# Patient Record
Sex: FEMALE | Race: OTHER | HISPANIC OR LATINO | URBAN - METROPOLITAN AREA
[De-identification: names, ages, dates, MRNs, and addresses within clinical notes are randomized per-mention and may not be internally consistent; named-entity substitution may affect disease eponyms.]

---

## 2017-10-16 ENCOUNTER — INPATIENT (INPATIENT)
Facility: HOSPITAL | Age: 57
LOS: 45 days | Discharge: HOME | DRG: 871 | End: 2017-12-01
Attending: SURGERY | Admitting: SURGERY
Payer: MEDICARE

## 2017-10-16 VITALS
RESPIRATION RATE: 30 BRPM | OXYGEN SATURATION: 97 % | TEMPERATURE: 98 F | HEART RATE: 70 BPM | SYSTOLIC BLOOD PRESSURE: 78 MMHG | DIASTOLIC BLOOD PRESSURE: 61 MMHG

## 2017-10-16 DIAGNOSIS — Z90.49 ACQUIRED ABSENCE OF OTHER SPECIFIED PARTS OF DIGESTIVE TRACT: Chronic | ICD-10-CM

## 2017-10-16 DIAGNOSIS — K81.9 CHOLECYSTITIS, UNSPECIFIED: ICD-10-CM

## 2017-10-16 DIAGNOSIS — N20.0 CALCULUS OF KIDNEY: Chronic | ICD-10-CM

## 2017-10-16 LAB
ALBUMIN SERPL ELPH-MCNC: 4.4 G/DL — SIGNIFICANT CHANGE UP (ref 3.3–5)
ALP SERPL-CCNC: 187 U/L — HIGH (ref 40–120)
ALT FLD-CCNC: 67 U/L RC — HIGH (ref 10–45)
ANION GAP SERPL CALC-SCNC: 18 MMOL/L — HIGH (ref 5–17)
APPEARANCE UR: CLEAR — SIGNIFICANT CHANGE UP
APTT BLD: 31.4 SEC — SIGNIFICANT CHANGE UP (ref 27.5–37.4)
AST SERPL-CCNC: 118 U/L — HIGH (ref 10–40)
BASOPHILS # BLD AUTO: 0.1 K/UL — SIGNIFICANT CHANGE UP (ref 0–0.2)
BASOPHILS NFR BLD AUTO: 0.7 % — SIGNIFICANT CHANGE UP (ref 0–2)
BILIRUB SERPL-MCNC: 0.6 MG/DL — SIGNIFICANT CHANGE UP (ref 0.2–1.2)
BILIRUB UR-MCNC: NEGATIVE — SIGNIFICANT CHANGE UP
BLD GP AB SCN SERPL QL: NEGATIVE — SIGNIFICANT CHANGE UP
BUN SERPL-MCNC: 21 MG/DL — SIGNIFICANT CHANGE UP (ref 7–23)
CALCIUM SERPL-MCNC: 10.1 MG/DL — SIGNIFICANT CHANGE UP (ref 8.4–10.5)
CHLORIDE SERPL-SCNC: 103 MMOL/L — SIGNIFICANT CHANGE UP (ref 96–108)
CO2 SERPL-SCNC: 23 MMOL/L — SIGNIFICANT CHANGE UP (ref 22–31)
COLOR SPEC: SIGNIFICANT CHANGE UP
CREAT SERPL-MCNC: 0.92 MG/DL — SIGNIFICANT CHANGE UP (ref 0.5–1.3)
DIFF PNL FLD: NEGATIVE — SIGNIFICANT CHANGE UP
EOSINOPHIL # BLD AUTO: 0.4 K/UL — SIGNIFICANT CHANGE UP (ref 0–0.5)
EOSINOPHIL NFR BLD AUTO: 2.4 % — SIGNIFICANT CHANGE UP (ref 0–6)
EPI CELLS # UR: SIGNIFICANT CHANGE UP /HPF
GAS PNL BLDV: SIGNIFICANT CHANGE UP
GAS PNL BLDV: SIGNIFICANT CHANGE UP
GLUCOSE SERPL-MCNC: 167 MG/DL — HIGH (ref 70–99)
GLUCOSE UR QL: NEGATIVE — SIGNIFICANT CHANGE UP
HCG SERPL-ACNC: <2 MIU/ML — SIGNIFICANT CHANGE UP
HCT VFR BLD CALC: 48.8 % — HIGH (ref 34.5–45)
HGB BLD-MCNC: 16 G/DL — HIGH (ref 11.5–15.5)
INR BLD: 0.95 RATIO — SIGNIFICANT CHANGE UP (ref 0.88–1.16)
KETONES UR-MCNC: NEGATIVE — SIGNIFICANT CHANGE UP
LEUKOCYTE ESTERASE UR-ACNC: NEGATIVE — SIGNIFICANT CHANGE UP
LIDOCAIN IGE QN: 8820 U/L — HIGH (ref 7–60)
LYMPHOCYTES # BLD AUTO: 23.7 % — SIGNIFICANT CHANGE UP (ref 13–44)
LYMPHOCYTES # BLD AUTO: 4 K/UL — HIGH (ref 1–3.3)
MCHC RBC-ENTMCNC: 29.4 PG — SIGNIFICANT CHANGE UP (ref 27–34)
MCHC RBC-ENTMCNC: 32.8 GM/DL — SIGNIFICANT CHANGE UP (ref 32–36)
MCV RBC AUTO: 89.5 FL — SIGNIFICANT CHANGE UP (ref 80–100)
MONOCYTES # BLD AUTO: 0.5 K/UL — SIGNIFICANT CHANGE UP (ref 0–0.9)
MONOCYTES NFR BLD AUTO: 3.1 % — SIGNIFICANT CHANGE UP (ref 2–14)
NEUTROPHILS # BLD AUTO: 11.8 K/UL — HIGH (ref 1.8–7.4)
NEUTROPHILS NFR BLD AUTO: 70 % — SIGNIFICANT CHANGE UP (ref 43–77)
NITRITE UR-MCNC: NEGATIVE — SIGNIFICANT CHANGE UP
PH UR: 7 — SIGNIFICANT CHANGE UP (ref 5–8)
PLATELET # BLD AUTO: 589 K/UL — HIGH (ref 150–400)
POTASSIUM SERPL-MCNC: 3.7 MMOL/L — SIGNIFICANT CHANGE UP (ref 3.5–5.3)
POTASSIUM SERPL-SCNC: 3.7 MMOL/L — SIGNIFICANT CHANGE UP (ref 3.5–5.3)
PROT SERPL-MCNC: 7.7 G/DL — SIGNIFICANT CHANGE UP (ref 6–8.3)
PROT UR-MCNC: SIGNIFICANT CHANGE UP
PROTHROM AB SERPL-ACNC: 10.2 SEC — SIGNIFICANT CHANGE UP (ref 9.8–12.7)
RBC # BLD: 5.45 M/UL — HIGH (ref 3.8–5.2)
RBC # FLD: 12.2 % — SIGNIFICANT CHANGE UP (ref 10.3–14.5)
RBC CASTS # UR COMP ASSIST: SIGNIFICANT CHANGE UP /HPF (ref 0–2)
RH IG SCN BLD-IMP: POSITIVE — SIGNIFICANT CHANGE UP
RH IG SCN BLD-IMP: POSITIVE — SIGNIFICANT CHANGE UP
SODIUM SERPL-SCNC: 144 MMOL/L — SIGNIFICANT CHANGE UP (ref 135–145)
SP GR SPEC: >1.03 — HIGH (ref 1.01–1.02)
UROBILINOGEN FLD QL: NEGATIVE — SIGNIFICANT CHANGE UP
WBC # BLD: 16.9 K/UL — HIGH (ref 3.8–10.5)
WBC # FLD AUTO: 16.9 K/UL — HIGH (ref 3.8–10.5)
WBC UR QL: NEGATIVE /HPF — SIGNIFICANT CHANGE UP (ref 0–5)

## 2017-10-16 PROCEDURE — 99285 EMERGENCY DEPT VISIT HI MDM: CPT | Mod: 25

## 2017-10-16 PROCEDURE — 74183 MRI ABD W/O CNTR FLWD CNTR: CPT | Mod: 26

## 2017-10-16 PROCEDURE — 71010: CPT | Mod: 26

## 2017-10-16 PROCEDURE — 99232 SBSQ HOSP IP/OBS MODERATE 35: CPT

## 2017-10-16 PROCEDURE — 76705 ECHO EXAM OF ABDOMEN: CPT | Mod: 26,RT

## 2017-10-16 PROCEDURE — 74177 CT ABD & PELVIS W/CONTRAST: CPT | Mod: 26

## 2017-10-16 RX ORDER — KETOROLAC TROMETHAMINE 30 MG/ML
30 SYRINGE (ML) INJECTION DAILY
Qty: 0 | Refills: 0 | Status: DISCONTINUED | OUTPATIENT
Start: 2017-10-16 | End: 2017-10-16

## 2017-10-16 RX ORDER — ONDANSETRON 8 MG/1
4 TABLET, FILM COATED ORAL ONCE
Qty: 0 | Refills: 0 | Status: COMPLETED | OUTPATIENT
Start: 2017-10-16 | End: 2017-10-16

## 2017-10-16 RX ORDER — ENOXAPARIN SODIUM 100 MG/ML
40 INJECTION SUBCUTANEOUS DAILY
Qty: 0 | Refills: 0 | Status: DISCONTINUED | OUTPATIENT
Start: 2017-10-16 | End: 2017-10-19

## 2017-10-16 RX ORDER — HYDROMORPHONE HYDROCHLORIDE 2 MG/ML
0.5 INJECTION INTRAMUSCULAR; INTRAVENOUS; SUBCUTANEOUS ONCE
Qty: 0 | Refills: 0 | Status: DISCONTINUED | OUTPATIENT
Start: 2017-10-16 | End: 2017-10-16

## 2017-10-16 RX ORDER — CEFOTETAN DISODIUM 1 G
2 VIAL (EA) INJECTION EVERY 12 HOURS
Qty: 0 | Refills: 0 | Status: DISCONTINUED | OUTPATIENT
Start: 2017-10-16 | End: 2017-10-17

## 2017-10-16 RX ORDER — SODIUM CHLORIDE 9 MG/ML
1000 INJECTION INTRAMUSCULAR; INTRAVENOUS; SUBCUTANEOUS ONCE
Qty: 0 | Refills: 0 | Status: COMPLETED | OUTPATIENT
Start: 2017-10-16 | End: 2017-10-16

## 2017-10-16 RX ORDER — HYDROMORPHONE HYDROCHLORIDE 2 MG/ML
1 INJECTION INTRAMUSCULAR; INTRAVENOUS; SUBCUTANEOUS ONCE
Qty: 0 | Refills: 0 | Status: DISCONTINUED | OUTPATIENT
Start: 2017-10-16 | End: 2017-10-16

## 2017-10-16 RX ORDER — FENTANYL CITRATE 50 UG/ML
100 INJECTION INTRAVENOUS ONCE
Qty: 0 | Refills: 0 | Status: DISCONTINUED | OUTPATIENT
Start: 2017-10-16 | End: 2017-10-16

## 2017-10-16 RX ORDER — HYDROMORPHONE HYDROCHLORIDE 2 MG/ML
1 INJECTION INTRAMUSCULAR; INTRAVENOUS; SUBCUTANEOUS EVERY 4 HOURS
Qty: 0 | Refills: 0 | Status: DISCONTINUED | OUTPATIENT
Start: 2017-10-16 | End: 2017-10-17

## 2017-10-16 RX ORDER — AMLODIPINE BESYLATE 2.5 MG/1
5 TABLET ORAL DAILY
Qty: 0 | Refills: 0 | Status: DISCONTINUED | OUTPATIENT
Start: 2017-10-16 | End: 2017-10-17

## 2017-10-16 RX ORDER — SODIUM CHLORIDE 9 MG/ML
1000 INJECTION INTRAMUSCULAR; INTRAVENOUS; SUBCUTANEOUS
Qty: 0 | Refills: 0 | Status: DISCONTINUED | OUTPATIENT
Start: 2017-10-16 | End: 2017-10-17

## 2017-10-16 RX ORDER — PIPERACILLIN AND TAZOBACTAM 4; .5 G/20ML; G/20ML
3.38 INJECTION, POWDER, LYOPHILIZED, FOR SOLUTION INTRAVENOUS ONCE
Qty: 0 | Refills: 0 | Status: COMPLETED | OUTPATIENT
Start: 2017-10-16 | End: 2017-10-16

## 2017-10-16 RX ORDER — HYDROMORPHONE HYDROCHLORIDE 2 MG/ML
0.5 INJECTION INTRAMUSCULAR; INTRAVENOUS; SUBCUTANEOUS EVERY 4 HOURS
Qty: 0 | Refills: 0 | Status: DISCONTINUED | OUTPATIENT
Start: 2017-10-16 | End: 2017-10-17

## 2017-10-16 RX ORDER — SODIUM CHLORIDE 9 MG/ML
1000 INJECTION, SOLUTION INTRAVENOUS
Qty: 0 | Refills: 0 | Status: DISCONTINUED | OUTPATIENT
Start: 2017-10-16 | End: 2017-10-16

## 2017-10-16 RX ORDER — ACETAMINOPHEN 500 MG
1000 TABLET ORAL ONCE
Qty: 0 | Refills: 0 | Status: COMPLETED | OUTPATIENT
Start: 2017-10-16 | End: 2017-10-17

## 2017-10-16 RX ORDER — KETOROLAC TROMETHAMINE 30 MG/ML
30 SYRINGE (ML) INJECTION EVERY 6 HOURS
Qty: 0 | Refills: 0 | Status: DISCONTINUED | OUTPATIENT
Start: 2017-10-16 | End: 2017-10-17

## 2017-10-16 RX ORDER — KETOROLAC TROMETHAMINE 30 MG/ML
30 SYRINGE (ML) INJECTION ONCE
Qty: 0 | Refills: 0 | Status: DISCONTINUED | OUTPATIENT
Start: 2017-10-16 | End: 2017-10-16

## 2017-10-16 RX ADMIN — HYDROMORPHONE HYDROCHLORIDE 1 MILLIGRAM(S): 2 INJECTION INTRAMUSCULAR; INTRAVENOUS; SUBCUTANEOUS at 10:43

## 2017-10-16 RX ADMIN — HYDROMORPHONE HYDROCHLORIDE 0.5 MILLIGRAM(S): 2 INJECTION INTRAMUSCULAR; INTRAVENOUS; SUBCUTANEOUS at 12:06

## 2017-10-16 RX ADMIN — HYDROMORPHONE HYDROCHLORIDE 1 MILLIGRAM(S): 2 INJECTION INTRAMUSCULAR; INTRAVENOUS; SUBCUTANEOUS at 16:30

## 2017-10-16 RX ADMIN — HYDROMORPHONE HYDROCHLORIDE 0.5 MILLIGRAM(S): 2 INJECTION INTRAMUSCULAR; INTRAVENOUS; SUBCUTANEOUS at 17:50

## 2017-10-16 RX ADMIN — HYDROMORPHONE HYDROCHLORIDE 1 MILLIGRAM(S): 2 INJECTION INTRAMUSCULAR; INTRAVENOUS; SUBCUTANEOUS at 20:48

## 2017-10-16 RX ADMIN — Medication 30 MILLIGRAM(S): at 18:39

## 2017-10-16 RX ADMIN — HYDROMORPHONE HYDROCHLORIDE 0.5 MILLIGRAM(S): 2 INJECTION INTRAMUSCULAR; INTRAVENOUS; SUBCUTANEOUS at 18:38

## 2017-10-16 RX ADMIN — SODIUM CHLORIDE 100 MILLILITER(S): 9 INJECTION INTRAMUSCULAR; INTRAVENOUS; SUBCUTANEOUS at 15:30

## 2017-10-16 RX ADMIN — ONDANSETRON 4 MILLIGRAM(S): 8 TABLET, FILM COATED ORAL at 06:12

## 2017-10-16 RX ADMIN — ENOXAPARIN SODIUM 40 MILLIGRAM(S): 100 INJECTION SUBCUTANEOUS at 12:40

## 2017-10-16 RX ADMIN — FENTANYL CITRATE 100 MICROGRAM(S): 50 INJECTION INTRAVENOUS at 06:13

## 2017-10-16 RX ADMIN — HYDROMORPHONE HYDROCHLORIDE 0.5 MILLIGRAM(S): 2 INJECTION INTRAMUSCULAR; INTRAVENOUS; SUBCUTANEOUS at 06:14

## 2017-10-16 RX ADMIN — HYDROMORPHONE HYDROCHLORIDE 0.5 MILLIGRAM(S): 2 INJECTION INTRAMUSCULAR; INTRAVENOUS; SUBCUTANEOUS at 17:15

## 2017-10-16 RX ADMIN — Medication 30 MILLIGRAM(S): at 21:49

## 2017-10-16 RX ADMIN — SODIUM CHLORIDE 2000 MILLILITER(S): 9 INJECTION INTRAMUSCULAR; INTRAVENOUS; SUBCUTANEOUS at 06:50

## 2017-10-16 RX ADMIN — HYDROMORPHONE HYDROCHLORIDE 1 MILLIGRAM(S): 2 INJECTION INTRAMUSCULAR; INTRAVENOUS; SUBCUTANEOUS at 20:33

## 2017-10-16 RX ADMIN — HYDROMORPHONE HYDROCHLORIDE 0.5 MILLIGRAM(S): 2 INJECTION INTRAMUSCULAR; INTRAVENOUS; SUBCUTANEOUS at 22:05

## 2017-10-16 RX ADMIN — PIPERACILLIN AND TAZOBACTAM 200 GRAM(S): 4; .5 INJECTION, POWDER, LYOPHILIZED, FOR SOLUTION INTRAVENOUS at 07:15

## 2017-10-16 RX ADMIN — HYDROMORPHONE HYDROCHLORIDE 0.5 MILLIGRAM(S): 2 INJECTION INTRAMUSCULAR; INTRAVENOUS; SUBCUTANEOUS at 21:49

## 2017-10-16 RX ADMIN — SODIUM CHLORIDE 1000 MILLILITER(S): 9 INJECTION INTRAMUSCULAR; INTRAVENOUS; SUBCUTANEOUS at 05:52

## 2017-10-16 RX ADMIN — HYDROMORPHONE HYDROCHLORIDE 0.5 MILLIGRAM(S): 2 INJECTION INTRAMUSCULAR; INTRAVENOUS; SUBCUTANEOUS at 07:56

## 2017-10-16 RX ADMIN — HYDROMORPHONE HYDROCHLORIDE 0.5 MILLIGRAM(S): 2 INJECTION INTRAMUSCULAR; INTRAVENOUS; SUBCUTANEOUS at 06:34

## 2017-10-16 RX ADMIN — AMLODIPINE BESYLATE 5 MILLIGRAM(S): 2.5 TABLET ORAL at 12:35

## 2017-10-16 RX ADMIN — HYDROMORPHONE HYDROCHLORIDE 1 MILLIGRAM(S): 2 INJECTION INTRAMUSCULAR; INTRAVENOUS; SUBCUTANEOUS at 15:30

## 2017-10-16 RX ADMIN — HYDROMORPHONE HYDROCHLORIDE 0.5 MILLIGRAM(S): 2 INJECTION INTRAMUSCULAR; INTRAVENOUS; SUBCUTANEOUS at 12:36

## 2017-10-16 RX ADMIN — HYDROMORPHONE HYDROCHLORIDE 0.5 MILLIGRAM(S): 2 INJECTION INTRAMUSCULAR; INTRAVENOUS; SUBCUTANEOUS at 05:52

## 2017-10-16 RX ADMIN — HYDROMORPHONE HYDROCHLORIDE 0.5 MILLIGRAM(S): 2 INJECTION INTRAMUSCULAR; INTRAVENOUS; SUBCUTANEOUS at 09:35

## 2017-10-16 RX ADMIN — HYDROMORPHONE HYDROCHLORIDE 0.5 MILLIGRAM(S): 2 INJECTION INTRAMUSCULAR; INTRAVENOUS; SUBCUTANEOUS at 19:08

## 2017-10-16 RX ADMIN — Medication 30 MILLIGRAM(S): at 19:09

## 2017-10-16 RX ADMIN — Medication 30 MILLIGRAM(S): at 22:05

## 2017-10-16 RX ADMIN — HYDROMORPHONE HYDROCHLORIDE 0.5 MILLIGRAM(S): 2 INJECTION INTRAMUSCULAR; INTRAVENOUS; SUBCUTANEOUS at 07:15

## 2017-10-16 RX ADMIN — SODIUM CHLORIDE 100 MILLILITER(S): 9 INJECTION INTRAMUSCULAR; INTRAVENOUS; SUBCUTANEOUS at 12:36

## 2017-10-16 RX ADMIN — FENTANYL CITRATE 100 MICROGRAM(S): 50 INJECTION INTRAVENOUS at 06:14

## 2017-10-16 RX ADMIN — Medication 100 GRAM(S): at 20:36

## 2017-10-16 RX ADMIN — HYDROMORPHONE HYDROCHLORIDE 1 MILLIGRAM(S): 2 INJECTION INTRAMUSCULAR; INTRAVENOUS; SUBCUTANEOUS at 13:23

## 2017-10-16 NOTE — ED PROVIDER NOTE - PHYSICAL EXAMINATION
Physical Exam: middle aged F who is in severe distress, writhing around in pain, AAOx3, NCAT, MMM, neck is supple, PERRL, CTAB, normal rate and regular rhythm, abdomen is nondistended, but significantly TTP diffusely worse in the RUQ and epigastrium, + sandoval sign, No edema, No deformity of extremities, No rashes, CN grossly intact, No focal motor or sensory deficits. ~ John Lyon MD

## 2017-10-16 NOTE — H&P ADULT - NSHPLABSRESULTS_GEN_ALL_CORE
CBC (10-16 @ 05:46)                              16.0<H>                         16.9<H>  )----------------(  589<H>     70.0  % Neutrophils, 23.7  % Lymphocytes, ANC: 11.8<H>                              48.8<H>                BMP (10-16 @ 05:46)             144     |  103     |  21    		Ca++ --      Ca 10.1               ---------------------------------( 167<H>		Mg --                 3.7     |  23      |  0.92  			Ph --        LFTs (10-16 @ 05:46)      TPro 7.7 / Alb 4.4 / TBili 0.6 / DBili 0.2 / <H> / ALT 67<H> / AlkPhos 187<H>    Coags (10-16 @ 05:46)  aPTT 31.4 / INR 0.95 / PT 10.2    ABG (10-16 @ 05:46)      /  /  /  /  / %     Lactate:   2.5<H>    VBG (10-16 @ 05:46)     7.45 / 38 / 17<L> / 26 / 2.5<H> / 22<L>%      IMAGING:  < from: US Abdomen Upper Quadrant Right (10.16.17 @ 06:31) >    FINDINGS:    Liver: Within normal limits.  Bile ducts: Normal caliber. Common duct measures 7 mm, top normal.   Gallbladder: Physiologically distended distended and contains multiple   shadowing stones. Gallbladder wall thickening measuring 5 mm. A   sonographic Conklin sign was elicited    Pancreas: Visualized portions are within normal limits.  Right kidney: 10.5 cm in length without hydronephrosis or shadowing   stones.  Ascites: None.  IVC/Aorta: Visualized portions are within normal limits.    < end of copied text >    Prelim read on CT scan sig for pancreatitis w/ 7mm CBD. Will f/u final read.

## 2017-10-16 NOTE — ED ADULT NURSE REASSESSMENT NOTE - NS ED NURSE REASSESS COMMENT FT1
Pt voided clear yellow urine without difficulty, specimen sent to lab. Pt removed from bedpan and cleaned.

## 2017-10-16 NOTE — H&P ADULT - ASSESSMENT
57yF w/ Gallstone pancreatitis    - Admit to ACS service, Dr. Beltran  - NPO/IVF  - IV Dilaudid PRN for pain  - will trend LFTs and Lipase  - if worsening of LFTs or lipase will obtain MRCP and GI consult  - VTE ppx  - Pt will require eventual Lap kamran once pancreatitis has resolved  - Plan discussed w/ Dr. Beltran  - Please page [phone] w/ any questions

## 2017-10-16 NOTE — H&P ADULT - ATTENDING COMMENTS
57F with interstitial edematous gallstone pancreatitis. no pancreatic necrosis on CT scan.    Non-infectious SIRS secondary to acute pancreatitis is present on admission  Lactic acidosis  -repeat lactate to assure that she has received adequate fluid resuscitation    r/o choledocholithiasis (CBD = 8mm on CT scan and Alk Phos = 187)  -MRCP    Pain from acute pancreatitis  -hydromorphone IV    Acute cholecystitis based on U/S  -ABx    Will admit to surgery and plan cholecystectomy prior to discharge home to prevent early recurrence of gallstone pancreatitis. 57F with interstitial edematous gallstone pancreatitis. no pancreatic necrosis on CT scan.  Seen and examined in Emergency Department 10/16/2017 @ 1130 AM.  Assessment and plan reviewed with the patient and her spouse at bedside.    Non-infectious SIRS secondary to acute pancreatitis is present on admission  Lactic acidosis  -repeat lactate to assure that she has received adequate fluid resuscitation    r/o choledocholithiasis (CBD = 8mm on CT scan and Alk Phos = 187)  -MRCP    Pain from acute pancreatitis  -hydromorphone IV    Acute cholecystitis based on U/S  -ABx    Will admit to surgery and plan cholecystectomy prior to discharge home to prevent early recurrence of gallstone pancreatitis.

## 2017-10-16 NOTE — ED PROVIDER NOTE - ATTENDING CONTRIBUTION TO CARE
Attending MD Acuna:  I personally have seen and examined this patient.  Resident note reviewed and agree on plan of care and except where noted.  See MDM for details.

## 2017-10-16 NOTE — ED ADULT NURSE NOTE - OBJECTIVE STATEMENT
57 y.o female aaox3 ambulatory came to ED from home accompanied by spouse with c/o acute abdominal pain and vomiting started few hours ago, h/o cholelithisis, appendicitis and appendectomy. Hypotensive in triage. Abd tender to palpation, non distended. denies any blood in urine or stool, no diarrhea or constipation.

## 2017-10-16 NOTE — ED PROVIDER NOTE - MEDICAL DECISION MAKING DETAILS
John Lyon MD (resident): severe abd pain w/ initial VS showing hypotension. initial concern for perforated viscus, ruptured AAA, so POCUS FAST, RUQ and Aorta was performed that showed no free fluid in abd, + gallstones w/ + sono sandoval, and limited aorta examination. Pt's BP improved w/o Tx, pain improved w/ dilaudid. Will get official U/S and a CT of Abd. John Lyon MD (resident): severe abd pain w/ initial VS showing hypotension. initial concern for perforated viscus, ruptured AAA, so POCUS FAST, RUQ and Aorta was performed that showed no free fluid in abd, + gallstones w/ + sono sandoval, and limited aorta examination. Pt's BP improved w/o Tx, pain improved w/ dilaudid. Will get official U/S and a CT of Abd.    Kalpana VICK: 58 y/o female with PMH of Kidney stone here with severe abd pain that woke her up from sleep. As per smith RN patient had one episode of SBP in the 80's and thus she was rushed to a critical room. In the room patient is animated complaining of severe diffuse abd pain 's and no resp distress observed. Endorses nausea and NBNB vomiting today. Denies trauma, diarrhea, melena, alcohol abuse, IV drug use, CP, SOB, fever, back pain, focal weakness, HA, cough or recent hopsitalization. Exam shows a female in severe distress with guarding in abd diffusely and also diffuse tenderness. No abd distention observed. Normal skin. Moves all extremities. Consider Ruptured hollow viscus, Aortic dissection or AAA, pancreatitis, PUD, Cholecystits, Cholangitis, Diverticulaitis. Plan CBC, CMP, Lipase, VBG, CXR, EKG, CT abd and RUQ US. POCUS of abdomen was done and multiple gallbladder stones were found with possible CBD dilatation. Patient to be admitted.

## 2017-10-16 NOTE — H&P ADULT - HISTORY OF PRESENT ILLNESS
57yF w/ PMH sig for HTN and nephrolithiasis, s/p lap appy. Pt presented to [facility] ED on 10/16/2017 c/o acute onset severe sharp stabbing upper abdominal pain that radiates to the back. She says the pain woke her up in the middle of the night. She is uncertain exactly what time it was. She is also c/o nausea and NBNB emesis x3 episodes. Last PO intake was 10PM the night PTA. She has never experienced a pain like this before. She denies F/C, CP, SoB, palpitations, diarrhea, constipation, hematuria, or dysuria.

## 2017-10-16 NOTE — ED PROVIDER NOTE - PROGRESS NOTE DETAILS
Richard: Assumed care at 8am. Patient seen and evaluated. Patient in considerable pain. CT scan being done for the pain severity. Lipase>8000, kamran. Surgery involved at every step.

## 2017-10-16 NOTE — ED PROVIDER NOTE - OBJECTIVE STATEMENT
John Lyon MD (resident): 57 F w/ Hx of appendectomy and kidney stones who p/w severe abd pain that woke her up from sleep, generalized, 10/10 in severity. Pt has not had similar pain in the past. Unable to localize the pain. Does not feel like her kidney stone pain. Vomiting x2, reported as yellow/green fluid. Last BM was at 7 pm.     PMD: [place]

## 2017-10-16 NOTE — H&P ADULT - NSHPPHYSICALEXAM_GEN_ALL_CORE
Gen: WD, WN, writhing in pain  HEENT: PERRLA, EOMI. Oropharynx clear.  Neck: Supple, no JVD, no thyromegaly.  Lungs: CTA B/L.  Heart: RRR, S1 S2, No m/r/g  Abd: Soft, ND, TTP epigastrium and RUQ, No HSM, No rebound or guarding  Ext: WWP. No clubbing, cyanosis, or edema.  Neuro: AAOx3. CN II-XII grossly intact, No focal deficits Gen: WD, WN, writhing in pain  HEENT: PERRLA, EOMI. Oropharynx clear.  Neck: Supple, no JVD, no thyromegaly.  Lungs: CTA B/L.  Heart: RRR, S1 S2, No m/r/g  Abd: Soft, ND, TTP epigastrium and RUQ, No HSM, Guarding, No rebound.  Ext: WWP. No clubbing, cyanosis, or edema.  Neuro: AAOx3. CN II-XII grossly intact, No focal deficits

## 2017-10-17 LAB
APTT BLD: 32.2 SEC — SIGNIFICANT CHANGE UP (ref 27.5–37.4)
CA-I BLD-SCNC: 1.1 MMOL/L — LOW (ref 1.12–1.3)
GAS PNL BLDV: SIGNIFICANT CHANGE UP
HCT VFR BLD CALC: 44 % — SIGNIFICANT CHANGE UP (ref 34.5–45)
HGB BLD-MCNC: 15.2 G/DL — SIGNIFICANT CHANGE UP (ref 11.5–15.5)
INR BLD: 1.12 RATIO — SIGNIFICANT CHANGE UP (ref 0.88–1.16)
MAGNESIUM SERPL-MCNC: 1.5 MG/DL — LOW (ref 1.6–2.6)
MCHC RBC-ENTMCNC: 31.1 PG — SIGNIFICANT CHANGE UP (ref 27–34)
MCHC RBC-ENTMCNC: 34.6 GM/DL — SIGNIFICANT CHANGE UP (ref 32–36)
MCV RBC AUTO: 89.9 FL — SIGNIFICANT CHANGE UP (ref 80–100)
PHOSPHATE SERPL-MCNC: 3.9 MG/DL — SIGNIFICANT CHANGE UP (ref 2.5–4.5)
PLATELET # BLD AUTO: 417 K/UL — HIGH (ref 150–400)
PROTHROM AB SERPL-ACNC: 12.2 SEC — SIGNIFICANT CHANGE UP (ref 9.8–12.7)
RBC # BLD: 4.89 M/UL — SIGNIFICANT CHANGE UP (ref 3.8–5.2)
RBC # FLD: 12.5 % — SIGNIFICANT CHANGE UP (ref 10.3–14.5)
WBC # BLD: 13.7 K/UL — HIGH (ref 3.8–10.5)
WBC # FLD AUTO: 13.7 K/UL — HIGH (ref 3.8–10.5)

## 2017-10-17 PROCEDURE — 99222 1ST HOSP IP/OBS MODERATE 55: CPT | Mod: 25,GC

## 2017-10-17 PROCEDURE — 99291 CRITICAL CARE FIRST HOUR: CPT

## 2017-10-17 PROCEDURE — 43274 ERCP DUCT STENT PLACEMENT: CPT | Mod: GC

## 2017-10-17 PROCEDURE — 74328 X-RAY BILE DUCT ENDOSCOPY: CPT | Mod: 26,GC

## 2017-10-17 RX ORDER — ONDANSETRON 8 MG/1
4 TABLET, FILM COATED ORAL ONCE
Qty: 0 | Refills: 0 | Status: COMPLETED | OUTPATIENT
Start: 2017-10-17 | End: 2017-10-17

## 2017-10-17 RX ORDER — HYDROMORPHONE HYDROCHLORIDE 2 MG/ML
2 INJECTION INTRAMUSCULAR; INTRAVENOUS; SUBCUTANEOUS EVERY 4 HOURS
Qty: 0 | Refills: 0 | Status: DISCONTINUED | OUTPATIENT
Start: 2017-10-17 | End: 2017-10-17

## 2017-10-17 RX ORDER — HYDROMORPHONE HYDROCHLORIDE 2 MG/ML
30 INJECTION INTRAMUSCULAR; INTRAVENOUS; SUBCUTANEOUS
Qty: 0 | Refills: 0 | Status: DISCONTINUED | OUTPATIENT
Start: 2017-10-17 | End: 2017-10-19

## 2017-10-17 RX ORDER — ACETAMINOPHEN 500 MG
1000 TABLET ORAL ONCE
Qty: 0 | Refills: 0 | Status: COMPLETED | OUTPATIENT
Start: 2017-10-17 | End: 2017-10-17

## 2017-10-17 RX ORDER — HYDROMORPHONE HYDROCHLORIDE 2 MG/ML
0.5 INJECTION INTRAMUSCULAR; INTRAVENOUS; SUBCUTANEOUS EVERY 4 HOURS
Qty: 0 | Refills: 0 | Status: DISCONTINUED | OUTPATIENT
Start: 2017-10-17 | End: 2017-10-17

## 2017-10-17 RX ORDER — CALCIUM GLUCONATE 100 MG/ML
2 VIAL (ML) INTRAVENOUS ONCE
Qty: 0 | Refills: 0 | Status: COMPLETED | OUTPATIENT
Start: 2017-10-17 | End: 2017-10-17

## 2017-10-17 RX ORDER — HYDROMORPHONE HYDROCHLORIDE 2 MG/ML
1 INJECTION INTRAMUSCULAR; INTRAVENOUS; SUBCUTANEOUS EVERY 4 HOURS
Qty: 0 | Refills: 0 | Status: DISCONTINUED | OUTPATIENT
Start: 2017-10-17 | End: 2017-10-17

## 2017-10-17 RX ORDER — HYDROMORPHONE HYDROCHLORIDE 2 MG/ML
1 INJECTION INTRAMUSCULAR; INTRAVENOUS; SUBCUTANEOUS
Qty: 0 | Refills: 0 | Status: DISCONTINUED | OUTPATIENT
Start: 2017-10-17 | End: 2017-10-17

## 2017-10-17 RX ORDER — PIPERACILLIN AND TAZOBACTAM 4; .5 G/20ML; G/20ML
3.38 INJECTION, POWDER, LYOPHILIZED, FOR SOLUTION INTRAVENOUS EVERY 8 HOURS
Qty: 0 | Refills: 0 | Status: DISCONTINUED | OUTPATIENT
Start: 2017-10-17 | End: 2017-10-19

## 2017-10-17 RX ORDER — MAGNESIUM SULFATE 500 MG/ML
2 VIAL (ML) INJECTION ONCE
Qty: 0 | Refills: 0 | Status: COMPLETED | OUTPATIENT
Start: 2017-10-17 | End: 2017-10-17

## 2017-10-17 RX ORDER — SODIUM CHLORIDE 9 MG/ML
1000 INJECTION, SOLUTION INTRAVENOUS
Qty: 0 | Refills: 0 | Status: DISCONTINUED | OUTPATIENT
Start: 2017-10-17 | End: 2017-10-18

## 2017-10-17 RX ORDER — HYDROMORPHONE HYDROCHLORIDE 2 MG/ML
1 INJECTION INTRAMUSCULAR; INTRAVENOUS; SUBCUTANEOUS ONCE
Qty: 0 | Refills: 0 | Status: DISCONTINUED | OUTPATIENT
Start: 2017-10-17 | End: 2017-10-17

## 2017-10-17 RX ORDER — HYDROMORPHONE HYDROCHLORIDE 2 MG/ML
0.5 INJECTION INTRAMUSCULAR; INTRAVENOUS; SUBCUTANEOUS
Qty: 0 | Refills: 0 | Status: DISCONTINUED | OUTPATIENT
Start: 2017-10-17 | End: 2017-10-17

## 2017-10-17 RX ORDER — ONDANSETRON 8 MG/1
4 TABLET, FILM COATED ORAL EVERY 6 HOURS
Qty: 0 | Refills: 0 | Status: DISCONTINUED | OUTPATIENT
Start: 2017-10-17 | End: 2017-10-19

## 2017-10-17 RX ORDER — HYDROMORPHONE HYDROCHLORIDE 2 MG/ML
0.75 INJECTION INTRAMUSCULAR; INTRAVENOUS; SUBCUTANEOUS
Qty: 0 | Refills: 0 | Status: DISCONTINUED | OUTPATIENT
Start: 2017-10-17 | End: 2017-10-19

## 2017-10-17 RX ORDER — NALOXONE HYDROCHLORIDE 4 MG/.1ML
0.1 SPRAY NASAL
Qty: 0 | Refills: 0 | Status: DISCONTINUED | OUTPATIENT
Start: 2017-10-17 | End: 2017-10-19

## 2017-10-17 RX ORDER — POTASSIUM CHLORIDE 20 MEQ
10 PACKET (EA) ORAL
Qty: 0 | Refills: 0 | Status: COMPLETED | OUTPATIENT
Start: 2017-10-17 | End: 2017-10-17

## 2017-10-17 RX ADMIN — HYDROMORPHONE HYDROCHLORIDE 30 MILLILITER(S): 2 INJECTION INTRAMUSCULAR; INTRAVENOUS; SUBCUTANEOUS at 19:13

## 2017-10-17 RX ADMIN — Medication 100 MILLIEQUIVALENT(S): at 05:17

## 2017-10-17 RX ADMIN — Medication 400 MILLIGRAM(S): at 21:29

## 2017-10-17 RX ADMIN — HYDROMORPHONE HYDROCHLORIDE 30 MILLILITER(S): 2 INJECTION INTRAMUSCULAR; INTRAVENOUS; SUBCUTANEOUS at 15:15

## 2017-10-17 RX ADMIN — HYDROMORPHONE HYDROCHLORIDE 1 MILLIGRAM(S): 2 INJECTION INTRAMUSCULAR; INTRAVENOUS; SUBCUTANEOUS at 04:16

## 2017-10-17 RX ADMIN — ENOXAPARIN SODIUM 40 MILLIGRAM(S): 100 INJECTION SUBCUTANEOUS at 18:12

## 2017-10-17 RX ADMIN — HYDROMORPHONE HYDROCHLORIDE 0.5 MILLIGRAM(S): 2 INJECTION INTRAMUSCULAR; INTRAVENOUS; SUBCUTANEOUS at 04:21

## 2017-10-17 RX ADMIN — Medication 50 GRAM(S): at 08:06

## 2017-10-17 RX ADMIN — HYDROMORPHONE HYDROCHLORIDE 0.75 MILLIGRAM(S): 2 INJECTION INTRAMUSCULAR; INTRAVENOUS; SUBCUTANEOUS at 19:32

## 2017-10-17 RX ADMIN — ONDANSETRON 4 MILLIGRAM(S): 8 TABLET, FILM COATED ORAL at 01:17

## 2017-10-17 RX ADMIN — HYDROMORPHONE HYDROCHLORIDE 30 MILLILITER(S): 2 INJECTION INTRAMUSCULAR; INTRAVENOUS; SUBCUTANEOUS at 05:51

## 2017-10-17 RX ADMIN — Medication 100 MILLIEQUIVALENT(S): at 06:17

## 2017-10-17 RX ADMIN — Medication 1000 MILLIGRAM(S): at 01:16

## 2017-10-17 RX ADMIN — Medication 50 GRAM(S): at 06:14

## 2017-10-17 RX ADMIN — HYDROMORPHONE HYDROCHLORIDE 0.5 MILLIGRAM(S): 2 INJECTION INTRAMUSCULAR; INTRAVENOUS; SUBCUTANEOUS at 01:53

## 2017-10-17 RX ADMIN — HYDROMORPHONE HYDROCHLORIDE 1 MILLIGRAM(S): 2 INJECTION INTRAMUSCULAR; INTRAVENOUS; SUBCUTANEOUS at 00:31

## 2017-10-17 RX ADMIN — Medication 400 MILLIGRAM(S): at 10:00

## 2017-10-17 RX ADMIN — PIPERACILLIN AND TAZOBACTAM 25 GRAM(S): 4; .5 INJECTION, POWDER, LYOPHILIZED, FOR SOLUTION INTRAVENOUS at 23:06

## 2017-10-17 RX ADMIN — PIPERACILLIN AND TAZOBACTAM 25 GRAM(S): 4; .5 INJECTION, POWDER, LYOPHILIZED, FOR SOLUTION INTRAVENOUS at 08:06

## 2017-10-17 RX ADMIN — Medication 400 GRAM(S): at 06:14

## 2017-10-17 RX ADMIN — HYDROMORPHONE HYDROCHLORIDE 1 MILLIGRAM(S): 2 INJECTION INTRAMUSCULAR; INTRAVENOUS; SUBCUTANEOUS at 00:46

## 2017-10-17 RX ADMIN — HYDROMORPHONE HYDROCHLORIDE 0.5 MILLIGRAM(S): 2 INJECTION INTRAMUSCULAR; INTRAVENOUS; SUBCUTANEOUS at 02:10

## 2017-10-17 RX ADMIN — Medication 1000 MILLIGRAM(S): at 22:00

## 2017-10-17 RX ADMIN — Medication 50 GRAM(S): at 07:43

## 2017-10-17 RX ADMIN — Medication 100 MILLIEQUIVALENT(S): at 10:21

## 2017-10-17 RX ADMIN — HYDROMORPHONE HYDROCHLORIDE 1 MILLIGRAM(S): 2 INJECTION INTRAMUSCULAR; INTRAVENOUS; SUBCUTANEOUS at 03:48

## 2017-10-17 RX ADMIN — Medication 1000 MILLIGRAM(S): at 10:20

## 2017-10-17 RX ADMIN — HYDROMORPHONE HYDROCHLORIDE 0.75 MILLIGRAM(S): 2 INJECTION INTRAMUSCULAR; INTRAVENOUS; SUBCUTANEOUS at 17:24

## 2017-10-17 RX ADMIN — PIPERACILLIN AND TAZOBACTAM 25 GRAM(S): 4; .5 INJECTION, POWDER, LYOPHILIZED, FOR SOLUTION INTRAVENOUS at 15:46

## 2017-10-17 RX ADMIN — Medication 400 MILLIGRAM(S): at 01:01

## 2017-10-17 RX ADMIN — HYDROMORPHONE HYDROCHLORIDE 30 MILLILITER(S): 2 INJECTION INTRAMUSCULAR; INTRAVENOUS; SUBCUTANEOUS at 07:23

## 2017-10-17 NOTE — PROVIDER CONTACT NOTE (OTHER) - ASSESSMENT
Pt is A&Ox4, able to verbalize understanding. Pt's HR is 110, 92% O2sat on room air, pt is complaining of inadequate pain relief of abdomen, increasing pain in LLQ, nausea, inability to take deep breaths d/t pain, and chest tightness d/t pain. Pt states that she does not feel that the chest tightness radiates anywhere else in the body.

## 2017-10-17 NOTE — PROGRESS NOTE ADULT - ASSESSMENT
57yF w/ Gallstone pancreatitis, choledocolithiasis.  - NPO/IVF  - f/u MRCP: prelim read multiple stones in gallbladder and common bile duct  - f/u GI recs: ERCP when schedule permits (OR if available, otherwise endoscopy unit)\  - Pain control  - VTE ppx  - Pt will require eventual Lap kamran once pancreatitis has resolved

## 2017-10-17 NOTE — PROGRESS NOTE ADULT - SUBJECTIVE AND OBJECTIVE BOX
Pre-Endoscopy Evaluation      Referring Physician: Dr. Woody Beltran                                 Procedure: ERCP    Indication for Procedure: Choledocholithiasis    Pertinent History: 57 year old female with hx of  hypertension, Nephrolithiasis presents to ED with abdominal pain, nausea, and vomiting, abnormal LFT'S. Found to have choledocholithiasis on MRCP    PAST MEDICAL & SURGICAL HISTORY:  Kidney stone  Hypertension  Kidney stones  History of appendectomy      PMH of Gastroparesis [ ]  Gastric Surgery [ ]  Gastric Outlet Obstruction [ ]    Allergies:    No Known Allergies    Intolerances:    Latex allergy: [ ] yes [X] no    Medications:MEDICATIONS  (STANDING):  enoxaparin Injectable 40 milliGRAM(s) SubCutaneous daily  HYDROmorphone PCA (1 mG/mL) 30 milliLiter(s) PCA Continuous PCA Continuous  lactated ringers. 1000 milliLiter(s) (150 mL/Hr) IV Continuous <Continuous>  piperacillin/tazobactam IVPB. 3.375 Gram(s) IV Intermittent every 8 hours    MEDICATIONS  (PRN):  HYDROmorphone  Injectable 0.5 milliGRAM(s) IV Push every 2 hours PRN Severe Pain (7 - 10)  HYDROmorphone  Injectable 1 milliGRAM(s) IV Push every 4 hours PRN severe breakthrough pain  HYDROmorphone PCA (1 mG/mL) Rescue Clinician Bolus 0.75 milliGRAM(s) IV Push every 15 minutes PRN for Pain Scale GREATER THAN 6  naloxone Injectable 0.1 milliGRAM(s) IV Push every 3 minutes PRN For ANY of the following changes in patient status:  A. RR LESS THAN 10 breaths per minute, B. Oxygen saturation LESS THAN 90%, C. Sedation score of 6  ondansetron Injectable 4 milliGRAM(s) IV Push every 6 hours PRN Nausea      Smoking: [ ] yes  [X] no    AICD/PPM: [ ] yes   [X] no    Pertinent lab data:                        15.2   13.7  )-----------( 417      ( 17 Oct 2017 04:12 )             44.0     10-    141  |  106  |  17  ----------------------------<  131<H>  3.7   |  18<L>  |  0.80    Ca    7.6<L>      17 Oct 2017 04:12  Phos  3.9     10-17  Mg     1.5     10-17    TPro  6.5  /  Alb  3.6  /  TBili  0.5  /  DBili  x   /  AST  44<H>  /  ALT  58<H>  /  AlkPhos  141<H>  10-17    PT/INR - ( 17 Oct 2017 04:12 )   PT: 12.2 sec;   INR: 1.12 ratio         PTT - ( 17 Oct 2017 04:12 )  PTT:32.2 sec  CARDIAC MARKERS ( 16 Oct 2017 05:46 )  x     / <0.01 ng/mL / x     / x     / x            HCG Quantitative, Serum: <2.0 mIU/mL (10-16 @ 05:46)    Physical Examination:  Daily Height in cm: 149.86 (16 Oct 2017 21:04)    Daily Weight in k.5 (17 Oct 2017 03:30)  Vital Signs Last 24 Hrs  T(C): 37.4 (17 Oct 2017 07:00), Max: 37.4 (17 Oct 2017 07:00)  T(F): 99.3 (17 Oct 2017 07:00), Max: 99.3 (17 Oct 2017 07:00)  HR: 95 (17 Oct 2017 10:00) (58 - 130)  BP: 118/69 (17 Oct 2017 10:00) (101/67 - 150/84)  BP(mean): 88 (17 Oct 2017 10:00) (79 - 105)  RR: 38 (17 Oct 2017 10:00) (12 - 38)  SpO2: 90% (17 Oct 2017 10:00) (90% - 97%)      Constitutional: NAD    HEENT: PERRLA, EOMI,       Neck:  No JVD    Respiratory: CTAB/L    Cardiovascular: S1 and S2    Gastrointestinal: BS+, soft, NT/ND    Extremities: No peripheral edema    Neurological: A/O x 3, no focal deficits    Psychiatric: Normal mood, normal affect    : No Lara    Skin: No rashes    Comments:    ASA Class: I [ ]  II [ ]  III [X]  IV [ ]    The patient is a suitable candidate for the planned procedure unless box checked [ ]  No, explain:

## 2017-10-17 NOTE — CONSULT NOTE ADULT - SUBJECTIVE AND OBJECTIVE BOX
Chief Complaint:  Patient is a 57y old  Female who presents with a chief complaint of Abdominal pain (16 Oct 2017 09:50)      HPI: 57 year old woman with hypertension presents with abdominal pain, nausea, and vomiting for 2 days, admitted with pancreatitis was found to have choledocholithiasis on MRCP.     She reports severe, diffuse abdominal pain that began early in the morning the day prior to admission ( morning). Her pain is primarily in the upper abdomen, LUQ>RUQ with radiation to the back. The pain worsened throughout the day, and she developed nausea and nonbloody emesis. She denies fever, chills, cough, rash, dysuria. She denies melena, hematochezia, hematemesis. She has not had prior episodes of pain like this, and denies history of gallstones, pancreatitis, cholangitis. She has never had an EGD or colonoscopy. She denies alcohol, tobacco and drug use. She is originally from [place]. She has no family history of GI malignancy or diseases of the pancreas, gallbladder, or biliary tree. She takes aspirin (81 mg) and otherwise denies NSAIDs or anticoagulants.     Since admission she has received 2 L NS and is currently receiving LR at 150 ml/hr. She received cefotetan and was switched to zosyn. She was transferred to the SICU for tachycardia and GI was consulted after MRCP revealed choledocholithiasis.     Allergies:  No Known Allergies      Home Medications:  Patient reports: amlodipine, aspirin    Hospital Medications:  acetaminophen  IVPB. 1000 milliGRAM(s) IV Intermittent once  calcium gluconate IVPB 2 Gram(s) IV Intermittent once  enoxaparin Injectable 40 milliGRAM(s) SubCutaneous daily  HYDROmorphone  Injectable 0.5 milliGRAM(s) IV Push every 2 hours PRN  HYDROmorphone  Injectable 1 milliGRAM(s) IV Push every 4 hours PRN  HYDROmorphone PCA (1 mG/mL) 30 milliLiter(s) PCA Continuous PCA Continuous  HYDROmorphone PCA (1 mG/mL) Rescue Clinician Bolus 0.75 milliGRAM(s) IV Push every 15 minutes PRN  lactated ringers. 1000 milliLiter(s) IV Continuous <Continuous>  magnesium sulfate  IVPB 2 Gram(s) IV Intermittent once  magnesium sulfate  IVPB 2 Gram(s) IV Intermittent once  magnesium sulfate  IVPB 2 Gram(s) IV Intermittent once  naloxone Injectable 0.1 milliGRAM(s) IV Push every 3 minutes PRN  ondansetron Injectable 4 milliGRAM(s) IV Push every 6 hours PRN  piperacillin/tazobactam IVPB. 3.375 Gram(s) IV Intermittent every 8 hours  potassium chloride  10 mEq/100 mL IVPB 10 milliEquivalent(s) IV Intermittent every 1 hour      PMHX/PSHX:  Kidney stone  Hypertension  No pertinent past medical history  Kidney stones  History of appendectomy      Family history:  No pertinent family history in first degree relatives      Social History: As above    Review of systems: Negative, except as otherwise noted above      PHYSICAL EXAM:   Vital Signs:  Vital Signs Last 24 Hrs  T(C): 37 (17 Oct 2017 03:30), Max: 37 (17 Oct 2017 03:30)  T(F): 98.6 (17 Oct 2017 03:30), Max: 98.6 (17 Oct 2017 03:30)  HR: 118 (17 Oct 2017 04:00) (56 - 130)  BP: 118/76 (17 Oct 2017 04:00) (101/79 - 150/84)  BP(mean): 88 (17 Oct 2017 04:00) (88 - 105)  RR: 19 (17 Oct 2017 04:00) (16 - 24)  SpO2: 94% (17 Oct 2017 04:00) (92% - 98%)  Daily Height in cm: 149.86 (16 Oct 2017 21:04)    Daily Weight in k.5 (17 Oct 2017 03:30)    GENERAL:  No acute distress  HEENT:  Anicteric, no thrush  CHEST:  Non-labored breathing, lungs clear b/l  HEART:  +s1, s2 heart sounds, no murmurs  ABDOMEN:  Soft, tender to palpation diffusely, no rebound tenderness  EXTREMITIES:  warm and well perfused, no edema  SKIN:  No rash or jaundice  NEURO:  AAOx3, interactive, following commands        LABS:  CBC Full  -  ( 17 Oct 2017 04:12 )  WBC Count : 13.7 K/uL  Hemoglobin : 15.2 g/dL  Hematocrit : 44.0 %  Platelet Count - Automated : 417 K/uL  Mean Cell Volume : 89.9 fl  Auto Neutrophil # :   Auto Neutrophil % :     10-16  WBC 16.9  70% Neutrophils    10-17 @ 04:12  Na 141 mmol/L  K 3.7 mmol/L  Cl 106 mmol/L  CO2 18 mmol/L  BUN 17 mg/dL  Creat 0.80 mg/dL  Glucose 131 mg/dL  Ca 7.6 mg/dL    Total protein 6.5 g/dL  Albumin 3.6 g/dL  T bili 0.5 mg/dL  Alk phos 141 U/L  AST 44 U/L  ALT 58 U/L RC    10-16 @ 05:46  T bili 0.6 mg/dL  Alk phos 187 U/L   U/L  ALT 67 U/L RC    Lipase 8820      PT/INR - ( 17 Oct 2017 04:12 )   PT: 12.2 sec;   INR: 1.12 ratio         PTT - ( 17 Oct 2017 04:12 )  PTT:32.2 sec      Imaging:    CTAP:  FINDINGS:    LOWER CHEST: Dependent atelectasis.    LIVER: Within normal limits.  BILE DUCTS: No intrahepatic biliary ductal dilatation. The common bile   duct measures up to 7 mm and tapers distally at the level of the pancreas.  GALLBLADDER: Gallbladder wall thickening.  SPLEEN: Within normal limits.  PANCREAS: The pancreas is edematous with moderate peripancreatic fluid   and stranding, compatible with acute pancreatitis. No evidence of   necrosis. No peripancreatic fluid collection.  ADRENALS: Within normal limits.  KIDNEYS/URETERS: No hydronephrosis. Mild nonspecific left perirenal   edema. 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   Additional subcentimeter hypodensities are too small to characterize.    BLADDER: Within normal limits.  REPRODUCTIVE ORGANS: The uterus and adnexa are within normal limits.    BOWEL: Submucosal edema involving the ascending colon and proximal   transverse colon, likely due to reactive inflammation. No bowel   obstruction. Appendix is not visualized, probably surgically absent.  PERITONEUM: Small volume ascites.  VESSELS:  The portal vein, splenic vein, celiac axis, and SMA are patent.  RETROPERITONEUM: No lymphadenopathy.    ABDOMINAL WALL: Within normal limits.  BONES: Mild degenerative changes of the spine.    IMPRESSION:   1.  Acute interstitial edematous pancreatitis. No evidence of necrosis.   2.  Common bile duct borderline enlarged up to 7 mm. Consider MRCP for   further evaluation.      MRCP: prelim read multiple stones in gallbladder and common bile duct

## 2017-10-17 NOTE — PROGRESS NOTE ADULT - SUBJECTIVE AND OBJECTIVE BOX
ATP Team Progress Note    S: Pt seen and examined at bedside. Pt transferred to the SICU yesterday due to tachycardia and pain control.  Denies fever/chills/N/V. Pt complaining of 9/10 pain.     T(C): 37.4 (10-17-17 @ 07:00), Max: 37.4 (10-17-17 @ 07:00)  HR: 97 (10-17-17 @ 09:00) (58 - 130)  BP: 118/72 (10-17-17 @ 09:00) (101/67 - 150/84)  RR: 22 (10-17-17 @ 09:00) (12 - 24)  SpO2: 92% (10-17-17 @ 09:00) (92% - 97%)  Wt(kg): --    10-16 @ 07:01  -  10-17 @ 07:00  --------------------------------------------------------  IN:    IV PiggyBack: 50 mL    lactated ringers.: 450 mL    sodium chloride 0.9%: 1300 mL  Total IN: 1800 mL    OUT:    Indwelling Catheter - Urethral: 450 mL    Voided: 200 mL  Total OUT: 650 mL    Total NET: 1150 mL      10-17 @ 07:01  -  10-17 @ 10:03  --------------------------------------------------------  IN:    IV PiggyBack: 50 mL    lactated ringers.: 300 mL    Solution: 50 mL  Total IN: 400 mL    OUT:    Indwelling Catheter - Urethral: 75 mL  Total OUT: 75 mL    Total NET: 325 mL      PE:   Gen: writhing in pain  Abd: Soft, ND, TTP epigastrium and RUQ, No HSM, Guarding, No rebound.  Ext: WWP. No clubbing, cyanosis, or edema.  Neuro: AAOx3, No focal deficits                        15.2   13.7  )-----------( 417      ( 17 Oct 2017 04:12 )             44.0     10-17    141  |  106  |  17  ----------------------------<  131<H>  3.7   |  18<L>  |  0.80    Ca    7.6<L>      17 Oct 2017 04:12  Phos  3.9     10-17  Mg     1.5     10-17    TPro  6.5  /  Alb  3.6  /  TBili  0.5  /  DBili  x   /  AST  44<H>  /  ALT  58<H>  /  AlkPhos  141<H>  10-17    PT/INR - ( 17 Oct 2017 04:12 )   PT: 12.2 sec;   INR: 1.12 ratio         PTT - ( 17 Oct 2017 04:12 )  PTT:32.2 sec    Urinalysis Basic - ( 16 Oct 2017 12:05 )    Color: PL Yellow / Appearance: Clear / SG: >1.030 / pH: x  Gluc: x / Ketone: Negative  / Bili: Negative / Urobili: Negative   Blood: x / Protein: Trace / Nitrite: Negative   Leuk Esterase: Negative / RBC: 0-2 /HPF / WBC Negative /HPF   Sq Epi: x / Non Sq Epi: OCC /HPF / Bacteria: x

## 2017-10-17 NOTE — CONSULT NOTE ADULT - SUBJECTIVE AND OBJECTIVE BOX
HISTORY OF PRESENT ILLNESS:  57yF w/ PMH sig for HTN and nephrolithiasis, s/p lap appy. Pt presented to [facility] ED on 10/16/2017 c/o acute onset severe sharp stabbing upper abdominal pain that radiates to the back. She says the pain woke her up in the middle of the night. She is uncertain exactly what time it was. She is also c/o nausea and NBNB emesis x3 episodes. Last PO intake was 10PM the night PTA. She has never experienced a pain like this before. She denies F/C, CP, SoB, palpitations, diarrhea, constipation, hematuria, or dysuria. She endorses taking oxycodone for low back pain intermittently.  Over the course of the day, her pain became increasingly severe and diffuse. MRCP was ordered and completed, and showed a distal CBD stone. SICU was consulted as she was tachycardic to the 130s and breaking through dilaudid, tylenol, and toradol. GI was consulted urgently.    PAST MEDICAL HISTORY: Kidney stone  Hypertension  No pertinent past medical history      PAST SURGICAL HISTORY: Kidney stones  History of appendectomy      FAMILY HISTORY: No pertinent family history in first degree relatives      SOCIAL HISTORY: Accompanied by . Nonsmoker    CODE STATUS: Full    HOME MEDICATIONS: Amlodipine    ALLERGIES: No Known Allergies      VITAL SIGNS:  ICU Vital Signs Last 24 Hrs  T(C): 37 (17 Oct 2017 03:30), Max: 37 (17 Oct 2017 03:30)  T(F): 98.6 (17 Oct 2017 03:30), Max: 98.6 (17 Oct 2017 03:30)  HR: 118 (17 Oct 2017 04:00) (56 - 130)  BP: 118/76 (17 Oct 2017 04:00) (78/61 - 150/84)  BP(mean): 88 (17 Oct 2017 04:00) (88 - 105)  ABP: --  ABP(mean): --  RR: 19 (17 Oct 2017 04:00) (16 - 30)  SpO2: 94% (17 Oct 2017 04:00) (92% - 98%)      NEURO  Exam: in apparent distress, responsive, orientedx3  Meds:HYDROmorphone  Injectable 0.5 milliGRAM(s) IV Push every 2 hours PRN Severe Pain (7 - 10)  HYDROmorphone  Injectable 1 milliGRAM(s) IV Push every 4 hours PRN severe breakthrough pain      RESPIRATORY  Exam: CTA b/l  Meds: none    CARDIOVASCULAR  VBG - ( 17 Oct 2017 03:56 )  pH: 7.30  /  pCO2: 44    /  pO2: 73    / HCO3: 21    / Base Excess: -5.3  /  SaO2: 92     Lactate: 1.9    Exam: no murmurs  Cardiac Rhythm: sinus tachycardia  Meds:amLODIPine   Tablet 5 milliGRAM(s) Oral daily      GI/NUTRITION  Exam: diffusely tender, with guarding throughout  Diet: NPO  Meds: none    GENITOURINARY/RENAL  Meds:sodium chloride 0.9%. 1000 milliLiter(s) IV Continuous <Continuous>      10-16 @ 07:01  -  10-17 @ 04:13  --------------------------------------------------------  IN:    sodium chloride 0.9%.: 1300 mL  Total IN: 1300 mL    OUT:    Voided: 200 mL  Total OUT: 200 mL    Total NET: 1100 mL        Weight (kg): 66.5 (10-17 @ 03:30)  10-16    144  |  103  |  21  ----------------------------<  167<H>  3.7   |  23  |  0.92    Ca    10.1      16 Oct 2017 05:46    TPro  7.7  /  Alb  4.4  /  TBili  0.6  /  DBili  0.2  /  AST  118<H>  /  ALT  67<H>  /  AlkPhos  187<H>  10-16    [x ] Lara catheter, indication: urine output monitoring in critically ill patient    HEMATOLOGIC  [x ] VTE Prophylaxis:  enoxaparin Injectable 40 milliGRAM(s) SubCutaneous daily                          16.0   16.9  )-----------( 589      ( 16 Oct 2017 05:46 )             48.8     PT/INR - ( 16 Oct 2017 05:46 )   PT: 10.2 sec;   INR: 0.95 ratio         PTT - ( 16 Oct 2017 05:46 )  PTT:31.4 sec  Transfusion: [ ] PRBC	[ ] Platelets	[ ] FFP	[ ] Cryoprecipitate      INFECTIOUS DISEASES  Meds:piperacillin/tazobactam IVPB. 3.375 Gram(s) IV Intermittent every 8 hours  RECENT CULTURES: pending      ENDOCRINE  Meds: none  BMP glucose 167      PATIENT CARE ACCESS DEVICES:  [x ] Peripheral IV  [ ] Central Venous Line	[ ] R	[ ] L	[ ] IJ	[ ] Fem	[ ] SC	Placed:   [ ] Arterial Line		[ ] R	[ ] L	[ ] Fem	[ ] Rad	[ ] Ax	Placed:   [ ] PICC:					[ ] Mediport  [x ] Urinary Catheter, Date Placed: 10/17  [x] Necessity of urinary, arterial, and venous catheters discussed    OTHER MEDICATIONS:   IMAGING STUDIES: US:Sonographic evidence of acute cholecystitis.     CT: Acute interstitial edematous pancreatitis. No evidence of necrosis.  2. Common bile duct borderline enlarged up to 7 mm. Consider MRCP for  further evaluation.    MRCP 10/17 w/ distal CBD stone

## 2017-10-17 NOTE — CHART NOTE - NSCHARTNOTEFT_GEN_A_CORE
STATUS POST:  ERCP    SUBJECTIVE: Pt seen and examined in the SICU  SOB:  [ ] YES [x ] NO  Chest Discomfort: [ ] YES [ x] NO    Nausea: [x ] YES [ ] NO           Vomiting: [ ] YES [x ] NO  Flatus: [ ] YES [x ] NO             Bowel Movement: [ ] YES [ x] NO  Pain Control Adequate: [ ] YES [x ] NO *requiring additional IV narcotics    Vital Signs Last 24 Hrs  T(C): 37.5 (17 Oct 2017 15:00), Max: 37.5 (17 Oct 2017 15:00)  T(F): 99.5 (17 Oct 2017 15:00), Max: 99.5 (17 Oct 2017 15:00)  HR: 97 (17 Oct 2017 19:00) (81 - 130)  BP: 140/74 (17 Oct 2017 19:00) (101/67 - 140/74)  BP(mean): 101 (17 Oct 2017 19:00) (79 - 105)  RR: 31 (17 Oct 2017 19:00) (12 - 38)  SpO2: 93% (17 Oct 2017 19:00) (90% - 96%)      MEDICATIONS  (STANDING):  enoxaparin Injectable 40 milliGRAM(s) SubCutaneous daily  HYDROmorphone PCA (1 mG/mL) 30 milliLiter(s) PCA Continuous PCA Continuous  lactated ringers. 1000 milliLiter(s) (150 mL/Hr) IV Continuous <Continuous>  piperacillin/tazobactam IVPB. 3.375 Gram(s) IV Intermittent every 8 hours      LABS:                        15.2   13.7  )-----------( 417      ( 17 Oct 2017 04:12 )             44.0     10-17    141  |  106  |  17  ----------------------------<  131<H>  3.7   |  18<L>  |  0.80    Ca    7.6<L>      17 Oct 2017 04:12  Phos  3.9     10-17  Mg     1.5     10-17    TPro  6.5  /  Alb  3.6  /  TBili  0.5  /  DBili  x   /  AST  44<H>  /  ALT  58<H>  /  AlkPhos  141<H>  10-17    PT/INR - ( 17 Oct 2017 04:12 )   PT: 12.2 sec;   INR: 1.12 ratio         PTT - ( 17 Oct 2017 04:12 )  PTT:32.2 sec  Urinalysis Basic - ( 16 Oct 2017 12:05 )    Color: PL Yellow / Appearance: Clear / SG: >1.030 / pH: x  Gluc: x / Ketone: Negative  / Bili: Negative / Urobili: Negative   Blood: x / Protein: Trace / Nitrite: Negative   Leuk Esterase: Negative / RBC: 0-2 /HPF / WBC Negative /HPF   Sq Epi: x / Non Sq Epi: OCC /HPF / Bacteria: x      FINDINGS/IMPRESSION:  Findings: The common bile duct contained filling defect(s) thought to be a stone and sludge. One 10 Fr by 7 cm biliary stent was placed into the common bile duct to allow biliary system to decompress. Bile and sludge flowed through the stent. The stent was in good position.   Impression: Ascending cholangitis with choledocholithiasis s/p ERCP with biliary stent placement    Physical Exam:    General: WN/WD; appears uncomfortable  Neurology: A&Ox3, nonfocal, follows commands  ENT/Neck: Neck supple, trachea midline, No JVD  CV: Normal rate regular rhythm  Abdominal: Obese, softly distended. Tender to light palpation throughout, most significant in the left nadia-abdomen  Extremities: No edema, + peripheral pulses  Skin: No Rashes, Hematoma, Ecchymosis        A/P: 57y Female s/p ERCP w/ biliary stent placement    - Pain control: PCA  - Activity: OOB as tolerated  - Labs  - DVT ppx  - Monitor GI and  function  - Dispo: SICU, patient will need repeat ERCP w/ stent removal, sphincterotomy, and stone extraction at later date per GI    [id number]

## 2017-10-17 NOTE — CONSULT NOTE ADULT - ASSESSMENT
Impression:  1. Gallstone pancreatitis (BISAP = 1)  2. Choledocholithiasis on MRCP    Plan:  - IV fluid resuscitation  - Pain control  - Patient was started on antibiotics, continue at this point, though leukocytosis and tachycardia could be attributed to pancreatitis alone and may have improved with fluid resuscitation  - Infectious workup, BCx x 2, f/u UCx  - Correction of electrolyte derangements per primary team  - Keep NPO  - ERCP when schedule permits (OR if available, otherwise endoscopy unit)  - Eventual evaluation for cholecystectomy Impression:  1. Gallstone pancreatitis (BISAP = 1)  2. Choledocholithiasis on MRCP, meets TG13 criteria for mild cholangitis (leukocytosis, abnormal liver tests, CBD stone)    Plan:  - IV fluid resuscitation  - Pain control  - Continue antibiotics (though leukocytosis and tachycardia could be attributed to pancreatitis alone and may have improved with fluid resuscitation)  - Infectious workup, BCx x 2, f/u UCx  - Correction of electrolyte derangements per primary team  - Keep NPO  - ERCP when schedule permits (OR if available, otherwise endoscopy unit)  - Eventual evaluation for cholecystectomy Impression:  1. Gallstone pancreatitis (BISAP = 1)  2. Choledocholithiasis on MRCP, meets TG13 criteria for mild cholangitis (leukocytosis, abnormal liver tests, CBD stone)    Plan:  - IV fluid resuscitation; would give additional IVF bolus as tachycardia and leukocytosis could be attributed to pancreatitis alone, rather than cholangitis, and would likely improve with additional fluid resuscitation)  - Pain control  - Continue antibiotics   - Infectious workup, BCx x 2, f/u UCx  - Correction of electrolyte derangements per primary team  - Keep NPO  - ERCP when schedule permits (OR if available, otherwise endoscopy unit)  - Eventual evaluation for cholecystectomy

## 2017-10-18 LAB
ALBUMIN SERPL ELPH-MCNC: 2.5 G/DL — LOW (ref 3.3–5)
ALBUMIN SERPL ELPH-MCNC: 2.7 G/DL — LOW (ref 3.3–5)
ALP SERPL-CCNC: 78 U/L — SIGNIFICANT CHANGE UP (ref 40–120)
ALP SERPL-CCNC: 85 U/L — SIGNIFICANT CHANGE UP (ref 40–120)
ALT FLD-CCNC: 26 U/L RC — SIGNIFICANT CHANGE UP (ref 10–45)
ALT FLD-CCNC: 28 U/L RC — SIGNIFICANT CHANGE UP (ref 10–45)
AMYLASE P1 CFR SERPL: 923 U/L — HIGH (ref 25–125)
ANION GAP SERPL CALC-SCNC: 10 MMOL/L — SIGNIFICANT CHANGE UP (ref 5–17)
ANION GAP SERPL CALC-SCNC: 12 MMOL/L — SIGNIFICANT CHANGE UP (ref 5–17)
APTT BLD: 31 SEC — SIGNIFICANT CHANGE UP (ref 27.5–37.4)
AST SERPL-CCNC: 30 U/L — SIGNIFICANT CHANGE UP (ref 10–40)
AST SERPL-CCNC: 38 U/L — SIGNIFICANT CHANGE UP (ref 10–40)
BILIRUB DIRECT SERPL-MCNC: 0.3 MG/DL — HIGH (ref 0–0.2)
BILIRUB INDIRECT FLD-MCNC: 0.4 MG/DL — SIGNIFICANT CHANGE UP (ref 0.2–1)
BILIRUB SERPL-MCNC: 0.5 MG/DL — SIGNIFICANT CHANGE UP (ref 0.2–1.2)
BILIRUB SERPL-MCNC: 0.7 MG/DL — SIGNIFICANT CHANGE UP (ref 0.2–1.2)
BLD GP AB SCN SERPL QL: NEGATIVE — SIGNIFICANT CHANGE UP
BUN SERPL-MCNC: 10 MG/DL — SIGNIFICANT CHANGE UP (ref 7–23)
BUN SERPL-MCNC: 12 MG/DL — SIGNIFICANT CHANGE UP (ref 7–23)
CA-I BLD-SCNC: 1.04 MMOL/L — LOW (ref 1.12–1.3)
CALCIUM SERPL-MCNC: 7 MG/DL — LOW (ref 8.4–10.5)
CALCIUM SERPL-MCNC: 7.3 MG/DL — LOW (ref 8.4–10.5)
CHLORIDE SERPL-SCNC: 101 MMOL/L — SIGNIFICANT CHANGE UP (ref 96–108)
CHLORIDE SERPL-SCNC: 104 MMOL/L — SIGNIFICANT CHANGE UP (ref 96–108)
CO2 SERPL-SCNC: 23 MMOL/L — SIGNIFICANT CHANGE UP (ref 22–31)
CO2 SERPL-SCNC: 23 MMOL/L — SIGNIFICANT CHANGE UP (ref 22–31)
CREAT SERPL-MCNC: 0.46 MG/DL — LOW (ref 0.5–1.3)
CREAT SERPL-MCNC: 0.69 MG/DL — SIGNIFICANT CHANGE UP (ref 0.5–1.3)
GAS PNL BLDA: SIGNIFICANT CHANGE UP
GLUCOSE SERPL-MCNC: 109 MG/DL — HIGH (ref 70–99)
GLUCOSE SERPL-MCNC: 90 MG/DL — SIGNIFICANT CHANGE UP (ref 70–99)
HCT VFR BLD CALC: 30.2 % — LOW (ref 34.5–45)
HCT VFR BLD CALC: 35.1 % — SIGNIFICANT CHANGE UP (ref 34.5–45)
HCT VFR BLD CALC: 35.6 % — SIGNIFICANT CHANGE UP (ref 34.5–45)
HGB BLD-MCNC: 10.8 G/DL — LOW (ref 11.5–15.5)
HGB BLD-MCNC: 11.6 G/DL — SIGNIFICANT CHANGE UP (ref 11.5–15.5)
HGB BLD-MCNC: 12.2 G/DL — SIGNIFICANT CHANGE UP (ref 11.5–15.5)
INR BLD: 1.54 RATIO — HIGH (ref 0.88–1.16)
LACTATE BLDV-MCNC: 1.2 MMOL/L — SIGNIFICANT CHANGE UP (ref 0.7–2)
LIDOCAIN IGE QN: 212 U/L — HIGH (ref 7–60)
LIDOCAIN IGE QN: 890 U/L — HIGH (ref 7–60)
MAGNESIUM SERPL-MCNC: 1.9 MG/DL — SIGNIFICANT CHANGE UP (ref 1.6–2.6)
MAGNESIUM SERPL-MCNC: 2 MG/DL — SIGNIFICANT CHANGE UP (ref 1.6–2.6)
MCHC RBC-ENTMCNC: 30.6 PG — SIGNIFICANT CHANGE UP (ref 27–34)
MCHC RBC-ENTMCNC: 31.4 PG — SIGNIFICANT CHANGE UP (ref 27–34)
MCHC RBC-ENTMCNC: 32.7 PG — SIGNIFICANT CHANGE UP (ref 27–34)
MCHC RBC-ENTMCNC: 33.2 GM/DL — SIGNIFICANT CHANGE UP (ref 32–36)
MCHC RBC-ENTMCNC: 34.1 GM/DL — SIGNIFICANT CHANGE UP (ref 32–36)
MCHC RBC-ENTMCNC: 35.8 GM/DL — SIGNIFICANT CHANGE UP (ref 32–36)
MCV RBC AUTO: 91.3 FL — SIGNIFICANT CHANGE UP (ref 80–100)
MCV RBC AUTO: 92 FL — SIGNIFICANT CHANGE UP (ref 80–100)
MCV RBC AUTO: 92.2 FL — SIGNIFICANT CHANGE UP (ref 80–100)
PHOSPHATE SERPL-MCNC: 1.9 MG/DL — LOW (ref 2.5–4.5)
PHOSPHATE SERPL-MCNC: 2.5 MG/DL — SIGNIFICANT CHANGE UP (ref 2.5–4.5)
PLATELET # BLD AUTO: 227 K/UL — SIGNIFICANT CHANGE UP (ref 150–400)
PLATELET # BLD AUTO: 274 K/UL — SIGNIFICANT CHANGE UP (ref 150–400)
PLATELET # BLD AUTO: 275 K/UL — SIGNIFICANT CHANGE UP (ref 150–400)
POTASSIUM SERPL-MCNC: 3.8 MMOL/L — SIGNIFICANT CHANGE UP (ref 3.5–5.3)
POTASSIUM SERPL-MCNC: 4.1 MMOL/L — SIGNIFICANT CHANGE UP (ref 3.5–5.3)
POTASSIUM SERPL-SCNC: 3.8 MMOL/L — SIGNIFICANT CHANGE UP (ref 3.5–5.3)
POTASSIUM SERPL-SCNC: 4.1 MMOL/L — SIGNIFICANT CHANGE UP (ref 3.5–5.3)
PROT SERPL-MCNC: 5.3 G/DL — LOW (ref 6–8.3)
PROT SERPL-MCNC: 5.3 G/DL — LOW (ref 6–8.3)
PROTHROM AB SERPL-ACNC: 16.8 SEC — HIGH (ref 9.8–12.7)
RBC # BLD: 3.3 M/UL — LOW (ref 3.8–5.2)
RBC # BLD: 3.81 M/UL — SIGNIFICANT CHANGE UP (ref 3.8–5.2)
RBC # BLD: 3.88 M/UL — SIGNIFICANT CHANGE UP (ref 3.8–5.2)
RBC # FLD: 12.5 % — SIGNIFICANT CHANGE UP (ref 10.3–14.5)
RBC # FLD: 12.5 % — SIGNIFICANT CHANGE UP (ref 10.3–14.5)
RBC # FLD: 12.6 % — SIGNIFICANT CHANGE UP (ref 10.3–14.5)
RH IG SCN BLD-IMP: POSITIVE — SIGNIFICANT CHANGE UP
SODIUM SERPL-SCNC: 136 MMOL/L — SIGNIFICANT CHANGE UP (ref 135–145)
SODIUM SERPL-SCNC: 137 MMOL/L — SIGNIFICANT CHANGE UP (ref 135–145)
WBC # BLD: 15.2 K/UL — HIGH (ref 3.8–10.5)
WBC # BLD: 18.3 K/UL — HIGH (ref 3.8–10.5)
WBC # BLD: 18.5 K/UL — HIGH (ref 3.8–10.5)
WBC # FLD AUTO: 15.2 K/UL — HIGH (ref 3.8–10.5)
WBC # FLD AUTO: 18.3 K/UL — HIGH (ref 3.8–10.5)
WBC # FLD AUTO: 18.5 K/UL — HIGH (ref 3.8–10.5)

## 2017-10-18 PROCEDURE — 76705 ECHO EXAM OF ABDOMEN: CPT | Mod: 26,RT

## 2017-10-18 PROCEDURE — 71010: CPT | Mod: 26

## 2017-10-18 RX ORDER — ACETAMINOPHEN 500 MG
1000 TABLET ORAL ONCE
Qty: 0 | Refills: 0 | Status: COMPLETED | OUTPATIENT
Start: 2017-10-19 | End: 2017-10-19

## 2017-10-18 RX ORDER — ACETAMINOPHEN 500 MG
1000 TABLET ORAL ONCE
Qty: 0 | Refills: 0 | Status: COMPLETED | OUTPATIENT
Start: 2017-10-18 | End: 2017-10-18

## 2017-10-18 RX ORDER — HYDROMORPHONE HYDROCHLORIDE 2 MG/ML
0.5 INJECTION INTRAMUSCULAR; INTRAVENOUS; SUBCUTANEOUS ONCE
Qty: 0 | Refills: 0 | Status: DISCONTINUED | OUTPATIENT
Start: 2017-10-18 | End: 2017-10-18

## 2017-10-18 RX ORDER — ACETAMINOPHEN 500 MG
1000 TABLET ORAL ONCE
Qty: 0 | Refills: 0 | Status: COMPLETED | OUTPATIENT
Start: 2017-10-19 | End: 2017-10-18

## 2017-10-18 RX ORDER — POTASSIUM PHOSPHATE, MONOBASIC POTASSIUM PHOSPHATE, DIBASIC 236; 224 MG/ML; MG/ML
15 INJECTION, SOLUTION INTRAVENOUS ONCE
Qty: 0 | Refills: 0 | Status: COMPLETED | OUTPATIENT
Start: 2017-10-18 | End: 2017-10-18

## 2017-10-18 RX ORDER — HYDROMORPHONE HYDROCHLORIDE 2 MG/ML
1 INJECTION INTRAMUSCULAR; INTRAVENOUS; SUBCUTANEOUS ONCE
Qty: 0 | Refills: 0 | Status: DISCONTINUED | OUTPATIENT
Start: 2017-10-18 | End: 2017-10-18

## 2017-10-18 RX ORDER — HYDROMORPHONE HYDROCHLORIDE 2 MG/ML
2 INJECTION INTRAMUSCULAR; INTRAVENOUS; SUBCUTANEOUS ONCE
Qty: 0 | Refills: 0 | Status: DISCONTINUED | OUTPATIENT
Start: 2017-10-18 | End: 2017-10-19

## 2017-10-18 RX ORDER — SODIUM CHLORIDE 9 MG/ML
500 INJECTION, SOLUTION INTRAVENOUS ONCE
Qty: 0 | Refills: 0 | Status: COMPLETED | OUTPATIENT
Start: 2017-10-18 | End: 2017-10-18

## 2017-10-18 RX ORDER — SODIUM CHLORIDE 9 MG/ML
1000 INJECTION, SOLUTION INTRAVENOUS
Qty: 0 | Refills: 0 | Status: DISCONTINUED | OUTPATIENT
Start: 2017-10-18 | End: 2017-10-19

## 2017-10-18 RX ORDER — MAGNESIUM SULFATE 500 MG/ML
2 VIAL (ML) INJECTION ONCE
Qty: 0 | Refills: 0 | Status: COMPLETED | OUTPATIENT
Start: 2017-10-18 | End: 2017-10-18

## 2017-10-18 RX ADMIN — SODIUM CHLORIDE 150 MILLILITER(S): 9 INJECTION, SOLUTION INTRAVENOUS at 06:14

## 2017-10-18 RX ADMIN — Medication 400 MILLIGRAM(S): at 23:19

## 2017-10-18 RX ADMIN — POTASSIUM PHOSPHATE, MONOBASIC POTASSIUM PHOSPHATE, DIBASIC 62.5 MILLIMOLE(S): 236; 224 INJECTION, SOLUTION INTRAVENOUS at 20:16

## 2017-10-18 RX ADMIN — HYDROMORPHONE HYDROCHLORIDE 30 MILLILITER(S): 2 INJECTION INTRAMUSCULAR; INTRAVENOUS; SUBCUTANEOUS at 07:15

## 2017-10-18 RX ADMIN — HYDROMORPHONE HYDROCHLORIDE 0.75 MILLIGRAM(S): 2 INJECTION INTRAMUSCULAR; INTRAVENOUS; SUBCUTANEOUS at 17:12

## 2017-10-18 RX ADMIN — Medication 255 MILLIMOLE(S): at 18:36

## 2017-10-18 RX ADMIN — Medication 1000 MILLIGRAM(S): at 12:20

## 2017-10-18 RX ADMIN — HYDROMORPHONE HYDROCHLORIDE 0.5 MILLIGRAM(S): 2 INJECTION INTRAMUSCULAR; INTRAVENOUS; SUBCUTANEOUS at 23:50

## 2017-10-18 RX ADMIN — HYDROMORPHONE HYDROCHLORIDE 0.75 MILLIGRAM(S): 2 INJECTION INTRAMUSCULAR; INTRAVENOUS; SUBCUTANEOUS at 00:21

## 2017-10-18 RX ADMIN — HYDROMORPHONE HYDROCHLORIDE 30 MILLILITER(S): 2 INJECTION INTRAMUSCULAR; INTRAVENOUS; SUBCUTANEOUS at 11:12

## 2017-10-18 RX ADMIN — SODIUM CHLORIDE 100 MILLILITER(S): 9 INJECTION, SOLUTION INTRAVENOUS at 10:46

## 2017-10-18 RX ADMIN — Medication 400 MILLIGRAM(S): at 11:43

## 2017-10-18 RX ADMIN — PIPERACILLIN AND TAZOBACTAM 25 GRAM(S): 4; .5 INJECTION, POWDER, LYOPHILIZED, FOR SOLUTION INTRAVENOUS at 23:19

## 2017-10-18 RX ADMIN — HYDROMORPHONE HYDROCHLORIDE 0.75 MILLIGRAM(S): 2 INJECTION INTRAMUSCULAR; INTRAVENOUS; SUBCUTANEOUS at 08:05

## 2017-10-18 RX ADMIN — SODIUM CHLORIDE 2000 MILLILITER(S): 9 INJECTION, SOLUTION INTRAVENOUS at 03:00

## 2017-10-18 RX ADMIN — Medication 50 GRAM(S): at 20:16

## 2017-10-18 RX ADMIN — HYDROMORPHONE HYDROCHLORIDE 30 MILLILITER(S): 2 INJECTION INTRAMUSCULAR; INTRAVENOUS; SUBCUTANEOUS at 19:20

## 2017-10-18 RX ADMIN — HYDROMORPHONE HYDROCHLORIDE 0.5 MILLIGRAM(S): 2 INJECTION INTRAMUSCULAR; INTRAVENOUS; SUBCUTANEOUS at 13:00

## 2017-10-18 RX ADMIN — Medication 400 MILLIGRAM(S): at 17:12

## 2017-10-18 RX ADMIN — ENOXAPARIN SODIUM 40 MILLIGRAM(S): 100 INJECTION SUBCUTANEOUS at 11:52

## 2017-10-18 RX ADMIN — HYDROMORPHONE HYDROCHLORIDE 1 MILLIGRAM(S): 2 INJECTION INTRAMUSCULAR; INTRAVENOUS; SUBCUTANEOUS at 04:15

## 2017-10-18 RX ADMIN — PIPERACILLIN AND TAZOBACTAM 25 GRAM(S): 4; .5 INJECTION, POWDER, LYOPHILIZED, FOR SOLUTION INTRAVENOUS at 08:54

## 2017-10-18 RX ADMIN — HYDROMORPHONE HYDROCHLORIDE 0.75 MILLIGRAM(S): 2 INJECTION INTRAMUSCULAR; INTRAVENOUS; SUBCUTANEOUS at 10:42

## 2017-10-18 RX ADMIN — Medication 1000 MILLIGRAM(S): at 06:45

## 2017-10-18 RX ADMIN — HYDROMORPHONE HYDROCHLORIDE 1 MILLIGRAM(S): 2 INJECTION INTRAMUSCULAR; INTRAVENOUS; SUBCUTANEOUS at 04:07

## 2017-10-18 RX ADMIN — Medication 63.75 MILLIMOLE(S): at 06:12

## 2017-10-18 RX ADMIN — HYDROMORPHONE HYDROCHLORIDE 0.75 MILLIGRAM(S): 2 INJECTION INTRAMUSCULAR; INTRAVENOUS; SUBCUTANEOUS at 23:32

## 2017-10-18 RX ADMIN — PIPERACILLIN AND TAZOBACTAM 25 GRAM(S): 4; .5 INJECTION, POWDER, LYOPHILIZED, FOR SOLUTION INTRAVENOUS at 16:50

## 2017-10-18 RX ADMIN — HYDROMORPHONE HYDROCHLORIDE 0.5 MILLIGRAM(S): 2 INJECTION INTRAMUSCULAR; INTRAVENOUS; SUBCUTANEOUS at 12:42

## 2017-10-18 RX ADMIN — Medication 1000 MILLIGRAM(S): at 17:40

## 2017-10-18 RX ADMIN — Medication 400 MILLIGRAM(S): at 06:13

## 2017-10-18 NOTE — PROGRESS NOTE ADULT - ASSESSMENT
Impression:  1. Gallstone pancreatitis (BISAP = 1)  2. Choledocholithiasis- meets TG13 criteria for mild cholangitis (leukocytosis, abnormal liver tests, CBD stone)    Plan:  -c/w pain control, IVF  -trend liver enzymes daily  -f/u surgery recs re: cholecystectomy  -will need outpatient f/u with GI for stent removal following cholecystectomy       Please call with questions  Rena Tijerina  GI Fellow  Pager: [phone]

## 2017-10-18 NOTE — PROGRESS NOTE ADULT - SUBJECTIVE AND OBJECTIVE BOX
Pain Management Attending Addendum    SUBJECTIVE: Patient doing well with IV PCA    Therapy:    [X] IV PCA         [ ] PRN Analgesics    OBJECTIVE:   [X] Pain appropriately controlled    [ ] Other:    Side Effects:  [X] None	             [ ] Nausea              [ ] Pruritis                	[ ] Other:    ASSESSMENT/PLAN: Continue current therapy    Comments:

## 2017-10-18 NOTE — PROGRESS NOTE ADULT - SUBJECTIVE AND OBJECTIVE BOX
HISTORY OF PRESENT ILLNESS:  57yF w/ PMH significant for HTN and nephrolithiasis, s/p appendectomy that presented to [facility] ED on 10/16/2017 c/o acute onset severe sharp stabbing upper abdominal pain that radiates to the back. She says the pain woke her up in the middle of the night causing her to develop nausea and NBNB emesis x3 episodes. She presented to the emergency department hypotensive was worked up with abdominal imaging findings concerning for CBD obstructing stone and was admitted, MRCP ordered and completed, and showed a distal CBD stone elevating concern for acute obstructive cholangitis. Labs resulted with evidence of pancreatitis with lipase > 8500. SICU was consulted as she was tachycardic to the 130s and breaking through dilaudid, tylenol, and toradol. GI was consulted urgently and ERCP was performed with biliary stent placement.     24 HOUR EVENTS: Biliary stent placement successful, patient on empiric pip/tazo. Patient is not opioid naive and there is difficulty with pain control; pain service consultation was placed and the patient was started on a hydromorphone PCA with continued iv APAP. Despite use of the PCA the patient continued to have significant pain throughout the night of 10/17-10/18.     SUBJECTIVE/ROS:  [ ] A ten-point review of systems was otherwise negative except as noted.  [ ] Due to altered mental status/intubation, subjective information were not able to be obtained from the patient. History was obtained, to the extent possible, from review of the chart and collateral sources of information.    NEURO  RASS:     GCS:     CAM ICU:  Exam: awake, alert, oriented  Meds: acetaminophen  IVPB. 1000 milliGRAM(s) IV Intermittent once  HYDROmorphone PCA (1 mG/mL) 30 milliLiter(s) PCA Continuous PCA Continuous  HYDROmorphone PCA (1 mG/mL) Rescue Clinician Bolus 0.75 milliGRAM(s) IV Push every 15 minutes PRN for Pain Scale GREATER THAN 6  ondansetron Injectable 4 milliGRAM(s) IV Push every 6 hours PRN Nausea    [x] Adequacy of sedation and pain control has been assessed and adjusted    RESPIRATORY  RR: 20 (10-18-17 @ 01:00) (12 - 38)  SpO2: 91% (10-18-17 @ 01:00) (90% - 95%)  Wt(kg): --  Exam: unlabored, clear to auscultation bilaterally  Mechanical Ventilation:     [N/A] Extubation Readiness Assessed  Meds:     CARDIOVASCULAR  HR: 109 (10-18-17 @ 01:00) (81 - 130)  BP: 107/69 (10-18-17 @ 01:00) (101/67 - 140/74)  BP(mean): 84 (10-18-17 @ 01:00) (79 - 105)  ABP: --  ABP(mean): --  Wt(kg): --  CVP(cm H2O): --  VBG - ( 17 Oct 2017 03:56 )  pH: 7.30  /  pCO2: 44    /  pO2: 73    / HCO3: 21    / Base Excess: -5.3  /  SaO2: 92     Lactate: 1.9      Exam: regular rate and rhythm  Cardiac Rhythm: sinus  Perfusion     [x]Adequate   [ ]Inadequate  Mentation   [x]Normal       [ ]Reduced  Extremities  [x]Warm         [ ]Cool  Volume Status [ ]Hypervolemic [x]Euvolemic [ ]Hypovolemic  Meds:     GI/NUTRITION  Exam: soft, nontender, nondistended, incision C/D/I  Diet:  Meds:     GENITOURINARY  I&O's Detail    10-16 @ 07:01  -  10-17 @ 07:00  --------------------------------------------------------  IN:    IV PiggyBack: 50 mL    lactated ringers.: 450 mL    sodium chloride 0.9%: 1300 mL  Total IN: 1800 mL    OUT:    Indwelling Catheter - Urethral: 450 mL    Voided: 200 mL  Total OUT: 650 mL    Total NET: 1150 mL      10-17 @ 07:01  -  10-18 @ 02:28  --------------------------------------------------------  IN:    IV PiggyBack: 250 mL    lactated ringers.: 2400 mL    Solution: 225 mL  Total IN: 2875 mL    OUT:    Indwelling Catheter - Urethral: 650 mL  Total OUT: 650 mL    Total NET: 2225 mL    Weight (kg): 66.5 (10-17 @ 03:30)  10-17    141  |  106  |  17  ----------------------------<  131<H>  3.7   |  18<L>  |  0.80    Ca    7.6<L>      17 Oct 2017 04:12  Phos  3.9     10-17  Mg     1.5     10-17    TPro  6.5  /  Alb  3.6  /  TBili  0.5  /  DBili  x   /  AST  44<H>  /  ALT  58<H>  /  AlkPhos  141<H>  10-17    [ ] Lara catheter, indication: N/A  Meds: lactated ringers Bolus 500 milliLiter(s) IV Bolus once  lactated ringers. 1000 milliLiter(s) IV Continuous <Continuous>    HEMATOLOGIC  Meds: enoxaparin Injectable 40 milliGRAM(s) SubCutaneous daily    [x] VTE Prophylaxis                        15.2   13.7  )-----------( 417      ( 17 Oct 2017 04:12 )             44.0     PT/INR - ( 17 Oct 2017 04:12 )   PT: 12.2 sec;   INR: 1.12 ratio         PTT - ( 17 Oct 2017 04:12 )  PTT:32.2 sec  Transfusion     [ ] PRBC   [ ] Platelets   [ ] FFP   [ ] Cryoprecipitate    INFECTIOUS DISEASES  WBC Count: 13.7 K/uL (10-17 @ 04:12)    RECENT CULTURES:    Meds: piperacillin/tazobactam IVPB. 3.375 Gram(s) IV Intermittent every 8 hours    ENDOCRINE  CAPILLARY BLOOD GLUCOSE    Meds:     ACCESS DEVICES:  [ ] Peripheral IV  [ ] Central Venous Line	[ ] R	[ ] L	[ ] IJ	[ ] Fem	[ ] SC	Placed:   [ ] Arterial Line		[ ] R	[ ] L	[ ] Fem	[ ] Rad	[ ] Ax	Placed:   [ ] PICC:					[ ] Mediport  [ ] Urinary Catheter, Date Placed:   [x] Necessity of urinary, arterial, and venous catheters discussed    OTHER MEDICATIONS:  naloxone Injectable 0.1 milliGRAM(s) IV Push every 3 minutes PRN      CODE STATUS:      IMAGING: HISTORY OF PRESENT ILLNESS:  57yF w/ PMH significant for HTN and nephrolithiasis, s/p appendectomy that presented to [facility] ED on 10/16/2017 c/o acute onset severe sharp stabbing upper abdominal pain that radiates to the back. She says the pain woke her up in the middle of the night causing her to develop nausea and NBNB emesis x3 episodes. She presented to the emergency department hypotensive was worked up with abdominal imaging findings concerning for CBD obstructing stone and was admitted, MRCP ordered and completed, and showed a distal CBD stone elevating concern for acute obstructive cholangitis. Labs resulted with evidence of pancreatitis with lipase > 8500. SICU was consulted as she was tachycardic to the 130s and breaking through dilaudid, tylenol, and toradol. GI was consulted urgently and ERCP was performed with biliary stent placement.     24 HOUR EVENTS: Biliary stent placement successful, patient on empiric pip/tazo. Patient is not opioid naive and there is difficulty with pain control; pain service consultation was placed and the patient was started on a hydromorphone PCA with continued iv APAP. Despite use of the PCA the patient continued to have significant pain throughout the night of 10/17-10/18.     SUBJECTIVE/ROS:  [ ] A ten-point review of systems was otherwise negative except as noted.  [ ] Due to altered mental status/intubation, subjective information were not able to be obtained from the patient. History was obtained, to the extent possible, from review of the chart and collateral sources of information.    NEURO  RASS:     GCS:     CAM ICU:  Exam: awake, alert, oriented  Meds: acetaminophen  IVPB. 1000 milliGRAM(s) IV Intermittent once  HYDROmorphone PCA (1 mG/mL) 30 milliLiter(s) PCA Continuous PCA Continuous  HYDROmorphone PCA (1 mG/mL) Rescue Clinician Bolus 0.75 milliGRAM(s) IV Push every 15 minutes PRN for Pain Scale GREATER THAN 6  ondansetron Injectable 4 milliGRAM(s) IV Push every 6 hours PRN Nausea    [x] Adequacy of sedation and pain control has been assessed and adjusted    RESPIRATORY  RR: 25 (18 Oct 2017 07:00) (12 - 38)  SpO2: 93% (18 Oct 2017 07:00) (89% - 96%)      Wt(kg): --  Exam: unlabored, clear to auscultation bilaterally  Mechanical Ventilation:     [N/A] Extubation Readiness Assessed  Meds:     CARDIOVASCULAR  HR: 101 (18 Oct 2017 07:00) (81 - 112)  BP: 115/61 (18 Oct 2017 07:00) (107/69 - 140/74)  BP(mean): 80 (18 Oct 2017 07:00) (80 - 103)  VBG - ( 17 Oct 2017 03:56 )  pH: 7.30  /  pCO2: 44    /  pO2: 73    / HCO3: 21    / Base Excess: -5.3  /  SaO2: 92     Lactate: 1.9      Exam: regular rate and rhythm  Cardiac Rhythm: sinus  Perfusion     [x]Adequate   [ ]Inadequate  Mentation   [x]Normal       [ ]Reduced  Extremities  [x]Warm         [ ]Cool  Volume Status [ ]Hypervolemic [x]Euvolemic [ ]Hypovolemic  Meds:     GI/NUTRITION  Exam: soft, nontender, nondistended, incision C/D/I  Diet:  Meds:     GENITOURINARY  I&O's Detail    10-16 @ 07:01  -  10-17 @ 07:00  --------------------------------------------------------  IN:    IV PiggyBack: 50 mL    lactated ringers.: 450 mL    sodium chloride 0.9%: 1300 mL  Total IN: 1800 mL    OUT:    Indwelling Catheter - Urethral: 450 mL    Voided: 200 mL  Total OUT: 650 mL    Total NET: 1150 mL      I&O's Detail    17 Oct 2017 07:01  -  18 Oct 2017 07:00  --------------------------------------------------------  IN:    IV PiggyBack: 512.5 mL    Lactated Ringers IV Bolus: 500 mL    lactated ringers.: 3300 mL    Solution: 275 mL  Total IN: 4587.5 mL    OUT:    Indwelling Catheter - Urethral: 870 mL  Total OUT: 870 mL    Total NET: 3717.5 mL    Weight (kg): 66.5 (10-17 @ 03:30)    10-18    137  |  104  |  12  ----------------------------<  109<H>  4.1   |  23  |  0.69    Ca    7.3<L>      18 Oct 2017 02:29  Phos  2.5     10-18  Mg     2.0     10-18    TPro  5.3<L>  /  Alb  2.7<L>  /  TBili  0.7  /  DBili  0.3<H>  /  AST  38  /  ALT  28  /  AlkPhos  85  10-18      [ ] Lara catheter, indication: N/A  Meds: lactated ringers Bolus 500 milliLiter(s) IV Bolus once  lactated ringers. 1000 milliLiter(s) IV Continuous <Continuous>    HEMATOLOGIC  Meds: enoxaparin Injectable 40 milliGRAM(s) SubCutaneous daily    [x] VTE Prophylaxis                                   12.2   18.3  )-----------( 274      ( 18 Oct 2017 04:30 )               35.6   PT/INR - ( 18 Oct 2017 02:29 )   PT: 16.8 sec;   INR: 1.54 ratio         PTT - ( 18 Oct 2017 02:29 )  PTT:31.0 sec     Transfusion     [ ] PRBC   [ ] Platelets   [ ] FFP   [ ] Cryoprecipitate    INFECTIOUS DISEASES  ICU Vital Signs Last 24 Hrs  T(C): 37.2 (18 Oct 2017 03:00), Max: 37.5 (17 Oct 2017 15:00)  T(F): 99 (18 Oct 2017 03:00), Max: 99.5 (17 Oct 2017 15:00)    WBC Count: 18.3 K/uL (10-18 @ 04:30)  WBC Count: 18.5 K/uL (10-18 @ 02:29)  WBC Count: 13.7 K/uL (10-17 @ 04:12)  WBC Count: 16.9 K/uL (10-16 @ 05:46)    RECENT CULTURES:    Meds: piperacillin/tazobactam IVPB. 3.375 Gram(s) IV Intermittent every 8 hours    ENDOCRINE  CAPILLARY BLOOD GLUCOSE    Meds:     ACCESS DEVICES:  [ ] Peripheral IV  [ ] Central Venous Line	[ ] R	[ ] L	[ ] IJ	[ ] Fem	[ ] SC	Placed:   [ ] Arterial Line		[ ] R	[ ] L	[ ] Fem	[ ] Rad	[ ] Ax	Placed:   [ ] PICC:					[ ] Mediport  [ ] Urinary Catheter, Date Placed:   [x] Necessity of urinary, arterial, and venous catheters discussed    OTHER MEDICATIONS:  naloxone Injectable 0.1 milliGRAM(s) IV Push every 3 minutes PRN      CODE STATUS:      IMAGING:

## 2017-10-18 NOTE — DIETITIAN INITIAL EVALUATION ADULT. - PT NOT SOURCE
Pt interview pending. Per chart, pt noted with N+V x 3 episodes PTA. Pt noted with pain management issues; RD will follow up with interview as appropriate.

## 2017-10-18 NOTE — PROVIDER CONTACT NOTE (CHANGE IN STATUS NOTIFICATION) - SITUATION
Pt pain unrelieved by Dilaudid PCA; Pt increasingly tachy; Pt requiring 50% Venturi mask to maintain O2 sat; WBC elevated and H/H decreased

## 2017-10-18 NOTE — PROGRESS NOTE ADULT - SUBJECTIVE AND OBJECTIVE BOX
Day 1 of Anesthesia Pain Management Service    SUBJECTIVE: Patient is doing well with IV PCA    Pain Scale Score:	[X] Refer to charted pain scores    THERAPY:    [ ] IV PCA Morphine		[ ] 5 mg/mL	[ ] 1 mg/mL  [X] IV PCA Hydromorphone	[ ] 5 mg/mL	[X] 1 mg/mL  [ ] IV PCA Fentanyl		[ ] 50 micrograms/mL    Demand dose: 0.3 mg     Lockout: 6 minutes   Continuous Rate: 0 mg/hr  4 Hour Limit: 6 mg    MEDICATIONS  (STANDING):  acetaminophen  IVPB. 1000 milliGRAM(s) IV Intermittent once  acetaminophen  IVPB. 1000 milliGRAM(s) IV Intermittent once  enoxaparin Injectable 40 milliGRAM(s) SubCutaneous daily  HYDROmorphone PCA (1 mG/mL) 30 milliLiter(s) PCA Continuous PCA Continuous  multiple electrolytes Injection Type 1 1000 milliLiter(s) (100 mL/Hr) IV Continuous <Continuous>  piperacillin/tazobactam IVPB. 3.375 Gram(s) IV Intermittent every 8 hours    MEDICATIONS  (PRN):  HYDROmorphone PCA (1 mG/mL) Rescue Clinician Bolus 0.75 milliGRAM(s) IV Push every 15 minutes PRN for Pain Scale GREATER THAN 6  naloxone Injectable 0.1 milliGRAM(s) IV Push every 3 minutes PRN For ANY of the following changes in patient status:  A. RR LESS THAN 10 breaths per minute, B. Oxygen saturation LESS THAN 90%, C. Sedation score of 6  ondansetron Injectable 4 milliGRAM(s) IV Push every 6 hours PRN Nausea      OBJECTIVE:    Sedation Score:	[ X] Alert	[ ] Drowsy 	[ ] Arousable	[ ] Asleep	[ ] Unresponsive    Side Effects:	[X ] None	[ ] Nausea	[ ] Vomiting	[ ] Pruritus  		[ ] Other:    Vital Signs Last 24 Hrs  T(C): 37.4 (18 Oct 2017 08:00), Max: 37.5 (17 Oct 2017 15:00)  T(F): 99.3 (18 Oct 2017 08:00), Max: 99.5 (17 Oct 2017 15:00)  HR: 94 (18 Oct 2017 09:00) (81 - 112)  BP: 115/67 (18 Oct 2017 09:00) (104/55 - 140/74)  BP(mean): 86 (18 Oct 2017 09:00) (74 - 103)  RR: 27 (18 Oct 2017 09:00) (12 - 38)  SpO2: 93% (18 Oct 2017 09:00) (89% - 96%)    ASSESSMENT/ PLAN    Therapy to  be:               [X] Continued   [ ] Discontinued   [ ] Changed to PRN Analgesics    Documentation and Verification of current medications:   [X] Done	[ ] Not done, not eligible    Comments: Using 3-6x/hr. Reeducated to use.

## 2017-10-18 NOTE — PROVIDER CONTACT NOTE (CHANGE IN STATUS NOTIFICATION) - ASSESSMENT
Pt pain unrelieved by Dilaudid PCA - 4hour limit reached; Pt increasingly tachy throughout shift (HR ~ 100-110); Temp 99F orally; Pt requiring 50% Venturi mask to maintain O2 sat ~ 92%; Respirations irregular and shallow; WBC elevated and H/H decreased, as per AM labs; UO low (~25-30cc/hr); Abd soft but extremely tender

## 2017-10-18 NOTE — DIETITIAN INITIAL EVALUATION ADULT. - PHYSICAL APPEARANCE
BMI 29.6Kg/m2. Pt appears well developed with no signs of muscle or fat depletion./well nourished/obese

## 2017-10-18 NOTE — PROGRESS NOTE ADULT - SUBJECTIVE AND OBJECTIVE BOX
ATP Team Progress Note    S: Pt seen and examined at bedside. Denies fever/chills/N/V. Biliary stent placement yesterday, patient on empiric pip/tazo. Started on hydromorphone PCA. Despite use of the PCA the patient continued to have significant pain throughout the night    T(C): 37.5 (10-18-17 @ 12:00), Max: 37.5 (10-17-17 @ 15:00)  HR: 86 (10-18-17 @ 13:00) (84 - 112)  BP: 110/64 (10-18-17 @ 13:00) (104/55 - 140/74)  RR: 14 (10-18-17 @ 13:00) (12 - 33)  SpO2: 94% (10-18-17 @ 13:00) (89% - 96%)  Wt(kg): --    10-17 @ 07:01  -  10-18 @ 07:00  --------------------------------------------------------  IN:    IV PiggyBack: 512.5 mL    Lactated Ringers IV Bolus: 500 mL    lactated ringers.: 3300 mL    Solution: 275 mL  Total IN: 4587.5 mL    OUT:    Indwelling Catheter - Urethral: 870 mL  Total OUT: 870 mL    Total NET: 3717.5 mL      10-18 @ 07:01  -  10-18 @ 14:07  --------------------------------------------------------  IN:    IV PiggyBack: 225 mL    lactated ringers.: 450 mL    multiple electrolytes Injection Type 1: 400 mL    Solution: 100 mL  Total IN: 1175 mL    OUT:    Indwelling Catheter - Urethral: 205 mL  Total OUT: 205 mL    Total NET: 970 mL      PE:   Gen: writhing in pain  Abd: Soft, ND, TTP epigastrium and RUQ, No HSM, Guarding, No rebound.  Ext: WWP. No clubbing, cyanosis, or edema.  Neuro: AAOx3, No focal deficits                                   12.2   18.3  )-----------( 274      ( 18 Oct 2017 04:30 )             35.6       10-18    137  |  104  |  12  ----------------------------<  109<H>  4.1   |  23  |  0.69    Ca    7.3<L>      18 Oct 2017 02:29  Phos  2.5     10-18  Mg     2.0     10-18    TPro  5.3<L>  /  Alb  2.7<L>  /  TBili  0.7  /  DBili  0.3<H>  /  AST  38  /  ALT  28  /  AlkPhos  85  10-18

## 2017-10-18 NOTE — PROGRESS NOTE ADULT - SUBJECTIVE AND OBJECTIVE BOX
[facility] ENDOSCOPY PROGRESS NOTE:  Chief Complaint:  Patient is a 57y old  Female who presents with a chief complaint of Abdominal pain (16 Oct 2017 09:50)    Interval Events:  -s/p ERCP with biliary stent placement  -persistent oxygen requirements  -abdominal pain is improved but patient requiring PCA  -no nausea or vomiting   -afebrile       Allergies:  No Known Allergies      Home Medications:  Patient reports: amlodipine, aspirin    Hospital Medications:  acetaminophen  IVPB. 1000 milliGRAM(s) IV Intermittent once  calcium gluconate IVPB 2 Gram(s) IV Intermittent once  enoxaparin Injectable 40 milliGRAM(s) SubCutaneous daily  HYDROmorphone  Injectable 0.5 milliGRAM(s) IV Push every 2 hours PRN  HYDROmorphone  Injectable 1 milliGRAM(s) IV Push every 4 hours PRN  HYDROmorphone PCA (1 mG/mL) 30 milliLiter(s) PCA Continuous PCA Continuous  HYDROmorphone PCA (1 mG/mL) Rescue Clinician Bolus 0.75 milliGRAM(s) IV Push every 15 minutes PRN  lactated ringers. 1000 milliLiter(s) IV Continuous <Continuous>  magnesium sulfate  IVPB 2 Gram(s) IV Intermittent once  magnesium sulfate  IVPB 2 Gram(s) IV Intermittent once  magnesium sulfate  IVPB 2 Gram(s) IV Intermittent once  naloxone Injectable 0.1 milliGRAM(s) IV Push every 3 minutes PRN  ondansetron Injectable 4 milliGRAM(s) IV Push every 6 hours PRN  piperacillin/tazobactam IVPB. 3.375 Gram(s) IV Intermittent every 8 hours  potassium chloride  10 mEq/100 mL IVPB 10 milliEquivalent(s) IV Intermittent every 1 hour      PMHX/PSHX:  Kidney stone  Hypertension  No pertinent past medical history  Kidney stones  History of appendectomy      Family history:  No pertinent family history in first degree relatives      Social History: no tobacco, ETOH or IVDA     Review of systems: Negative, except as otherwise noted above      PHYSICAL EXAM:    GENERAL:  No acute distress  HEENT:  Anicteric, no thrush  CHEST:  Non-labored breathing, lungs clear b/l  HEART:  +s1, s2 heart sounds, no murmurs  ABDOMEN:  Soft, tender to palpation diffusely, no rebound tenderness  EXTREMITIES:  warm and well perfused, no edema  SKIN:  No rash or jaundice  NEURO:  AAOx3, interactive, following commands      Vital Signs:  Vital Signs Last 24 Hrs  T(C): 37.4 (18 Oct 2017 08:00), Max: 37.5 (17 Oct 2017 15:00)  T(F): 99.3 (18 Oct 2017 08:00), Max: 99.5 (17 Oct 2017 15:00)  HR: 94 (18 Oct 2017 09:00) (81 - 112)  BP: 115/67 (18 Oct 2017 09:00) (104/55 - 140/74)  BP(mean): 86 (18 Oct 2017 09:00) (74 - 103)  RR: 27 (18 Oct 2017 09:00) (12 - 38)  SpO2: 93% (18 Oct 2017 09:00) (89% - 96%)  Daily     Daily     LABS:                        12.2   18.3  )-----------( 274      ( 18 Oct 2017 04:30 )             35.6     10-18    137  |  104  |  12  ----------------------------<  109<H>  4.1   |  23  |  0.69    Ca    7.3<L>      18 Oct 2017 02:29  Phos  2.5     10-18  Mg     2.0     10-18    TPro  5.3<L>  /  Alb  2.7<L>  /  TBili  0.7  /  DBili  0.3<H>  /  AST  38  /  ALT  28  /  AlkPhos  85  10-18    LIVER FUNCTIONS - ( 18 Oct 2017 02:29 )  Alb: 2.7 g/dL / Pro: 5.3 g/dL / ALK PHOS: 85 U/L / ALT: 28 U/L RC / AST: 38 U/L / GGT: x           PT/INR - ( 18 Oct 2017 02:29 )   PT: 16.8 sec;   INR: 1.54 ratio         PTT - ( 18 Oct 2017 02:29 )  PTT:31.0 sec  Urinalysis Basic - ( 16 Oct 2017 12:05 )    Color: PL Yellow / Appearance: Clear / SG: >1.030 / pH: x  Gluc: x / Ketone: Negative  / Bili: Negative / Urobili: Negative   Blood: x / Protein: Trace / Nitrite: Negative   Leuk Esterase: Negative / RBC: 0-2 /HPF / WBC Negative /HPF   Sq Epi: x / Non Sq Epi: OCC /HPF / Bacteria: x      Amylase [id number]      Lipase [id number]       Ammonia--                  Imaging:  < from: ERCP (10.17.17 @ 11:24) >  [facility]  ____________________________________________________________________________________________________  Patient Name: Elba Coleman                   MRN: [id number]  Account Number: [id number]                     YOB: 1960  Room: Endoscopy Room 2                           Gender: Female  Attending MD: MELISSA GRAY MD                 Procedure Date No Time: 10/17/2017  ____________________________________________________________________________________________________     Procedure:           ERCP  Indications:         Bile duct stone(s), Suspected ascending cholangitis  Providers:           MELISSA GRAY MD, Aissatou Iqbal MD (Fellow)  Medicines:           General Anesthesia  Complications:       No immediate complications.  ____________________________________________________________________________________________________  Procedure:           Pre-Anesthesia Assessment:                       - Prior to the procedure, a History andPhysical was performed, and patient                        medications, allergies and sensitivities were reviewed. The patient's                        tolerance of previous anesthesia was reviewed.                       - The risks and benefits of the procedure and the sedation options and risks                        were discussed with the patient. All questions were answered and informed                        consent was obtained.                       - Patient identification and proposed procedure were verified prior to the                        procedure by the physician, the nurse and the anesthetist. The procedure was                        verified in the procedure room.                       After obtaining informed consent, the scope was passed under direct vision.                        Throughout the procedure, the patient's blood pressure, pulse, and oxygen                        saturations were monitored continuously. The Duodenoscope was introduced     through the mouth, and advanced to the duodenum and used to inject contrast                        into the bile duct. The ERCP was accomplished without difficulty. The patient                        tolerated the procedure well.                                                                                      Findings:       The  film was normal. The esophagus was successfully intubated under direct vision. The        scope was advanced to a normal major papilla in the descending duodenum without detailed        examination of the pharynx, larynx and associated structures, and upper GI tract. The upper        GI tract was grossly normal. The bile duct was deeply cannulated with the Rx 44        sphincterotome preloaded with the 0.035 inch x 260 cm straight Hydra Jagwire . Contrast was        injected. I personally interpreted the bile duct images. There was brisk flow of contrast        through the ducts. Image quality was excellent. Contrast extended to the main bile duct. The        common bile duct contained filling defect(s) thought to be a stone and sludge. One 10 Fr by 7        cm biliary stent was placed into the common bile duct to allow biliary system to decompress.        Bile and sludge flowed through the stent. The stent was in good position. Final fluoroscopic        images showed no air in unusual places and biliary stent.                                                                                                        Impression:      Ascending cholangitis with choledocholithiasis s/p ERCP with biliary stent                        placement  Recommendation:      - [facility] patient to [facility] for [facility] care.                       - Consider cholecystectomy          - Repeat ERCP with stent removal, sphincterotomy, and stone extraction at                        later date    < end of copied text >

## 2017-10-18 NOTE — PROGRESS NOTE ADULT - ASSESSMENT
57F w/ obstructing common bile duct stone, subsequent cholangitis and pancreatitis now s/p ERCP with biliary stent placement that will require future ERCP for stent removal currently undergoing empiric antibiotic therapy and pain control.     Neuro: severe pain, not opioid naive, continued difficulties with pain despite PCA  Pain  	- Hydromorphone PCA 1mg/ml (0.5mg bolus, 0.2mg demand, lockout 6, 4h limit 6mg)  	- Hydromorphone 0.75mg ivp via pump q15m prn pain >6; not to exceed 2 doses in 3h  	- APAP 1000mg ivpb q6h    Resp: no acute issues  Opioids  -Naloxone 0.1mg ivp q3m prn max dose 0.4mg; critera RR<10 or SpO2 <90% or sedation score 6    CV: borderline tachycardia currently attributed to SIRS/pain  	- Cardiac monitor, q1h vitals  	- Hold home amlodipine    GI: choledocholithiasis & pancreatitis with lipase now resolving   Cholangitis/pancreatitis  	- ERCP w/ biliary stent placed 10/17  	- Piperacillin/tazobactam 3.375g ivpb q8h  NPO   	- Fluids as listed in   	- No TPN at this time   	- Consider advancing to CLD in AM on 10/18    :   	- LR @ 150cc/hr  	- Lara, monitor I&Os, replete electrolytes as needed    ID:   Possible cholecystitis, cholangitis  	- Piperacillin/tazobactam 3.375g ivpb q8h  	- Trend CBC, WBC decreasing s/p ERCP  - Monitor fevers, q1h vitals    Heme: trend CBCs  DVT prophylaxis  	- Enoxaparin 40mg subq qd    Endo: No active issues/interventions    Disposition: Full code    RACHNA Santamaria PGY1, [id number]

## 2017-10-18 NOTE — DIETITIAN INITIAL EVALUATION ADULT. - ENERGY NEEDS
ht: 4 feet 11 inches, wt: 147 pounds, BMI: 29.6 Kg/m2, IBW: 97 pounds (+/- 10%), 152% IBW. Edema: none noted; Skin: no pressure injuries.

## 2017-10-18 NOTE — DIETITIAN INITIAL EVALUATION ADULT. - ORAL INTAKE PTA
Pt interview pending. Per chart review, pt takes no nutrition supplements, reports NKFA. Per chart, pt noted with N+V x 3 episodes PTA. Per chart review, pt takes no nutrition supplements, reports NKFA.

## 2017-10-18 NOTE — PROVIDER CONTACT NOTE (OTHER) - ASSESSMENT
Low UO - 20cc for last hour; UO for previous 2 hours was 25cc/25cc; No intervention ordered at this time; Pt mildly tachy (-105) - all other VSS; NPO; IVF at 150cc/hr

## 2017-10-18 NOTE — PROVIDER CONTACT NOTE (CHANGE IN STATUS NOTIFICATION) - ACTION/TREATMENT ORDERED:
CBC/lactate to be drawn; Pt assessed at bedside by Elie VICK; 1mg Dilaudid IVP ordered for pain; No other intervention ordered at this time; Will continue to monitor

## 2017-10-18 NOTE — PROGRESS NOTE ADULT - ASSESSMENT
57yF w/ Gallstone pancreatitis, choledocolithiasis, s/p biliary stent placement (10/17).  - NPO/IVF  - f/u GI recs: will need outpatient f/u with GI for stent removal following cholecystectomy  - Pain control  - VTE ppx  - Pt will require eventual Lap kamran once pancreatitis has resolved  care as per SICU

## 2017-10-18 NOTE — PROVIDER CONTACT NOTE (CHANGE IN STATUS NOTIFICATION) - RECOMMENDATIONS
Draw blood cultures for elevated WBC; Abd CT; Pain management; Trend CBC; Reevaluate IV abx regimen; Concern for possible sepsis clearly expressed

## 2017-10-19 LAB
ALBUMIN SERPL ELPH-MCNC: 2.7 G/DL — LOW (ref 3.3–5)
ALP SERPL-CCNC: 82 U/L — SIGNIFICANT CHANGE UP (ref 40–120)
ALT FLD-CCNC: 25 U/L RC — SIGNIFICANT CHANGE UP (ref 10–45)
ANION GAP SERPL CALC-SCNC: 13 MMOL/L — SIGNIFICANT CHANGE UP (ref 5–17)
APPEARANCE UR: CLEAR — SIGNIFICANT CHANGE UP
APTT BLD: 31.7 SEC — SIGNIFICANT CHANGE UP (ref 27.5–37.4)
AST SERPL-CCNC: 29 U/L — SIGNIFICANT CHANGE UP (ref 10–40)
BACTERIA # UR AUTO: ABNORMAL /HPF
BILIRUB DIRECT SERPL-MCNC: 0.2 MG/DL — SIGNIFICANT CHANGE UP (ref 0–0.2)
BILIRUB INDIRECT FLD-MCNC: 0.2 MG/DL — SIGNIFICANT CHANGE UP (ref 0.2–1)
BILIRUB SERPL-MCNC: 0.4 MG/DL — SIGNIFICANT CHANGE UP (ref 0.2–1.2)
BILIRUB UR-MCNC: NEGATIVE — SIGNIFICANT CHANGE UP
BUN SERPL-MCNC: 9 MG/DL — SIGNIFICANT CHANGE UP (ref 7–23)
CALCIUM SERPL-MCNC: 7.6 MG/DL — LOW (ref 8.4–10.5)
CHLORIDE SERPL-SCNC: 101 MMOL/L — SIGNIFICANT CHANGE UP (ref 96–108)
CO2 SERPL-SCNC: 23 MMOL/L — SIGNIFICANT CHANGE UP (ref 22–31)
COLOR SPEC: YELLOW — SIGNIFICANT CHANGE UP
CREAT SERPL-MCNC: 0.47 MG/DL — LOW (ref 0.5–1.3)
DIFF PNL FLD: ABNORMAL
EPI CELLS # UR: SIGNIFICANT CHANGE UP /HPF
GLUCOSE SERPL-MCNC: 86 MG/DL — SIGNIFICANT CHANGE UP (ref 70–99)
GLUCOSE UR QL: NEGATIVE — SIGNIFICANT CHANGE UP
HCT VFR BLD CALC: 32.9 % — LOW (ref 34.5–45)
HGB BLD-MCNC: 11.2 G/DL — LOW (ref 11.5–15.5)
INR BLD: 1.18 RATIO — HIGH (ref 0.88–1.16)
KETONES UR-MCNC: ABNORMAL
LEUKOCYTE ESTERASE UR-ACNC: NEGATIVE — SIGNIFICANT CHANGE UP
LIDOCAIN IGE QN: 111 U/L — HIGH (ref 7–60)
MAGNESIUM SERPL-MCNC: 2.5 MG/DL — SIGNIFICANT CHANGE UP (ref 1.6–2.6)
MCHC RBC-ENTMCNC: 31.3 PG — SIGNIFICANT CHANGE UP (ref 27–34)
MCHC RBC-ENTMCNC: 34 GM/DL — SIGNIFICANT CHANGE UP (ref 32–36)
MCV RBC AUTO: 92.2 FL — SIGNIFICANT CHANGE UP (ref 80–100)
NITRITE UR-MCNC: NEGATIVE — SIGNIFICANT CHANGE UP
PH UR: 6 — SIGNIFICANT CHANGE UP (ref 5–8)
PHOSPHATE SERPL-MCNC: 2.5 MG/DL — SIGNIFICANT CHANGE UP (ref 2.5–4.5)
PLATELET # BLD AUTO: 252 K/UL — SIGNIFICANT CHANGE UP (ref 150–400)
POTASSIUM SERPL-MCNC: 3.8 MMOL/L — SIGNIFICANT CHANGE UP (ref 3.5–5.3)
POTASSIUM SERPL-SCNC: 3.8 MMOL/L — SIGNIFICANT CHANGE UP (ref 3.5–5.3)
PROT SERPL-MCNC: 5.6 G/DL — LOW (ref 6–8.3)
PROT UR-MCNC: 100 MG/DL
PROTHROM AB SERPL-ACNC: 12.9 SEC — HIGH (ref 9.8–12.7)
RBC # BLD: 3.57 M/UL — LOW (ref 3.8–5.2)
RBC # FLD: 12.2 % — SIGNIFICANT CHANGE UP (ref 10.3–14.5)
RBC CASTS # UR COMP ASSIST: ABNORMAL /HPF (ref 0–2)
SODIUM SERPL-SCNC: 137 MMOL/L — SIGNIFICANT CHANGE UP (ref 135–145)
SP GR SPEC: >1.03 — HIGH (ref 1.01–1.02)
UROBILINOGEN FLD QL: NEGATIVE — SIGNIFICANT CHANGE UP
WBC # BLD: 16.4 K/UL — HIGH (ref 3.8–10.5)
WBC # FLD AUTO: 16.4 K/UL — HIGH (ref 3.8–10.5)
WBC UR QL: SIGNIFICANT CHANGE UP /HPF (ref 0–5)

## 2017-10-19 PROCEDURE — 99233 SBSQ HOSP IP/OBS HIGH 50: CPT

## 2017-10-19 PROCEDURE — 71010: CPT | Mod: 26

## 2017-10-19 RX ORDER — HYDROMORPHONE HYDROCHLORIDE 2 MG/ML
1 INJECTION INTRAMUSCULAR; INTRAVENOUS; SUBCUTANEOUS
Qty: 0 | Refills: 0 | Status: DISCONTINUED | OUTPATIENT
Start: 2017-10-19 | End: 2017-10-19

## 2017-10-19 RX ORDER — ACETAMINOPHEN 500 MG
1000 TABLET ORAL ONCE
Qty: 0 | Refills: 0 | Status: COMPLETED | OUTPATIENT
Start: 2017-10-19 | End: 2017-10-19

## 2017-10-19 RX ORDER — IMIPENEM AND CILASTATIN 250; 250 MG/100ML; MG/100ML
500 INJECTION, POWDER, FOR SOLUTION INTRAVENOUS ONCE
Qty: 0 | Refills: 0 | Status: COMPLETED | OUTPATIENT
Start: 2017-10-19 | End: 2017-10-19

## 2017-10-19 RX ORDER — MORPHINE SULFATE 50 MG/1
3 CAPSULE, EXTENDED RELEASE ORAL ONCE
Qty: 0 | Refills: 0 | Status: DISCONTINUED | OUTPATIENT
Start: 2017-10-19 | End: 2017-10-21

## 2017-10-19 RX ORDER — DIPHENHYDRAMINE HCL 50 MG
25 CAPSULE ORAL ONCE
Qty: 0 | Refills: 0 | Status: COMPLETED | OUTPATIENT
Start: 2017-10-19 | End: 2017-10-19

## 2017-10-19 RX ORDER — HYDROMORPHONE HYDROCHLORIDE 2 MG/ML
1 INJECTION INTRAMUSCULAR; INTRAVENOUS; SUBCUTANEOUS EVERY 4 HOURS
Qty: 0 | Refills: 0 | Status: DISCONTINUED | OUTPATIENT
Start: 2017-10-19 | End: 2017-10-22

## 2017-10-19 RX ORDER — PANTOPRAZOLE SODIUM 20 MG/1
40 TABLET, DELAYED RELEASE ORAL DAILY
Qty: 0 | Refills: 0 | Status: DISCONTINUED | OUTPATIENT
Start: 2017-10-19 | End: 2017-10-23

## 2017-10-19 RX ORDER — DEXTROSE MONOHYDRATE, SODIUM CHLORIDE, AND POTASSIUM CHLORIDE 50; .745; 4.5 G/1000ML; G/1000ML; G/1000ML
1000 INJECTION, SOLUTION INTRAVENOUS
Qty: 0 | Refills: 0 | Status: DISCONTINUED | OUTPATIENT
Start: 2017-10-19 | End: 2017-11-04

## 2017-10-19 RX ORDER — IMIPENEM AND CILASTATIN 250; 250 MG/100ML; MG/100ML
INJECTION, POWDER, FOR SOLUTION INTRAVENOUS
Qty: 0 | Refills: 0 | Status: DISCONTINUED | OUTPATIENT
Start: 2017-10-19 | End: 2017-10-22

## 2017-10-19 RX ORDER — OXYCODONE HYDROCHLORIDE 5 MG/1
10 TABLET ORAL
Qty: 0 | Refills: 0 | Status: DISCONTINUED | OUTPATIENT
Start: 2017-10-19 | End: 2017-10-22

## 2017-10-19 RX ORDER — IMIPENEM AND CILASTATIN 250; 250 MG/100ML; MG/100ML
500 INJECTION, POWDER, FOR SOLUTION INTRAVENOUS EVERY 6 HOURS
Qty: 0 | Refills: 0 | Status: DISCONTINUED | OUTPATIENT
Start: 2017-10-20 | End: 2017-10-22

## 2017-10-19 RX ORDER — DEXTROSE 50 % IN WATER 50 %
1 SYRINGE (ML) INTRAVENOUS ONCE
Qty: 0 | Refills: 0 | Status: DISCONTINUED | OUTPATIENT
Start: 2017-10-19 | End: 2017-10-19

## 2017-10-19 RX ORDER — HEPARIN SODIUM 5000 [USP'U]/ML
5000 INJECTION INTRAVENOUS; SUBCUTANEOUS EVERY 12 HOURS
Qty: 0 | Refills: 0 | Status: DISCONTINUED | OUTPATIENT
Start: 2017-10-19 | End: 2017-10-21

## 2017-10-19 RX ORDER — OXYCODONE HYDROCHLORIDE 5 MG/1
5 TABLET ORAL
Qty: 0 | Refills: 0 | Status: DISCONTINUED | OUTPATIENT
Start: 2017-10-19 | End: 2017-10-22

## 2017-10-19 RX ORDER — POTASSIUM PHOSPHATE, MONOBASIC POTASSIUM PHOSPHATE, DIBASIC 236; 224 MG/ML; MG/ML
15 INJECTION, SOLUTION INTRAVENOUS ONCE
Qty: 0 | Refills: 0 | Status: COMPLETED | OUTPATIENT
Start: 2017-10-19 | End: 2017-10-19

## 2017-10-19 RX ADMIN — HYDROMORPHONE HYDROCHLORIDE 0.75 MILLIGRAM(S): 2 INJECTION INTRAMUSCULAR; INTRAVENOUS; SUBCUTANEOUS at 09:17

## 2017-10-19 RX ADMIN — PANTOPRAZOLE SODIUM 40 MILLIGRAM(S): 20 TABLET, DELAYED RELEASE ORAL at 05:04

## 2017-10-19 RX ADMIN — HYDROMORPHONE HYDROCHLORIDE 1 MILLIGRAM(S): 2 INJECTION INTRAMUSCULAR; INTRAVENOUS; SUBCUTANEOUS at 21:40

## 2017-10-19 RX ADMIN — HEPARIN SODIUM 5000 UNIT(S): 5000 INJECTION INTRAVENOUS; SUBCUTANEOUS at 17:24

## 2017-10-19 RX ADMIN — POTASSIUM PHOSPHATE, MONOBASIC POTASSIUM PHOSPHATE, DIBASIC 62.5 MILLIMOLE(S): 236; 224 INJECTION, SOLUTION INTRAVENOUS at 04:52

## 2017-10-19 RX ADMIN — Medication 25 MILLIGRAM(S): at 23:42

## 2017-10-19 RX ADMIN — HYDROMORPHONE HYDROCHLORIDE 2 MILLIGRAM(S): 2 INJECTION INTRAMUSCULAR; INTRAVENOUS; SUBCUTANEOUS at 00:01

## 2017-10-19 RX ADMIN — HYDROMORPHONE HYDROCHLORIDE 1 MILLIGRAM(S): 2 INJECTION INTRAMUSCULAR; INTRAVENOUS; SUBCUTANEOUS at 22:00

## 2017-10-19 RX ADMIN — HYDROMORPHONE HYDROCHLORIDE 0.75 MILLIGRAM(S): 2 INJECTION INTRAMUSCULAR; INTRAVENOUS; SUBCUTANEOUS at 04:50

## 2017-10-19 RX ADMIN — Medication 400 MILLIGRAM(S): at 05:04

## 2017-10-19 RX ADMIN — ONDANSETRON 4 MILLIGRAM(S): 8 TABLET, FILM COATED ORAL at 06:11

## 2017-10-19 RX ADMIN — HYDROMORPHONE HYDROCHLORIDE 30 MILLILITER(S): 2 INJECTION INTRAMUSCULAR; INTRAVENOUS; SUBCUTANEOUS at 07:20

## 2017-10-19 RX ADMIN — HYDROMORPHONE HYDROCHLORIDE 2 MILLIGRAM(S): 2 INJECTION INTRAMUSCULAR; INTRAVENOUS; SUBCUTANEOUS at 00:20

## 2017-10-19 RX ADMIN — IMIPENEM AND CILASTATIN 100 MILLIGRAM(S): 250; 250 INJECTION, POWDER, FOR SOLUTION INTRAVENOUS at 16:26

## 2017-10-19 RX ADMIN — Medication 25 MILLIGRAM(S): at 15:00

## 2017-10-19 RX ADMIN — HYDROMORPHONE HYDROCHLORIDE 30 MILLILITER(S): 2 INJECTION INTRAMUSCULAR; INTRAVENOUS; SUBCUTANEOUS at 05:06

## 2017-10-19 RX ADMIN — IMIPENEM AND CILASTATIN 100 MILLIGRAM(S): 250; 250 INJECTION, POWDER, FOR SOLUTION INTRAVENOUS at 23:51

## 2017-10-19 RX ADMIN — Medication 400 MILLIGRAM(S): at 16:09

## 2017-10-19 RX ADMIN — Medication 400 MILLIGRAM(S): at 23:49

## 2017-10-19 RX ADMIN — SODIUM CHLORIDE 100 MILLILITER(S): 9 INJECTION, SOLUTION INTRAVENOUS at 05:04

## 2017-10-19 RX ADMIN — HYDROMORPHONE HYDROCHLORIDE 0.5 MILLIGRAM(S): 2 INJECTION INTRAMUSCULAR; INTRAVENOUS; SUBCUTANEOUS at 00:05

## 2017-10-19 RX ADMIN — PIPERACILLIN AND TAZOBACTAM 25 GRAM(S): 4; .5 INJECTION, POWDER, LYOPHILIZED, FOR SOLUTION INTRAVENOUS at 08:40

## 2017-10-19 RX ADMIN — HYDROMORPHONE HYDROCHLORIDE 0.75 MILLIGRAM(S): 2 INJECTION INTRAMUSCULAR; INTRAVENOUS; SUBCUTANEOUS at 08:37

## 2017-10-19 NOTE — PROGRESS NOTE ADULT - SUBJECTIVE AND OBJECTIVE BOX
Day 2  of Anesthesia Pain Management Service    SUBJECTIVE: Patient still reporting pain     Pain Scale Score:	[X] Refer to charted pain scores    THERAPY:    [ ] IV PCA Morphine		[ ] 5 mg/mL	[ ] 1 mg/mL  [X] IV PCA Hydromorphone	[ ] 5 mg/mL	[X] 1 mg/mL  [ ] IV PCA Fentanyl		[ ] 50 micrograms/mL    Demand dose: 0.35 mg     Lockout: 6 minutes   Continuous Rate: 0 mg/hr  4 Hour Limit: 4 mg    MEDICATIONS  (STANDING):  dextrose 5% + sodium chloride 0.45% with potassium chloride 20 mEq/L 1000 milliLiter(s) (50 mL/Hr) IV Continuous <Continuous>  heparin  Injectable 5000 Unit(s) SubCutaneous every 12 hours  HYDROmorphone (10 MICROgram(s)/mL) + BUpivacaine 0.0625% in 0.9% Sodium Chloride PCEA 250 milliLiter(s) Epidural PCA Continuous  HYDROmorphone PCA (1 mG/mL) 30 milliLiter(s) PCA Continuous PCA Continuous  pantoprazole  Injectable 40 milliGRAM(s) IV Push daily    MEDICATIONS  (PRN):  HYDROmorphone (10 MICROgram(s)/mL) + BUpivacaine 0.0625% in 0.9% Sodium Chloride PCEA Rescue Clinician  Bolus 5 milliLiter(s) Epidural every 15 minutes PRN for Pain Score greater than 6   HYDROmorphone PCA (1 mG/mL) Rescue Clinician Bolus 0.75 milliGRAM(s) IV Push every 15 minutes PRN for Pain Scale GREATER THAN 6  naloxone Injectable 0.1 milliGRAM(s) IV Push every 3 minutes PRN For ANY of the following changes in patient status:  A. RR LESS THAN 10 breaths per minute, B. Oxygen saturation LESS THAN 90%, C. Sedation score of 6  ondansetron Injectable 4 milliGRAM(s) IV Push every 6 hours PRN Nausea      OBJECTIVE:    Sedation Score:	[ X] Alert	[ ] Drowsy 	[ ] Arousable	[ ] Asleep	[ ] Unresponsive    Side Effects:	[X ] None	[ ] Nausea	[ ] Vomiting	[ ] Pruritus  		[ ] Other:    Vital Signs Last 24 Hrs  T(C): 36.7 (19 Oct 2017 07:00), Max: 37.5 (18 Oct 2017 12:00)  T(F): 98.1 (19 Oct 2017 07:00), Max: 99.5 (18 Oct 2017 12:00)  HR: 89 (19 Oct 2017 10:00) (82 - 99)  BP: 118/72 (19 Oct 2017 10:00) (95/54 - 131/91)  BP(mean): 90 (19 Oct 2017 10:00) (69 - 103)  RR: 17 (19 Oct 2017 10:00) (9 - 46)  SpO2: 98% (19 Oct 2017 10:00) (92% - 99%)    ASSESSMENT/ PLAN    Therapy to  be:               [X] Continued   [ ] Discontinued   [ ] Changed to PRN Analgesics    Documentation and Verification of current medications:   [X] Done	[ ] Not done, not eligible    Comments: Patient continues to report abdominal pain while on PCA with escalating PCA demand doses. Using 2-6x/hr.   Epidural  placed by anesthesia. Will D\C PCA.

## 2017-10-19 NOTE — PHYSICAL THERAPY INITIAL EVALUATION ADULT - PERTINENT HX OF CURRENT PROBLEM, REHAB EVAL
56 yo F admit on 10/16/2017 c/o acute onset severe sharp stabbing upper abdominal pain that radiating to back. Pt also c/o nausea and NBNB emesis x3 episodes. Pt hypotensive in ED. abdominal imaging findings concerning for CBD obstructing stone and acute pancreatitis w/ 30% necrosis. MRCP showed a distal CBD stone, elevating concern for acute obstructive cholangitis. Labs resulted with evidence of pancreatitis with lipase > 8500.

## 2017-10-19 NOTE — CONSULT NOTE ADULT - ASSESSMENT
Offered a Thoracic Epidural, SICU TEAM AND PATIENT accept.  Under full sterile precautions, a T78 Thoracic Epidural placed without any difficulty, test dose given, Loaded with 6 mls of 0.125% Plain Bupivaciane. Tolerated well.  Full epidural PCEA Orders written.

## 2017-10-19 NOTE — PROGRESS NOTE ADULT - SUBJECTIVE AND OBJECTIVE BOX
Pain Management Attending Addendum    SUBJECTIVE: Patient doing well with IV PCA    Therapy:    [X] IV PCA         [ ] PRN Analgesics    OBJECTIVE:   [] Pain appropriately controlled    [ ] Other:    Side Effects:  [] None	             [ ] Nausea              [ ] Pruritis                	[ ] Other:    ASSESSMENT/PLAN: Switch IV PCA to EPIDURAL PCEA    Comments: Placed T78 Epidural at bedside.

## 2017-10-19 NOTE — PROGRESS NOTE ADULT - ASSESSMENT
Impression:  1. Gallstone pancreatitis (BISAP = 1)  2. Choledocholithiasis- meets TG13 criteria for mild cholangitis (leukocytosis, abnormal liver tests, CBD stone)  3. Respiratory Distress with Hypoxia requiring High Flow O2     Plan:  -c/w pain control, IVF  -trend liver enzymes daily  -f/u surgery recs re: cholecystectomy  -will need outpatient f/u with GI for stent removal following cholecystectomy   -incentive spirometry, OOB as tolerated       Please call with questions  Rena Tijerina  GI Fellow  Pager: [phone]

## 2017-10-19 NOTE — PHYSICAL THERAPY INITIAL EVALUATION ADULT - PRECAUTIONS/LIMITATIONS, REHAB EVAL
fall precautions fall precautions/SICU was consulted as she was tachycardic to the 130s and breaking through Dilaudid, Tylenol, and Toradol. GI was consulted urgently and ERCP was performed with biliary stent placement.  CXR demonstrates bilateral atelectsis.Plan for repeat ERCP to removal stone in the CBD.

## 2017-10-19 NOTE — PROGRESS NOTE ADULT - ASSESSMENT
57 year old woman with necrotizing pancreatitis, s/p ERCP (sphincterotomy and plastic stent placement)  1. Pain control  - appreciate pain service placing PCEA  2. Continue NPO and IV fluids  3. Continue incentive spirometry use  4. Wean supplemental O2 as tolerated

## 2017-10-19 NOTE — PROGRESS NOTE ADULT - SUBJECTIVE AND OBJECTIVE BOX
[facility]VANCED ENDOSCOPY PROGRESS NOTE:  Chief Complaint:  Patient is a 57y old  Female who presents with a chief complaint of Abdominal pain (16 Oct 2017 09:50)    Interval Events:  -s/p ERCP with biliary stent placement  -persistent oxygen requirements- now on high flow O2   -abdominal pain is improved   -no nausea or vomiting   -afebrile       Allergies:  No Known Allergies      Home Medications:  Patient reports: amlodipine, aspirin    Hospital Medications:  acetaminophen  IVPB. 1000 milliGRAM(s) IV Intermittent once  calcium gluconate IVPB 2 Gram(s) IV Intermittent once  enoxaparin Injectable 40 milliGRAM(s) SubCutaneous daily  HYDROmorphone  Injectable 0.5 milliGRAM(s) IV Push every 2 hours PRN  HYDROmorphone  Injectable 1 milliGRAM(s) IV Push every 4 hours PRN  HYDROmorphone PCA (1 mG/mL) 30 milliLiter(s) PCA Continuous PCA Continuous  HYDROmorphone PCA (1 mG/mL) Rescue Clinician Bolus 0.75 milliGRAM(s) IV Push every 15 minutes PRN  lactated ringers. 1000 milliLiter(s) IV Continuous <Continuous>  magnesium sulfate  IVPB 2 Gram(s) IV Intermittent once  magnesium sulfate  IVPB 2 Gram(s) IV Intermittent once  magnesium sulfate  IVPB 2 Gram(s) IV Intermittent once  naloxone Injectable 0.1 milliGRAM(s) IV Push every 3 minutes PRN  ondansetron Injectable 4 milliGRAM(s) IV Push every 6 hours PRN  piperacillin/tazobactam IVPB. 3.375 Gram(s) IV Intermittent every 8 hours  potassium chloride  10 mEq/100 mL IVPB 10 milliEquivalent(s) IV Intermittent every 1 hour      PMHX/PSHX:  Kidney stone  Hypertension  No pertinent past medical history  Kidney stones  History of appendectomy      Family history:  No pertinent family history in first degree relatives      Social History: no tobacco, ETOH or IVDA     Review of systems: Negative, except as otherwise noted above      PHYSICAL EXAM:    GENERAL:  On high flow O2   HEENT:  Anicteric, no thrush  CHEST:  Non-labored breathing, lungs clear b/l  HEART:  +s1, s2 heart sounds, no murmurs  ABDOMEN:  Soft, tender to palpation diffusely, no rebound tenderness  EXTREMITIES:  warm and well perfused, no edema  SKIN:  No rash or jaundice  NEURO:  AAOx3, interactive, following commands        Vital Signs:  Vital Signs Last 24 Hrs  T(C): 36.7 (19 Oct 2017 07:00), Max: 37.5 (18 Oct 2017 12:00)  T(F): 98.1 (19 Oct 2017 07:00), Max: 99.5 (18 Oct 2017 12:00)  HR: 84 (19 Oct 2017 09:00) (82 - 99)  BP: 95/54 (19 Oct 2017 09:00) (95/54 - 131/91)  BP(mean): 69 (19 Oct 2017 09:00) (69 - 103)  RR: 14 (19 Oct 2017 09:00) (9 - 46)  SpO2: 99% (19 Oct 2017 09:00) (92% - 99%)  Daily     Daily Weight in k.5 (19 Oct 2017 06:42)    LABS:                        11.2   16.4  )-----------( 252      ( 19 Oct 2017 02:37 )             32.9     10-    137  |  101  |  9   ----------------------------<  86  3.8   |  23  |  0.47<L>    Ca    7.6<L>      19 Oct 2017 02:37  Phos  2.5     10-  Mg     2.5     10-19    TPro  5.6<L>  /  Alb  2.7<L>  /  TBili  0.4  /  DBili  0.2  /  AST  29  /  ALT  25  /  AlkPhos  82  10-19    LIVER FUNCTIONS - ( 19 Oct 2017 02:37 )  Alb: 2.7 g/dL / Pro: 5.6 g/dL / ALK PHOS: 82 U/L / ALT: 25 U/L RC / AST: 29 U/L / GGT: x           PT/INR - ( 19 Oct 2017 02:37 )   PT: 12.9 sec;   INR: 1.18 ratio         PTT - ( 19 Oct 2017 02:37 )  PTT:31.7 sec    Amylase Serum--      Lipase [id number]       Ammonia--  Amylase Serum--      Lipase [id number]       Ammonia--                        Imaging:  < from: ERCP (10.17.17 @ 11:24) >  [facility]  ____________________________________________________________________________________________________  Patient Name: Elba Coleman                   MRN: [id number]  Account Number: [id number]                     YOB: 1960  Room: Endoscopy Room 2                           Gender: Female  Attending MD: MELISSA GRAY MD                 Procedure Date No Time: 10/17/2017  ____________________________________________________________________________________________________     Procedure:           ERCP  Indications:         Bile duct stone(s), Suspected ascending cholangitis  Providers:           MELISSA GRAY MD, Aissatou Iqbal MD (Fellow)  Medicines:           General Anesthesia  Complications:       No immediate complications.  ____________________________________________________________________________________________________  Procedure:           Pre-Anesthesia Assessment:                       - Prior to the procedure, a History andPhysical was performed, and patient                        medications, allergies and sensitivities were reviewed. The patient's                        tolerance of previous anesthesia was reviewed.                       - The risks and benefits of the procedure and the sedation options and risks                        were discussed with the patient. All questions were answered and informed                        consent was obtained.                       - Patient identification and proposed procedure were verified prior to the                        procedure by the physician, the nurse and the anesthetist. The procedure was                        verified in the procedure room.                       After obtaining informed consent, the scope was passed under direct vision.                        Throughout the procedure, the patient's blood pressure, pulse, and oxygen                        saturations were monitored continuously. The Duodenoscope was introduced     through the mouth, and advanced to the duodenum and used to inject contrast                        into the bile duct. The ERCP was accomplished without difficulty. The patient                        tolerated the procedure well.                                                                                      Findings:       The  film was normal. The esophagus was successfully intubated under direct vision. The        scope was advanced to a normal major papilla in the descending duodenum without detailed        examination of the pharynx, larynx and associated structures, and upper GI tract. The upper        GI tract was grossly normal. The bile duct was deeply cannulated with the Rx 44        sphincterotome preloaded with the 0.035 inch x 260 cm straight Hydra Jagwire . Contrast was        injected. I personally interpreted the bile duct images. There was brisk flow of contrast        through the ducts. Image quality was excellent. Contrast extended to the main bile duct. The        common bile duct contained filling defect(s) thought to be a stone and sludge. One 10 Fr by 7        cm biliary stent was placed into the common bile duct to allow biliary system to decompress.        Bile and sludge flowed through the stent. The stent was in good position. Final fluoroscopic        images showed no air in unusual places and biliary stent.                                                                                                        Impression:      Ascending cholangitis with choledocholithiasis s/p ERCP with biliary stent                        placement  Recommendation:      - [facility].                       - Consider cholecystectomy          - Repeat ERCP with stent removal, sphincterotomy, and stone extraction at                        later date    < end of copied text >

## 2017-10-19 NOTE — PROGRESS NOTE ADULT - ATTENDING COMMENTS
I have seen and examined this patient and agree with above. Patient with acute gallstone pancreatitis an pancreatic necrosis. The patient continued to have pain overnight not controlled well with PCA. After discussion with anesthesia an epidural catheter was inserted. Her pain improved after this procedure     Acute hypoxemic respiratory failure- placed on high flow oxygen today with improvement in respiratory status  -will continue to wean as tolerated  -goal SpO2>92%  IVF decreased as well.   CXR reviewed and there is no pulmonary congestion.     Hemodynamically stable  Patient has been febrile throughout the day and primaxin was begun. Will continue NPO and continue with serial abdominal exams. Cultures to be sent. If continues to be febrile plan for CT scan abdomen/pelvis to assess for infected pancreatic necrosis.

## 2017-10-19 NOTE — PROGRESS NOTE ADULT - ASSESSMENT
ASSESSMENT:  56 y/o female w/ PMHx significant for HTN, nephrolithiasis, and s/p appendectomy presenting with acute gallstone pancreatitis & cholangitis s/p ERCP w/ biliary stent placement. Pending repeat ERCP with stone extraction.    PLAN:    Neuro: acute pain from gallstone pancreatitis  - Dilaudid PCA w/ standing IV acetaminophen.    Resp: atelectasis  - Monitor pulse oximeter.  - High flow O2 to help with atelectasis. Will obtain repeat ABG in the afternoon.    CV: low grade tachycardia  - Monitor vital signs.  - Holding home anti-hypertensives.    GI: choledocholithiasis s/p ERCP with biliary stent placement, gallstone pancreatitis now resolving  - Strict NPO due to her severe pain.  - Pending repeat ERCP with stone extraction with GI.    Renal: no acute issues  - Monitor I&Os.  - Monitor electrolytes and replete as needed.  - Plasmalyte @ 100 cc/hr for acute pancreatitis and patient is NPO.    Heme: no acute issues  - Lovenox for VTE prophylaxis.    ID: choledocholithiasis & gallstone pancreatitis  - Monitor for clinical evidence of active infection.  - Zosyn for empiric treatment of cholecystitis vs. cholangitis.    Endo: no acute issues  - Monitor glucose on BMP.    Disposition:  - Full code.  - Will remain in SICU.    Miguelina Martinez PA-C    [id number] ASSESSMENT:  56 y/o female w/ PMHx significant for HTN, nephrolithiasis, and s/p appendectomy presenting with acute gallstone pancreatitis & cholangitis s/p ERCP w/ biliary stent placement. Pending repeat ERCP with stone extraction. Hypoxic, requiring [place] NC.     PLAN:  Neuro: acute pain from gallstone pancreatitis  - Dilaudid PCA w/ standing IV acetaminophen.  - s/p Epidural PCA transition     Resp: atelectasis, hypoxemia   - Monitor pulse oximeter.  - Encourage incentive spirometry   - Wean [place] NC     CV: tachycardia resolved, hypervolemic   - Monitor vital signs.  - Holding home anti-hypertensives.    GI: choledocholithiasis s/p ERCP with biliary stent placement, gallstone pancreatitis now resolving  - Strict NPO due to her severe pain.  - Pending repeat ERCP with stone extraction with GI.    Renal: Hypervolemic   - Monitor I&Os.  - Monitor electrolytes and replete as needed.  - Change IVF to D51/2NS +20KCl @ 50mL/hr     Heme: no acute issues  - SQ Hep for VTE prophylaxis     ID: choledocholithiasis s/p CBD & gallstone pancreatitis  - D/C Zosyn, monitor for signs of infection, rescan if clinically warranted    Endo: no acute issues  - Monitor glucose on BMP.    Disposition:  - Full code.  - Will remain in SICU.    Miguelina Martinez PA-C    [id number]

## 2017-10-19 NOTE — PROVIDER CONTACT NOTE (OTHER) - ASSESSMENT
Pt awoke from sleep in excruciating pain, crying, moaning, guarding. Pt in LUQ and described as constant and sharp. Pt tachy (HR~ 100-110) - all other VSS at this time. Dilaudid PCA in progress - Rescue bolus of .75mg given, as well as 1g IV Tylenol, as per orders. Pt describes no relief in pain Pt awoke from sleep in excruciating pain, crying, rocking, guarding abd. Pain in LUQ and described as constant and sharp. Pt tachy (HR~ 100-110) - all other VSS at this time. Dilaudid PCA in progress - Rescue bolus of .75mg given, as well as 1g IV Tylenol, as per orders. Pt describes no relief in pain

## 2017-10-19 NOTE — PROGRESS NOTE ADULT - SUBJECTIVE AND OBJECTIVE BOX
HISTORY  56 y/o female w/ PMHx significant for HTN, nephrolithiasis, and s/p appendectomy that presented to ED on 10/16/2017 c/o acute onset severe sharp stabbing upper abdominal pain that radiates to the back. The pain woke her up in the middle of the night causing her to develop nausea and emesis x3 episodes. Found to be hypotensive in the ED w/ abdominal imaging findings concerning for CBD obstructing stone and acute pancreatitis w/ 30% necrosis. MRCP showed a distal CBD stone, elevating concern for acute obstructive cholangitis. Labs resulted with evidence of pancreatitis with lipase > 8500. SICU was consulted as she was tachycardic to the 130s and breaking through Dilaudid, Tylenol, and Toradol. GI was consulted urgently and ERCP was performed with biliary stent placement.    24 HOUR EVENTS: Patient continues to have pain control issues. Due to the severe abdominal pain, patient is unable to breathe deeply and CXR demonstrates bilateral atelectsis so she was started on high flow O2. Lipase decreased to 111. Plan for repeat ERCP to removal stone in the CBD.    SUBJECTIVE/ROS:  [x] A ten-point review of systems was otherwise negative except as noted.  [ ] Due to altered mental status/intubation, subjective information were not able to be obtained from the patient. History was obtained, to the extent possible, from review of the chart and collateral sources of information.    NEURO  CAM ICU: negative  Exam: awake, alert, oriented x4, NAD, no focal deficits  Meds:  ·	HYDROmorphone PCA (1 mG/mL) 30 milliLiter(s) PCA Continuous PCA Continuous  ·	HYDROmorphone PCA (1 mG/mL) Rescue Clinician Bolus 0.75 milliGRAM(s) IV Push every 15 minutes PRN for Pain Scale GREATER THAN 6  [x] Adequacy of sedation and pain control has been assessed and adjusted    RESPIRATORY  RR: 17 (10-19-17 @ 05:00) (9 - 46)  SpO2: 96% (10-19-17 @ 05:00) (92% - 97%)  Exam: decreased bibasilar breath sounds  Mechanical Ventilation: none  [N/A] Extubation Readiness Assessed  Meds: none    CARDIOVASCULAR  HR: 90 (10-19-17 @ 05:00) (82 - 101)  BP: 124/75 (10-19-17 @ 05:00) (104/55 - 124/75)  BP(mean): 95 (10-19-17 @ 05:00) (74 - 95)  Exam: regular rate and rhythm, S1S2  Cardiac Rhythm: sinus  Perfusion     [x]Adequate   [ ]Inadequate  Mentation   [x]Normal       [ ]Reduced  Extremities  [x]Warm         [ ]Cool  Volume Status [ ]Hypervolemic [x]Euvolemic [ ]Hypovolemic  Meds: none    GI/NUTRITION  Exam: mildly distended, diffuse tenderness but soft, no rebound tenderness  Diet: NPO  Meds:   ·	pantoprazole  Injectable 40 milliGRAM(s) IV Push daily  ·	ondansetron Injectable 4 milliGRAM(s) IV Push every 6 hours PRN Nausea    GENITOURINARY    19 Oct 2017 02:37    137  |  101  |  9   ----------------------------<  86  3.8   |  23  |  0.47<L>    Ca    7.6<L>        Phos  2.5  Mg     2.5  TPro  5.6<L>  /  Alb  2.7<L>  /  TBili  0.4  /  DBili  0.2  /  AST  29  /  ALT  25  /  AlkPhos  82  10-19    [x] Lara catheter, indication: urine output monitoring in the critically ill  Meds: multiple electrolytes Injection Type 1 infuse at 100 mL/hr    HEMATOLOGIC  Meds: enoxaparin Injectable 40 milliGRAM(s) SubCutaneous daily  [x] VTE Prophylaxis                        11.2   16.4  )-----------( 252      ( 19 Oct 2017 02:37 )             32.9     PT/INR - ( 19 Oct 2017 02:37 )   PT: 12.9 sec;   INR: 1.18 ratio    PTT - ( 19 Oct 2017 02:37 )  PTT:31.7 sec    INFECTIOUS DISEASES  T(C): 37.2 (10-19-17 @ 03:00), Max: 37.5 (10-18-17 @ 12:00)  WBC Count:  ·	16.4 K/uL (10-19 @ 02:37)  ·	15.2 K/uL (10-18 @ 17:35)  Recent Cultures: Specimen Source: .Blood Blood-Peripheral, 10-17 @ 07:32; Results No growth to date.; Gram Stain: --; Organism: --  Meds: piperacillin/tazobactam IVPB. 3.375 Gram(s) IV Intermittent every 8 hours    ENDOCRINE  Capillary Blood Glucose: none  Meds: none    ACCESS DEVICES:  [x] Peripheral IV  [ ] Central Venous Line	[ ] R	[ ] L	[ ] IJ	[ ] Fem	[ ] SC	Placed:   [ ] Arterial Line		[ ] R	[ ] L	[ ] Fem	[ ] Rad	[ ] Ax	Placed:   [ ] PICC:					[ ] Mediport  [x] Urinary Catheter, Date Placed: 10/17/2017  [x] Necessity of urinary, arterial, and venous catheters discussed    OTHER MEDICATIONS: naloxone Injectable 0.1 milliGRAM(s) IV Push every 3 minutes PRN    CODE STATUS: full code    IMAGING: HISTORY  56 y/o female w/ PMHx significant for HTN, nephrolithiasis, and s/p appendectomy that presented to ED on 10/16/2017 c/o acute onset severe sharp stabbing upper abdominal pain that radiates to the back. The pain woke her up in the middle of the night causing her to develop nausea and emesis x3 episodes. Found to be hypotensive in the ED w/ abdominal imaging findings concerning for CBD obstructing stone and acute pancreatitis w/ 30% necrosis. MRCP showed a distal CBD stone, elevating concern for acute obstructive cholangitis. Labs resulted with evidence of pancreatitis with lipase > 8500. SICU was consulted as she was tachycardic to the 130s and breaking through Dilaudid, Tylenol, and Toradol. GI was consulted urgently and ERCP was performed with biliary stent placement.    24 HOUR EVENTS: Patient continues to have pain control issues. Due to the severe abdominal pain, patient is unable to breathe deeply and CXR demonstrates bilateral atelectsis so she was started on high flow O2. Lipase decreased to 111. Plan for repeat ERCP to removal stone in the CBD.    SUBJECTIVE/ROS:  [x] A ten-point review of systems was otherwise negative except as noted.  [ ] Due to altered mental status/intubation, subjective information were not able to be obtained from the patient. History was obtained, to the extent possible, from review of the chart and collateral sources of information.    NEURO  CAM ICU: negative  Exam: awake, alert, oriented x4, NAD, no focal deficits  Meds:  ·	HYDROmorphone PCA (1 mG/mL) 30 milliLiter(s) PCA Continuous PCA Continuous  ·	HYDROmorphone PCA (1 mG/mL) Rescue Clinician Bolus 0.75 milliGRAM(s) IV Push every 15 minutes PRN for Pain Scale GREATER THAN 6  [x] Adequacy of sedation and pain control has been assessed and adjusted    RESPIRATORY  RR: 17 (10-19-17 @ 05:00) (9 - 46)  SpO2: 96% (10-19-17 @ 05:00) (92% - 97%)  Exam: decreased bibasilar breath sounds on [place] NC  Mechanical Ventilation: none  [N/A] Extubation Readiness Assessed  Meds: none    CARDIOVASCULAR  HR: 90 (10-19-17 @ 05:00) (82 - 101)  BP: 124/75 (10-19-17 @ 05:00) (104/55 - 124/75)  BP(mean): 95 (10-19-17 @ 05:00) (74 - 95)  Exam: regular rate and rhythm, S1S2  Cardiac Rhythm: sinus  Perfusion     [x]Adequate   [ ]Inadequate  Mentation   [x]Normal       [ ]Reduced  Extremities  [x]Warm         [ ]Cool  Volume Status [x ]Hypervolemic [ ]Euvolemic [ ]Hypovolemic  Meds: none    GI/NUTRITION  Exam: mildly distended, diffuse tenderness but soft, no rebound tenderness  Diet: NPO  Meds:   ·	pantoprazole  Injectable 40 milliGRAM(s) IV Push daily  ·	ondansetron Injectable 4 milliGRAM(s) IV Push every 6 hours PRN Nausea    GENITOURINARY  I&O's Detail    18 Oct 2017 07:01  -  19 Oct 2017 07:00  --------------------------------------------------------  IN:    IV PiggyBack: 962.5 mL    lactated ringers.: 450 mL    multiple electrolytes Injection Type 1: 2000 mL    Solution: 200 mL    Solution: 200 mL  Total IN: 3812.5 mL    OUT:    Indwelling Catheter - Urethral: 945 mL  Total OUT: 945 mL    Total NET: 2867.5 mL      19 Oct 2017 07:01  -  19 Oct 2017 10:20  --------------------------------------------------------  IN:    IV PiggyBack: 62.5 mL    multiple electrolytes Injection Type 1: 200 mL    Solution: 50 mL  Total IN: 312.5 mL    OUT:    Indwelling Catheter - Urethral: 100 mL  Total OUT: 100 mL    Total NET: 212.5 mL      19 Oct 2017 02:37    137  |  101  |  9   ----------------------------<  86  3.8   |  23  |  0.47<L>    Ca    7.6<L>        Phos  2.5  Mg     2.5  TPro  5.6<L>  /  Alb  2.7<L>  /  TBili  0.4  /  DBili  0.2  /  AST  29  /  ALT  25  /  AlkPhos  82  10-19    [x] Laar catheter, indication: urine output monitoring in the critically ill  Meds: multiple electrolytes Injection Type 1 infuse at 100 mL/hr    HEMATOLOGIC  Meds: enoxaparin Injectable 40 milliGRAM(s) SubCutaneous daily  [x] VTE Prophylaxis                        11.2   16.4  )-----------( 252      ( 19 Oct 2017 02:37 )             32.9     PT/INR - ( 19 Oct 2017 02:37 )   PT: 12.9 sec;   INR: 1.18 ratio    PTT - ( 19 Oct 2017 02:37 )  PTT:31.7 sec    INFECTIOUS DISEASES  T(C): 37.2 (10-19-17 @ 03:00), Max: 37.5 (10-18-17 @ 12:00)  WBC Count:  ·	16.4 K/uL (10-19 @ 02:37)  ·	15.2 K/uL (10-18 @ 17:35)  Procalcitonin 24.40     Recent Cultures: Specimen Source: .Blood Blood-Peripheral, 10-17 @ 07:32; Results No growth to date.; Gram Stain: --; Organism: --  Meds: piperacillin/tazobactam IVPB. 3.375 Gram(s) IV Intermittent every 8 hours    ENDOCRINE  Capillary Blood Glucose: none  Meds: none    ACCESS DEVICES:  [x] Peripheral IV  [ ] Central Venous Line	[ ] R	[ ] L	[ ] IJ	[ ] Fem	[ ] SC	Placed:   [ ] Arterial Line		[ ] R	[ ] L	[ ] Fem	[ ] Rad	[ ] Ax	Placed:   [ ] PICC:					[ ] Mediport  [x] Urinary Catheter, Date Placed: 10/17/2017  [x] Necessity of urinary, arterial, and venous catheters discussed    OTHER MEDICATIONS: naloxone Injectable 0.1 milliGRAM(s) IV Push every 3 minutes PRN    CODE STATUS: full code    IMAGING:

## 2017-10-19 NOTE — PHYSICAL THERAPY INITIAL EVALUATION ADULT - ADDITIONAL COMMENTS
Pt and spouse visiting from [place]. Pt living with pt daughter during stay in [place], no steps to negotiate. Pt in [place] lives in private apartment with 20 steps to enter with bilat railing. Pt not using AD prior to admission, pt attend PT 2x/wk

## 2017-10-19 NOTE — PROGRESS NOTE ADULT - SUBJECTIVE AND OBJECTIVE BOX
SURGERY DAILY PROGRESS NOTE:     The patient continues to have abdominal pain.   She denies any nausea or vomiting today  She states that she does not feel short of breath, but she is on high-flow nasal cannula    Physical Exam  Vital Signs Last 24 Hrs  T(C): 37.1 (19 Oct 2017 11:00), Max: 37.2 (18 Oct 2017 23:00)  T(F): 98.7 (19 Oct 2017 11:00), Max: 99 (18 Oct 2017 23:00)  HR: 92 (19 Oct 2017 11:53) (82 - 99)  BP: 111/64 (19 Oct 2017 11:00) (95/54 - 131/91)  BP(mean): 81 (19 Oct 2017 11:00) (69 - 103)  RR: 12 (19 Oct 2017 11:53) (9 - 46)  SpO2: 95% (19 Oct 2017 11:53) (94% - 99%)    Gen: No apparent distress  HEENT: normocephalic, atraumatic, no scleral icterus  Pulm: Airway patent, high flow nasal cannula for supplemental O2  Abd: Soft, distended, tender, without any rebound or guarding    I&O's Detail    18 Oct 2017 07:01  -  19 Oct 2017 07:00  --------------------------------------------------------  IN:    IV PiggyBack: 962.5 mL    lactated ringers.: 450 mL    multiple electrolytes Injection Type 1multiple electrolytes Injection Type 1: 2000 mL    Solution: 200 mL    Solution: 200 mL  Total IN: 3812.5 mL    OUT:    Indwelling Catheter - Urethral: 945 mL  Total OUT: 945 mL    Total NET: 2867.5 mL      19 Oct 2017 07:01  -  19 Oct 2017 12:55  --------------------------------------------------------  IN:    dextrose 5% + sodium chloride 0.45% with potassium chloride 20 mEq/L: 150 mL    IV PiggyBack: 62.5 mL    multiple electrolytes Injection Type 1multiple electrolytes Injection Type 1: 200 mL    Solution: 100 mL  Total IN: 512.5 mL    OUT:    Indwelling Catheter - Urethral: 270 mL  Total OUT: 270 mL    Total NET: 242.5 mL          Labs:                        11.2   16.4  )-----------( 252      ( 19 Oct 2017 02:37 )             32.9     10-19    137  |  101  |  9   ----------------------------<  86  3.8   |  23  |  0.47<L>    Ca    7.6<L>      19 Oct 2017 02:37  Phos  2.5     10-19  Mg     2.5     10-19    TPro  5.6<L>  /  Alb  2.7<L>  /  TBili  0.4  /  DBili  0.2  /  AST  29  /  ALT  25  /  AlkPhos  82  10-19    PT/INR - ( 19 Oct 2017 02:37 )   PT: 12.9 sec;   INR: 1.18 ratio         PTT - ( 19 Oct 2017 02:37 )  PTT:31.7 sec    ABG - ( 18 Oct 2017 10:04 )  pH: 7.38  /  pCO2: 42    /  pO2: 77    / HCO3: 24    / Base Excess: -.6   /  SaO2: 96

## 2017-10-20 LAB
ALBUMIN SERPL ELPH-MCNC: 2.3 G/DL — LOW (ref 3.3–5)
ALP SERPL-CCNC: 91 U/L — SIGNIFICANT CHANGE UP (ref 40–120)
ALT FLD-CCNC: 19 U/L RC — SIGNIFICANT CHANGE UP (ref 10–45)
ANION GAP SERPL CALC-SCNC: 11 MMOL/L — SIGNIFICANT CHANGE UP (ref 5–17)
APPEARANCE UR: CLEAR — SIGNIFICANT CHANGE UP
APTT BLD: 31.6 SEC — SIGNIFICANT CHANGE UP (ref 27.5–37.4)
AST SERPL-CCNC: 21 U/L — SIGNIFICANT CHANGE UP (ref 10–40)
BILIRUB DIRECT SERPL-MCNC: 0.1 MG/DL — SIGNIFICANT CHANGE UP (ref 0–0.2)
BILIRUB INDIRECT FLD-MCNC: 0.2 MG/DL — SIGNIFICANT CHANGE UP (ref 0.2–1)
BILIRUB SERPL-MCNC: 0.3 MG/DL — SIGNIFICANT CHANGE UP (ref 0.2–1.2)
BILIRUB UR-MCNC: NEGATIVE — SIGNIFICANT CHANGE UP
BUN SERPL-MCNC: 7 MG/DL — SIGNIFICANT CHANGE UP (ref 7–23)
CA-I BLD-SCNC: 1.09 MMOL/L — LOW (ref 1.12–1.3)
CALCIUM SERPL-MCNC: 7.8 MG/DL — LOW (ref 8.4–10.5)
CHLORIDE SERPL-SCNC: 103 MMOL/L — SIGNIFICANT CHANGE UP (ref 96–108)
CO2 SERPL-SCNC: 22 MMOL/L — SIGNIFICANT CHANGE UP (ref 22–31)
COLOR SPEC: SIGNIFICANT CHANGE UP
CREAT SERPL-MCNC: 0.34 MG/DL — LOW (ref 0.5–1.3)
DIFF PNL FLD: NEGATIVE — SIGNIFICANT CHANGE UP
EPI CELLS # UR: ABNORMAL /HPF
GAS PNL BLDA: SIGNIFICANT CHANGE UP
GLUCOSE SERPL-MCNC: 97 MG/DL — SIGNIFICANT CHANGE UP (ref 70–99)
GLUCOSE UR QL: NEGATIVE — SIGNIFICANT CHANGE UP
HCT VFR BLD CALC: 31.3 % — LOW (ref 34.5–45)
HGB BLD-MCNC: 10.4 G/DL — LOW (ref 11.5–15.5)
INR BLD: 1.31 RATIO — HIGH (ref 0.88–1.16)
KETONES UR-MCNC: NEGATIVE — SIGNIFICANT CHANGE UP
LEUKOCYTE ESTERASE UR-ACNC: NEGATIVE — SIGNIFICANT CHANGE UP
MAGNESIUM SERPL-MCNC: 2.1 MG/DL — SIGNIFICANT CHANGE UP (ref 1.6–2.6)
MCHC RBC-ENTMCNC: 30.4 PG — SIGNIFICANT CHANGE UP (ref 27–34)
MCHC RBC-ENTMCNC: 33.3 GM/DL — SIGNIFICANT CHANGE UP (ref 32–36)
MCV RBC AUTO: 91.3 FL — SIGNIFICANT CHANGE UP (ref 80–100)
NITRITE UR-MCNC: NEGATIVE — SIGNIFICANT CHANGE UP
PH UR: 7 — SIGNIFICANT CHANGE UP (ref 5–8)
PHOSPHATE SERPL-MCNC: 1.5 MG/DL — LOW (ref 2.5–4.5)
PLATELET # BLD AUTO: 293 K/UL — SIGNIFICANT CHANGE UP (ref 150–400)
POTASSIUM SERPL-MCNC: 3.8 MMOL/L — SIGNIFICANT CHANGE UP (ref 3.5–5.3)
POTASSIUM SERPL-SCNC: 3.8 MMOL/L — SIGNIFICANT CHANGE UP (ref 3.5–5.3)
PROT SERPL-MCNC: 5.6 G/DL — LOW (ref 6–8.3)
PROT UR-MCNC: 30 MG/DL
PROTHROM AB SERPL-ACNC: 14.2 SEC — HIGH (ref 9.8–12.7)
RBC # BLD: 3.43 M/UL — LOW (ref 3.8–5.2)
RBC # FLD: 12.4 % — SIGNIFICANT CHANGE UP (ref 10.3–14.5)
RBC CASTS # UR COMP ASSIST: SIGNIFICANT CHANGE UP /HPF (ref 0–2)
SODIUM SERPL-SCNC: 136 MMOL/L — SIGNIFICANT CHANGE UP (ref 135–145)
SP GR SPEC: 1.01 — LOW (ref 1.01–1.02)
TROPONIN T SERPL-MCNC: <0.01 NG/ML — SIGNIFICANT CHANGE UP (ref 0–0.06)
UROBILINOGEN FLD QL: NEGATIVE — SIGNIFICANT CHANGE UP
WBC # BLD: 14.9 K/UL — HIGH (ref 3.8–10.5)
WBC # FLD AUTO: 14.9 K/UL — HIGH (ref 3.8–10.5)
WBC UR QL: SIGNIFICANT CHANGE UP /HPF (ref 0–5)

## 2017-10-20 PROCEDURE — 99233 SBSQ HOSP IP/OBS HIGH 50: CPT

## 2017-10-20 PROCEDURE — 93010 ELECTROCARDIOGRAM REPORT: CPT

## 2017-10-20 PROCEDURE — 71010: CPT | Mod: 26

## 2017-10-20 PROCEDURE — 99232 SBSQ HOSP IP/OBS MODERATE 35: CPT | Mod: GC

## 2017-10-20 RX ORDER — ACETAMINOPHEN 500 MG
1000 TABLET ORAL ONCE
Qty: 0 | Refills: 0 | Status: COMPLETED | OUTPATIENT
Start: 2017-10-20 | End: 2017-10-20

## 2017-10-20 RX ORDER — DIPHENHYDRAMINE HCL 50 MG
25 CAPSULE ORAL ONCE
Qty: 0 | Refills: 0 | Status: COMPLETED | OUTPATIENT
Start: 2017-10-20 | End: 2017-10-20

## 2017-10-20 RX ORDER — CALCIUM GLUCONATE 100 MG/ML
2 VIAL (ML) INTRAVENOUS ONCE
Qty: 0 | Refills: 0 | Status: COMPLETED | OUTPATIENT
Start: 2017-10-20 | End: 2017-10-20

## 2017-10-20 RX ORDER — POTASSIUM PHOSPHATE, MONOBASIC POTASSIUM PHOSPHATE, DIBASIC 236; 224 MG/ML; MG/ML
15 INJECTION, SOLUTION INTRAVENOUS ONCE
Qty: 0 | Refills: 0 | Status: COMPLETED | OUTPATIENT
Start: 2017-10-20 | End: 2017-10-20

## 2017-10-20 RX ORDER — DIPHENHYDRAMINE HCL 50 MG
25 CAPSULE ORAL ONCE
Qty: 0 | Refills: 0 | Status: DISCONTINUED | OUTPATIENT
Start: 2017-10-20 | End: 2017-10-20

## 2017-10-20 RX ADMIN — Medication 400 MILLIGRAM(S): at 23:05

## 2017-10-20 RX ADMIN — HEPARIN SODIUM 5000 UNIT(S): 5000 INJECTION INTRAVENOUS; SUBCUTANEOUS at 05:04

## 2017-10-20 RX ADMIN — Medication 63.75 MILLIMOLE(S): at 05:04

## 2017-10-20 RX ADMIN — HYDROMORPHONE HYDROCHLORIDE 1 MILLIGRAM(S): 2 INJECTION INTRAMUSCULAR; INTRAVENOUS; SUBCUTANEOUS at 04:00

## 2017-10-20 RX ADMIN — Medication 63.75 MILLIMOLE(S): at 04:05

## 2017-10-20 RX ADMIN — Medication 25 MILLIGRAM(S): at 20:57

## 2017-10-20 RX ADMIN — HYDROMORPHONE HYDROCHLORIDE 1 MILLIGRAM(S): 2 INJECTION INTRAMUSCULAR; INTRAVENOUS; SUBCUTANEOUS at 03:46

## 2017-10-20 RX ADMIN — PANTOPRAZOLE SODIUM 40 MILLIGRAM(S): 20 TABLET, DELAYED RELEASE ORAL at 05:04

## 2017-10-20 RX ADMIN — POTASSIUM PHOSPHATE, MONOBASIC POTASSIUM PHOSPHATE, DIBASIC 62.5 MILLIMOLE(S): 236; 224 INJECTION, SOLUTION INTRAVENOUS at 04:05

## 2017-10-20 RX ADMIN — Medication 400 GRAM(S): at 03:47

## 2017-10-20 RX ADMIN — Medication 400 GRAM(S): at 06:07

## 2017-10-20 RX ADMIN — HEPARIN SODIUM 5000 UNIT(S): 5000 INJECTION INTRAVENOUS; SUBCUTANEOUS at 17:10

## 2017-10-20 RX ADMIN — IMIPENEM AND CILASTATIN 100 MILLIGRAM(S): 250; 250 INJECTION, POWDER, FOR SOLUTION INTRAVENOUS at 11:40

## 2017-10-20 RX ADMIN — Medication 400 MILLIGRAM(S): at 15:00

## 2017-10-20 RX ADMIN — IMIPENEM AND CILASTATIN 100 MILLIGRAM(S): 250; 250 INJECTION, POWDER, FOR SOLUTION INTRAVENOUS at 23:05

## 2017-10-20 RX ADMIN — IMIPENEM AND CILASTATIN 100 MILLIGRAM(S): 250; 250 INJECTION, POWDER, FOR SOLUTION INTRAVENOUS at 05:04

## 2017-10-20 RX ADMIN — IMIPENEM AND CILASTATIN 100 MILLIGRAM(S): 250; 250 INJECTION, POWDER, FOR SOLUTION INTRAVENOUS at 17:10

## 2017-10-20 RX ADMIN — DEXTROSE MONOHYDRATE, SODIUM CHLORIDE, AND POTASSIUM CHLORIDE 50 MILLILITER(S): 50; .745; 4.5 INJECTION, SOLUTION INTRAVENOUS at 20:56

## 2017-10-20 NOTE — PROGRESS NOTE ADULT - ASSESSMENT
57 year old woman with necrotizing pancreatitis, s/p ERCP (sphincterotomy and plastic stent placement)  - Pain control: PCEA  - Continue NPO and IV fluids  - Continue incentive spirometry use  - Wean supplemental O2 as tolerated  Care as per SICU 57 year old woman with necrotizing pancreatitis, s/p ERCP (sphincterotomy and plastic stent placement)  - Pain control: PCEA  - Continue NPO and IV fluids  - Continue incentive spirometry use  - Wean supplemental O2 as tolerated  - PT: home w/ assist; outpatient PT  Care as per SICU

## 2017-10-20 NOTE — PROGRESS NOTE ADULT - ATTENDING COMMENTS
As above    Impression:  Necrotizing pancreatitis, with improved abd pain.  Choledocholithiasis with biliary obstruction, s/p ERCP/stent  LFTs improved after ERCP  Fever, dDx necrotizing pancreatitis, infected necrosis, biliary sepsis, much less likely perforation.    Recommendations:  Follow CBC/LFTs.  CT scan abd/pelvis  IV fluids  IV antibiotics  Pain control

## 2017-10-20 NOTE — PROGRESS NOTE ADULT - ASSESSMENT
57F w/ obstructing common bile duct stone, subsequent cholangitis and pancreatitis now s/p ERCP with biliary stent placement that will require future ERCP for stent removal currently undergoing empiric antibiotic therapy and pain control. Now with PCEA placement (10/19) to significant improvement of pain.     Neuro: severe pain, not opioid naive, pain significantly improved with PCEA  Pain  	- Hydromorphone 10mcg/ml + bupivacaine 0.0625% PCEA (3ml demand, lockout 15min, 4h limit 50ml)  	- Oxycodone IR 5mg q3h prn mild pain  	- Oxycodone IR 10mg q3h prn moderate pain  	- Hydromorphone 1mg ivp q4h prn breakthough pain   	- APAP 1000mg ivpb q6h    Resp: no acute issues   Opioids  -Transitioned to PCEA significantly reducing risk of opioid related apnea  -Monitor    CV: borderline tachycardia currently attributed to SIRS/pain  	- Cardiac monitor, q1h vitals  	- Hold home amlodipine    GI: choledocholithiasis & pancreatitis with lipase now resolving but ongoing concern for necrotic conversion  Choledocholithiasis/pancreatitis  	- Imipenem/cilastatin 500mg iv q6h  	- Pantoprazole 40mg qd  	- ERCP w/ biliary stent placed 10/17  NPO   	- Fluids as listed in   	- No TPN at this time   	- Consider advancing to [facility] in AM on 10/20    :   	- D5 1/2NS w/ KCl @ 50ml/h   	- Lara, monitor I&Os, replete electrolytes as needed    ID:   Possible cholecystitis, cholangitis  	- Imipenem/cilastatin 500mg iv q6h  	- Trend CBC, WBC decreasing s/p ERCP  - Monitor fevers, q1h vitals    Heme: trend CBCs  DVT prophylaxis  	- Heparin 5000units subq bid    Endo: No active issues/interventions    Disposition: Full code    FRANCINE Martin, EM PGY1, [id number] ASSESSMENT: 57F w/ obstructing common bile duct stone, subsequent cholangitis and pancreatitis now s/p ERCP with biliary stent placement on 10/17 that will require future ERCP for stent removal currently undergoing empiric antibiotic therapy and pain control. Now with PCEA placement (10/19) to significant improvement of pain.     PLAN:  Neuro: severe pain, not opioid naive, pain significantly improved with PCEA  	- Hydromorphone 10mcg/ml + bupivacaine 0.0625% PCEA (3ml demand, lockout 15min, 4h limit 50ml)  	- Oxycodone IR 5mg q3h prn mild pain  	- Oxycodone IR 10mg q3h prn moderate pain  	- Hydromorphone 1mg ivp q4h prn breakthough pain   	- APAP 1000mg ivpb q6h    Resp: no acute issues   -Transitioned to PCEA significantly reducing risk of opioid related apnea  -Incentive spirometry, OOB, PT    CV: borderline tachycardia currently attributed to SIRS/pain  	- Cardiac monitor, q1h vitals  	- Hold home amlodipine     GI: choledocholithiasis & pancreatitis with lipase now resolving but ongoing concern for necrotic conversion  	- Imipenem/cilastatin 500mg iv q6h  	- Pantoprazole 40mg qd  	- ERCP w/ biliary stent placed 10/17  	- NPO   	- Advance diet as per surgical team    :   	- D5 1/2NS w/ KCl @ 50ml/h   	- Lara, monitor I&Os, replete electrolytes as needed    Heme: trend CBCs  	- Heparin 5000units subq bid for VTE ppx    ID:  Possible cholangitis,   	- Imipenem/cilastatin 500mg iv q6h  	- Trend CBC, WBC decreasing s/p ERCP  - Monitor fevers, q1h vitals    Endo: No active issues/interventions    Disposition: Full code, SICU care      RACHNA Santamaria PGY1, [id number]

## 2017-10-20 NOTE — PROGRESS NOTE ADULT - ATTENDING COMMENTS
I have seen and examined this patient and agree with above. Patient with acute gallstone pancreatitis and pancreatic necrosis noted on MRCP. The patient continued to have pain overnight but is better controlled with PCA. After discussion with anesthesia an epidural catheter was inserted. Her pain improved after this procedure.    Acute hypoxemic respiratory failure-  improvement in respiratory status  -on 3 liters of nasal cannula  -will continue to wean as tolerated  -goal SpO2>92%    Hemodynamically stable  Pancreatic necrosis and leukocytosis- Continue primaxin as improved leukocytosis. WIll advance to clears.  Discontinue rivas.

## 2017-10-20 NOTE — PROGRESS NOTE ADULT - SUBJECTIVE AND OBJECTIVE BOX
[facility]ED ENDOSCOPY PROGRESS NOTE:  Chief Complaint:  Patient is a 57y old  Female who presents with a chief complaint of Abdominal pain (16 Oct 2017 09:50)    Interval Events:  -febrile overnight to 101.2  -pain is improved but still with PCA requirement  -currently on nasal cannula  -empirically started on Imipenem       Allergies:  No Known Allergies      Home Medications:  Patient reports: amlodipine, aspirin    Hospital Medications:  acetaminophen  IVPB. 1000 milliGRAM(s) IV Intermittent once  calcium gluconate IVPB 2 Gram(s) IV Intermittent once  enoxaparin Injectable 40 milliGRAM(s) SubCutaneous daily  HYDROmorphone  Injectable 0.5 milliGRAM(s) IV Push every 2 hours PRN  HYDROmorphone  Injectable 1 milliGRAM(s) IV Push every 4 hours PRN  HYDROmorphone PCA (1 mG/mL) 30 milliLiter(s) PCA Continuous PCA Continuous  HYDROmorphone PCA (1 mG/mL) Rescue Clinician Bolus 0.75 milliGRAM(s) IV Push every 15 minutes PRN  lactated ringers. 1000 milliLiter(s) IV Continuous <Continuous>  magnesium sulfate  IVPB 2 Gram(s) IV Intermittent once  magnesium sulfate  IVPB 2 Gram(s) IV Intermittent once  magnesium sulfate  IVPB 2 Gram(s) IV Intermittent once  naloxone Injectable 0.1 milliGRAM(s) IV Push every 3 minutes PRN  ondansetron Injectable 4 milliGRAM(s) IV Push every 6 hours PRN  piperacillin/tazobactam IVPB. 3.375 Gram(s) IV Intermittent every 8 hours  potassium chloride  10 mEq/100 mL IVPB 10 milliEquivalent(s) IV Intermittent every 1 hour      PMHX/PSHX:  Kidney stone  Hypertension  No pertinent past medical history  Kidney stones  History of appendectomy      Family history:  No pertinent family history in first degree relatives      Social History: no tobacco, ETOH or IVDA     Review of systems: Negative, except as otherwise noted above      PHYSICAL EXAM:    GENERAL:  On high flow O2   HEENT:  Anicteric, no thrush  CHEST:  Non-labored breathing, lungs clear b/l  HEART:  +s1, s2 heart sounds, no murmurs  ABDOMEN:  Soft, tender to palpation diffusely, no rebound tenderness  EXTREMITIES:  warm and well perfused, no edema  SKIN:  No rash or jaundice  NEURO:  AAOx3, interactive, following commands      Vital Signs:  Vital Signs Last 24 Hrs  T(C): 37.6 (20 Oct 2017 11:00), Max: 38.4 (20 Oct 2017 00:00)  T(F): 99.7 (20 Oct 2017 11:00), Max: 101.2 (20 Oct 2017 00:00)  HR: 86 (20 Oct 2017 11:00) (79 - 102)  BP: 127/74 (20 Oct 2017 11:00) (111/66 - 149/71)  BP(mean): 95 (20 Oct 2017 11:00) (82 - 115)  RR: 26 (20 Oct 2017 11:00) (12 - 37)  SpO2: 96% (20 Oct 2017 11:00) (91% - 98%)  Daily     Daily Weight in k.1 (20 Oct 2017 01:29)    LABS:                        10.4   14.9  )-----------( 293      ( 20 Oct 2017 02:57 )             31.3     10-20    136  |  103  |  7   ----------------------------<  97  3.8   |  22  |  0.34<L>    Ca    7.8<L>      20 Oct 2017 02:57  Phos  1.5     10-20  Mg     2.1     10-20    TPro  5.6<L>  /  Alb  2.3<L>  /  TBili  0.3  /  DBili  0.1  /  AST  21  /  ALT  19  /  AlkPhos  91  10-20    LIVER FUNCTIONS - ( 20 Oct 2017 02:57 )  Alb: 2.3 g/dL / Pro: 5.6 g/dL / ALK PHOS: 91 U/L / ALT: 19 U/L RC / AST: 21 U/L / GGT: x           PT/INR - ( 20 Oct 2017 02:57 )   PT: 14.2 sec;   INR: 1.31 ratio         PTT - ( 20 Oct 2017 02:57 )  PTT:31.6 sec  Urinalysis Basic - ( 19 Oct 2017 16:47 )    Color: Yellow / Appearance: Clear / SG: >1.030 / pH: x  Gluc: x / Ketone: Large  / Bili: Negative / Urobili: Negative   Blood: x / Protein: 100 mg/dL / Nitrite: Negative   Leuk Esterase: Negative / RBC: 2-5 /HPF / WBC 2-5 /HPF   Sq Epi: x / Non Sq Epi: Occasional /HPF / Bacteria: Few /HPF                    Imaging:  < from: ERCP (10.17.17 @ 11:24) >  [facility]  ____________________________________________________________________________________________________  Patient Name: Elba Coleman                   MRN: [id number]  Account Number: [id number]                     YOB: 1960  Room: Endoscopy Room 2                           Gender: Female  Attending MD: MELISSA GRAY MD                 Procedure Date No Time: 10/17/2017  ____________________________________________________________________________________________________     Procedure:           ERCP  Indications:         Bile duct stone(s), Suspected ascending cholangitis  Providers:           MELISSA GRAY MD, Aissatou Iqbal MD (Fellow)  Medicines:           General Anesthesia  Complications:       No immediate complications.  ____________________________________________________________________________________________________  Procedure:           Pre-Anesthesia Assessment:                       - Prior to the procedure, a History andPhysical was performed, and patient                        medications, allergies and sensitivities were reviewed. The patient's                        tolerance of previous anesthesia was reviewed.                       - The risks and benefits of the procedure and the sedation options and risks                        were discussed with the patient. All questions were answered and informed                        consent was obtained.                       - Patient identification and proposed procedure were verified prior to the                        procedure by the physician, the nurse and the anesthetist. The procedure was                        verified in the procedure room.                       After obtaining informed consent, the scope was passed under direct vision.                        Throughout the procedure, the patient's blood pressure, pulse, and oxygen                        saturations were monitored continuously. The Duodenoscope was introduced     through the mouth, and advanced to the duodenum and used to inject contrast                        into the bile duct. The ERCP was accomplished without difficulty. The patient                        tolerated the procedure well.                                                                                      Findings:       The  film was normal. The esophagus was successfully intubated under direct vision. The        scope was advanced to a normal major papilla in the descending duodenum without detailed        examination of the pharynx, larynx and associated structures, and upper GI tract. The upper        GI tract was grossly normal. The bile duct was deeply cannulated with the Rx 44        sphincterotome preloaded with the 0.035 inch x 260 cm straight Hydra Jagwire . Contrast was        injected. I personally interpreted the bile duct images. There was brisk flow of contrast        through the ducts. Image quality was excellent. Contrast extended to the main bile duct. The        common bile duct contained filling defect(s) thought to be a stone and sludge. One 10 Fr by 7        cm biliary stent was placed into the common bile duct to allow biliary system to decompress.        Bile and sludge flowed through the stent. The stent was in good position. Final fluoroscopic        images showed no air in unusual places and biliary stent.                                                                                                        Impression:      Ascending cholangitis with choledocholithiasis s/p ERCP with biliary stent                        placement  Recommendation:      - Return patient to [facility] for [facility] care.                       - Consider cholecystectomy          - Repeat ERCP with stent removal, sphincterotomy, and stone extraction at                        later date    < end of copied text >

## 2017-10-20 NOTE — PROGRESS NOTE ADULT - SUBJECTIVE AND OBJECTIVE BOX
Pain Management Attending Addendum    SUBJECTIVE: Patient doing well with IV PCA, c/o some chest pain. EKG about to be done    Therapy:    [X] IV PCA         [ ] PRN Analgesics    OBJECTIVE:   [X] Pain appropriately controlled    [ ] Other:    Side Effects:  [X] None	             [ ] Nausea              [ ] Pruritis                	[ ] Other:    ASSESSMENT/PLAN: Continue current therapy    Comments: good strength in both arms. As per family the pt has been much more mobile since epidural was inserted. Epidural site clean and epidural catheter in good position.

## 2017-10-20 NOTE — PROGRESS NOTE ADULT - SUBJECTIVE AND OBJECTIVE BOX
ATP Team Progress Note    S: Pt seen and examined at bedside. PCEA placed with anesthesia with significant improvement in her pain. Started on imipenem and has ~30% necrosis of her pancreas    T(C): 37.7 (10-20-17 @ 07:00), Max: 38.4 (10-20-17 @ 00:00)  HR: 86 (10-20-17 @ 08:00) (79 - 102)  BP: 141/78 (10-20-17 @ 08:00) (111/64 - 149/71)  RR: 20 (10-20-17 @ 08:00) (12 - 44)  SpO2: 96% (10-20-17 @ 08:00) (91% - 98%)  Wt(kg): --    10-19 @ 07:01  -  10-20 @ 07:00  --------------------------------------------------------  IN:    dextrose 5% + sodium chloride 0.45% with potassium chloride 20 mEq/L: 1100 mL    multiple electrolytes Injection Type 1multiple electrolytes Injection Type 1: 200 mL    Solution: 200 mL    Solution: 600 mL    Solution: 362.5 mL    Solution: 187.5 mL    Solution: 200 mL  Total IN: 2850 mL    OUT:    Indwelling Catheter - Urethral: 1415 mL  Total OUT: 1415 mL    Total NET: 1435 mL      10-20 @ 07:01  -  10-20 @ 09:46  --------------------------------------------------------  IN:    dextrose 5% + sodium chloride 0.45% with potassium chloride 20 mEq/L: 150 mL    Solution: 62.5 mL  Total IN: 212.5 mL    OUT:    Indwelling Catheter - Urethral: 100 mL  Total OUT: 100 mL    Total NET: 112.5 mL      PE  Gen: No apparent distress  HEENT: normocephalic, atraumatic, no scleral icterus  Pulm: Airway patent, high flow nasal cannula for supplemental O2  Abd: Soft, distended, tender, without any rebound or guarding                          10.4   14.9  )-----------( 293      ( 20 Oct 2017 02:57 )             31.3     10-20    136  |  103  |  7   ----------------------------<  97  3.8   |  22  |  0.34<L>    Ca    7.8<L>      20 Oct 2017 02:57  Phos  1.5     10-20  Mg     2.1     10-20    TPro  5.6<L>  /  Alb  2.3<L>  /  TBili  0.3  /  DBili  0.1  /  AST  21  /  ALT  19  /  AlkPhos  91  10-20    PT/INR - ( 20 Oct 2017 02:57 )   PT: 14.2 sec;   INR: 1.31 ratio         PTT - ( 20 Oct 2017 02:57 )  PTT:31.6 sec

## 2017-10-20 NOTE — PROGRESS NOTE ADULT - SUBJECTIVE AND OBJECTIVE BOX
HISTORY OF PRESENT ILLNESS:  57yF w/ PMH significant for HTN and nephrolithiasis, s/p appendectomy that presented to [facility] ED on 10/16/2017 c/o acute onset severe sharp stabbing upper abdominal pain that radiates to the back. She says the pain woke her up in the middle of the night causing her to develop nausea and NBNB emesis x3 episodes. She presented to the emergency department hypotensive was worked up with abdominal imaging findings concerning for CBD obstructing stone and was admitted, MRCP ordered and completed, and showed a distal CBD stone elevating concern for acute obstructive cholangitis. Labs resulted with evidence of pancreatitis with lipase > 8500. SICU was consulted as she was tachycardic to the 130s and breaking through dilaudid, tylenol, and toradol. GI was consulted urgently and ERCP was performed with biliary stent placement.     24 HOUR EVENTS: The patient had a PCEA placed with anesthesia with significant improvement in her pain. 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 Her high flow O2 was weaned to NC. HISTORY  57yF w/ PMH significant for HTN and nephrolithiasis, s/p appendectomy that presented to [facility] ED on 10/16/2017 c/o acute onset severe sharp stabbing upper abdominal pain that radiates to the back. She says the pain woke her up in the middle of the night causing her to develop nausea and NBNB emesis x3 episodes. She presented to the emergency department hypotensive was worked up with abdominal imaging findings concerning for CBD obstructing stone and was admitted, MRCP ordered and completed, and showed a distal CBD stone elevating concern for acute obstructive cholangitis. Labs resulted with evidence of pancreatitis with lipase > 8500. SICU was consulted as she was tachycardic to the 130s and breaking through dilaudid, tylenol, and toradol. GI was consulted urgently and ERCP was performed with biliary stent placement.     24 HOUR EVENTS: The patient had a PCEA placed with anesthesia with significant improvement in her pain. 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 Her high flow O2 was weaned to NC.     SUBJECTIVE/ROS: Pt reports abdominal pain.  [ x] A ten-point review of systems was otherwise negative except as noted.  [ ] Due to altered mental status/intubation, subjective information were not able to be obtained from the patient. History was obtained, to the extent possible, from review of the chart and collateral sources of information.      NEURO  RASS:  0     Exam: awake, alert and oriented x3, NAD  Meds: HYDROmorphone  Injectable 1 milliGRAM(s) IV Push every 4 hours PRN breakthrough  HYDROmorphone (10 MICROgram(s)/mL) + BUpivacaine 0.0625% in 0.9% Sodium Chloride PCEA 250 milliLiter(s) Epidural PCA Continuous  HYDROmorphone (10 MICROgram(s)/mL) + BUpivacaine 0.0625% in 0.9% Sodium Chloride PCEA Rescue Clinician  Bolus 5 milliLiter(s) Epidural every 15 minutes PRN for Pain Score greater than 6  morphine PF Epidural 3 milliGRAM(s) Epidural once  oxyCODONE    IR 5 milliGRAM(s) Oral every 3 hours PRN Mild Pain  oxyCODONE    IR 10 milliGRAM(s) Oral every 3 hours PRN Moderate Pain    [x] Adequacy of sedation and pain control has been assessed and adjusted      RESPIRATORY  RR: 19 (10-20-17 @ 06:00) (12 - 44)  SpO2: 96% (10-20-17 @ 06:00) (91% - 99%)    Exam: unlabored, clear to auscultation bilaterally  Mechanical Ventilation: n/a  ABG - ( 20 Oct 2017 02:50 )  pH: 7.40  /  pCO2: 41    /  pO2: 125   / HCO3: 25    / Base Excess: 1.0   /  SaO2: 99      Blood Gas Arterial, Lactate: 0.6 (10.20.17 @ 02:50)    [ n/a] Extubation Readiness Assessed  Meds: x      CARDIOVASCULAR  HR: 85 (10-20-17 @ 06:00) (79 - 102)  BP: 140/98 (10-20-17 @ 06:00) (95/54 - 149/71)  BP(mean): 115 (10-20-17 @ 06:00) (69 - 115)    Exam: regular rate and rhythm  Cardiac Rhythm: sinus rhythm  Perfusion     [x ]Adequate   [ ]Inadequate  Mentation   [x ]Normal       [ ]Reduced  Extremities  [x ]Warm         [ ]Cool  Volume Status [ ]Hypervolemic [x ]Euvolemic [ ]Hypovolemic  Meds: x      GI/NUTRITION  Exam: softly distended, tender to palpation  Diet: NPO  Meds: pantoprazole  Injectable 40 milliGRAM(s) IV Push daily      GENITOURINARY  I&O's Detail    10-18 @ 07:01  -  10-19 @ 07:00  --------------------------------------------------------  IN:    lactated ringers.: 450 mL    multiple electrolytes Injection Type 1multiple electrolytes Injection Type 1: 2000 mL    Solution: 200 mL    Solution: 200 mL    Solution: 962.5 mL  Total IN: 3812.5 mL    OUT:    Indwelling Catheter - Urethral: 945 mL  Total OUT: 945 mL    Total NET: 2867.5 mL      10-19 @ 07:01  -  10-20 @ 06:03  --------------------------------------------------------  IN:    dextrose 5% + sodium chloride 0.45% with potassium chloride 20 mEq/L: 1100 mL    multiple electrolytes Injection Type 1multiple electrolytes Injection Type 1: 200 mL    Solution: 200 mL    Solution: 600 mL    Solution: 362.5 mL    Solution: 187.5 mL    Solution: 200 mL  Total IN: 2850 mL    OUT:    Indwelling Catheter - Urethral: 1365 mL  Total OUT: 1365 mL    Total NET: 1485 mL          10-20    136  |  103  |  7   ----------------------------<  97  3.8   |  22  |  0.34<L>    Ca    7.8<L>      20 Oct 2017 02:57  Phos  1.5     10-20  Mg     2.1     10-20    TPro  5.6<L>  /  Alb  2.3<L>  /  TBili  0.3  /  DBili  0.1  /  AST  21  /  ALT  19  /  AlkPhos  91  10-20    [x ] Lara catheter, indication: urine output monitoring in critically ill   Meds: calcium gluconate IVPB 2 Gram(s) IV Intermittent once  dextrose 5% + sodium chloride 0.45% with potassium chloride 20 mEq/L 1000 milliLiter(s) IV Continuous <Continuous>        HEMATOLOGIC  Meds: heparin  Injectable 5000 Unit(s) SubCutaneous every 12 hours    [x] VTE Prophylaxis                        10.4   14.9  )-----------( 293      ( 20 Oct 2017 02:57 )             31.3     PT/INR - ( 20 Oct 2017 02:57 )   PT: 14.2 sec;   INR: 1.31 ratio         PTT - ( 20 Oct 2017 02:57 )  PTT:31.6 sec  Transfusion     [ ] PRBC   [ ] Platelets   [ ] FFP   [ ] Cryoprecipitate      INFECTIOUS DISEASES  T(C): 37.4 (10-20-17 @ 03:00), Max: 38.4 (10-20-17 @ 00:00)    WBC Count: 14.9 K/uL (10-20 @ 02:57)    Recent Cultures:  Specimen Source: .Blood Blood-Peripheral, 10-17 @ 07:32; Results   No growth to date.; Gram Stain: --; Organism: --    Meds: imipenem/cilastatin  IVPB      imipenem/cilastatin  IVPB 500 milliGRAM(s) IV Intermittent every 6 hours        ENDOCRINE  Capillary Blood Glucose: WNL    Meds: x      ACCESS DEVICES:  [x ] Peripheral IV  [ ] Central Venous Line	[ ] R	[ ] L	[ ] IJ	[ ] Fem	[ ] SC	Placed:   [ ] Arterial Line		[ ] R	[ ] L	[ ] Fem	[ ] Rad	[ ] Ax	Placed:   [ ] PICC:					[ ] Mediport  [x ] Urinary Catheter, Date Placed:   [x ] Necessity of urinary, arterial, and venous catheters discussed    OTHER MEDICATIONS: x      CODE STATUS: Full code    IMAGING: < from: Xray Chest 1 View AP -PORTABLE-Routine (10.19.17 @ 07:20) >    Impression:    Poor inspiratory effort. The heart is normal in size. Left lower lobe   pneumonia and/or atelectasis. The right lung is clear. No radiographic   evidence for pulmonary vascular congestion. HISTORY  57yF w/ PMH significant for HTN and nephrolithiasis, s/p appendectomy that presented to [facility] ED on 10/16/2017 c/o acute onset severe sharp stabbing upper abdominal pain that radiates to the back. She says the pain woke her up in the middle of the night causing her to develop nausea and NBNB emesis x3 episodes. She presented to the emergency department hypotensive was worked up with abdominal imaging findings concerning for CBD obstructing stone and was admitted, MRCP ordered and completed, and showed a distal CBD stone elevating concern for acute obstructive cholangitis. Labs resulted with evidence of pancreatitis with lipase > 8500. SICU was consulted as she was tachycardic to the 130s and breaking through dilaudid, tylenol, and toradol. GI was consulted urgently and ERCP was performed with biliary stent placement.     24 HOUR EVENTS: The patient had a PCEA placed with anesthesia with significant improvement in her pain. 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 Her high flow O2 was weaned to NC.     SUBJECTIVE/ROS: Pt reports abdominal pain.  [ x] A ten-point review of systems was otherwise negative except as noted.  [ ] Due to altered mental status/intubation, subjective information were not able to be obtained from the patient. History was obtained, to the extent possible, from review of the chart and collateral sources of information.      NEURO  RASS:  0     Exam: awake, alert and oriented x3, NAD  Meds: HYDROmorphone  Injectable 1 milliGRAM(s) IV Push every 4 hours PRN breakthrough  HYDROmorphone (10 MICROgram(s)/mL) + BUpivacaine 0.0625% in 0.9% Sodium Chloride PCEA 250 milliLiter(s) Epidural PCA Continuous  HYDROmorphone (10 MICROgram(s)/mL) + BUpivacaine 0.0625% in 0.9% Sodium Chloride PCEA Rescue Clinician  Bolus 5 milliLiter(s) Epidural every 15 minutes PRN for Pain Score greater than 6  morphine PF Epidural 3 milliGRAM(s) Epidural once  oxyCODONE    IR 5 milliGRAM(s) Oral every 3 hours PRN Mild Pain  oxyCODONE    IR 10 milliGRAM(s) Oral every 3 hours PRN Moderate Pain    [x] Adequacy of sedation and pain control has been assessed and adjusted      RESPIRATORY  RR: 22 (20 Oct 2017 07:00) (12 - 44)  SpO2: 96% (20 Oct 2017 07:00) (91% - 99%)      Exam: unlabored, clear to auscultation bilaterally  Mechanical Ventilation: n/a  ABG - ( 20 Oct 2017 02:50 )  pH: 7.40  /  pCO2: 41    /  pO2: 125   / HCO3: 25    / Base Excess: 1.0   /  SaO2: 99      Blood Gas Arterial, Lactate: 0.6 (10.20.17 @ 02:50)    [ n/a] Extubation Readiness Assessed  Meds: x      CARDIOVASCULAR  HR: 86 (20 Oct 2017 07:00) (79 - 102)  BP: 146/88 (20 Oct 2017 07:00) (95/54 - 149/71)  BP(mean): 111 (20 Oct 2017 07:00) (69 - 115)    Exam: regular rate and rhythm  Cardiac Rhythm: sinus rhythm  Perfusion     [x ]Adequate   [ ]Inadequate  Mentation   [x ]Normal       [ ]Reduced  Extremities  [x ]Warm         [ ]Cool  Volume Status [ ]Hypervolemic [x ]Euvolemic [ ]Hypovolemic  Meds: x      GI/NUTRITION  Exam: softly distended, tender to palpation  Diet: NPO  Meds: pantoprazole  Injectable 40 milliGRAM(s) IV Push daily      GENITOURINARY  I&O's Detail    10-18 @ 07:01  -  10-19 @ 07:00  --------------------------------------------------------  IN:    lactated ringers.: 450 mL    multiple electrolytes Injection Type 1multiple electrolytes Injection Type 1: 2000 mL    Solution: 200 mL    Solution: 200 mL    Solution: 962.5 mL  Total IN: 3812.5 mL    OUT:    Indwelling Catheter - Urethral: 945 mL  Total OUT: 945 mL    Total NET: 2867.5 mL      I&O's Detail    19 Oct 2017 07:01  -  20 Oct 2017 07:00  --------------------------------------------------------  IN:    dextrose 5% + sodium chloride 0.45% with potassium chloride 20 mEq/L: 1100 mL    multiple electrolytes Injection Type 1multiple electrolytes Injection Type 1: 200 mL    Solution: 200 mL    Solution: 600 mL    Solution: 362.5 mL    Solution: 187.5 mL    Solution: 200 mL  Total IN: 2850 mL    OUT:    Indwelling Catheter - Urethral: 1415 mL  Total OUT: 1415 mL    Total NET: 1435 mL      20 Oct 2017 07:01  -  20 Oct 2017 07:42  --------------------------------------------------------  IN:    dextrose 5% + sodium chloride 0.45% with potassium chloride 20 mEq/L: 50 mL    Solution: 62.5 mL  Total IN: 112.5 mL    OUT:  Total OUT: 0 mL    Total NET: 112.5 mL        10-20    136  |  103  |  7   ----------------------------<  97  3.8   |  22  |  0.34<L>    Ca    7.8<L>      20 Oct 2017 02:57  Phos  1.5     10-20  Mg     2.1     10-20    TPro  5.6<L>  /  Alb  2.3<L>  /  TBili  0.3  /  DBili  0.1  /  AST  21  /  ALT  19  /  AlkPhos  91  10-20    [x ] Lara catheter, indication: urine output monitoring in critically ill   Meds: calcium gluconate IVPB 2 Gram(s) IV Intermittent once  dextrose 5% + sodium chloride 0.45% with potassium chloride 20 mEq/L 1000 milliLiter(s) IV Continuous <Continuous>        HEMATOLOGIC  Meds: heparin  Injectable 5000 Unit(s) SubCutaneous every 12 hours    [x] VTE Prophylaxis                        10.4   14.9  )-----------( 293      ( 20 Oct 2017 02:57 )             31.3     PT/INR - ( 20 Oct 2017 02:57 )   PT: 14.2 sec;   INR: 1.31 ratio         PTT - ( 20 Oct 2017 02:57 )  PTT:31.6 sec  Transfusion     [ ] PRBC   [ ] Platelets   [ ] FFP   [ ] Cryoprecipitate      INFECTIOUS DISEASES  T(C): 37.7 (20 Oct 2017 07:00), Max: 38.4 (20 Oct 2017 00:00)    WBC Count: 14.9 K/uL (10-20 @ 02:57)  WBC Count: 16.4 K/uL (10-19 @ 02:37)    Recent Cultures:  Specimen Source: .Blood Blood-Peripheral, 10-17 @ 07:32; Results   No growth to date.; Gram Stain: --; Organism: --    Meds: imipenem/cilastatin  IVPB      imipenem/cilastatin  IVPB 500 milliGRAM(s) IV Intermittent every 6 hours        ENDOCRINE  Capillary Blood Glucose: WNL    Meds: x      ACCESS DEVICES:  [x ] Peripheral IV  [ ] Central Venous Line	[ ] R	[ ] L	[ ] IJ	[ ] Fem	[ ] SC	Placed:   [ ] Arterial Line		[ ] R	[ ] L	[ ] Fem	[ ] Rad	[ ] Ax	Placed:   [ ] PICC:					[ ] Mediport  [x ] Urinary Catheter, Date Placed:   [x ] Necessity of urinary, arterial, and venous catheters discussed    OTHER MEDICATIONS: x      CODE STATUS: Full code    IMAGING: < from: Xray Chest 1 View AP -PORTABLE-Routine (10.19.17 @ 07:20) >    Impression:    Poor inspiratory effort. The heart is normal in size. Left lower lobe   pneumonia and/or atelectasis. The right lung is clear. No radiographic   evidence for pulmonary vascular congestion.

## 2017-10-20 NOTE — PROGRESS NOTE ADULT - ASSESSMENT
Impression:  1. Gallstone pancreatitis (BISAP = 1) with persistent pain and fevers- evidence of necrosis on MRI   2. Choledocholithiasis- s/p ERCP with CBD stent   3. Respiratory Distress with Hypoxia requiring supplemental O2     Plan:  -would check CT Abdomen with contrast to evaluate for abscess/cyst formation  -trend liver enzymes daily  -c/w IVF   -Pain control  -will need biliary stent removal following cholecystectomy   Discussed plan with patient and  at bedside     Please call with questions  Rena Tijerina  GI Fellow  Pager: [phone]

## 2017-10-20 NOTE — PROGRESS NOTE ADULT - SUBJECTIVE AND OBJECTIVE BOX
Day 3/1 of Anesthesia Pain Management Service    SUBJECTIVE: Doing better with PCEA    Pain Scale Score:   Refer to charted pain scores    THERAPY:  [X] Epidural Bupivacaine 0.0625% and Hydromorphone         [X] 10 micrograms/mL 	[ ] 5 micrograms/mL  [ ] Epidural Ropivacaine 0.1% plain – 1 mg/mL    Demand dose: 3 mL  Lockout: 15 minutes  Continuous Rate: 6 mL/hr    MEDICATIONS  (STANDING):  dextrose 5% + sodium chloride 0.45% with potassium chloride 20 mEq/L 1000 milliLiter(s) (50 mL/Hr) IV Continuous <Continuous>  heparin  Injectable 5000 Unit(s) SubCutaneous every 12 hours  HYDROmorphone (10 MICROgram(s)/mL) + BUpivacaine 0.0625% in 0.9% Sodium Chloride PCEA 250 milliLiter(s) Epidural PCA Continuous  imipenem/cilastatin  IVPB      imipenem/cilastatin  IVPB 500 milliGRAM(s) IV Intermittent every 6 hours  morphine PF Epidural 3 milliGRAM(s) Epidural once  pantoprazole  Injectable 40 milliGRAM(s) IV Push daily    MEDICATIONS  (PRN):  HYDROmorphone  Injectable 1 milliGRAM(s) IV Push every 4 hours PRN breakthrough  HYDROmorphone (10 MICROgram(s)/mL) + BUpivacaine 0.0625% in 0.9% Sodium Chloride PCEA Rescue Clinician  Bolus 5 milliLiter(s) Epidural every 15 minutes PRN for Pain Score greater than 6  oxyCODONE    IR 5 milliGRAM(s) Oral every 3 hours PRN Mild Pain  oxyCODONE    IR 10 milliGRAM(s) Oral every 3 hours PRN Moderate Pain      OBJECTIVE:    Assessment of Catheter Site:    [X] Epidural 	  [X] Dressing intact	[X] Site non-tender	[X] Site without erythema, discharge, edema  [X] Epidural tubing and connection checked	[X] Gross neurological exam within normal limits  [ ] Catheter removed – tip intact		[X] Afebrile	            [ ] Febrile: ___    PT/INR - ( 20 Oct 2017 02:57 )   PT: 14.2 sec;   INR: 1.31 ratio         PTT - ( 20 Oct 2017 02:57 )  PTT:31.6 sec                      10.4   14.9  )-----------( 293      ( 20 Oct 2017 02:57 )             31.3     Vital Signs Last 24 Hrs  T(C): 37.7 (10-20-17 @ 07:00), Max: 38.4 (10-20-17 @ 00:00)  T(F): 99.9 (10-20-17 @ 07:00), Max: 101.2 (10-20-17 @ 00:00)  HR: 89 (10-20-17 @ 10:00) (79 - 102)  BP: 126/70 (10-20-17 @ 10:00) (111/64 - 149/71)  BP(mean): 92 (10-20-17 @ 10:00) (81 - 115)  RR: 27 (10-20-17 @ 10:00) (12 - 44)  SpO2: 97% (10-20-17 @ 10:00) (91% - 98%)      Sedation Score:	[X] Alert	[ ] Drowsy	[ ] Arousable  [ ] Asleep     [ ] Unresponsive    Side Effects:	[X] None	[ ] Nausea	[ ] Vomiting   [ ] Pruritus  		[ ] Weakness     [ ] Numbness	[ ] Other:    ASSESSMENT/ PLAN:    Therapy:	[X] Continue   [ ] Discontinue   [ ] Change to PRN Analgesics   [ ] Change to PCA    Documentation and Verification of current medications:  [X] Done	[ ] Not done, not eligible, reason:    COMMENTS: Reporting chest discomfort. EKG done by primary nurse. Abdominal pain better controlled with PCEA. Monitor INR - 1.3 today.

## 2017-10-21 LAB
ALBUMIN SERPL ELPH-MCNC: 2.4 G/DL — LOW (ref 3.3–5)
ALP SERPL-CCNC: 96 U/L — SIGNIFICANT CHANGE UP (ref 40–120)
ALT FLD-CCNC: 15 U/L RC — SIGNIFICANT CHANGE UP (ref 10–45)
AMYLASE P1 CFR SERPL: 51 U/L — SIGNIFICANT CHANGE UP (ref 25–125)
ANION GAP SERPL CALC-SCNC: 8 MMOL/L — SIGNIFICANT CHANGE UP (ref 5–17)
APTT BLD: 29.5 SEC — SIGNIFICANT CHANGE UP (ref 27.5–37.4)
AST SERPL-CCNC: 17 U/L — SIGNIFICANT CHANGE UP (ref 10–40)
BILIRUB DIRECT SERPL-MCNC: 0.1 MG/DL — SIGNIFICANT CHANGE UP (ref 0–0.2)
BILIRUB INDIRECT FLD-MCNC: 0.1 MG/DL — LOW (ref 0.2–1)
BILIRUB SERPL-MCNC: 0.2 MG/DL — SIGNIFICANT CHANGE UP (ref 0.2–1.2)
BUN SERPL-MCNC: 6 MG/DL — LOW (ref 7–23)
CA-I BLD-SCNC: 1.12 MMOL/L — SIGNIFICANT CHANGE UP (ref 1.12–1.3)
CALCIUM SERPL-MCNC: 7.7 MG/DL — LOW (ref 8.4–10.5)
CHLORIDE SERPL-SCNC: 104 MMOL/L — SIGNIFICANT CHANGE UP (ref 96–108)
CO2 SERPL-SCNC: 27 MMOL/L — SIGNIFICANT CHANGE UP (ref 22–31)
CREAT SERPL-MCNC: 0.5 MG/DL — SIGNIFICANT CHANGE UP (ref 0.5–1.3)
GLUCOSE SERPL-MCNC: 111 MG/DL — HIGH (ref 70–99)
HCG UR QL: NEGATIVE — SIGNIFICANT CHANGE UP
HCT VFR BLD CALC: 31.1 % — LOW (ref 34.5–45)
HGB BLD-MCNC: 10.4 G/DL — LOW (ref 11.5–15.5)
INR BLD: 1.29 RATIO — HIGH (ref 0.88–1.16)
LIDOCAIN IGE QN: 30 U/L — SIGNIFICANT CHANGE UP (ref 7–60)
MAGNESIUM SERPL-MCNC: 1.8 MG/DL — SIGNIFICANT CHANGE UP (ref 1.6–2.6)
MCHC RBC-ENTMCNC: 30.5 PG — SIGNIFICANT CHANGE UP (ref 27–34)
MCHC RBC-ENTMCNC: 33.6 GM/DL — SIGNIFICANT CHANGE UP (ref 32–36)
MCV RBC AUTO: 90.8 FL — SIGNIFICANT CHANGE UP (ref 80–100)
PHOSPHATE SERPL-MCNC: 1.6 MG/DL — LOW (ref 2.5–4.5)
PLATELET # BLD AUTO: 320 K/UL — SIGNIFICANT CHANGE UP (ref 150–400)
POTASSIUM SERPL-MCNC: 3.4 MMOL/L — LOW (ref 3.5–5.3)
POTASSIUM SERPL-SCNC: 3.4 MMOL/L — LOW (ref 3.5–5.3)
PROCALCITONIN SERPL-MCNC: 9.06 NG/ML — HIGH (ref 0–0.04)
PROT SERPL-MCNC: 5.7 G/DL — LOW (ref 6–8.3)
PROTHROM AB SERPL-ACNC: 14.1 SEC — HIGH (ref 9.8–12.7)
RBC # BLD: 3.42 M/UL — LOW (ref 3.8–5.2)
RBC # FLD: 12.3 % — SIGNIFICANT CHANGE UP (ref 10.3–14.5)
SODIUM SERPL-SCNC: 139 MMOL/L — SIGNIFICANT CHANGE UP (ref 135–145)
TROPONIN T SERPL-MCNC: <0.01 NG/ML — SIGNIFICANT CHANGE UP (ref 0–0.06)
WBC # BLD: 14.7 K/UL — HIGH (ref 3.8–10.5)
WBC # FLD AUTO: 14.7 K/UL — HIGH (ref 3.8–10.5)

## 2017-10-21 PROCEDURE — 74177 CT ABD & PELVIS W/CONTRAST: CPT | Mod: 26

## 2017-10-21 PROCEDURE — 99233 SBSQ HOSP IP/OBS HIGH 50: CPT

## 2017-10-21 RX ORDER — POTASSIUM CHLORIDE 20 MEQ
20 PACKET (EA) ORAL
Qty: 0 | Refills: 0 | Status: COMPLETED | OUTPATIENT
Start: 2017-10-21 | End: 2017-10-21

## 2017-10-21 RX ORDER — HYDROMORPHONE HYDROCHLORIDE 2 MG/ML
0.5 INJECTION INTRAMUSCULAR; INTRAVENOUS; SUBCUTANEOUS
Qty: 0 | Refills: 0 | Status: DISCONTINUED | OUTPATIENT
Start: 2017-10-21 | End: 2017-10-22

## 2017-10-21 RX ORDER — ACETAMINOPHEN 500 MG
1000 TABLET ORAL ONCE
Qty: 0 | Refills: 0 | Status: COMPLETED | OUTPATIENT
Start: 2017-10-21 | End: 2017-10-21

## 2017-10-21 RX ORDER — POTASSIUM PHOSPHATE, MONOBASIC POTASSIUM PHOSPHATE, DIBASIC 236; 224 MG/ML; MG/ML
30 INJECTION, SOLUTION INTRAVENOUS ONCE
Qty: 0 | Refills: 0 | Status: COMPLETED | OUTPATIENT
Start: 2017-10-21 | End: 2017-10-21

## 2017-10-21 RX ORDER — HYDROMORPHONE HYDROCHLORIDE 2 MG/ML
30 INJECTION INTRAMUSCULAR; INTRAVENOUS; SUBCUTANEOUS
Qty: 0 | Refills: 0 | Status: DISCONTINUED | OUTPATIENT
Start: 2017-10-21 | End: 2017-10-22

## 2017-10-21 RX ORDER — ENOXAPARIN SODIUM 100 MG/ML
40 INJECTION SUBCUTANEOUS DAILY
Qty: 0 | Refills: 0 | Status: DISCONTINUED | OUTPATIENT
Start: 2017-10-22 | End: 2017-10-30

## 2017-10-21 RX ORDER — DIPHENHYDRAMINE HCL 50 MG
25 CAPSULE ORAL EVERY 6 HOURS
Qty: 0 | Refills: 0 | Status: DISCONTINUED | OUTPATIENT
Start: 2017-10-21 | End: 2017-10-21

## 2017-10-21 RX ORDER — MAGNESIUM SULFATE 500 MG/ML
2 VIAL (ML) INJECTION ONCE
Qty: 0 | Refills: 0 | Status: COMPLETED | OUTPATIENT
Start: 2017-10-21 | End: 2017-10-21

## 2017-10-21 RX ORDER — HYDROMORPHONE HYDROCHLORIDE 2 MG/ML
1 INJECTION INTRAMUSCULAR; INTRAVENOUS; SUBCUTANEOUS ONCE
Qty: 0 | Refills: 0 | Status: DISCONTINUED | OUTPATIENT
Start: 2017-10-21 | End: 2017-10-21

## 2017-10-21 RX ORDER — ONDANSETRON 8 MG/1
4 TABLET, FILM COATED ORAL EVERY 6 HOURS
Qty: 0 | Refills: 0 | Status: DISCONTINUED | OUTPATIENT
Start: 2017-10-21 | End: 2017-10-22

## 2017-10-21 RX ORDER — NALOXONE HYDROCHLORIDE 4 MG/.1ML
0.1 SPRAY NASAL
Qty: 0 | Refills: 0 | Status: DISCONTINUED | OUTPATIENT
Start: 2017-10-21 | End: 2017-10-22

## 2017-10-21 RX ORDER — BENZOCAINE AND MENTHOL 5; 1 G/100ML; G/100ML
1 LIQUID ORAL
Qty: 0 | Refills: 0 | Status: DISCONTINUED | OUTPATIENT
Start: 2017-10-21 | End: 2017-10-23

## 2017-10-21 RX ORDER — DIPHENHYDRAMINE HCL 50 MG
25 CAPSULE ORAL ONCE
Qty: 0 | Refills: 0 | Status: COMPLETED | OUTPATIENT
Start: 2017-10-21 | End: 2017-10-21

## 2017-10-21 RX ADMIN — HYDROMORPHONE HYDROCHLORIDE 1 MILLIGRAM(S): 2 INJECTION INTRAMUSCULAR; INTRAVENOUS; SUBCUTANEOUS at 22:05

## 2017-10-21 RX ADMIN — OXYCODONE HYDROCHLORIDE 10 MILLIGRAM(S): 5 TABLET ORAL at 19:59

## 2017-10-21 RX ADMIN — IMIPENEM AND CILASTATIN 100 MILLIGRAM(S): 250; 250 INJECTION, POWDER, FOR SOLUTION INTRAVENOUS at 12:17

## 2017-10-21 RX ADMIN — Medication 400 MILLIGRAM(S): at 22:48

## 2017-10-21 RX ADMIN — OXYCODONE HYDROCHLORIDE 10 MILLIGRAM(S): 5 TABLET ORAL at 23:20

## 2017-10-21 RX ADMIN — HYDROMORPHONE HYDROCHLORIDE 30 MILLILITER(S): 2 INJECTION INTRAMUSCULAR; INTRAVENOUS; SUBCUTANEOUS at 23:04

## 2017-10-21 RX ADMIN — Medication 1000 MILLIGRAM(S): at 23:18

## 2017-10-21 RX ADMIN — IMIPENEM AND CILASTATIN 100 MILLIGRAM(S): 250; 250 INJECTION, POWDER, FOR SOLUTION INTRAVENOUS at 23:37

## 2017-10-21 RX ADMIN — HEPARIN SODIUM 5000 UNIT(S): 5000 INJECTION INTRAVENOUS; SUBCUTANEOUS at 05:09

## 2017-10-21 RX ADMIN — Medication 50 GRAM(S): at 03:54

## 2017-10-21 RX ADMIN — HYDROMORPHONE HYDROCHLORIDE 0.5 MILLIGRAM(S): 2 INJECTION INTRAMUSCULAR; INTRAVENOUS; SUBCUTANEOUS at 23:05

## 2017-10-21 RX ADMIN — Medication 20 MILLIEQUIVALENT(S): at 09:03

## 2017-10-21 RX ADMIN — POTASSIUM PHOSPHATE, MONOBASIC POTASSIUM PHOSPHATE, DIBASIC 83.33 MILLIMOLE(S): 236; 224 INJECTION, SOLUTION INTRAVENOUS at 04:40

## 2017-10-21 RX ADMIN — DEXTROSE MONOHYDRATE, SODIUM CHLORIDE, AND POTASSIUM CHLORIDE 50 MILLILITER(S): 50; .745; 4.5 INJECTION, SOLUTION INTRAVENOUS at 23:36

## 2017-10-21 RX ADMIN — HYDROMORPHONE HYDROCHLORIDE 1 MILLIGRAM(S): 2 INJECTION INTRAMUSCULAR; INTRAVENOUS; SUBCUTANEOUS at 21:45

## 2017-10-21 RX ADMIN — Medication 20 MILLIEQUIVALENT(S): at 03:54

## 2017-10-21 RX ADMIN — HEPARIN SODIUM 5000 UNIT(S): 5000 INJECTION INTRAVENOUS; SUBCUTANEOUS at 18:00

## 2017-10-21 RX ADMIN — Medication 400 MILLIGRAM(S): at 14:51

## 2017-10-21 RX ADMIN — Medication 1000 MILLIGRAM(S): at 15:45

## 2017-10-21 RX ADMIN — OXYCODONE HYDROCHLORIDE 10 MILLIGRAM(S): 5 TABLET ORAL at 20:19

## 2017-10-21 RX ADMIN — HYDROMORPHONE HYDROCHLORIDE 1 MILLIGRAM(S): 2 INJECTION INTRAMUSCULAR; INTRAVENOUS; SUBCUTANEOUS at 19:50

## 2017-10-21 RX ADMIN — HYDROMORPHONE HYDROCHLORIDE 1 MILLIGRAM(S): 2 INJECTION INTRAMUSCULAR; INTRAVENOUS; SUBCUTANEOUS at 22:17

## 2017-10-21 RX ADMIN — IMIPENEM AND CILASTATIN 100 MILLIGRAM(S): 250; 250 INJECTION, POWDER, FOR SOLUTION INTRAVENOUS at 05:37

## 2017-10-21 RX ADMIN — IMIPENEM AND CILASTATIN 100 MILLIGRAM(S): 250; 250 INJECTION, POWDER, FOR SOLUTION INTRAVENOUS at 18:01

## 2017-10-21 RX ADMIN — Medication 25 MILLIGRAM(S): at 23:51

## 2017-10-21 RX ADMIN — Medication 20 MILLIEQUIVALENT(S): at 05:44

## 2017-10-21 RX ADMIN — HYDROMORPHONE HYDROCHLORIDE 1 MILLIGRAM(S): 2 INJECTION INTRAMUSCULAR; INTRAVENOUS; SUBCUTANEOUS at 22:37

## 2017-10-21 RX ADMIN — Medication 400 MILLIGRAM(S): at 05:09

## 2017-10-21 RX ADMIN — HYDROMORPHONE HYDROCHLORIDE 1 MILLIGRAM(S): 2 INJECTION INTRAMUSCULAR; INTRAVENOUS; SUBCUTANEOUS at 20:10

## 2017-10-21 RX ADMIN — PANTOPRAZOLE SODIUM 40 MILLIGRAM(S): 20 TABLET, DELAYED RELEASE ORAL at 05:09

## 2017-10-21 RX ADMIN — OXYCODONE HYDROCHLORIDE 10 MILLIGRAM(S): 5 TABLET ORAL at 23:50

## 2017-10-21 NOTE — PROGRESS NOTE ADULT - SUBJECTIVE AND OBJECTIVE BOX
HISTORY OF PRESENT ILLNESS:  57yF w/ PMH significant for HTN and nephrolithiasis, s/p appendectomy that presented to [facility] ED on 10/16/2017 c/o acute onset severe sharp stabbing upper abdominal pain that radiates to the back. She says the pain woke her up in the middle of the night causing her to develop nausea and NBNB emesis x3 episodes. She presented to the emergency department hypotensive was worked up with abdominal imaging findings concerning for CBD obstructing stone and was admitted, MRCP ordered and completed, and showed a distal CBD stone elevating concern for acute obstructive cholangitis. Labs resulted with evidence of pancreatitis with lipase > 8500. SICU was consulted as she was tachycardic to the 130s and breaking through dilaudid, tylenol, and toradol. GI was consulted urgently and ERCP was performed with biliary stent placement. Pt continued to have pain after placement of the ERCP and this has been attributed to pancreatic necrosis.     24 HOUR EVENTS: The patient has been tolerated fluids, her rivas was discontinued and she was avoid to void well. She has been followed by GI who recommend a CT A/P to eval for cyst/abscess formation. Pt also reporting urinary frequency with low volumes overnight.     SUBJECTIVE/ROS:  [x] A ten-point review of systems was otherwise negative except as noted.  [ ] Due to altered mental status/intubation, subjective information were not able to be obtained from the patient. History was obtained, to the extent possible, from review of the chart and collateral sources of information.    NEURO  RASS:     GCS:     CAM ICU:  Exam: awake, alert, oriented  Meds: HYDROmorphone  Injectable 1 milliGRAM(s) IV Push every 4 hours PRN breakthrough  HYDROmorphone (10 MICROgram(s)/mL) + BUpivacaine 0.0625% in 0.9% Sodium Chloride PCEA 250 milliLiter(s) Epidural PCA Continuous  HYDROmorphone (10 MICROgram(s)/mL) + BUpivacaine 0.0625% in 0.9% Sodium Chloride PCEA Rescue Clinician  Bolus 5 milliLiter(s) Epidural every 15 minutes PRN for Pain Score greater than 6  morphine PF Epidural 3 milliGRAM(s) Epidural once  oxyCODONE    IR 5 milliGRAM(s) Oral every 3 hours PRN Mild Pain  oxyCODONE    IR 10 milliGRAM(s) Oral every 3 hours PRN Moderate Pain    [x] Adequacy of sedation and pain control has been assessed and adjusted    RESPIRATORY  RR: 37 (10-21-17 @ 01:00) (13 - 46)  SpO2: 91% (10-21-17 @ 01:00) (74% - 98%)  Wt(kg): --  Exam: unlabored, clear to auscultation bilaterally  Mechanical Ventilation:   ABG - ( 20 Oct 2017 02:50 )  pH: 7.40  /  pCO2: 41    /  pO2: 125   / HCO3: 25    / Base Excess: 1.0   /  SaO2: 99      Lactate: x     CARDIOVASCULAR  HR: 75 (10-21-17 @ 01:00) (75 - 98)  BP: 113/67 (10-21-17 @ 01:00) (112/72 - 146/88)  BP(mean): 85 (10-21-17 @ 01:00) (85 - 115)    Exam: regular rate and rhythm  Cardiac Rhythm: sinus  Perfusion     [x]Adequate   [ ]Inadequate  Mentation   [x]Normal       [ ]Reduced  Extremities  [x]Warm         [ ]Cool  Volume Status [ ]Hypervolemic [x]Euvolemic [ ]Hypovolemic    GI/NUTRITION  Exam: soft, nontender, nondistended, incision C/D/I  Diet:  Meds: pantoprazole  Injectable 40 milliGRAM(s) IV Push daily    GENITOURINARY  I&O's Detail    10-19 @ 07:01  -  10-20 @ 07:00  --------------------------------------------------------  IN:    dextrose 5% + sodium chloride 0.45% with potassium chloride 20 mEq/L: 1100 mL    multiple electrolytes Injection Type 1multiple electrolytes Injection Type 1: 200 mL    Solution: 200 mL    Solution: 600 mL    Solution: 362.5 mL    Solution: 187.5 mL    Solution: 200 mL  Total IN: 2850 mL    OUT:    Indwelling Catheter - Urethral: 1415 mL  Total OUT: 1415 mL    Total NET: 1435 mL      10-20 @ 07:01  -  10-21 @ 01:11  --------------------------------------------------------  IN:    dextrose 5% + sodium chloride 0.45% with potassium chloride 20 mEq/L: 900 mL    Oral Fluid: 200 mL    Solution: 200 mL    Solution: 300 mL    Solution: 62.5 mL  Total IN: 1662.5 mL    OUT:    Indwelling Catheter - Urethral: 325 mL    Voided: 1300 mL  Total OUT: 1625 mL    Total NET: 37.5 mL    10-20    136  |  103  |  7   ----------------------------<  97  3.8   |  22  |  0.34<L>    Ca    7.8<L>      20 Oct 2017 02:57  Phos  1.5     10-20  Mg     2.1     10-20    TPro  5.6<L>  /  Alb  2.3<L>  /  TBili  0.3  /  DBili  0.1  /  AST  21  /  ALT  19  /  AlkPhos  91  10-20    [ ] Rivas catheter, indication: Not currently needed, patient able to void  Meds: dextrose 5% + sodium chloride 0.45% with potassium chloride 20 mEq/L 1000 milliLiter(s) IV Continuous <Continuous>    HEMATOLOGIC  Meds: heparin  Injectable 5000 Unit(s) SubCutaneous every 12 hours    [x] VTE Prophylaxis                        10.4   14.9  )-----------( 293      ( 20 Oct 2017 02:57 )             31.3     PT/INR - ( 20 Oct 2017 02:57 )   PT: 14.2 sec;   INR: 1.31 ratio         PTT - ( 20 Oct 2017 02:57 )  PTT:31.6 sec  Transfusion     [ ] PRBC   [ ] Platelets   [ ] FFP   [ ] Cryoprecipitate    INFECTIOUS DISEASES  WBC Count: 14.9 K/uL (10-20 @ 02:57)    RECENT CULTURES:  Blood cultures negative to date.     Meds: imipenem/cilastatin  IVPB      imipenem/cilastatin  IVPB 500 milliGRAM(s) IV Intermittent every 6 hours    ENDOCRINE  CAPILLARY BLOOD GLUCOSE    ACCESS DEVICES:  [ ] Peripheral IV  [ ] Central Venous Line	[ ] R	[ ] L	[ ] IJ	[ ] Fem	[ ] SC	Placed:   [ ] Arterial Line		[ ] R	[ ] L	[ ] Fem	[ ] Rad	[ ] Ax	Placed:   [ ] PICC:					[ ] Mediport  [ ] Urinary Catheter, Date Placed:   [x] Necessity of urinary, arterial, and venous catheters discussed    CODE STATUS: Full code      IMAGING: HISTORY OF PRESENT ILLNESS:  57yF w/ PMH significant for HTN and nephrolithiasis, s/p appendectomy that presented to [facility] ED on 10/16/2017 c/o acute onset severe sharp stabbing upper abdominal pain that radiates to the back. She says the pain woke her up in the middle of the night causing her to develop nausea and NBNB emesis x3 episodes. She presented to the emergency department hypotensive was worked up with abdominal imaging findings concerning for CBD obstructing stone and was admitted, MRCP ordered and completed, and showed a distal CBD stone elevating concern for acute obstructive cholangitis. Labs resulted with evidence of pancreatitis with lipase > 8500. SICU was consulted as she was tachycardic to the 130s and breaking through dilaudid, tylenol, and toradol. GI was consulted urgently and ERCP was performed with biliary stent placement. Pt continued to have pain after placement of the ERCP and this has been attributed to pancreatic necrosis.     24 HOUR EVENTS: The patient has been tolerated fluids, her rivas was discontinued and she was avoid to void well. She has been followed by GI who recommend a CT A/P to eval for cyst/abscess formation. Pt also reporting urinary frequency with low volumes overnight.     SUBJECTIVE/ROS:  [x] A ten-point review of systems was otherwise negative except as noted.  [ ] Due to altered mental status/intubation, subjective information were not able to be obtained from the patient. History was obtained, to the extent possible, from review of the chart and collateral sources of information.    NEURO  RASS:     GCS:     CAM ICU:  Exam: awake, alert, oriented  Meds: HYDROmorphone  Injectable 1 milliGRAM(s) IV Push every 4 hours PRN breakthrough  HYDROmorphone (10 MICROgram(s)/mL) + BUpivacaine 0.0625% in 0.9% Sodium Chloride PCEA 250 milliLiter(s) Epidural PCA Continuous  HYDROmorphone (10 MICROgram(s)/mL) + BUpivacaine 0.0625% in 0.9% Sodium Chloride PCEA Rescue Clinician  Bolus 5 milliLiter(s) Epidural every 15 minutes PRN for Pain Score greater than 6  morphine PF Epidural 3 milliGRAM(s) Epidural once  oxyCODONE    IR 5 milliGRAM(s) Oral every 3 hours PRN Mild Pain  oxyCODONE    IR 10 milliGRAM(s) Oral every 3 hours PRN Moderate Pain    [x] Adequacy of sedation and pain control has been assessed and adjusted    RESPIRATORY  RR: 37 (10-21-17 @ 01:00) (13 - 46)  SpO2: 91% (10-21-17 @ 01:00) (74% - 98%)  Wt(kg): --  Exam: unlabored, clear to auscultation bilaterally  Mechanical Ventilation:   ABG - ( 20 Oct 2017 02:50 )  pH: 7.40  /  pCO2: 41    /  pO2: 125   / HCO3: 25    / Base Excess: 1.0   /  SaO2: 99      Lactate: x     CARDIOVASCULAR  HR: 75 (10-21-17 @ 01:00) (75 - 98)  BP: 113/67 (10-21-17 @ 01:00) (112/72 - 146/88)  BP(mean): 85 (10-21-17 @ 01:00) (85 - 115)    Exam: regular rate and rhythm  Cardiac Rhythm: sinus  Perfusion     [x]Adequate   [ ]Inadequate  Mentation   [x]Normal       [ ]Reduced  Extremities  [x]Warm         [ ]Cool  Volume Status [ ]Hypervolemic [x]Euvolemic [ ]Hypovolemic    GI/NUTRITION  Exam: soft, nontender, nondistended, incision C/D/I  Diet:  Meds: pantoprazole  Injectable 40 milliGRAM(s) IV Push daily    GENITOURINARY  I&O's Detail    10-19 @ 07:01  -  10-20 @ 07:00  --------------------------------------------------------  IN:    dextrose 5% + sodium chloride 0.45% with potassium chloride 20 mEq/L: 1100 mL    multiple electrolytes Injection Type 1multiple electrolytes Injection Type 1: 200 mL    Solution: 200 mL    Solution: 600 mL    Solution: 362.5 mL    Solution: 187.5 mL    Solution: 200 mL  Total IN: 2850 mL    OUT:    Indwelling Catheter - Urethral: 1415 mL  Total OUT: 1415 mL    Total NET: 1435 mL      I&O's Detail    20 Oct 2017 07:01  -  21 Oct 2017 07:00  --------------------------------------------------------  IN:    dextrose 5% + sodium chloride 0.45% with potassium chloride 20 mEq/L: 1200 mL    Oral Fluid: 320 mL    Solution: 312.4 mL    Solution: 50 mL    Solution: 300 mL    Solution: 400 mL  Total IN: 2582.4 mL    OUT:    Indwelling Catheter - Urethral: 325 mL    Voided: 2000 mL  Total OUT: 2325 mL    Total NET: 257.4 mL        10-20    136  |  103  |  7   ----------------------------<  97  3.8   |  22  |  0.34<L>    Ca    7.8<L>      20 Oct 2017 02:57  Phos  1.5     10-20  Mg     2.1     10-20    TPro  5.6<L>  /  Alb  2.3<L>  /  TBili  0.3  /  DBili  0.1  /  AST  21  /  ALT  19  /  AlkPhos  91  10-20    [ ] Rivas catheter, indication: Not currently needed, patient able to void  Meds: dextrose 5% + sodium chloride 0.45% with potassium chloride 20 mEq/L 1000 milliLiter(s) IV Continuous <Continuous>    HEMATOLOGIC  Meds: heparin  Injectable 5000 Unit(s) SubCutaneous every 12 hours    [x] VTE Prophylaxis                        10.4   14.9  )-----------( 293      ( 20 Oct 2017 02:57 )             31.3     PT/INR - ( 20 Oct 2017 02:57 )   PT: 14.2 sec;   INR: 1.31 ratio         PTT - ( 20 Oct 2017 02:57 )  PTT:31.6 sec  Transfusion     [ ] PRBC   [ ] Platelets   [ ] FFP   [ ] Cryoprecipitate    INFECTIOUS DISEASES  WBC Count: 14.9 K/uL (10-20 @ 02:57)    RECENT CULTURES:  Blood cultures negative to date.     Meds: imipenem/cilastatin  IVPB      imipenem/cilastatin  IVPB 500 milliGRAM(s) IV Intermittent every 6 hours    ENDOCRINE  CAPILLARY BLOOD GLUCOSE    ACCESS DEVICES:  [ ] Peripheral IV  [ ] Central Venous Line	[ ] R	[ ] L	[ ] IJ	[ ] Fem	[ ] SC	Placed:   [ ] Arterial Line		[ ] R	[ ] L	[ ] Fem	[ ] Rad	[ ] Ax	Placed:   [ ] PICC:					[ ] Mediport  [ ] Urinary Catheter, Date Placed:   [x] Necessity of urinary, arterial, and venous catheters discussed    CODE STATUS: Full code      IMAGING:

## 2017-10-21 NOTE — PROGRESS NOTE ADULT - SUBJECTIVE AND OBJECTIVE BOX
ATP Progress Note    S: No acute events overnight.    O:  T(C): 36.8 (10-21-17 @ 08:00), Max: 39 (10-20-17 @ 15:00)  HR: 85 (10-21-17 @ 10:00) (72 - 98)  BP: 118/75 (10-21-17 @ 10:00) (106/76 - 132/70)  RR: 19 (10-21-17 @ 10:00) (15 - 46)  SpO2: 94% (10-21-17 @ 10:00) (74% - 98%)  Wt(kg): --    10-20 @ 07:01  -  10-21 @ 07:00  --------------------------------------------------------  IN:    dextrose 5% + sodium chloride 0.45% with potassium chloride 20 mEq/L: 1200 mL    Oral Fluid: 320 mL    Solution: 312.4 mL    Solution: 50 mL    Solution: 300 mL    Solution: 400 mL  Total IN: 2582.4 mL    OUT:    Indwelling Catheter - Urethral: 325 mL    Voided: 2000 mL  Total OUT: 2325 mL    Total NET: 257.4 mL      10-21 @ 07:01  -  10-21 @ 10:36  --------------------------------------------------------  IN:    dextrose 5% + sodium chloride 0.45% with potassium chloride 20 mEq/L: 150 mL  Total IN: 150 mL    OUT:    Voided: 300 mL  Total OUT: 300 mL    Total NET: -150 mL        CBC Full  -  ( 21 Oct 2017 02:56 )  WBC Count : 14.7 K/uL  Hemoglobin : 10.4 g/dL  Hematocrit : 31.1 %  Platelet Count - Automated : 320 K/uL  Mean Cell Volume : 90.8 fl  Mean Cell Hemoglobin : 30.5 pg  Mean Cell Hemoglobin Concentration : 33.6 gm/dL  Auto Neutrophil # : x  Auto Lymphocyte # : x  Auto Monocyte # : x  Auto Eosinophil # : x  Auto Basophil # : x  Auto Neutrophil % : x  Auto Lymphocyte % : x  Auto Monocyte % : x  Auto Eosinophil % : x  Auto Basophil % : x    CAPILLARY BLOOD GLUCOSE        PE  Gen: No apparent distress  HEENT: normocephalic, atraumatic, no scleral icterus  Pulm: Airway patent, high flow nasal cannula for supplemental O2  Abd: Soft, distended, tender, without any rebound or guarding

## 2017-10-21 NOTE — PROGRESS NOTE ADULT - ASSESSMENT
A/P: 57 year old woman with necrotizing pancreatitis, s/p ERCP (sphincterotomy and plastic stent placement)    - Pain control: PCEA  - Diet: NPO and IV fluids  - GI: would check CT Abdomen with contrast to evaluate for abscess/cyst formation  - Encourage IS as tolerated; wean supplemental O2 as tolerated  - PT: home w/ assist; outpatient PT  - Care as per SICU    4502

## 2017-10-21 NOTE — PROGRESS NOTE ADULT - ASSESSMENT
57F w/ obstructing common bile duct stone, subsequent cholangitis and pancreatitis now s/p ERCP with biliary stent placement that will require future ERCP for stent removal currently undergoing empiric antibiotic therapy and pain control. Now with PCEA placement (10/19) to significant improvement of pain. Patient has been stable and her rivas was removed, now with urinary frequency.     Neuro: severe pain, not opioid naive, pain significantly improved with PCEA  Pain  	- Hydromorphone 10mcg/ml + bupivacaine 0.0625% PCEA (3ml demand, lockout 15min, 4h limit 50ml)  	- Oxycodone IR 5mg q3h prn mild pain  	- Oxycodone IR 10mg q3h prn moderate pain  	- Hydromorphone 1mg ivp q4h prn breakthough pain   	- APAP 1000mg ivpb q6h    Resp: no acute issues   Opioids  -Transitioned to PCEA  -Monitor    CV: borderline tachycardia currently attributed to SIRS/pain  	- Cardiac monitor, q1h vitals  	- Hold home amlodipine    GI: choledocholithiasis & necrotic pancreatitis   Choledocholithiasis/pancreatitis  	- Imipenem/cilastatin 500mg iv q6h  	- Pantoprazole 40mg qd  	- ERCP w/ biliary stent placed 10/17  NPO   	- Fluids as listed in   	- No TPN at this time   	- Consider advancing to [facility] in AM on 10/20    : Urinary frequency/low volume  	- D5 1/2NS w/ KCl @ 50ml/h   	- Monitor I&Os, replete electrolytes as needed  	- Consider catheterization vs PVR bladder scan     ID:   Possible cholecystitis, cholangitis  	- Imipenem/cilastatin 500mg iv q6h  	- Trend CBC, WBC decreasing s/p ERCP  - Monitor fevers, q1h vitals    Heme: trend CBCs  DVT prophylaxis  	- Heparin 5000units subq bid    Endo: No active issues/interventions    Disposition: Full code    RACHNA Santamaria PGY1, [id number]

## 2017-10-21 NOTE — PROGRESS NOTE ADULT - SUBJECTIVE AND OBJECTIVE BOX
Day _4/2__ of Anesthesia Pain Management Service    SUBJECTIVE:  Pain Scale Score	At rest: ___ 	With Activity: ___ 	[ x ] Refer to charted pain scores    THERAPY:  [x ] Epidural Bupivacaine 0.0625% and Hydromorphone 10 micrograms/mL  [ ] Epidural Ropivacaine 0.2% plain    ] Epidural Bupivacaine 0.01 % and Fentanyl 3 micrograms/mL  (OB)    Demand dose _3__ lockout _15__ (minutes) Continuous Rate 6__       MEDICATIONS  (STANDING):  dextrose 5% + sodium chloride 0.45% with potassium chloride 20 mEq/L 1000 milliLiter(s) (50 mL/Hr) IV Continuous <Continuous>  heparin  Injectable 5000 Unit(s) SubCutaneous every 12 hours  HYDROmorphone (10 MICROgram(s)/mL) + BUpivacaine 0.0625% in 0.9% Sodium Chloride PCEA 250 milliLiter(s) Epidural PCA Continuous  imipenem/cilastatin  IVPB      imipenem/cilastatin  IVPB 500 milliGRAM(s) IV Intermittent every 6 hours  morphine PF Epidural 3 milliGRAM(s) Epidural once  pantoprazole  Injectable 40 milliGRAM(s) IV Push daily    MEDICATIONS  (PRN):  HYDROmorphone  Injectable 1 milliGRAM(s) IV Push every 4 hours PRN breakthrough  HYDROmorphone (10 MICROgram(s)/mL) + BUpivacaine 0.0625% in 0.9% Sodium Chloride PCEA Rescue Clinician  Bolus 5 milliLiter(s) Epidural every 15 minutes PRN for Pain Score greater than 6  oxyCODONE    IR 5 milliGRAM(s) Oral every 3 hours PRN Mild Pain  oxyCODONE    IR 10 milliGRAM(s) Oral every 3 hours PRN Moderate Pain      OBJECTIVE:    Assessment of Epidural Catheter Site: 	    [x ] Dressing intact	[x ] Site non-tender	[x ] Site without erythema, discharge, edema  [x ] Epidural tubing and connection checked	[x [ Gross neurological exam within normal limits  [ ] Catheter removed – tip intact		    PT/INR - ( 21 Oct 2017 02:56 )   PT: 14.1 sec;   INR: 1.29 ratio         PTT - ( 21 Oct 2017 02:56 )  PTT:29.5 sec                      10.4   14.7  )-----------( 320      ( 21 Oct 2017 02:56 )             31.1     Vital Signs Last 24 Hrs  T(C): 36.8 (10-21-17 @ 08:00), Max: 39 (10-20-17 @ 15:00)  T(F): 98.3 (10-21-17 @ 08:00), Max: 102.2 (10-20-17 @ 15:00)  HR: 94 (10-21-17 @ 11:00) (72 - 98)  BP: 133/70 (10-21-17 @ 11:00) (106/76 - 133/70)  BP(mean): 95 (10-21-17 @ 11:00) (84 - 99)  RR: 21 (10-21-17 @ 11:00) (15 - 46)  SpO2: 97% (10-21-17 @ 11:00) (74% - 98%)      Sedation Score:	[ x] Alert	[ ] Drowsy	[ ] Arousable  [ ] Asleep  [ ] Unresponsive    Side Effects:	[x ] None	[ ] Nausea	[ ] Vomiting  [ ] Pruritus  		[ ] Weakness  [ ] Numbness  [ ] Other:    ASSESSMENT/ PLAN:    Therapy to  be:	[x ] Continue   [ ] Discontinued   [ ] Change to prn Analgesics    Documentation and Verification of current medications:  [ X ] Done	[ ] Not done, not eligible, reason:    Comments:I was Physically present for the key portions of the evaluation and managemeny service provided. I agree with the above history, physical, and plan which I have reviewed and edited where appropriate

## 2017-10-21 NOTE — PROGRESS NOTE ADULT - ATTENDING COMMENTS
I have seen and examined this patient and agree with above. Patient with acute gallstone pancreatitis and pancreatic necrosis noted on MRCP.     1)Acute hypoxemic respiratory failure- resolved  -on 2 liters of nasal cannula  -will continue to wean as tolerated  -goal SpO2>92%    Hemodynamically stable  2)Pancreatic necrosis and leukocytosis- Continue primaxin as stable leukocytosis. Tolerating clear liquid diet   -procalcitonin improving  -CT scan with Po and IV contrast- pancreatic protocol    3) Hypokalemia- repleted and will recheck    4) patient to get out of bed into chair and ambulate    WIll continue to monitor closely.

## 2017-10-22 LAB
ALBUMIN SERPL ELPH-MCNC: 2.6 G/DL — LOW (ref 3.3–5)
ALP SERPL-CCNC: 121 U/L — HIGH (ref 40–120)
ALT FLD-CCNC: 15 U/L RC — SIGNIFICANT CHANGE UP (ref 10–45)
ANION GAP SERPL CALC-SCNC: 12 MMOL/L — SIGNIFICANT CHANGE UP (ref 5–17)
APTT BLD: 31.1 SEC — SIGNIFICANT CHANGE UP (ref 27.5–37.4)
AST SERPL-CCNC: 19 U/L — SIGNIFICANT CHANGE UP (ref 10–40)
BILIRUB DIRECT SERPL-MCNC: 0.1 MG/DL — SIGNIFICANT CHANGE UP (ref 0–0.2)
BILIRUB INDIRECT FLD-MCNC: 0.2 MG/DL — SIGNIFICANT CHANGE UP (ref 0.2–1)
BILIRUB SERPL-MCNC: 0.3 MG/DL — SIGNIFICANT CHANGE UP (ref 0.2–1.2)
BUN SERPL-MCNC: 5 MG/DL — LOW (ref 7–23)
CA-I BLD-SCNC: 1.09 MMOL/L — LOW (ref 1.12–1.3)
CALCIUM SERPL-MCNC: 7.9 MG/DL — LOW (ref 8.4–10.5)
CHLORIDE SERPL-SCNC: 104 MMOL/L — SIGNIFICANT CHANGE UP (ref 96–108)
CO2 SERPL-SCNC: 24 MMOL/L — SIGNIFICANT CHANGE UP (ref 22–31)
CREAT SERPL-MCNC: 0.39 MG/DL — LOW (ref 0.5–1.3)
CULTURE RESULTS: SIGNIFICANT CHANGE UP
CULTURE RESULTS: SIGNIFICANT CHANGE UP
GLUCOSE SERPL-MCNC: 97 MG/DL — SIGNIFICANT CHANGE UP (ref 70–99)
HCT VFR BLD CALC: 30.1 % — LOW (ref 34.5–45)
HGB BLD-MCNC: 9.9 G/DL — LOW (ref 11.5–15.5)
INR BLD: 1.3 RATIO — HIGH (ref 0.88–1.16)
MAGNESIUM SERPL-MCNC: 1.9 MG/DL — SIGNIFICANT CHANGE UP (ref 1.6–2.6)
MCHC RBC-ENTMCNC: 30.1 PG — SIGNIFICANT CHANGE UP (ref 27–34)
MCHC RBC-ENTMCNC: 33 GM/DL — SIGNIFICANT CHANGE UP (ref 32–36)
MCV RBC AUTO: 90.9 FL — SIGNIFICANT CHANGE UP (ref 80–100)
PHOSPHATE SERPL-MCNC: 2.3 MG/DL — LOW (ref 2.5–4.5)
PLATELET # BLD AUTO: 343 K/UL — SIGNIFICANT CHANGE UP (ref 150–400)
POTASSIUM SERPL-MCNC: 3.7 MMOL/L — SIGNIFICANT CHANGE UP (ref 3.5–5.3)
POTASSIUM SERPL-SCNC: 3.7 MMOL/L — SIGNIFICANT CHANGE UP (ref 3.5–5.3)
PROT SERPL-MCNC: 5.9 G/DL — LOW (ref 6–8.3)
PROTHROM AB SERPL-ACNC: 14.1 SEC — HIGH (ref 9.8–12.7)
RBC # BLD: 3.31 M/UL — LOW (ref 3.8–5.2)
RBC # FLD: 12.5 % — SIGNIFICANT CHANGE UP (ref 10.3–14.5)
SODIUM SERPL-SCNC: 140 MMOL/L — SIGNIFICANT CHANGE UP (ref 135–145)
SPECIMEN SOURCE: SIGNIFICANT CHANGE UP
SPECIMEN SOURCE: SIGNIFICANT CHANGE UP
WBC # BLD: 17.4 K/UL — HIGH (ref 3.8–10.5)
WBC # FLD AUTO: 17.4 K/UL — HIGH (ref 3.8–10.5)

## 2017-10-22 PROCEDURE — 71010: CPT | Mod: 26

## 2017-10-22 RX ORDER — OXYCODONE HYDROCHLORIDE 5 MG/1
15 TABLET ORAL EVERY 12 HOURS
Qty: 0 | Refills: 0 | Status: DISCONTINUED | OUTPATIENT
Start: 2017-10-22 | End: 2017-10-23

## 2017-10-22 RX ORDER — POTASSIUM CHLORIDE 20 MEQ
20 PACKET (EA) ORAL
Qty: 0 | Refills: 0 | Status: COMPLETED | OUTPATIENT
Start: 2017-10-22 | End: 2017-10-22

## 2017-10-22 RX ORDER — DIPHENHYDRAMINE HCL 50 MG
25 CAPSULE ORAL EVERY 4 HOURS
Qty: 0 | Refills: 0 | Status: DISCONTINUED | OUTPATIENT
Start: 2017-10-22 | End: 2017-11-01

## 2017-10-22 RX ORDER — CALCIUM GLUCONATE 100 MG/ML
2 VIAL (ML) INTRAVENOUS ONCE
Qty: 0 | Refills: 0 | Status: COMPLETED | OUTPATIENT
Start: 2017-10-22 | End: 2017-10-22

## 2017-10-22 RX ORDER — POTASSIUM PHOSPHATE, MONOBASIC POTASSIUM PHOSPHATE, DIBASIC 236; 224 MG/ML; MG/ML
15 INJECTION, SOLUTION INTRAVENOUS ONCE
Qty: 0 | Refills: 0 | Status: COMPLETED | OUTPATIENT
Start: 2017-10-22 | End: 2017-10-22

## 2017-10-22 RX ORDER — HYDROMORPHONE HYDROCHLORIDE 2 MG/ML
1 INJECTION INTRAMUSCULAR; INTRAVENOUS; SUBCUTANEOUS ONCE
Qty: 0 | Refills: 0 | Status: DISCONTINUED | OUTPATIENT
Start: 2017-10-22 | End: 2017-10-22

## 2017-10-22 RX ORDER — HYDROMORPHONE HYDROCHLORIDE 2 MG/ML
0.5 INJECTION INTRAMUSCULAR; INTRAVENOUS; SUBCUTANEOUS ONCE
Qty: 0 | Refills: 0 | Status: DISCONTINUED | OUTPATIENT
Start: 2017-10-22 | End: 2017-10-22

## 2017-10-22 RX ORDER — ACETAMINOPHEN 500 MG
1000 TABLET ORAL ONCE
Qty: 0 | Refills: 0 | Status: COMPLETED | OUTPATIENT
Start: 2017-10-23 | End: 2017-10-23

## 2017-10-22 RX ORDER — NALOXONE HYDROCHLORIDE 4 MG/.1ML
0.1 SPRAY NASAL
Qty: 0 | Refills: 0 | Status: DISCONTINUED | OUTPATIENT
Start: 2017-10-22 | End: 2017-10-23

## 2017-10-22 RX ORDER — ACETAMINOPHEN 500 MG
1000 TABLET ORAL ONCE
Qty: 0 | Refills: 0 | Status: COMPLETED | OUTPATIENT
Start: 2017-10-22 | End: 2017-10-22

## 2017-10-22 RX ORDER — HYDROMORPHONE HYDROCHLORIDE 2 MG/ML
30 INJECTION INTRAMUSCULAR; INTRAVENOUS; SUBCUTANEOUS
Qty: 0 | Refills: 0 | Status: DISCONTINUED | OUTPATIENT
Start: 2017-10-22 | End: 2017-10-23

## 2017-10-22 RX ORDER — ONDANSETRON 8 MG/1
4 TABLET, FILM COATED ORAL EVERY 6 HOURS
Qty: 0 | Refills: 0 | Status: DISCONTINUED | OUTPATIENT
Start: 2017-10-22 | End: 2017-10-23

## 2017-10-22 RX ORDER — HYDROMORPHONE HYDROCHLORIDE 2 MG/ML
0.5 INJECTION INTRAMUSCULAR; INTRAVENOUS; SUBCUTANEOUS EVERY 4 HOURS
Qty: 0 | Refills: 0 | Status: DISCONTINUED | OUTPATIENT
Start: 2017-10-22 | End: 2017-10-24

## 2017-10-22 RX ORDER — MAGNESIUM SULFATE 500 MG/ML
2 VIAL (ML) INJECTION ONCE
Qty: 0 | Refills: 0 | Status: COMPLETED | OUTPATIENT
Start: 2017-10-22 | End: 2017-10-22

## 2017-10-22 RX ORDER — HYDROMORPHONE HYDROCHLORIDE 2 MG/ML
0.5 INJECTION INTRAMUSCULAR; INTRAVENOUS; SUBCUTANEOUS
Qty: 0 | Refills: 0 | Status: DISCONTINUED | OUTPATIENT
Start: 2017-10-22 | End: 2017-10-23

## 2017-10-22 RX ORDER — DIPHENHYDRAMINE HCL 50 MG
25 CAPSULE ORAL EVERY 4 HOURS
Qty: 0 | Refills: 0 | Status: DISCONTINUED | OUTPATIENT
Start: 2017-10-22 | End: 2017-10-22

## 2017-10-22 RX ADMIN — HYDROMORPHONE HYDROCHLORIDE 1 MILLIGRAM(S): 2 INJECTION INTRAMUSCULAR; INTRAVENOUS; SUBCUTANEOUS at 15:00

## 2017-10-22 RX ADMIN — HYDROMORPHONE HYDROCHLORIDE 30 MILLILITER(S): 2 INJECTION INTRAMUSCULAR; INTRAVENOUS; SUBCUTANEOUS at 05:03

## 2017-10-22 RX ADMIN — HYDROMORPHONE HYDROCHLORIDE 0.5 MILLIGRAM(S): 2 INJECTION INTRAMUSCULAR; INTRAVENOUS; SUBCUTANEOUS at 08:00

## 2017-10-22 RX ADMIN — Medication 1000 MILLIGRAM(S): at 15:30

## 2017-10-22 RX ADMIN — OXYCODONE HYDROCHLORIDE 10 MILLIGRAM(S): 5 TABLET ORAL at 15:30

## 2017-10-22 RX ADMIN — Medication 25 MILLIGRAM(S): at 07:10

## 2017-10-22 RX ADMIN — OXYCODONE HYDROCHLORIDE 10 MILLIGRAM(S): 5 TABLET ORAL at 19:00

## 2017-10-22 RX ADMIN — HYDROMORPHONE HYDROCHLORIDE 0.5 MILLIGRAM(S): 2 INJECTION INTRAMUSCULAR; INTRAVENOUS; SUBCUTANEOUS at 18:18

## 2017-10-22 RX ADMIN — HYDROMORPHONE HYDROCHLORIDE 0.5 MILLIGRAM(S): 2 INJECTION INTRAMUSCULAR; INTRAVENOUS; SUBCUTANEOUS at 07:48

## 2017-10-22 RX ADMIN — HYDROMORPHONE HYDROCHLORIDE 0.5 MILLIGRAM(S): 2 INJECTION INTRAMUSCULAR; INTRAVENOUS; SUBCUTANEOUS at 16:29

## 2017-10-22 RX ADMIN — HYDROMORPHONE HYDROCHLORIDE 30 MILLILITER(S): 2 INJECTION INTRAMUSCULAR; INTRAVENOUS; SUBCUTANEOUS at 07:21

## 2017-10-22 RX ADMIN — Medication 400 GRAM(S): at 05:11

## 2017-10-22 RX ADMIN — HYDROMORPHONE HYDROCHLORIDE 0.5 MILLIGRAM(S): 2 INJECTION INTRAMUSCULAR; INTRAVENOUS; SUBCUTANEOUS at 22:48

## 2017-10-22 RX ADMIN — OXYCODONE HYDROCHLORIDE 10 MILLIGRAM(S): 5 TABLET ORAL at 16:00

## 2017-10-22 RX ADMIN — PANTOPRAZOLE SODIUM 40 MILLIGRAM(S): 20 TABLET, DELAYED RELEASE ORAL at 05:05

## 2017-10-22 RX ADMIN — HYDROMORPHONE HYDROCHLORIDE 30 MILLILITER(S): 2 INJECTION INTRAMUSCULAR; INTRAVENOUS; SUBCUTANEOUS at 07:49

## 2017-10-22 RX ADMIN — OXYCODONE HYDROCHLORIDE 10 MILLIGRAM(S): 5 TABLET ORAL at 05:11

## 2017-10-22 RX ADMIN — OXYCODONE HYDROCHLORIDE 15 MILLIGRAM(S): 5 TABLET ORAL at 21:10

## 2017-10-22 RX ADMIN — IMIPENEM AND CILASTATIN 100 MILLIGRAM(S): 250; 250 INJECTION, POWDER, FOR SOLUTION INTRAVENOUS at 11:36

## 2017-10-22 RX ADMIN — HYDROMORPHONE HYDROCHLORIDE 0.5 MILLIGRAM(S): 2 INJECTION INTRAMUSCULAR; INTRAVENOUS; SUBCUTANEOUS at 23:56

## 2017-10-22 RX ADMIN — HYDROMORPHONE HYDROCHLORIDE 0.5 MILLIGRAM(S): 2 INJECTION INTRAMUSCULAR; INTRAVENOUS; SUBCUTANEOUS at 17:03

## 2017-10-22 RX ADMIN — Medication 400 MILLIGRAM(S): at 15:00

## 2017-10-22 RX ADMIN — HYDROMORPHONE HYDROCHLORIDE 0.5 MILLIGRAM(S): 2 INJECTION INTRAMUSCULAR; INTRAVENOUS; SUBCUTANEOUS at 11:00

## 2017-10-22 RX ADMIN — Medication 20 MILLIEQUIVALENT(S): at 04:16

## 2017-10-22 RX ADMIN — HYDROMORPHONE HYDROCHLORIDE 0.5 MILLIGRAM(S): 2 INJECTION INTRAMUSCULAR; INTRAVENOUS; SUBCUTANEOUS at 10:44

## 2017-10-22 RX ADMIN — OXYCODONE HYDROCHLORIDE 5 MILLIGRAM(S): 5 TABLET ORAL at 07:48

## 2017-10-22 RX ADMIN — Medication 1000 MILLIGRAM(S): at 07:19

## 2017-10-22 RX ADMIN — OXYCODONE HYDROCHLORIDE 10 MILLIGRAM(S): 5 TABLET ORAL at 05:41

## 2017-10-22 RX ADMIN — HYDROMORPHONE HYDROCHLORIDE 0.5 MILLIGRAM(S): 2 INJECTION INTRAMUSCULAR; INTRAVENOUS; SUBCUTANEOUS at 02:25

## 2017-10-22 RX ADMIN — OXYCODONE HYDROCHLORIDE 10 MILLIGRAM(S): 5 TABLET ORAL at 02:20

## 2017-10-22 RX ADMIN — Medication 1000 MILLIGRAM(S): at 20:45

## 2017-10-22 RX ADMIN — OXYCODONE HYDROCHLORIDE 5 MILLIGRAM(S): 5 TABLET ORAL at 11:15

## 2017-10-22 RX ADMIN — OXYCODONE HYDROCHLORIDE 10 MILLIGRAM(S): 5 TABLET ORAL at 02:50

## 2017-10-22 RX ADMIN — HYDROMORPHONE HYDROCHLORIDE 0.5 MILLIGRAM(S): 2 INJECTION INTRAMUSCULAR; INTRAVENOUS; SUBCUTANEOUS at 23:05

## 2017-10-22 RX ADMIN — HYDROMORPHONE HYDROCHLORIDE 30 MILLILITER(S): 2 INJECTION INTRAMUSCULAR; INTRAVENOUS; SUBCUTANEOUS at 09:20

## 2017-10-22 RX ADMIN — OXYCODONE HYDROCHLORIDE 10 MILLIGRAM(S): 5 TABLET ORAL at 09:00

## 2017-10-22 RX ADMIN — HYDROMORPHONE HYDROCHLORIDE 30 MILLILITER(S): 2 INJECTION INTRAMUSCULAR; INTRAVENOUS; SUBCUTANEOUS at 19:27

## 2017-10-22 RX ADMIN — HYDROMORPHONE HYDROCHLORIDE 0.5 MILLIGRAM(S): 2 INJECTION INTRAMUSCULAR; INTRAVENOUS; SUBCUTANEOUS at 13:21

## 2017-10-22 RX ADMIN — IMIPENEM AND CILASTATIN 100 MILLIGRAM(S): 250; 250 INJECTION, POWDER, FOR SOLUTION INTRAVENOUS at 05:05

## 2017-10-22 RX ADMIN — OXYCODONE HYDROCHLORIDE 5 MILLIGRAM(S): 5 TABLET ORAL at 17:33

## 2017-10-22 RX ADMIN — OXYCODONE HYDROCHLORIDE 10 MILLIGRAM(S): 5 TABLET ORAL at 12:15

## 2017-10-22 RX ADMIN — HYDROMORPHONE HYDROCHLORIDE 0.5 MILLIGRAM(S): 2 INJECTION INTRAMUSCULAR; INTRAVENOUS; SUBCUTANEOUS at 08:16

## 2017-10-22 RX ADMIN — HYDROMORPHONE HYDROCHLORIDE 0.5 MILLIGRAM(S): 2 INJECTION INTRAMUSCULAR; INTRAVENOUS; SUBCUTANEOUS at 05:02

## 2017-10-22 RX ADMIN — OXYCODONE HYDROCHLORIDE 10 MILLIGRAM(S): 5 TABLET ORAL at 08:13

## 2017-10-22 RX ADMIN — OXYCODONE HYDROCHLORIDE 5 MILLIGRAM(S): 5 TABLET ORAL at 16:56

## 2017-10-22 RX ADMIN — Medication 25 MILLIGRAM(S): at 23:06

## 2017-10-22 RX ADMIN — OXYCODONE HYDROCHLORIDE 10 MILLIGRAM(S): 5 TABLET ORAL at 18:18

## 2017-10-22 RX ADMIN — Medication 50 GRAM(S): at 04:16

## 2017-10-22 RX ADMIN — OXYCODONE HYDROCHLORIDE 5 MILLIGRAM(S): 5 TABLET ORAL at 08:15

## 2017-10-22 RX ADMIN — Medication 400 MILLIGRAM(S): at 06:46

## 2017-10-22 RX ADMIN — HYDROMORPHONE HYDROCHLORIDE 0.5 MILLIGRAM(S): 2 INJECTION INTRAMUSCULAR; INTRAVENOUS; SUBCUTANEOUS at 15:00

## 2017-10-22 RX ADMIN — HYDROMORPHONE HYDROCHLORIDE 0.5 MILLIGRAM(S): 2 INJECTION INTRAMUSCULAR; INTRAVENOUS; SUBCUTANEOUS at 16:55

## 2017-10-22 RX ADMIN — Medication 20 MILLIEQUIVALENT(S): at 06:23

## 2017-10-22 RX ADMIN — OXYCODONE HYDROCHLORIDE 10 MILLIGRAM(S): 5 TABLET ORAL at 11:36

## 2017-10-22 RX ADMIN — HYDROMORPHONE HYDROCHLORIDE 0.5 MILLIGRAM(S): 2 INJECTION INTRAMUSCULAR; INTRAVENOUS; SUBCUTANEOUS at 09:53

## 2017-10-22 RX ADMIN — OXYCODONE HYDROCHLORIDE 5 MILLIGRAM(S): 5 TABLET ORAL at 10:45

## 2017-10-22 RX ADMIN — HYDROMORPHONE HYDROCHLORIDE 1 MILLIGRAM(S): 2 INJECTION INTRAMUSCULAR; INTRAVENOUS; SUBCUTANEOUS at 15:30

## 2017-10-22 RX ADMIN — Medication 400 MILLIGRAM(S): at 20:30

## 2017-10-22 RX ADMIN — POTASSIUM PHOSPHATE, MONOBASIC POTASSIUM PHOSPHATE, DIBASIC 62.5 MILLIMOLE(S): 236; 224 INJECTION, SOLUTION INTRAVENOUS at 04:16

## 2017-10-22 RX ADMIN — ENOXAPARIN SODIUM 40 MILLIGRAM(S): 100 INJECTION SUBCUTANEOUS at 11:36

## 2017-10-22 RX ADMIN — OXYCODONE HYDROCHLORIDE 15 MILLIGRAM(S): 5 TABLET ORAL at 22:10

## 2017-10-22 RX ADMIN — HYDROMORPHONE HYDROCHLORIDE 0.5 MILLIGRAM(S): 2 INJECTION INTRAMUSCULAR; INTRAVENOUS; SUBCUTANEOUS at 19:41

## 2017-10-22 NOTE — PROGRESS NOTE ADULT - SUBJECTIVE AND OBJECTIVE BOX
ATP Progress Note    S: Patient seen and examined at bedside. febrile overnight. Continues to complain of 9/10 pain. CT abdomen pelvis: splenic vein thrombosis as well as a portal vein thrombosis with 30-50% necrosis of the pancreas     O:  T(C): 37.6 (10-22-17 @ 08:00), Max: 38.7 (10-21-17 @ 14:39)  HR: 82 (10-22-17 @ 10:00) (72 - 103)  BP: 139/84 (10-22-17 @ 10:00) (108/73 - 160/77)  RR: 12 (10-22-17 @ 10:00) (9 - 38)  SpO2: 96% (10-22-17 @ 10:00) (85% - 98%)  Wt(kg): --    10-21 @ 07:01  -  10-22 @ 07:00  --------------------------------------------------------  IN:    dextrose 5% + sodium chloride 0.45% with potassium chloride 20 mEq/L: 1200 mL    Oral Fluid: 660 mL    Solution: 300 mL    Solution: 100 mL    Solution: 50 mL    Solution: 300 mL    Solution: 187.5 mL  Total IN: 2797.5 mL    OUT:    Voided: 3850 mL  Total OUT: 3850 mL    Total NET: -1052.5 mL      10-22 @ 07:01  -  10-22 @ 10:43  --------------------------------------------------------  IN:    dextrose 5% + sodium chloride 0.45% with potassium chloride 20 mEq/L: 150 mL    Oral Fluid: 240 mL  Total IN: 390 mL    OUT:    Voided: 300 mL  Total OUT: 300 mL    Total NET: 90 mL        PE  Gen: No apparent distress  HEENT: normocephalic, atraumatic, no scleral icterus  Pulm: Airway patent, high flow nasal cannula for supplemental O2  Abd: Soft, distended, tender, without any rebound or guarding                          9.9    17.4  )-----------( 343      ( 22 Oct 2017 02:39 )             30.1       10-22    140  |  104  |  5<L>  ----------------------------<  97  3.7   |  24  |  0.39<L>    Ca    7.9<L>      22 Oct 2017 02:39  Phos  2.3     10-22  Mg     1.9     10-22    TPro  5.9<L>  /  Alb  2.6<L>  /  TBili  0.3  /  DBili  0.1  /  AST  19  /  ALT  15  /  AlkPhos  121<H>  10-22      < from: CT Abdomen and Pelvis w/ IV Cont (10.21.17 @ 16:37) >    IMPRESSION:    Acute necrotizing pancreatitis,30-50%. Peripancreatic fluid.    Short segment thrombus of the splenic vein at the portosplenic confluence.    Persistent anterior right portal vein thrombosis with perfusional   abnormalities of the right hepatic lobe as described above.    Left lower lobe atelectasis /consolidation and small left pleural   effusion. Trace right pleural effusion with adjacent compressive   atelectasis.    Interval placement of CBD stent.    < end of copied text >

## 2017-10-22 NOTE — PROGRESS NOTE ADULT - SUBJECTIVE AND OBJECTIVE BOX
HISTORY  57yF w/ PMH significant for HTN and nephrolithiasis, s/p appendectomy that presented to [facility] ED on 10/16/2017 c/o acute onset severe sharp stabbing upper abdominal pain that radiates to the back. She says the pain woke her up in the middle of the night causing her to develop nausea and NBNB emesis x3 episodes. She presented to the emergency department hypotensive was worked up with abdominal imaging findings concerning for CBD obstructing stone and was admitted, MRCP ordered and completed, and showed a distal CBD stone elevating concern for acute obstructive cholangitis. Labs resulted with evidence of pancreatitis with lipase > 8500. SICU was consulted as she was tachycardic to the 130s and breaking through dilaudid, tylenol, and toradol. GI was consulted urgently and ERCP was performed with biliary stent placement. Pt continued to have pain after placement of the ERCP and this has been attributed to pancreatic necrosis.     24 HOUR EVENTS:    SUBJECTIVE/ROS:  [ ] A ten-point review of systems was otherwise negative except as noted.  [ ] Due to altered mental status/intubation, subjective information were not able to be obtained from the patient. History was obtained, to the extent possible, from review of the chart and collateral sources of information.      NEURO  RASS:     GCS:     [facility] ICU:  Exam:   Meds: HYDROmorphone PCA (1 mG/mL) 30 milliLiter(s) PCA Continuous PCA Continuous  HYDROmorphone PCA (1 mG/mL) Rescue Clinician Bolus 0.5 milliGRAM(s) IV Push every 15 minutes PRN for Pain Scale GREATER THAN 6  ondansetron Injectable 4 milliGRAM(s) IV Push every 6 hours PRN Nausea  oxyCODONE    IR 5 milliGRAM(s) Oral every 3 hours PRN Mild Pain  oxyCODONE    IR 10 milliGRAM(s) Oral every 3 hours PRN Moderate Pain    [x] Adequacy of sedation and pain control has been assessed and adjusted      RESPIRATORY  RR: 38 (10-22-17 @ 07:00) (9 - 38)  SpO2: 88% (10-22-17 @ 07:00) (85% - 98%)  Wt(kg): --  Exam: unlabored, clear to auscultation bilaterally  Mechanical Ventilation:     [ ] Extubation Readiness Assessed  Meds: diphenhydrAMINE   Injectable 25 milliGRAM(s) IntraMuscular every 4 hours PRN Rash and/or Itching        CARDIOVASCULAR  HR: 103 (10-22-17 @ 07:00) (72 - 103)  BP: 128/65 (10-22-17 @ 07:00) (106/76 - 160/77)  BP(mean): 90 (10-22-17 @ 07:00) (84 - 111)  ABP: --  ABP(mean): --  Wt(kg): --  CVP(cm H2O): --      Exam:  Cardiac Rhythm:  Perfusion     [ ]Adequate   [ ]Inadequate  Mentation   [ ]Normal       [ ]Reduced  Extremities  [ ]Warm         [ ]Cool  Volume Status [ ]Hypervolemic [ ]Euvolemic [ ]Hypovolemic  Meds:       GI/NUTRITION  Exam:  Diet:  Meds: pantoprazole  Injectable 40 milliGRAM(s) IV Push daily      GENITOURINARY  I&O's Detail    10-21 @ 07:01  -  10-22 @ 07:00  --------------------------------------------------------  IN:    dextrose 5% + sodium chloride 0.45% with potassium chloride 20 mEq/L: 1200 mL    Oral Fluid: 660 mL    Solution: 300 mL    Solution: 100 mL    Solution: 50 mL    Solution: 300 mL    Solution: 187.5 mL  Total IN: 2797.5 mL    OUT:    Voided: 3850 mL  Total OUT: 3850 mL    Total NET: -1052.5 mL          10-22    140  |  104  |  5<L>  ----------------------------<  97  3.7   |  24  |  0.39<L>    Ca    7.9<L>      22 Oct 2017 02:39  Phos  2.3     10-22  Mg     1.9     10-22    TPro  5.9<L>  /  Alb  2.6<L>  /  TBili  0.3  /  DBili  0.1  /  AST  19  /  ALT  15  /  AlkPhos  121<H>  10-22    [ ] Lara catheter, indication: N/A  Meds: dextrose 5% + sodium chloride 0.45% with potassium chloride 20 mEq/L 1000 milliLiter(s) IV Continuous <Continuous>        HEMATOLOGIC  Meds: enoxaparin Injectable 40 milliGRAM(s) SubCutaneous daily    [x] VTE Prophylaxis                        9.9    17.4  )-----------( 343      ( 22 Oct 2017 02:39 )             30.1     PT/INR - ( 22 Oct 2017 02:39 )   PT: 14.1 sec;   INR: 1.30 ratio         PTT - ( 22 Oct 2017 02:39 )  PTT:31.1 sec  Transfusion     [ ] PRBC   [ ] Platelets   [ ] FFP   [ ] Cryoprecipitate      INFECTIOUS DISEASES  T(C): 38 (10-22-17 @ 06:00), Max: 38.7 (10-21-17 @ 14:39)  Wt(kg): --  WBC Count: 17.4 K/uL (10-22 @ 02:39)    Recent Cultures:  Specimen Source: .Blood Blood, 10-21 @ 00:15; Results   No growth to date.; Gram Stain: --; Organism: --  Specimen Source: .Blood Blood-Venous, 10-20 @ 21:57; Results   No growth to date.; Gram Stain: --; Organism: --  Specimen Source: .Blood Blood-Venous, 10-19 @ 18:20; Results   No growth to date.; Gram Stain: --; Organism: --  Specimen Source: .Blood Blood-Peripheral, 10-17 @ 07:32; Results   No growth to date.; Gram Stain: --; Organism: --    Meds: imipenem/cilastatin  IVPB      imipenem/cilastatin  IVPB 500 milliGRAM(s) IV Intermittent every 6 hours        ENDOCRINE  Capillary Blood Glucose    Meds:       OTHER MEDICATIONS:  benzocaine 15 mG/menthol 3.6 mG Lozenge 1 Lozenge Oral four times a day PRN  naloxone Injectable 0.1 milliGRAM(s) IV Push every 3 minutes PRN    ACCESS DEVICES:  [ ] Peripheral IV  [ ] Central Venous Line	[ ] R	[ ] L	[ ] IJ	[ ] Fem	[ ] SC	Placed:   [ ] Arterial Line		[ ] R	[ ] L	[ ] Fem	[ ] Rad	[ ] Ax	Placed:   [ ] PICC:					[ ] Mediport  [ ] Urinary Catheter, Date Placed:   [x] Necessity of urinary, arterial, and venous catheters discussed    CODE STATUS:     IMAGING: HISTORY  57yF w/ PMH significant for HTN and nephrolithiasis, s/p appendectomy that presented to [facility] ED on 10/16/2017 c/o acute onset severe sharp stabbing upper abdominal pain that radiates to the back. She says the pain woke her up in the middle of the night causing her to develop nausea and NBNB emesis x3 episodes. She presented to the emergency department hypotensive was worked up with abdominal imaging findings concerning for CBD obstructing stone and was admitted, MRCP ordered and completed, and showed a distal CBD stone elevating concern for acute obstructive cholangitis. Labs resulted with evidence of pancreatitis with lipase > 8500. SICU was consulted as she was tachycardic to the 130s and breaking through dilaudid, tylenol, and toradol. GI was consulted urgently and ERCP was performed with biliary stent placement. Pt continued to have pain after placement of the ERCP and this has been attributed to pancreatic necrosis.     24 HOUR EVENTS:  Patient is continued on Imipenem. Patient was febrile multiple times today and blood cultures were sent. CT abdomen pelvis was performed to assess pancreatic necrosis and was found to have. Patient was found to have a splenic vein thrombosis as well as a portal vein thrombosis with 30-50% necrosis of the pancreas that is grossly unchanged from MRI. Patient continue to have pain.  PCEA fell out and anesthesia placed patient on a PCA.      SUBJECTIVE/ROS:  [x] A ten-point review of systems was otherwise negative except as noted.  [ ] Due to altered mental status/intubation, subjective information were not able to be obtained from the patient. History was obtained, to the extent possible, from review of the chart and collateral sources of information.      NEURO    Exam: AOx3  Meds: HYDROmorphone PCA (1 mG/mL) 30 milliLiter(s) PCA Continuous PCA Continuous  HYDROmorphone PCA (1 mG/mL) Rescue Clinician Bolus 0.5 milliGRAM(s) IV Push every 15 minutes PRN for Pain Scale GREATER THAN 6  ondansetron Injectable 4 milliGRAM(s) IV Push every 6 hours PRN Nausea  oxyCODONE    IR 5 milliGRAM(s) Oral every 3 hours PRN Mild Pain  oxyCODONE    IR 10 milliGRAM(s) Oral every 3 hours PRN Moderate Pain    [x] Adequacy of sedation and pain control has been assessed and adjusted      RESPIRATORY  RR: 38 (10-22-17 @ 07:00) (9 - 38)  SpO2: 88% (10-22-17 @ 07:00) (85% - 98%)  Exam: unlabored, clear to auscultation bilaterally  Mechanical Ventilation:     [ ] Extubation Readiness Assessed  Meds: diphenhydrAMINE   Injectable 25 milliGRAM(s) IntraMuscular every 4 hours PRN Rash and/or Itching        CARDIOVASCULAR  HR: 103 (10-22-17 @ 07:00) (72 - 103)  BP: 128/65 (10-22-17 @ 07:00) (106/76 - 160/77)  BP(mean): 90 (10-22-17 @ 07:00) (84 - 111)      Exam: regular rate and rhythm  Cardiac Rhythm: sinus  Perfusion     [x]Adequate   [ ]Inadequate  Mentation   [x]Normal       [ ]Reduced  Extremities  [x]Warm         [ ]Cool  Volume Status [ ]Hypervolemic [x]Euvolemic [ ]Hypovolemic      GI/NUTRITION  Exam: soft, tender in upper abdomen epigastric worse than ULQ.  tolerating minimal CLD  Diet: CLD  Meds: pantoprazole  Injectable 40 milliGRAM(s) IV Push daily      GENITOURINARY  I&O's Detail    10-21 @ 07:01  -  10-22 @ 07:00  --------------------------------------------------------  IN:    dextrose 5% + sodium chloride 0.45% with potassium chloride 20 mEq/L: 1200 mL    Oral Fluid: 660 mL    Solution: 300 mL    Solution: 100 mL    Solution: 50 mL    Solution: 300 mL    Solution: 187.5 mL  Total IN: 2797.5 mL    OUT:    Voided: 3850 mL  Total OUT: 3850 mL    Total NET: -1052.5 mL          10-22    140  |  104  |  5<L>  ----------------------------<  97  3.7   |  24  |  0.39<L>    Ca    7.9<L>      22 Oct 2017 02:39  Phos  2.3     10-22  Mg     1.9     10-22    TPro  5.9<L>  /  Alb  2.6<L>  /  TBili  0.3  /  DBili  0.1  /  AST  19  /  ALT  15  /  AlkPhos  121<H>  10-22    [ ] Rivas catheter, indication: N/A  Meds: dextrose 5% + sodium chloride 0.45% with potassium chloride 20 mEq/L 1000 milliLiter(s) IV Continuous <Continuous>        HEMATOLOGIC  Meds: enoxaparin Injectable 40 milliGRAM(s) SubCutaneous daily    [x] VTE Prophylaxis                        9.9    17.4  )-----------( 343      ( 22 Oct 2017 02:39 )             30.1     PT/INR - ( 22 Oct 2017 02:39 )   PT: 14.1 sec;   INR: 1.30 ratio         PTT - ( 22 Oct 2017 02:39 )  PTT:31.1 sec  Transfusion     [ ] PRBC   [ ] Platelets   [ ] FFP   [ ] Cryoprecipitate      INFECTIOUS DISEASES  T(C): 38 (10-22-17 @ 06:00), Max: 38.7 (10-21-17 @ 14:39)  Wt(kg): --  WBC Count: 17.4 K/uL (10-22 @ 02:39)    Recent Cultures:  Specimen Source: .Blood Blood, 10-21 @ 00:15; Results   No growth to date.; Gram Stain: --; Organism: --  Specimen Source: .Blood Blood-Venous, 10-20 @ 21:57; Results   No growth to date.; Gram Stain: --; Organism: --  Specimen Source: .Blood Blood-Venous, 10-19 @ 18:20; Results   No growth to date.; Gram Stain: --; Organism: --  Specimen Source: .Blood Blood-Peripheral, 10-17 @ 07:32; Results   No growth to date.; Gram Stain: --; Organism: --    Meds: imipenem/cilastatin  IVPB      imipenem/cilastatin  IVPB 500 milliGRAM(s) IV Intermittent every 6 hours        ENDOCRINE  Capillary Blood Glucose    Meds:       OTHER MEDICATIONS:  benzocaine 15 mG/menthol 3.6 mG Lozenge 1 Lozenge Oral four times a day PRN  naloxone Injectable 0.1 milliGRAM(s) IV Push every 3 minutes PRN    ACCESS DEVICES:  [x] Peripheral IV  [ ] Central Venous Line	[ ] R	[ ] L	[ ] IJ	[ ] Fem	[ ] SC	Placed:   [ ] Arterial Line		[ ] R	[ ] L	[ ] Fem	[ ] Rad	[ ] Ax	Placed:   [ ] PICC:					[ ] Mediport  [x] Urinary Catheter, Date Placed:   [x] Necessity of urinary, arterial, and venous catheters discussed    CODE STATUS: rivas    IMAGING: x

## 2017-10-22 NOTE — PROGRESS NOTE ADULT - ASSESSMENT
A/P: 57 year old woman with necrotizing pancreatitis, s/p ERCP (sphincterotomy and plastic stent placement) with acute necrotizing pancreatitis (30-50%), splenic and portal vein thrombosis  - Pain control: PCEA  - Diet: NPO and IV fluids  - GI: would check CT Abdomen with contrast to evaluate for abscess/cyst formation  - Encourage IS as tolerated; wean supplemental O2 as tolerated  - PT: home w/ assist; outpatient PT  - Care as per SICU    3090

## 2017-10-22 NOTE — PROGRESS NOTE ADULT - SUBJECTIVE AND OBJECTIVE BOX
Day _5/3_ of Anesthesia Pain Management Service    SUBJECTIVE:    Pain Scale Score	At rest: ___ 	With Activity: ___ 	[ x] Refer to charted pain scores    THERAPY:    [ ] IV PCA Morphine		[ ] 5 mg/mL	[ ] 1 mg/mL  [x ] IV PCA Hydromorphone	[ ] 5 mg/mL	[x ] 1 mg/mL  [ ] IV PCA Fentanyl		[ ] 50 micrograms/mL    Demand dose 0.3mg    lockout  6  (minutes) 0Continuous Rate          MEDICATIONS  (STANDING):  dextrose 5% + sodium chloride 0.45% with potassium chloride 20 mEq/L 1000 milliLiter(s) (50 mL/Hr) IV Continuous <Continuous>  enoxaparin Injectable 40 milliGRAM(s) SubCutaneous daily  HYDROmorphone  Injectable 0.5 milliGRAM(s) IV Push once  HYDROmorphone PCA (1 mG/mL) 30 milliLiter(s) PCA Continuous PCA Continuous  imipenem/cilastatin  IVPB      imipenem/cilastatin  IVPB 500 milliGRAM(s) IV Intermittent every 6 hours  pantoprazole  Injectable 40 milliGRAM(s) IV Push daily    MEDICATIONS  (PRN):  benzocaine 15 mG/menthol 3.6 mG Lozenge 1 Lozenge Oral four times a day PRN Sore Throat  diphenhydrAMINE   Injectable 25 milliGRAM(s) IntraMuscular every 4 hours PRN Rash and/or Itching  HYDROmorphone PCA (1 mG/mL) Rescue Clinician Bolus 0.5 milliGRAM(s) IV Push every 15 minutes PRN for Pain Scale GREATER THAN 6  naloxone Injectable 0.1 milliGRAM(s) IV Push every 3 minutes PRN For ANY of the following changes in patient status:  A. RR LESS THAN 10 breaths per minute, B. Oxygen saturation LESS THAN 90%, C. Sedation score of 6  ondansetron Injectable 4 milliGRAM(s) IV Push every 6 hours PRN Nausea  oxyCODONE    IR 5 milliGRAM(s) Oral every 3 hours PRN Mild Pain  oxyCODONE    IR 10 milliGRAM(s) Oral every 3 hours PRN Moderate Pain      OBJECTIVE:    Sedation Score:	[x ] Alert	[ ] Drowsy 	[ ] Arousable	[ ] Asleep	[ ] Unresponsive    Side Effects:	[ x] None	[ ] Nausea	[ ] Vomiting	[ ] Pruritus  		[ ] Other:    Vital Signs Last 24 Hrs  T(C): 37.6 (22 Oct 2017 08:00), Max: 38.7 (21 Oct 2017 14:39)  T(F): 99.6 (22 Oct 2017 08:00), Max: 101.7 (21 Oct 2017 14:39)  HR: 82 (22 Oct 2017 10:00) (72 - 103)  BP: 139/84 (22 Oct 2017 10:00) (108/73 - 160/77)  BP(mean): 106 (22 Oct 2017 10:00) (84 - 111)  RR: 12 (22 Oct 2017 10:00) (9 - 38)  SpO2: 96% (22 Oct 2017 10:00) (85% - 98%)    ASSESSMENT/ PLAN    Therapy to  be:	x ] Continue   [ ] Discontinued   [ ] Change to prn Analgesics    Documentation and Verification of current medications:   [X] Done	[ ] Not done, not elligible    Comments: I was physically present for the key portionjs of the evaluation and management service provided. I agree with the above history, physical, and plan which I have reviewed and edited where appropriate

## 2017-10-22 NOTE — PROGRESS NOTE ADULT - ASSESSMENT
57F w/ obstructing common bile duct stone, subsequent cholangitis and pancreatitis now s/p ERCP with biliary stent placement that will require future ERCP for stent removal currently undergoing empiric antibiotic therapy and pain control. Now with PCEA placement (10/19) to significant improvement of pain. Patient has been stable and her rivas was removed, now with urinary frequency.     Neuro: severe pain, not opioid naive, pain significantly improved with PCEA  Pain  	- Hydromorphone 10mcg/ml + bupivacaine 0.0625% PCEA (3ml demand, lockout 15min, 4h limit 50ml)  	- Oxycodone IR 5mg q3h prn mild pain  	- Oxycodone IR 10mg q3h prn moderate pain  	- Hydromorphone 1mg ivp q4h prn breakthough pain   	- APAP 1000mg ivpb q6h    Resp: no acute issues   Opioids  -Transitioned to PCEA  -Monitor    CV: borderline tachycardia currently attributed to SIRS/pain  	- Cardiac monitor, q1h vitals  	- Hold home amlodipine    GI: choledocholithiasis & necrotic pancreatitis   Choledocholithiasis/pancreatitis  	- Imipenem/cilastatin 500mg iv q6h  	- Pantoprazole 40mg qd  	- ERCP w/ biliary stent placed 10/17  NPO   	- Fluids as listed in   	- No TPN at this time   	- Consider advancing to CLD in AM on 10/20    : Urinary frequency/low volume  	- D5 1/2NS w/ KCl @ 50ml/h   	- Monitor I&Os, replete electrolytes as needed  	- Consider catheterization vs PVR bladder scan     ID:   Possible cholecystitis, cholangitis  	- Imipenem/cilastatin 500mg iv q6h  	- Trend CBC, WBC decreasing s/p ERCP  - Monitor fevers, q1h vitals    Heme: trend CBCs  DVT prophylaxis  	- Heparin 5000units subq bid    Endo: No active issues/interventions    Disposition: Full code    RACHNA Santamaria PGY1, [id number] 57F w/ obstructing common bile duct stone, subsequent cholangitis and pancreatitis now s/p ERCP with biliary stent placement that will require future ERCP for stent removal currently undergoing empiric antibiotic therapy and pain control. Now with PCEA placement (10/19) to significant improvement of pain. Patient has been stable and her rivas was removed, now with urinary frequency.     Neuro: severe pain, not opioid naive, pain significantly improved with PCEA  Pain  	- Hydromorphone 10mcg/ml + bupivacaine 0.0625% PCEA (3ml demand, lockout 15min, 4h limit 50ml)  	- Oxycodone IR 5mg q3h prn mild pain  	- Oxycodone IR 10mg q3h prn moderate pain  	- Hydromorphone 1mg ivp q4h prn breakthough pain   	- APAP 1000mg ivpb q6h    Resp: no acute issues   Opioids  -Contine PCA but consider switching to Morphine PCA  -Monitor    CV: borderline tachycardia currently attributed to SIRS/pain  	- Cardiac monitor, q1h vitals  	- Hold home amlodipine     GI: choledocholithiasis & necrotic pancreatitis   Choledocholithiasis/pancreatitis  	- Imipenem/cilastatin 500mg iv q6h, discontinue imepenem today as it is not effective  	- Pantoprazole 40mg qd  	- ERCP w/ biliary stent placed 10/17  NPO   	- Fluids as listed in   	- No TPN at this time   	- Continue CLD    : Urinary frequency/low volume  	- D5 1/2NS w/ KCl @ 50ml/h, continue today   	- Monitor I&Os, replete electrolytes as needed  	- Consider catheterization vs PVR bladder scan     ID: Pancreatitis with 30-50% necrosis, leukocytosis and persistent fevers  -discontinue Imipenem at this time  -follow up blood cultures    Heme: trend CBCs  DVT prophylaxis  	- Heparin 5000units subq bid    Endo: No active issues/interventions    Disposition: Full code    C April VICK PGYII [id number]

## 2017-10-22 NOTE — PROGRESS NOTE ADULT - ATTENDING COMMENTS
I have seen and examined this patient and agree with above. Patient with acute gallstone pancreatitis and pancreatic necrosis 30-50%.   Pain is better controlled with PCA. PCEA became dislodged and was removed.     1)Acute hypoxemic respiratory failure- resolved  -on 2 liters of nasal cannula  -will continue to wean as tolerated  -goal SpO2>92%  -CXR improved with hypervolemia from yesterday.     Hemodynamically stable  2)Pancreatic necrosis and leukocytosis- will discontinue primaxin. Tolerating clear liquid diet and will continue this.  -neutrophilic leukocytosis increasing secondary to necrosis of pancreas and will continue to follow WBC    3) Hypocalcemia and hypophosphatemia- repleted and will recheck    4) patient to get out of bed into chair and ambulate    WIll continue to monitor closely.

## 2017-10-23 DIAGNOSIS — K86.89 OTHER SPECIFIED DISEASES OF PANCREAS: ICD-10-CM

## 2017-10-23 DIAGNOSIS — R50.9 FEVER, UNSPECIFIED: ICD-10-CM

## 2017-10-23 LAB
ALBUMIN SERPL ELPH-MCNC: 2.6 G/DL — LOW (ref 3.3–5)
ALP SERPL-CCNC: 189 U/L — HIGH (ref 40–120)
ALT FLD-CCNC: 20 U/L RC — SIGNIFICANT CHANGE UP (ref 10–45)
ANION GAP SERPL CALC-SCNC: 15 MMOL/L — SIGNIFICANT CHANGE UP (ref 5–17)
APPEARANCE UR: CLEAR — SIGNIFICANT CHANGE UP
AST SERPL-CCNC: 28 U/L — SIGNIFICANT CHANGE UP (ref 10–40)
BILIRUB SERPL-MCNC: 0.3 MG/DL — SIGNIFICANT CHANGE UP (ref 0.2–1.2)
BILIRUB UR-MCNC: NEGATIVE — SIGNIFICANT CHANGE UP
BUN SERPL-MCNC: 3 MG/DL — LOW (ref 7–23)
C DIFF GDH STL QL: NEGATIVE — SIGNIFICANT CHANGE UP
C DIFF GDH STL QL: SIGNIFICANT CHANGE UP
CA-I BLD-SCNC: 1.14 MMOL/L — SIGNIFICANT CHANGE UP (ref 1.12–1.3)
CALCIUM SERPL-MCNC: 8.1 MG/DL — LOW (ref 8.4–10.5)
CHLORIDE SERPL-SCNC: 99 MMOL/L — SIGNIFICANT CHANGE UP (ref 96–108)
CO2 SERPL-SCNC: 26 MMOL/L — SIGNIFICANT CHANGE UP (ref 22–31)
COLOR SPEC: COLORLESS — SIGNIFICANT CHANGE UP
CREAT SERPL-MCNC: 0.44 MG/DL — LOW (ref 0.5–1.3)
DIFF PNL FLD: NEGATIVE — SIGNIFICANT CHANGE UP
GLUCOSE SERPL-MCNC: 99 MG/DL — SIGNIFICANT CHANGE UP (ref 70–99)
GLUCOSE UR QL: NEGATIVE — SIGNIFICANT CHANGE UP
HCT VFR BLD CALC: 32 % — LOW (ref 34.5–45)
HGB BLD-MCNC: 10.7 G/DL — LOW (ref 11.5–15.5)
KETONES UR-MCNC: NEGATIVE — SIGNIFICANT CHANGE UP
LEUKOCYTE ESTERASE UR-ACNC: NEGATIVE — SIGNIFICANT CHANGE UP
MAGNESIUM SERPL-MCNC: 1.8 MG/DL — SIGNIFICANT CHANGE UP (ref 1.6–2.6)
MCHC RBC-ENTMCNC: 30.6 PG — SIGNIFICANT CHANGE UP (ref 27–34)
MCHC RBC-ENTMCNC: 33.5 GM/DL — SIGNIFICANT CHANGE UP (ref 32–36)
MCV RBC AUTO: 91.4 FL — SIGNIFICANT CHANGE UP (ref 80–100)
NITRITE UR-MCNC: NEGATIVE — SIGNIFICANT CHANGE UP
PH UR: 7 — SIGNIFICANT CHANGE UP (ref 5–8)
PHOSPHATE SERPL-MCNC: 3.2 MG/DL — SIGNIFICANT CHANGE UP (ref 2.5–4.5)
PLATELET # BLD AUTO: 396 K/UL — SIGNIFICANT CHANGE UP (ref 150–400)
POTASSIUM SERPL-MCNC: 4.4 MMOL/L — SIGNIFICANT CHANGE UP (ref 3.5–5.3)
POTASSIUM SERPL-SCNC: 4.4 MMOL/L — SIGNIFICANT CHANGE UP (ref 3.5–5.3)
PROT SERPL-MCNC: 5.9 G/DL — LOW (ref 6–8.3)
PROT UR-MCNC: SIGNIFICANT CHANGE UP
RBC # BLD: 3.5 M/UL — LOW (ref 3.8–5.2)
RBC # FLD: 12.7 % — SIGNIFICANT CHANGE UP (ref 10.3–14.5)
SODIUM SERPL-SCNC: 140 MMOL/L — SIGNIFICANT CHANGE UP (ref 135–145)
SP GR SPEC: 1 — LOW (ref 1.01–1.02)
UROBILINOGEN FLD QL: NEGATIVE — SIGNIFICANT CHANGE UP
WBC # BLD: 28.9 K/UL — HIGH (ref 3.8–10.5)
WBC # FLD AUTO: 28.9 K/UL — HIGH (ref 3.8–10.5)

## 2017-10-23 PROCEDURE — 99233 SBSQ HOSP IP/OBS HIGH 50: CPT

## 2017-10-23 PROCEDURE — 99233 SBSQ HOSP IP/OBS HIGH 50: CPT | Mod: GC

## 2017-10-23 RX ORDER — HYDROMORPHONE HYDROCHLORIDE 2 MG/ML
1 INJECTION INTRAMUSCULAR; INTRAVENOUS; SUBCUTANEOUS ONCE
Qty: 0 | Refills: 0 | Status: DISCONTINUED | OUTPATIENT
Start: 2017-10-23 | End: 2017-10-23

## 2017-10-23 RX ORDER — MAGNESIUM SULFATE 500 MG/ML
2 VIAL (ML) INJECTION ONCE
Qty: 0 | Refills: 0 | Status: COMPLETED | OUTPATIENT
Start: 2017-10-23 | End: 2017-10-23

## 2017-10-23 RX ORDER — ONDANSETRON 8 MG/1
4 TABLET, FILM COATED ORAL EVERY 6 HOURS
Qty: 0 | Refills: 0 | Status: DISCONTINUED | OUTPATIENT
Start: 2017-10-23 | End: 2017-10-24

## 2017-10-23 RX ORDER — ACETAMINOPHEN 500 MG
650 TABLET ORAL ONCE
Qty: 0 | Refills: 0 | Status: COMPLETED | OUTPATIENT
Start: 2017-10-23 | End: 2017-10-23

## 2017-10-23 RX ORDER — NALOXONE HYDROCHLORIDE 4 MG/.1ML
0.1 SPRAY NASAL
Qty: 0 | Refills: 0 | Status: DISCONTINUED | OUTPATIENT
Start: 2017-10-23 | End: 2017-10-24

## 2017-10-23 RX ORDER — HYDROMORPHONE HYDROCHLORIDE 2 MG/ML
30 INJECTION INTRAMUSCULAR; INTRAVENOUS; SUBCUTANEOUS
Qty: 0 | Refills: 0 | Status: DISCONTINUED | OUTPATIENT
Start: 2017-10-23 | End: 2017-10-24

## 2017-10-23 RX ORDER — HYDROMORPHONE HYDROCHLORIDE 2 MG/ML
0.5 INJECTION INTRAMUSCULAR; INTRAVENOUS; SUBCUTANEOUS
Qty: 0 | Refills: 0 | Status: DISCONTINUED | OUTPATIENT
Start: 2017-10-23 | End: 2017-10-24

## 2017-10-23 RX ORDER — ACETAMINOPHEN 500 MG
1000 TABLET ORAL ONCE
Qty: 0 | Refills: 0 | Status: COMPLETED | OUTPATIENT
Start: 2017-10-23 | End: 2017-10-23

## 2017-10-23 RX ORDER — PANTOPRAZOLE SODIUM 20 MG/1
40 TABLET, DELAYED RELEASE ORAL
Qty: 0 | Refills: 0 | Status: DISCONTINUED | OUTPATIENT
Start: 2017-10-23 | End: 2017-10-29

## 2017-10-23 RX ORDER — OXYCODONE HYDROCHLORIDE 5 MG/1
20 TABLET ORAL EVERY 12 HOURS
Qty: 0 | Refills: 0 | Status: DISCONTINUED | OUTPATIENT
Start: 2017-10-23 | End: 2017-10-25

## 2017-10-23 RX ADMIN — PANTOPRAZOLE SODIUM 40 MILLIGRAM(S): 20 TABLET, DELAYED RELEASE ORAL at 05:36

## 2017-10-23 RX ADMIN — OXYCODONE HYDROCHLORIDE 20 MILLIGRAM(S): 5 TABLET ORAL at 17:27

## 2017-10-23 RX ADMIN — Medication 25 MILLIGRAM(S): at 17:31

## 2017-10-23 RX ADMIN — Medication 50 GRAM(S): at 05:36

## 2017-10-23 RX ADMIN — Medication 650 MILLIGRAM(S): at 20:34

## 2017-10-23 RX ADMIN — Medication 400 MILLIGRAM(S): at 10:57

## 2017-10-23 RX ADMIN — OXYCODONE HYDROCHLORIDE 15 MILLIGRAM(S): 5 TABLET ORAL at 08:41

## 2017-10-23 RX ADMIN — HYDROMORPHONE HYDROCHLORIDE 0.5 MILLIGRAM(S): 2 INJECTION INTRAMUSCULAR; INTRAVENOUS; SUBCUTANEOUS at 04:15

## 2017-10-23 RX ADMIN — HYDROMORPHONE HYDROCHLORIDE 0.5 MILLIGRAM(S): 2 INJECTION INTRAMUSCULAR; INTRAVENOUS; SUBCUTANEOUS at 10:28

## 2017-10-23 RX ADMIN — HYDROMORPHONE HYDROCHLORIDE 0.5 MILLIGRAM(S): 2 INJECTION INTRAMUSCULAR; INTRAVENOUS; SUBCUTANEOUS at 14:37

## 2017-10-23 RX ADMIN — HYDROMORPHONE HYDROCHLORIDE 0.5 MILLIGRAM(S): 2 INJECTION INTRAMUSCULAR; INTRAVENOUS; SUBCUTANEOUS at 22:49

## 2017-10-23 RX ADMIN — OXYCODONE HYDROCHLORIDE 15 MILLIGRAM(S): 5 TABLET ORAL at 08:11

## 2017-10-23 RX ADMIN — Medication 1000 MILLIGRAM(S): at 03:30

## 2017-10-23 RX ADMIN — HYDROMORPHONE HYDROCHLORIDE 0.5 MILLIGRAM(S): 2 INJECTION INTRAMUSCULAR; INTRAVENOUS; SUBCUTANEOUS at 04:45

## 2017-10-23 RX ADMIN — HYDROMORPHONE HYDROCHLORIDE 30 MILLILITER(S): 2 INJECTION INTRAMUSCULAR; INTRAVENOUS; SUBCUTANEOUS at 15:40

## 2017-10-23 RX ADMIN — HYDROMORPHONE HYDROCHLORIDE 0.5 MILLIGRAM(S): 2 INJECTION INTRAMUSCULAR; INTRAVENOUS; SUBCUTANEOUS at 12:05

## 2017-10-23 RX ADMIN — HYDROMORPHONE HYDROCHLORIDE 0.5 MILLIGRAM(S): 2 INJECTION INTRAMUSCULAR; INTRAVENOUS; SUBCUTANEOUS at 11:48

## 2017-10-23 RX ADMIN — HYDROMORPHONE HYDROCHLORIDE 0.5 MILLIGRAM(S): 2 INJECTION INTRAMUSCULAR; INTRAVENOUS; SUBCUTANEOUS at 12:30

## 2017-10-23 RX ADMIN — Medication 400 MILLIGRAM(S): at 03:15

## 2017-10-23 RX ADMIN — HYDROMORPHONE HYDROCHLORIDE 1 MILLIGRAM(S): 2 INJECTION INTRAMUSCULAR; INTRAVENOUS; SUBCUTANEOUS at 00:39

## 2017-10-23 RX ADMIN — PANTOPRAZOLE SODIUM 40 MILLIGRAM(S): 20 TABLET, DELAYED RELEASE ORAL at 12:32

## 2017-10-23 RX ADMIN — HYDROMORPHONE HYDROCHLORIDE 0.5 MILLIGRAM(S): 2 INJECTION INTRAMUSCULAR; INTRAVENOUS; SUBCUTANEOUS at 22:34

## 2017-10-23 RX ADMIN — HYDROMORPHONE HYDROCHLORIDE 1 MILLIGRAM(S): 2 INJECTION INTRAMUSCULAR; INTRAVENOUS; SUBCUTANEOUS at 05:44

## 2017-10-23 RX ADMIN — HYDROMORPHONE HYDROCHLORIDE 0.5 MILLIGRAM(S): 2 INJECTION INTRAMUSCULAR; INTRAVENOUS; SUBCUTANEOUS at 21:41

## 2017-10-23 RX ADMIN — HYDROMORPHONE HYDROCHLORIDE 30 MILLILITER(S): 2 INJECTION INTRAMUSCULAR; INTRAVENOUS; SUBCUTANEOUS at 19:13

## 2017-10-23 RX ADMIN — OXYCODONE HYDROCHLORIDE 20 MILLIGRAM(S): 5 TABLET ORAL at 18:00

## 2017-10-23 RX ADMIN — HYDROMORPHONE HYDROCHLORIDE 1 MILLIGRAM(S): 2 INJECTION INTRAMUSCULAR; INTRAVENOUS; SUBCUTANEOUS at 00:54

## 2017-10-23 RX ADMIN — HYDROMORPHONE HYDROCHLORIDE 1 MILLIGRAM(S): 2 INJECTION INTRAMUSCULAR; INTRAVENOUS; SUBCUTANEOUS at 05:59

## 2017-10-23 RX ADMIN — HYDROMORPHONE HYDROCHLORIDE 0.5 MILLIGRAM(S): 2 INJECTION INTRAMUSCULAR; INTRAVENOUS; SUBCUTANEOUS at 05:00

## 2017-10-23 RX ADMIN — ENOXAPARIN SODIUM 40 MILLIGRAM(S): 100 INJECTION SUBCUTANEOUS at 12:31

## 2017-10-23 RX ADMIN — Medication 50 GRAM(S): at 11:35

## 2017-10-23 RX ADMIN — HYDROMORPHONE HYDROCHLORIDE 0.5 MILLIGRAM(S): 2 INJECTION INTRAMUSCULAR; INTRAVENOUS; SUBCUTANEOUS at 06:41

## 2017-10-23 RX ADMIN — HYDROMORPHONE HYDROCHLORIDE 30 MILLILITER(S): 2 INJECTION INTRAMUSCULAR; INTRAVENOUS; SUBCUTANEOUS at 07:19

## 2017-10-23 NOTE — PROGRESS NOTE ADULT - SUBJECTIVE AND OBJECTIVE BOX
Chief Complaint:  Patient is a 57y old  Female who presents with a chief complaint of Abdominal pain (16 Oct 2017 09:50)      Interval Events: Patient with ongoing epigastric abd pain. She denies vomiting. She had fever overnight to 102. She is also having watery diarrhea ~4-5 episodes yesterday and 2 this AM. She was on abx last week and over the weekend but they have been discontinued due to (-) cultures so far. She had CT over the weekend which showed pancreatitis with pancreatic necrosis.     Allergies:  No Known Allergies      Hospital Medications:  dextrose 5% + sodium chloride 0.45% with potassium chloride 20 mEq/L 1000 milliLiter(s) IV Continuous <Continuous>  diphenhydrAMINE   Injectable 25 milliGRAM(s) IV Push every 4 hours PRN  enoxaparin Injectable 40 milliGRAM(s) SubCutaneous daily  HYDROmorphone  Injectable 0.5 milliGRAM(s) IV Push every 4 hours PRN  HYDROmorphone PCA (1 mG/mL) 30 milliLiter(s) PCA Continuous PCA Continuous  HYDROmorphone PCA (1 mG/mL) Rescue Clinician Bolus 0.5 milliGRAM(s) IV Push every 15 minutes PRN  naloxone Injectable 0.1 milliGRAM(s) IV Push every 3 minutes PRN  ondansetron Injectable 4 milliGRAM(s) IV Push every 6 hours PRN  oxyCODONE  ER Tablet 20 milliGRAM(s) Oral every 12 hours  pantoprazole    Tablet 40 milliGRAM(s) Oral before breakfast      PMHX/PSHX:  Kidney stone  Hypertension  No pertinent past medical history  Kidney stones  History of appendectomy      Family history:  No pertinent family history in first degree relatives      ROS:     General:  (+) fevers, (-) chills,  Eyes:  Good vision, no reported pain  ENT:  No sore throat, pain, runny nose  CV:  No chest pain, palpitations  Resp:  No cough, wheezing, SOB  GI:  See HPI  :  No pain, bleeding, incontinence, nocturia  Muscle:  No pain, weakness  Neuro:  No weakness, tingling, memory problems  Psych:  No fatigue, insomnia, mood problems, depression  Endocrine:  No cold/heat intolerance  Heme:  No petechiae, ecchymosis, easy bruising  Skin:  No rash, edema      PHYSICAL EXAM:   Vital Signs:  Vital Signs Last 24 Hrs  T(C): 38 (23 Oct 2017 11:00), Max: 38.9 (22 Oct 2017 20:00)  T(F): 100.4 (23 Oct 2017 11:00), Max: 102 (22 Oct 2017 20:00)  HR: 90 (23 Oct 2017 13:00) (84 - 106)  BP: 119/70 (23 Oct 2017 13:00) (112/72 - 143/71)  BP(mean): 88 (23 Oct 2017 13:00) (82 - 109)  RR: 27 (23 Oct 2017 13:00) (12 - 30)  SpO2: 96% (23 Oct 2017 13:00) (92% - 99%)  Daily     Daily Weight in k.8 (23 Oct 2017 00:57)    GENERAL:  NAD  HEENT:  sclera anicteric  CHEST:  no respiratory distress, CTAB  HEART:  RRR, no MRG, no edema  ABDOMEN:  distended, tender to palpation throughout with (+) guarding, no rigidity   EXTREMITIES:  no cyanosis, no edema  SKIN:  No rash  NEURO:  Alert, oriented      LABS:                        10.7   28.9  )-----------( 396      ( 23 Oct 2017 03:38 )             32.0     10-    140  |  99  |  3<L>  ----------------------------<  99  4.4   |  26  |  0.44<L>    Ca    8.1<L>      23 Oct 2017 03:38  Phos  3.2     10  Mg     1.8     1023    TPro  5.9<L>  /  Alb  2.6<L>  /  TBili  0.3  /  DBili  x   /  AST  28  /  ALT  20  /  AlkPhos  189<H>  1023    LIVER FUNCTIONS - ( 23 Oct 2017 03:38 )  Alb: 2.6 g/dL / Pro: 5.9 g/dL / ALK PHOS: 189 U/L / ALT: 20 U/L RC / AST: 28 U/L / GGT: x           PT/INR - ( 22 Oct 2017 02:39 )   PT: 14.1 sec;   INR: 1.30 ratio         PTT - ( 22 Oct 2017 02:39 )  PTT:31.1 sec      Imaging:  < from: CT Abdomen and Pelvis w/ IV Cont (10.21.17 @ 16:37) >  IMPRESSION:    Acute necrotizing pancreatitis,30-50%. Peripancreatic fluid.    Short segment thrombus of the splenic vein at the portosplenic confluence.    Persistent anterior right portal vein thrombosis with perfusional   abnormalities of the right hepatic lobe as described above.    Left lower lobe atelectasis /consolidation and small left pleural   effusion. Trace right pleural effusion with adjacent compressive   atelectasis.    Interval placement of CBD stent.    < end of copied text >    < from: ERCP (10.17.17 @ 11:24) >  Impression:      Ascending cholangitis with choledocholithiasis s/p ERCP with biliary stent placement  Recommendation:      - Return patient to [facility] for [facility] care.                                       - Consider cholecystectomy                                       - Repeat ERCP with stent removal, sphincterotomy, and stone extraction at later date    < end of copied text >      UA - (-) KLAUS, (-) nitrite

## 2017-10-23 NOTE — PROGRESS NOTE ADULT - ATTENDING COMMENTS
Impression:    #1.  Necrotizing pancreatitis    #2.  Fever to 102F    #3.  Acute diarrhea    Recommendations:    #1.  Cdiff PCR    #2.  IV fluids    #3.  Diet as tolerated.    #4.  Restart antibiotics if patient clinically deteriorates and Cdiff negative

## 2017-10-23 NOTE — PROGRESS NOTE ADULT - SUBJECTIVE AND OBJECTIVE BOX
Day 6\4 of Anesthesia Pain Management Service    SUBJECTIVE: Doing better but have some pain    Pain Scale Score:	[X] Refer to charted pain scores    THERAPY:    [ ] IV PCA Morphine		[ ] 5 mg/mL	[ ] 1 mg/mL  [X] IV PCA Hydromorphone	[ ] 5 mg/mL	[X] 1 mg/mL  [ ] IV PCA Fentanyl		[ ] 50 micrograms/mL    Demand dose: 0.35 mg     Lockout: 6 minutes   Continuous Rate: 0 mg/hr  4 Hour Limit: 7 mg    MEDICATIONS  (STANDING):  dextrose 5% + sodium chloride 0.45% with potassium chloride 20 mEq/L 1000 milliLiter(s) (50 mL/Hr) IV Continuous <Continuous>  enoxaparin Injectable 40 milliGRAM(s) SubCutaneous daily  HYDROmorphone PCA (1 mG/mL) 30 milliLiter(s) PCA Continuous PCA Continuous  oxyCODONE  ER Tablet 15 milliGRAM(s) Oral every 12 hours  pantoprazole  Injectable 40 milliGRAM(s) IV Push daily    MEDICATIONS  (PRN):  benzocaine 15 mG/menthol 3.6 mG Lozenge 1 Lozenge Oral four times a day PRN Sore Throat  diphenhydrAMINE   Injectable 25 milliGRAM(s) IV Push every 4 hours PRN Rash and/or Itching  HYDROmorphone  Injectable 0.5 milliGRAM(s) IV Push every 4 hours PRN breakthrough pain  HYDROmorphone PCA (1 mG/mL) Rescue Clinician Bolus 0.5 milliGRAM(s) IV Push every 15 minutes PRN for Pain Scale GREATER THAN 6  naloxone Injectable 0.1 milliGRAM(s) IV Push every 3 minutes PRN For ANY of the following changes in patient status:  A. RR LESS THAN 10 breaths per minute, B. Oxygen saturation LESS THAN 90%, C. Sedation score of 6  ondansetron Injectable 4 milliGRAM(s) IV Push every 6 hours PRN Nausea      OBJECTIVE:    Sedation Score:	[ X] Alert	[ ] Drowsy 	[ ] Arousable	[ ] Asleep	[ ] Unresponsive    Side Effects:	[X ] None	[ ] Nausea	[ ] Vomiting	[ ] Pruritus  		[ ] Other:    Vital Signs Last 24 Hrs  T(C): 37.2 (23 Oct 2017 07:00), Max: 38.9 (22 Oct 2017 20:00)  T(F): 99 (23 Oct 2017 07:00), Max: 102 (22 Oct 2017 20:00)  HR: 98 (23 Oct 2017 08:00) (82 - 106)  BP: 131/76 (23 Oct 2017 08:00) (112/72 - 146/81)  BP(mean): 98 (23 Oct 2017 08:00) (82 - 108)  RR: 20 (23 Oct 2017 07:00) (10 - 30)  SpO2: 96% (23 Oct 2017 08:00) (92% - 99%)    ASSESSMENT/ PLAN    Therapy to  be:               [X] Continued   [ ] Discontinued   [ ] Changed to PRN Analgesics    Documentation and Verification of current medications:   [X] Done	[ ] Not done, not eligible    Comments: Using 3-6x/hr. Doses adjusted last night. Patient takes Oxycodone 10mg preoperatively for back pain. Add Oxycodone ER 15mgpoBID.

## 2017-10-23 NOTE — PROGRESS NOTE ADULT - SUBJECTIVE AND OBJECTIVE BOX
Pain Management Attending Addendum    SUBJECTIVE:    Therapy:	  [x ] IV PCA	   [ x] Epidural           [ ] s/p Spinal Opoid              [ ] Postpartum infusion	  [ ] Patient controlled regional anesthesia (PCRA)    [ ] prn Analgesics    OBJECTIVE:   [x ] No new signs     [ ] Other:    Side Effects:  [ x] None			[ ] Other:    Assessment of Catheter Site:		[ Intact		[ ] Other:    ASSESSMENT/PLAN  [ x] Continue current therapy    [ ] Therapy changed to:    [ ] IV PCA       [ ] Epidural     [ ] prn Analgesics     [ ] post partum infusion    Comments:

## 2017-10-23 NOTE — PROGRESS NOTE ADULT - ATTENDING COMMENTS
Dr. Nichols (Attending Physician)  Pt. with gallstone pancreatitis s/p ERCP with stent placement pw persistent epigastric pain difficult to controlled suspected secondary to pancreatic necrosis; however, WBC increased today from 17 to 28.  We believe this is secondary to pancreatitis without infection but will check procalcitonin which has been downtrending and will repeat ultrasound of the right upper quadrant to eval for cholecystitis.  She is on a clear liquid diet we increased to low fat today and will observe if pain worsens.  If so she may need a post-pyloric feeding tube.  Increased her long-acting pain meds today.  If she spikes a temp we will repeat cultures.

## 2017-10-23 NOTE — PROGRESS NOTE ADULT - ATTENDING COMMENTS
Pt seen and examined today at 10am, agree with above. Pt still having significant pain, especially with po, as well as postprandial diarrhea. C.diff negative. Also poor po intake. Will discuss placement of postpyloric feeding tube with patient for improved nutrition. I reviewed most recent CT images: although there is significant pancreatic necrosis, with small splenic vein thrombus and right PV thrombus, with peripancreatic fluid, left pleural effusion, there is no overt sign of infected necrosis. Pt has been febrile and with rising WBC despite abx (was on a course for cholangitis, which was completed). 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 Will try to avoid empiric abx therapy as this will encourage growth of resistant organisms. In the setting of necrotizing pancreatitis, will not start A/C for splenic and R PV thrombosis due to the risk of conversion to hemorrhagic pancreatitis. D/w SICU attending and staff.

## 2017-10-23 NOTE — PROGRESS NOTE ADULT - PROBLEM SELECTOR PLAN 1
- f/u blood cultures  - given fevers and marked leukocytosis in the setting of pancreatic necrosis, would consider emperic treatment with abx (carbapenem) for possible infected necrosis  - IV fluids with LR  - analgesia  - liquid diet as tolerated, if patient cannot tolerate PO diet would consider NGT with tube feedings

## 2017-10-23 NOTE — PROGRESS NOTE ADULT - ASSESSMENT
57F w/ obstructing common bile duct stone, subsequent cholangitis and pancreatitis now s/p ERCP with biliary stent placement that will require future ERCP for stent removal currently undergoing empiric antibiotic therapy and pain control. Now with PCEA placement (10/19) to significant improvement of pain. Patient has been stable and her rivas was removed, now with urinary frequency.     Neuro: severe pain, not opioid naive  Pain  	- PCA  	- Hydromorphone 1mg ivp q4h prn breakthough pain   	- Oxycontin 15mg q12hrs, consider increasing    Resp: no acute issues   - nasal cannula prn to maintain sat >92%, has not required recently    CV: borderline tachycardia currently attributed to SIRS/pain  	- Cardiac monitor, q1h vitals  	- Hold home amlodipine     GI: choledocholithiasis & necrotic pancreatitis  	- ERCP w/ biliary stent placed 10/17  	- Pantoprazole 40mg qday  	- Continue CLD    : Urinary frequency/low volume  	- D5 1/2NS w/ KCl @ 50ml/h, continue today   	- Monitor I&Os, replete electrolytes as needed  	- Consider catheterization vs PVR bladder scan     ID: Pancreatitis with 30-50% necrosis, leukocytosis and persistent fevers  -discontinue Imipenem at this time  -follow up blood cultures    Heme: trend CBCs  DVT prophylaxis  	- Heparin 5000units subq bid    Endo: No active issues/interventions    Disposition: Full code    BASIL Chen MD PGYII [id number] 57F w/ obstructing common bile duct stone, subsequent cholangitis and pancreatitis now s/p ERCP with biliary stent placement that will require future ERCP for stent removal currently undergoing empiric antibiotic therapy and pain control. Now with PCEA placement (10/19) to significant improvement of pain. Patient has been stable and her rivas was removed, now with urinary frequency.     Neuro: severe pain, not opioid naive  Pain  	- PCA  	- Hydromorphone 1mg ivp q4h prn breakthough pain   	- Oxycontin 15mg q12hrs, consider increasing    Resp: no acute issues   - nasal cannula prn to maintain sat >92%, has not required recently  - monitor for resp depression in setting of high dose opioids    CV: borderline tachycardia currently attributed to SIRS/pain  	- Cardiac monitor, q1h vitals  	- Hold home amlodipine     GI: choledocholithiasis & necrotic pancreatitis  	- ERCP w/ biliary stent placed 10/17  	- Pantoprazole 40mg qday  	- Continue CLD    :   	- D5 1/2NS w/ KCl @ 50ml/h,   	- Monitor I&Os, replete electrolytes as needed  	    ID: Pancreatitis with 30-50% necrosis, leukocytosis and persistent fevers  - Imipenem d/c'd yesterday as likely no infectious process  - blood cx, UA neg    Heme: trend CBCs   - DVT prophylaxis  	    Endo: No active issues/interventions    Disposition: Full code    B Delvin VICK [id number]

## 2017-10-23 NOTE — PROGRESS NOTE ADULT - SUBJECTIVE AND OBJECTIVE BOX
HISTORY  57yF w/ PMH significant for HTN and nephrolithiasis, s/p appendectomy that presented to [facility] ED on 10/16/2017 c/o acute onset severe sharp stabbing upper abdominal pain that radiates to the back. She says the pain woke her up in the middle of the night causing her to develop nausea and NBNB emesis x3 episodes. She presented to the emergency department hypotensive was worked up with abdominal imaging findings concerning for CBD obstructing stone and was admitted, MRCP ordered and completed, and showed a distal CBD stone elevating concern for acute obstructive cholangitis. Labs resulted with evidence of pancreatitis with lipase > 8500. SICU was consulted as she was tachycardic to the 130s and breaking through dilaudid, tylenol, and toradol. GI was consulted urgently and ERCP was performed with biliary stent placement. Pt continued to have pain after placement of the ERCP and this has been attributed to pancreatic necrosis.     24 HOUR EVENTS: PCEA dislodged, switched to PCA for pain control. Patient continues to have severe pain despite multimodal pain control. Tolerated some clears. +OOB to chair.    SUBJECTIVE/ROS:  [ x] A ten-point review of systems was otherwise negative except as noted.  [ ] Due to altered mental status/intubation, subjective information were not able to be obtained from the patient. History was obtained, to the extent possible, from review of the chart and collateral sources of information.      NEURO  [facility] ICU: neg  Exam: A&Ox4  Meds: acetaminophen  IVPB. 1000 milliGRAM(s) IV Intermittent once  HYDROmorphone  Injectable 0.5 milliGRAM(s) IV Push every 4 hours PRN breakthrough pain  HYDROmorphone PCA (1 mG/mL) 30 milliLiter(s) PCA Continuous PCA Continuous  HYDROmorphone PCA (1 mG/mL) Rescue Clinician Bolus 0.5 milliGRAM(s) IV Push every 15 minutes PRN for Pain Scale GREATER THAN 6  ondansetron Injectable 4 milliGRAM(s) IV Push every 6 hours PRN Nausea  oxyCODONE  ER Tablet 15 milliGRAM(s) Oral every 12 hours  [x] Adequacy of sedation and pain control has been assessed and adjusted      RESPIRATORY  RR: 24 (10-23-17 @ 01:00) (9 - 38)  SpO2: 92% (10-23-17 @ 01:00) (85% - 99%)  Wt(kg): --  Exam: unlabored, clear to auscultation bilaterally  Meds: diphenhydrAMINE   Injectable 25 milliGRAM(s) IV Push every 4 hours PRN Rash and/or Itching        CARDIOVASCULAR  HR: 94 (10-23-17 @ 01:00) (73 - 106)  BP: 132/64 (10-23-17 @ 01:00) (108/73 - 146/81)  BP(mean): 91 (10-23-17 @ 01:00) (83 - 108)      Exam: no murmurs  Cardiac Rhythm: sinus rhtythm  Perfusion     [x ]Adequate   [ ]Inadequate  Mentation   [x ]Normal       [ ]Reduced  Extremities  [x ]Warm         [ ]Cool  Volume Status [ ]Hypervolemic [x ]Euvolemic [ ]Hypovolemic  Meds: none      GI/NUTRITION  Exam: soft, very tender, especially in epigastrium  Diet: CLD  Meds: pantoprazole  Injectable 40 milliGRAM(s) IV Push daily      GENITOURINARY  I&O's Detail    10-21 @ 07:01  -  10-22 @ 07:00  --------------------------------------------------------  IN:    dextrose 5% + sodium chloride 0.45% with potassium chloride 20 mEq/L: 1200 mL    Oral Fluid: 660 mL    Solution: 187.5 mL    Solution: 300 mL    Solution: 100 mL    Solution: 50 mL    Solution: 300 mL  Total IN: 2797.5 mL    OUT:    Voided: 3850 mL  Total OUT: 3850 mL    Total NET: -1052.5 mL      10-22 @ 07:01  -  10-23 @ 01:32  --------------------------------------------------------  IN:    dextrose 5% + sodium chloride 0.45% with potassium chloride 20 mEq/L: 850 mL    Oral Fluid: 960 mL    Solution: 100 mL    Solution: 100 mL  Total IN: 2010 mL    OUT:    Voided: 2700 mL  Total OUT: 2700 mL    Total NET: -690 mL          10-22    140  |  104  |  5<L>  ----------------------------<  97  3.7   |  24  |  0.39<L>    Ca    7.9<L>      22 Oct 2017 02:39  Phos  2.3     10-22  Mg     1.9     10-22    TPro  5.9<L>  /  Alb  2.6<L>  /  TBili  0.3  /  DBili  0.1  /  AST  19  /  ALT  15  /  AlkPhos  121<H>  10-22  [ ] Lara catheter, indication: N/A  Meds: dextrose 5% + sodium chloride 0.45% with potassium chloride 20 mEq/L 1000 milliLiter(s) IV Continuous <Continuous>        HEMATOLOGIC  Meds: enoxaparin Injectable 40 milliGRAM(s) SubCutaneous daily  [x] VTE Prophylaxis                        9.9    17.4  )-----------( 343      ( 22 Oct 2017 02:39 )             30.1     PT/INR - ( 22 Oct 2017 02:39 )   PT: 14.1 sec;   INR: 1.30 ratio    PTT - ( 22 Oct 2017 02:39 )  PTT:31.1 sec  Transfusion     [ ] PRBC   [ ] Platelets   [ ] FFP   [ ] Cryoprecipitate      INFECTIOUS DISEASES  T(C): 37.5 (10-22-17 @ 23:00), Max: 38.9 (10-22-17 @ 20:00)  Wt(kg): --  WBC Count: 17.4 K/uL (10-22 @ 02:39)    Recent Cultures:  Specimen Source: .Blood Blood-Peripheral, 10-21 @ 23:31; Results   No growth to date.; Gram Stain: --; Organism: --  Specimen Source: .Blood Blood, 10-21 @ 23:30; Results   No growth to date.; Gram Stain: --; Organism: --  Specimen Source: .Blood Blood, 10-21 @ 00:15; Results   No growth to date.; Gram Stain: --; Organism: --  Specimen Source: .Blood Blood-Venous, 10-20 @ 21:57; Results   No growth to date.; Gram Stain: --; Organism: --  Specimen Source: .Blood Blood-Venous, 10-19 @ 18:20; Results   No growth to date.; Gram Stain: --; Organism: --  Specimen Source: .Blood Blood-Peripheral, 10-17 @ 07:32; Results   No growth at 5 days.; Gram Stain: --; Organism: --    Meds: none      ENDOCRINE  Meds: none      ACCESS DEVICES:  [x ] Peripheral IV  [ ] Central Venous Line	[ ] R	[ ] L	[ ] IJ	[ ] Fem	[ ] SC	Placed:   [ ] Arterial Line		[ ] R	[ ] L	[ ] Fem	[ ] Rad	[ ] Ax	Placed:   [ ] PICC:					[ ] Mediport  [ ] Urinary Catheter, Date Placed:   [ ] Necessity of urinary, arterial, and venous catheters discussed    OTHER MEDICATIONS:  benzocaine 15 mG/menthol 3.6 mG Lozenge 1 Lozenge Oral four times a day PRN  naloxone Injectable 0.1 milliGRAM(s) IV Push every 3 minutes PRN      CODE STATUS: Full    IMAGING: no new this AM HISTORY  57yF w/ PMH significant for HTN and nephrolithiasis, s/p appendectomy that presented to [facility] ED on 10/16/2017 c/o acute onset severe sharp stabbing upper abdominal pain that radiates to the back. She says the pain woke her up in the middle of the night causing her to develop nausea and NBNB emesis x3 episodes. She presented to the emergency department hypotensive was worked up with abdominal imaging findings concerning for CBD obstructing stone and was admitted, MRCP ordered and completed, and showed a distal CBD stone elevating concern for acute obstructive cholangitis. Labs resulted with evidence of pancreatitis with lipase > 8500. SICU was consulted as she was tachycardic to the 130s and breaking through dilaudid, tylenol, and toradol. GI was consulted urgently and ERCP was performed with biliary stent placement. Pt continued to have pain after placement of the ERCP and this has been attributed to pancreatic necrosis.     24 HOUR EVENTS: PCEA dislodged, switched to PCA for pain control. Patient continues to have severe pain despite multimodal pain control. Tolerated some clears. +OOB to chair.    SUBJECTIVE/ROS:  [ x] A ten-point review of systems was otherwise negative except as noted.  [ ] Due to altered mental status/intubation, subjective information were not able to be obtained from the patient. History was obtained, to the extent possible, from review of the chart and collateral sources of information.      NEURO  CAM ICU: neg  Exam: A&Ox4  Meds: acetaminophen  IVPB. 1000 milliGRAM(s) IV Intermittent once  HYDROmorphone  Injectable 0.5 milliGRAM(s) IV Push every 4 hours PRN breakthrough pain  HYDROmorphone PCA (1 mG/mL) 30 milliLiter(s) PCA Continuous PCA Continuous  HYDROmorphone PCA (1 mG/mL) Rescue Clinician Bolus 0.5 milliGRAM(s) IV Push every 15 minutes PRN for Pain Scale GREATER THAN 6  ondansetron Injectable 4 milliGRAM(s) IV Push every 6 hours PRN Nausea  oxyCODONE  ER Tablet 15 milliGRAM(s) Oral every 12 hours  [x] Adequacy of sedation and pain control has been assessed and adjusted      RESPIRATORY  RR: 24 (10-23-17 @ 01:00) (9 - 38)  SpO2: 92% (10-23-17 @ 01:00) (85% - 99%)  Wt(kg): --  Exam: unlabored, clear to auscultation bilaterally  Meds: diphenhydrAMINE   Injectable 25 milliGRAM(s) IV Push every 4 hours PRN Rash and/or Itching        CARDIOVASCULAR  HR: 94 (10-23-17 @ 01:00) (73 - 106)  BP: 132/64 (10-23-17 @ 01:00) (108/73 - 146/81)  BP(mean): 91 (10-23-17 @ 01:00) (83 - 108)      Exam: no murmurs  Cardiac Rhythm: sinus rhtythm  Perfusion     [x ]Adequate   [ ]Inadequate  Mentation   [x ]Normal       [ ]Reduced  Extremities  [x ]Warm         [ ]Cool  Volume Status [ ]Hypervolemic [x ]Euvolemic [ ]Hypovolemic  Meds: none      GI/NUTRITION  Exam: soft, very tender, especially in epigastrium  Diet: CLD  Meds: pantoprazole  Injectable 40 milliGRAM(s) IV Push daily      GENITOURINARY    22 Oct 2017 07:01  -  23 Oct 2017 07:00  --------------------------------------------------------  IN:    dextrose 5% + sodium chloride 0.45% with potassium chloride 20 mEq/L: 1100 mL    Oral Fluid: 960 mL    Solution: 200 mL    Solution: 50 mL    Solution: 100 mL  Total IN: 2410 mL    OUT:    Voided: 3100 mL  Total OUT: 3100 mL    Total NET: -690 mL              10-22    140  |  104  |  5<L>  ----------------------------<  97  3.7   |  24  |  0.39<L>    Ca    7.9<L>      22 Oct 2017 02:39  Phos  2.3     10-22  Mg     1.9     10-22    TPro  5.9<L>  /  Alb  2.6<L>  /  TBili  0.3  /  DBili  0.1  /  AST  19  /  ALT  15  /  AlkPhos  121<H>  10-22  [ ] Lara catheter, indication: N/A  Meds: dextrose 5% + sodium chloride 0.45% with potassium chloride 20 mEq/L 1000 milliLiter(s) IV Continuous <Continuous>        HEMATOLOGIC  Meds: enoxaparin Injectable 40 milliGRAM(s) SubCutaneous daily  [x] VTE Prophylaxis                        9.9    17.4  )-----------( 343      ( 22 Oct 2017 02:39 )             30.1     PT/INR - ( 22 Oct 2017 02:39 )   PT: 14.1 sec;   INR: 1.30 ratio    PTT - ( 22 Oct 2017 02:39 )  PTT:31.1 sec  Transfusion     [ ] PRBC   [ ] Platelets   [ ] FFP   [ ] Cryoprecipitate      INFECTIOUS DISEASES  T(C): 37.5 (10-22-17 @ 23:00), Max: 38.9 (10-22-17 @ 20:00)  Wt(kg): --  WBC Count: 17.4 K/uL (10-22 @ 02:39)    Recent Cultures:  Specimen Source: .Blood Blood-Peripheral, 10-21 @ 23:31; Results   No growth to date.; Gram Stain: --; Organism: --  Specimen Source: .Blood Blood, 10-21 @ 23:30; Results   No growth to date.; Gram Stain: --; Organism: --  Specimen Source: .Blood Blood, 10-21 @ 00:15; Results   No growth to date.; Gram Stain: --; Organism: --  Specimen Source: .Blood Blood-Venous, 10-20 @ 21:57; Results   No growth to date.; Gram Stain: --; Organism: --  Specimen Source: .Blood Blood-Venous, 10-19 @ 18:20; Results   No growth to date.; Gram Stain: --; Organism: --  Specimen Source: .Blood Blood-Peripheral, 10-17 @ 07:32; Results   No growth at 5 days.; Gram Stain: --; Organism: --    Meds: none      ENDOCRINE  Meds: none      ACCESS DEVICES:  [x ] Peripheral IV  [ ] Central Venous Line	[ ] R	[ ] L	[ ] IJ	[ ] Fem	[ ] SC	Placed:   [ ] Arterial Line		[ ] R	[ ] L	[ ] Fem	[ ] Rad	[ ] Ax	Placed:   [ ] PICC:					[ ] Mediport  [ ] Urinary Catheter, Date Placed:   [ ] Necessity of urinary, arterial, and venous catheters discussed    OTHER MEDICATIONS:  benzocaine 15 mG/menthol 3.6 mG Lozenge 1 Lozenge Oral four times a day PRN  naloxone Injectable 0.1 milliGRAM(s) IV Push every 3 minutes PRN      CODE STATUS: Full    IMAGING: no new this AM

## 2017-10-23 NOTE — PROGRESS NOTE ADULT - SUBJECTIVE AND OBJECTIVE BOX
SUBJECTIVE:  PCEA came out overnight and the patient was started on a PCA.   Difficult pain control.     OBJECTIVE:     ** VITAL SIGNS / I&O's **    T(C): 38.4 (10-23-17 @ 03:00), Max: 38.9 (10-22-17 @ 20:00)  T(F): 101.2 (10-23-17 @ 03:00), Max: 102 (10-22-17 @ 20:00)  HR: 98 (10-23-17 @ 03:00) (82 - 106)  BP: 131/67 (10-23-17 @ 03:00) (112/72 - 146/81)  RR: 17 (10-23-17 @ 03:00) (10 - 38)  SpO2: 94% (10-23-17 @ 03:00) (88% - 99%)      21 Oct 2017 07:01  -  22 Oct 2017 07:00  --------------------------------------------------------  IN:    dextrose 5% + sodium chloride 0.45% with potassium chloride 20 mEq/L: 1200 mL    Oral Fluid: 660 mL    Solution: 300 mL    Solution: 100 mL    Solution: 50 mL    Solution: 300 mL    Solution: 187.5 mL  Total IN: 2797.5 mL    OUT:    Voided: 3850 mL  Total OUT: 3850 mL    Total NET: -1052.5 mL      22 Oct 2017 07:01  -  23 Oct 2017 05:34  --------------------------------------------------------  IN:    dextrose 5% + sodium chloride 0.45% with potassium chloride 20 mEq/L: 950 mL    Oral Fluid: 960 mL    Solution: 100 mL    Solution: 200 mL  Total IN: 2210 mL    OUT:    Voided: 2700 mL  Total OUT: 2700 mL    Total NET: -490 mL          ** PHYSICAL EXAM **    -- CONSTITUTIONAL: AOx3. NAD.   -- HEENT:  -- NECK:   -- CARDIOVASCULAR: Regular rate and rhythm. S1, S2.  -- RESPIRATORY: Bilateral breath sounds.   -- CHEST:  -- ABDOMEN:   -- EXTREMITIES:   -- VASCULAR:   -- NEUROLOGICAL:     ** LABS **                 10.7   28.9   )----------(  396       ( 23 Oct 2017 03:38 )               32.0      140    |  99     |  3      ----------------------------<  99         ( 23 Oct 2017 03:38 )  4.4     |  26     |  0.44     Ca    8.1        ( 23 Oct 2017 03:38 )  Phos  3.2       ( 23 Oct 2017 03:38 )  Mg     1.8       ( 23 Oct 2017 03:38 )    TPro  5.9    /  Alb  2.6    /  TBili  0.3    /  DBili  x      /  AST  28     /  ALT  20     /  AlkPhos  189    ( 23 Oct 2017 03:38 )      CT Abd/Pelvis (10/21): Acute necrotizing pancreatitis, 30-50%. Peripancreatic fluid.    Short segment thrombus of the splenic vein at the portosplenic confluence.    Persistent anterior right portal vein thrombosis with perfusional   abnormalities of the right hepatic lobe as described above.    Left lower lobe atelectasis /consolidation and small left pleural   effusion. Trace right pleural effusion with adjacent compressive   atelectasis.    Interval placement of CBD stent. SUBJECTIVE:  PCEA came out overnight and the patient was started on a PCA.   Difficult pain control, but improve this morning.     OBJECTIVE:     ** VITAL SIGNS / I&O's **    T(C): 38.4 (10-23-17 @ 03:00), Max: 38.9 (10-22-17 @ 20:00)  T(F): 101.2 (10-23-17 @ 03:00), Max: 102 (10-22-17 @ 20:00)  HR: 98 (10-23-17 @ 03:00) (82 - 106)  BP: 131/67 (10-23-17 @ 03:00) (112/72 - 146/81)  RR: 17 (10-23-17 @ 03:00) (10 - 38)  SpO2: 94% (10-23-17 @ 03:00) (88% - 99%)      21 Oct 2017 07:01  -  22 Oct 2017 07:00  --------------------------------------------------------  IN:    dextrose 5% + sodium chloride 0.45% with potassium chloride 20 mEq/L: 1200 mL    Oral Fluid: 660 mL    Solution: 300 mL    Solution: 100 mL    Solution: 50 mL    Solution: 300 mL    Solution: 187.5 mL  Total IN: 2797.5 mL    OUT:    Voided: 3850 mL  Total OUT: 3850 mL    Total NET: -1052.5 mL      22 Oct 2017 07:01  -  23 Oct 2017 05:34  --------------------------------------------------------  IN:    dextrose 5% + sodium chloride 0.45% with potassium chloride 20 mEq/L: 950 mL    Oral Fluid: 960 mL    Solution: 100 mL    Solution: 200 mL  Total IN: 2210 mL    OUT:    Voided: 2700 mL  Total OUT: 2700 mL    Total NET: -490 mL          ** PHYSICAL EXAM **    -- CONSTITUTIONAL: AOx3. NAD.   -- RESPIRATORY: breathing comfortably   -- ABDOMEN: soft, nontender, mildly distended     ** LABS **                 10.7   28.9   )----------(  396       ( 23 Oct 2017 03:38 )               32.0      140    |  99     |  3      ----------------------------<  99         ( 23 Oct 2017 03:38 )  4.4     |  26     |  0.44     Ca    8.1        ( 23 Oct 2017 03:38 )  Phos  3.2       ( 23 Oct 2017 03:38 )  Mg     1.8       ( 23 Oct 2017 03:38 )    TPro  5.9    /  Alb  2.6    /  TBili  0.3    /  DBili  x      /  AST  28     /  ALT  20     /  AlkPhos  189    ( 23 Oct 2017 03:38 )      CT Abd/Pelvis (10/21): Acute necrotizing pancreatitis, 30-50%. Peripancreatic fluid.    Short segment thrombus of the splenic vein at the portosplenic confluence.    Persistent anterior right portal vein thrombosis with perfusional   abnormalities of the right hepatic lobe as described above.    Left lower lobe atelectasis /consolidation and small left pleural   effusion. Trace right pleural effusion with adjacent compressive   atelectasis.    Interval placement of CBD stent.

## 2017-10-23 NOTE — PROGRESS NOTE ADULT - ASSESSMENT
56 yo woman with gallstone pancreatitis s/p ERCP on 10/17 for cholangitis, pancreatitis c/b pancreatic necrosis, splenic vein thrombus now with fever, marked leukocytosis and diarrhea, C diff (-). DDX pancreatitis with pancreatic necrosis vs infected necrosis.

## 2017-10-23 NOTE — PROGRESS NOTE ADULT - ASSESSMENT
ASSESSNEnt: Elba Coleman is a 57 year old woman with necrotizing pancreatitis, s/p ERCP (sphincterotomy and plastic stent placement) with acute necrotizing pancreatitis (30-50%), splenic and portal vein thrombosis. Pain was controlled with PCEA, but lost overnight.     PLAN:  - Pain control: PCA, will attempt to replace PCEA   - Diet: CLD  - Encourage IS as tolerated; wean supplemental O2 as tolerated  - PT: home w/ assist; outpatient PT  - Care as per SICU    P: [id number] ASSESSNEnt: Elba Coleman is a 57 year old woman with necrotizing pancreatitis, s/p ERCP (sphincterotomy and plastic stent placement) with acute necrotizing pancreatitis (30-50%), splenic and portal vein thrombosis. Pain was controlled with PCEA, but lost overnight.     PLAN:  - Pain control: PCA, will attempt to replace PCEA if pain increases again   - Diet: CLD  - Encourage IS as tolerated; wean supplemental O2 as tolerated  - PT: home w/ assist; outpatient PT  - Care as per SICU    P: [id number] ASSESSNEnt: Elba Coleman is a 57 year old woman with necrotizing pancreatitis, s/p ERCP (sphincterotomy and plastic stent placement) with acute necrotizing pancreatitis (30-50%), splenic and portal vein thrombosis. Pain was controlled with PCEA, but lost overnight.     PLAN:  - Pain control: PCA, will attempt to replace PCEA if pain increases again   - Diet: CLD  - Encourage IS as tolerated; wean supplemental O2 as tolerated  - PT: home w/ assist; outpatient PT  - Would consider anticoagulation given that portal vein thrombosis is now relatively remote   - Care as per SICU    P: [id number]

## 2017-10-24 LAB
ALBUMIN SERPL ELPH-MCNC: 2.6 G/DL — LOW (ref 3.3–5)
ALP SERPL-CCNC: 197 U/L — HIGH (ref 40–120)
ALT FLD-CCNC: 16 U/L RC — SIGNIFICANT CHANGE UP (ref 10–45)
ANION GAP SERPL CALC-SCNC: 15 MMOL/L — SIGNIFICANT CHANGE UP (ref 5–17)
APTT BLD: 28.1 SEC — SIGNIFICANT CHANGE UP (ref 27.5–37.4)
AST SERPL-CCNC: 35 U/L — SIGNIFICANT CHANGE UP (ref 10–40)
BILIRUB DIRECT SERPL-MCNC: <0.1 MG/DL — SIGNIFICANT CHANGE UP (ref 0–0.2)
BILIRUB INDIRECT FLD-MCNC: >0.2 MG/DL — SIGNIFICANT CHANGE UP (ref 0.2–1)
BILIRUB SERPL-MCNC: 0.3 MG/DL — SIGNIFICANT CHANGE UP (ref 0.2–1.2)
BUN SERPL-MCNC: 2 MG/DL — LOW (ref 7–23)
CA-I BLD-SCNC: 1.03 MMOL/L — LOW (ref 1.12–1.3)
CALCIUM SERPL-MCNC: 7.9 MG/DL — LOW (ref 8.4–10.5)
CHLORIDE SERPL-SCNC: 99 MMOL/L — SIGNIFICANT CHANGE UP (ref 96–108)
CO2 SERPL-SCNC: 22 MMOL/L — SIGNIFICANT CHANGE UP (ref 22–31)
CREAT SERPL-MCNC: 0.47 MG/DL — LOW (ref 0.5–1.3)
CULTURE RESULTS: SIGNIFICANT CHANGE UP
CULTURE RESULTS: SIGNIFICANT CHANGE UP
GLUCOSE SERPL-MCNC: 102 MG/DL — HIGH (ref 70–99)
HCT VFR BLD CALC: 30.8 % — LOW (ref 34.5–45)
HGB BLD-MCNC: 10.3 G/DL — LOW (ref 11.5–15.5)
INR BLD: 1.37 RATIO — HIGH (ref 0.88–1.16)
MAGNESIUM SERPL-MCNC: 2 MG/DL — SIGNIFICANT CHANGE UP (ref 1.6–2.6)
MCHC RBC-ENTMCNC: 30.3 PG — SIGNIFICANT CHANGE UP (ref 27–34)
MCHC RBC-ENTMCNC: 33.3 GM/DL — SIGNIFICANT CHANGE UP (ref 32–36)
MCV RBC AUTO: 90.9 FL — SIGNIFICANT CHANGE UP (ref 80–100)
PHOSPHATE SERPL-MCNC: 3.4 MG/DL — SIGNIFICANT CHANGE UP (ref 2.5–4.5)
PLATELET # BLD AUTO: 413 K/UL — HIGH (ref 150–400)
POTASSIUM SERPL-MCNC: 5 MMOL/L — SIGNIFICANT CHANGE UP (ref 3.5–5.3)
POTASSIUM SERPL-SCNC: 5 MMOL/L — SIGNIFICANT CHANGE UP (ref 3.5–5.3)
PROCALCITONIN SERPL-MCNC: 2.87 NG/ML — HIGH (ref 0–0.04)
PROT SERPL-MCNC: 5.9 G/DL — LOW (ref 6–8.3)
PROTHROM AB SERPL-ACNC: 14.9 SEC — HIGH (ref 9.8–12.7)
RBC # BLD: 3.39 M/UL — LOW (ref 3.8–5.2)
RBC # FLD: 12.7 % — SIGNIFICANT CHANGE UP (ref 10.3–14.5)
SODIUM SERPL-SCNC: 136 MMOL/L — SIGNIFICANT CHANGE UP (ref 135–145)
SPECIMEN SOURCE: SIGNIFICANT CHANGE UP
SPECIMEN SOURCE: SIGNIFICANT CHANGE UP
WBC # BLD: 30.8 K/UL — HIGH (ref 3.8–10.5)
WBC # FLD AUTO: 30.8 K/UL — HIGH (ref 3.8–10.5)

## 2017-10-24 PROCEDURE — 99233 SBSQ HOSP IP/OBS HIGH 50: CPT | Mod: GC

## 2017-10-24 PROCEDURE — 99232 SBSQ HOSP IP/OBS MODERATE 35: CPT | Mod: GC

## 2017-10-24 PROCEDURE — 99233 SBSQ HOSP IP/OBS HIGH 50: CPT

## 2017-10-24 PROCEDURE — 71010: CPT | Mod: 26,77

## 2017-10-24 PROCEDURE — 76705 ECHO EXAM OF ABDOMEN: CPT | Mod: 26,RT

## 2017-10-24 PROCEDURE — 71010: CPT | Mod: 26

## 2017-10-24 RX ORDER — TETRACAINE/BENZOCAINE/BUTAMBEN 2%-14%-2%
1 OINTMENT (GRAM) TOPICAL ONCE
Qty: 0 | Refills: 0 | Status: DISCONTINUED | OUTPATIENT
Start: 2017-10-24 | End: 2017-10-30

## 2017-10-24 RX ORDER — METOCLOPRAMIDE HCL 10 MG
10 TABLET ORAL EVERY 6 HOURS
Qty: 0 | Refills: 0 | Status: COMPLETED | OUTPATIENT
Start: 2017-10-24 | End: 2017-10-25

## 2017-10-24 RX ORDER — IMIPENEM AND CILASTATIN 250; 250 MG/100ML; MG/100ML
500 INJECTION, POWDER, FOR SOLUTION INTRAVENOUS EVERY 6 HOURS
Qty: 0 | Refills: 0 | Status: DISCONTINUED | OUTPATIENT
Start: 2017-10-24 | End: 2017-10-25

## 2017-10-24 RX ORDER — KETOROLAC TROMETHAMINE 30 MG/ML
15 SYRINGE (ML) INJECTION ONCE
Qty: 0 | Refills: 0 | Status: DISCONTINUED | OUTPATIENT
Start: 2017-10-24 | End: 2017-10-24

## 2017-10-24 RX ORDER — ONDANSETRON 8 MG/1
4 TABLET, FILM COATED ORAL EVERY 4 HOURS
Qty: 0 | Refills: 0 | Status: COMPLETED | OUTPATIENT
Start: 2017-10-24 | End: 2017-10-27

## 2017-10-24 RX ORDER — HYDROMORPHONE HYDROCHLORIDE 2 MG/ML
1 INJECTION INTRAMUSCULAR; INTRAVENOUS; SUBCUTANEOUS
Qty: 0 | Refills: 0 | Status: DISCONTINUED | OUTPATIENT
Start: 2017-10-24 | End: 2017-10-31

## 2017-10-24 RX ORDER — ACETAMINOPHEN 500 MG
1000 TABLET ORAL ONCE
Qty: 0 | Refills: 0 | Status: COMPLETED | OUTPATIENT
Start: 2017-10-24 | End: 2017-10-24

## 2017-10-24 RX ORDER — HYDROMORPHONE HYDROCHLORIDE 2 MG/ML
30 INJECTION INTRAMUSCULAR; INTRAVENOUS; SUBCUTANEOUS
Qty: 0 | Refills: 0 | Status: DISCONTINUED | OUTPATIENT
Start: 2017-10-24 | End: 2017-10-31

## 2017-10-24 RX ORDER — BENZOCAINE AND MENTHOL 5; 1 G/100ML; G/100ML
1 LIQUID ORAL
Qty: 0 | Refills: 0 | Status: DISCONTINUED | OUTPATIENT
Start: 2017-10-24 | End: 2017-10-30

## 2017-10-24 RX ORDER — CALCIUM GLUCONATE 100 MG/ML
2 VIAL (ML) INTRAVENOUS ONCE
Qty: 0 | Refills: 0 | Status: COMPLETED | OUTPATIENT
Start: 2017-10-24 | End: 2017-10-24

## 2017-10-24 RX ORDER — ACETAMINOPHEN 500 MG
1000 TABLET ORAL ONCE
Qty: 0 | Refills: 0 | Status: COMPLETED | OUTPATIENT
Start: 2017-10-25 | End: 2017-10-25

## 2017-10-24 RX ADMIN — ENOXAPARIN SODIUM 40 MILLIGRAM(S): 100 INJECTION SUBCUTANEOUS at 11:20

## 2017-10-24 RX ADMIN — Medication 15 MILLIGRAM(S): at 14:00

## 2017-10-24 RX ADMIN — OXYCODONE HYDROCHLORIDE 20 MILLIGRAM(S): 5 TABLET ORAL at 18:19

## 2017-10-24 RX ADMIN — HYDROMORPHONE HYDROCHLORIDE 30 MILLILITER(S): 2 INJECTION INTRAMUSCULAR; INTRAVENOUS; SUBCUTANEOUS at 02:38

## 2017-10-24 RX ADMIN — Medication 25 MILLIGRAM(S): at 20:20

## 2017-10-24 RX ADMIN — HYDROMORPHONE HYDROCHLORIDE 0.5 MILLIGRAM(S): 2 INJECTION INTRAMUSCULAR; INTRAVENOUS; SUBCUTANEOUS at 00:18

## 2017-10-24 RX ADMIN — HYDROMORPHONE HYDROCHLORIDE 0.5 MILLIGRAM(S): 2 INJECTION INTRAMUSCULAR; INTRAVENOUS; SUBCUTANEOUS at 02:45

## 2017-10-24 RX ADMIN — HYDROMORPHONE HYDROCHLORIDE 0.5 MILLIGRAM(S): 2 INJECTION INTRAMUSCULAR; INTRAVENOUS; SUBCUTANEOUS at 11:23

## 2017-10-24 RX ADMIN — HYDROMORPHONE HYDROCHLORIDE 0.5 MILLIGRAM(S): 2 INJECTION INTRAMUSCULAR; INTRAVENOUS; SUBCUTANEOUS at 07:32

## 2017-10-24 RX ADMIN — HYDROMORPHONE HYDROCHLORIDE 1 MILLIGRAM(S): 2 INJECTION INTRAMUSCULAR; INTRAVENOUS; SUBCUTANEOUS at 23:22

## 2017-10-24 RX ADMIN — Medication 15 MILLIGRAM(S): at 13:20

## 2017-10-24 RX ADMIN — Medication 400 MILLIGRAM(S): at 21:55

## 2017-10-24 RX ADMIN — Medication 1000 MILLIGRAM(S): at 11:00

## 2017-10-24 RX ADMIN — OXYCODONE HYDROCHLORIDE 20 MILLIGRAM(S): 5 TABLET ORAL at 06:15

## 2017-10-24 RX ADMIN — HYDROMORPHONE HYDROCHLORIDE 0.5 MILLIGRAM(S): 2 INJECTION INTRAMUSCULAR; INTRAVENOUS; SUBCUTANEOUS at 03:00

## 2017-10-24 RX ADMIN — Medication 15 MILLIGRAM(S): at 13:05

## 2017-10-24 RX ADMIN — HYDROMORPHONE HYDROCHLORIDE 30 MILLILITER(S): 2 INJECTION INTRAMUSCULAR; INTRAVENOUS; SUBCUTANEOUS at 19:01

## 2017-10-24 RX ADMIN — PANTOPRAZOLE SODIUM 40 MILLIGRAM(S): 20 TABLET, DELAYED RELEASE ORAL at 07:33

## 2017-10-24 RX ADMIN — Medication 1000 MILLIGRAM(S): at 15:35

## 2017-10-24 RX ADMIN — Medication 15 MILLIGRAM(S): at 14:15

## 2017-10-24 RX ADMIN — Medication 25 MILLIGRAM(S): at 15:31

## 2017-10-24 RX ADMIN — Medication 25 MILLIGRAM(S): at 00:15

## 2017-10-24 RX ADMIN — HYDROMORPHONE HYDROCHLORIDE 30 MILLILITER(S): 2 INJECTION INTRAMUSCULAR; INTRAVENOUS; SUBCUTANEOUS at 07:12

## 2017-10-24 RX ADMIN — HYDROMORPHONE HYDROCHLORIDE 30 MILLILITER(S): 2 INJECTION INTRAMUSCULAR; INTRAVENOUS; SUBCUTANEOUS at 12:44

## 2017-10-24 RX ADMIN — Medication 400 MILLIGRAM(S): at 10:30

## 2017-10-24 RX ADMIN — Medication 400 MILLIGRAM(S): at 15:22

## 2017-10-24 RX ADMIN — OXYCODONE HYDROCHLORIDE 20 MILLIGRAM(S): 5 TABLET ORAL at 05:04

## 2017-10-24 RX ADMIN — OXYCODONE HYDROCHLORIDE 20 MILLIGRAM(S): 5 TABLET ORAL at 17:49

## 2017-10-24 RX ADMIN — Medication 1000 MILLIGRAM(S): at 22:05

## 2017-10-24 RX ADMIN — Medication 400 GRAM(S): at 06:08

## 2017-10-24 RX ADMIN — HYDROMORPHONE HYDROCHLORIDE 0.5 MILLIGRAM(S): 2 INJECTION INTRAMUSCULAR; INTRAVENOUS; SUBCUTANEOUS at 09:44

## 2017-10-24 NOTE — PROGRESS NOTE ADULT - ASSESSMENT
Ms. Coleman is a 57 year old woman with necrotizing pancreatitis, s/p ERCP (sphincterotomy and plastic stent placement) with acute necrotizing pancreatitis (30-50%), splenic and portal vein thrombosis. Pain is not well controlled and PCA demand dose has been increased. lab is significant for procalcitonin 2:89 and leukocytosis (trending up) and elevated alkaline phosphate     PLAN:  - Pain control: PCA, will attempt to replace PCEA if pain increases again   - Diet: NPO  - DVT ppx: lovenox  - ordered RUQ US   - fever work up: Blood culture is still pending ( no growth utd)   - Encourage IS as tolerated; wean supplemental O2 as tolerated  - PT: home w/ assist; outpatient PT  - Care as per SICU

## 2017-10-24 NOTE — PROGRESS NOTE ADULT - SUBJECTIVE AND OBJECTIVE BOX
Surgery Trauma Team Progress Note    SUBJECTIVE:   Patient was seen and examined this morning. There was no acute event overnight. She is resting comfortably in bed. Pain is not controlled well, and she was back on PCA. She had spiking fever over night.  She does not report n/v, chest pain, or shortness of breath. she did not tolerated cld, and is back on NPO.    OBJECTIVE:   T(C): 37.6 (10-24-17 @ 11:00), Max: 39.2 (10-24-17 @ 03:00)  HR: 83 (10-24-17 @ 13:00) (83 - 112)  BP: 100/57 (10-24-17 @ 13:00) (99/54 - 143/77)  RR: 25 (10-24-17 @ 13:00) (12 - 38)  SpO2: 96% (10-24-17 @ 13:00) (83% - 99%)  Wt(kg): --  CAPILLARY BLOOD GLUCOSE        I&O's Detail    23 Oct 2017 07:01  -  24 Oct 2017 07:00  --------------------------------------------------------  IN:    dextrose 5% + sodium chloride 0.45% with potassium chloride 20 mEq/L: 1150 mL    Oral Fluid: 1170 mL    Solution: 50 mL  Total IN: 2370 mL    OUT:    Voided: 3000 mL  Total OUT: 3000 mL    Total NET: -630 mL      24 Oct 2017 07:01  -  24 Oct 2017 14:09  --------------------------------------------------------  IN:    dextrose 5% + sodium chloride 0.45% with potassium chloride 20 mEq/L: 350 mL  Total IN: 350 mL    OUT:    Voided: 850 mL  Total OUT: 850 mL    Total NET: -500 mL          PHYSICAL EXAM:  Gen: Well-nourished, well-developed, A&O x3, resting in bed in no acute distress  CV: RRR  Resp: Patent airways, unlabored   Abdomen: Soft, epigastric tenderness, nondistended  Extremities: All 4 extremities warm and well perfused, no edema

## 2017-10-24 NOTE — PROGRESS NOTE ADULT - PROBLEM SELECTOR PLAN 1
- f/u blood cultures  - given cholangitis patient should complete 7-10 day course of abx (would favor carbapenem)  - IV fluids with LR  - analgesia  - diet as tolerated, if patient cannot tolerate PO diet would consider NGT with tube feedings

## 2017-10-24 NOTE — PROGRESS NOTE ADULT - SUBJECTIVE AND OBJECTIVE BOX
Day 7\5 of Anesthesia Pain Management Service    SUBJECTIVE: I have some pain    Pain Scale Score:	[X] Refer to charted pain scores    THERAPY:    [ ] IV PCA Morphine		[ ] 5 mg/mL	[ ] 1 mg/mL  [X] IV PCA Hydromorphone	[ ] 5 mg/mL	[X] 1 mg/mL  [ ] IV PCA Fentanyl		[ ] 50 micrograms/mL    Demand dose: 0.45 mg     Lockout: 6 minutes   Continuous Rate: 0 mg/hr  4 Hour Limit: 9 mg    MEDICATIONS  (STANDING):  acetaminophen  IVPB. 1000 milliGRAM(s) IV Intermittent once  acetaminophen  IVPB. 1000 milliGRAM(s) IV Intermittent once  acetaminophen  IVPB. 1000 milliGRAM(s) IV Intermittent once  dextrose 5% + sodium chloride 0.45% with potassium chloride 20 mEq/L 1000 milliLiter(s) (50 mL/Hr) IV Continuous <Continuous>  enoxaparin Injectable 40 milliGRAM(s) SubCutaneous daily  HYDROmorphone PCA (1 mG/mL) 30 milliLiter(s) PCA Continuous PCA Continuous  oxyCODONE  ER Tablet 20 milliGRAM(s) Oral every 12 hours  pantoprazole    Tablet 40 milliGRAM(s) Oral before breakfast    MEDICATIONS  (PRN):  diphenhydrAMINE   Injectable 25 milliGRAM(s) IV Push every 4 hours PRN Rash and/or Itching  HYDROmorphone  Injectable 0.5 milliGRAM(s) IV Push every 4 hours PRN breakthrough pain  HYDROmorphone PCA (1 mG/mL) Rescue Clinician Bolus 0.5 milliGRAM(s) IV Push every 15 minutes PRN for Pain Scale GREATER THAN 6  naloxone Injectable 0.1 milliGRAM(s) IV Push every 3 minutes PRN For ANY of the following changes in patient status:  A. RR LESS THAN 10 breaths per minute, B. Oxygen saturation LESS THAN 90%, C. Sedation score of 6  ondansetron Injectable 4 milliGRAM(s) IV Push every 6 hours PRN Nausea      OBJECTIVE:    Sedation Score:	[ X] Alert	[ ] Drowsy 	[ ] Arousable	[ ] Asleep	[ ] Unresponsive    Side Effects:	[X ] None	[ ] Nausea	[ ] Vomiting	[ ] Pruritus  		[ ] Other:    Vital Signs Last 24 Hrs  T(C): 38 (24 Oct 2017 07:00), Max: 39.2 (24 Oct 2017 03:00)  T(F): 100.4 (24 Oct 2017 07:00), Max: 102.5 (24 Oct 2017 03:00)  HR: 99 (24 Oct 2017 10:00) (86 - 112)  BP: 119/67 (24 Oct 2017 10:00) (108/61 - 143/77)  BP(mean): 87 (24 Oct 2017 10:00) (79 - 109)  RR: 25 (24 Oct 2017 10:00) (12 - 38)  SpO2: 96% (24 Oct 2017 10:00) (83% - 99%)    ASSESSMENT/ PLAN    Therapy to  be:               [X] Continued   [ ] Discontinued   [ ] Changed to PRN Analgesics    Documentation and Verification of current medications:   [X] Done	[ ] Not done, not eligible    Comments: Using 1-5x/hr. Re-educated to use.

## 2017-10-24 NOTE — PROGRESS NOTE ADULT - SUBJECTIVE AND OBJECTIVE BOX
HISTORY  57yF w/ PMH significant for HTN and nephrolithiasis, s/p appendectomy that presented to [facility] ED on 10/16/2017 c/o acute onset severe sharp stabbing upper abdominal pain that radiates to the back. She says the pain woke her up in the middle of the night causing her to develop nausea and NBNB emesis x3 episodes. She presented to the emergency department hypotensive was worked up with abdominal imaging findings concerning for CBD obstructing stone and was admitted, MRCP ordered and completed, and showed a distal CBD stone elevating concern for acute obstructive cholangitis. Labs resulted with evidence of pancreatitis with lipase > 8500. SICU was consulted as she was tachycardic to the 130s and breaking through dilaudid, tylenol, and toradol. GI was consulted urgently and ERCP was performed with biliary stent placement. Pt continued to have pain after placement of the ERCP and this has been attributed to pancreatic necrosis.     24 HOUR EVENTS: Oxycontin increased to 20 mg, PCA demand dose increased, febrile x 3, C.diff was negative, advanced to regular diet, still has decreased oral intake, RUQ U/S ordered but not performed yet.     SUBJECTIVE/ROS:  [ x] A ten-point review of systems was otherwise negative except as noted.  [ ] Due to altered mental status/intubation, subjective information were not able to be obtained from the patient. History was obtained, to the extent possible, from review of the chart and collateral sources of information.      NEURO  [facility] ICU: neg  Exam: A&Ox4  Meds: acetaminophen  IVPB. 1000 milliGRAM(s) IV Intermittent once  HYDROmorphone  Injectable 0.5 milliGRAM(s) IV Push every 4 hours PRN breakthrough pain  HYDROmorphone PCA (1 mG/mL) 30 milliLiter(s) PCA Continuous PCA Continuous  HYDROmorphone PCA (1 mG/mL) Rescue Clinician Bolus 0.5 milliGRAM(s) IV Push every 15 minutes PRN for Pain Scale GREATER THAN 6  ondansetron Injectable 4 milliGRAM(s) IV Push every 6 hours PRN Nausea  oxyCODONE  ER Tablet 15 milliGRAM(s) Oral every 12 hours  [x] Adequacy of sedation and pain control has been assessed and adjusted      RESPIRATORY  RR: 26 (23 Oct 2017 23:00) (12 - 38)  SpO2: 94% (23 Oct 2017 23:00) (83% - 99%)  Wt(kg): --  Exam: unlabored, clear to auscultation bilaterally  Meds: diphenhydrAMINE   Injectable 25 milliGRAM(s) IV Push every 4 hours PRN Rash and/or Itching        CARDIOVASCULAR  Vital Signs Last 24 Hrs  HR: 96 (23 Oct 2017 23:00) (86 - 112)  BP: 110/67 (23 Oct 2017 23:00) (110/67 - 141/90)  BP(mean): 81 (23 Oct 2017 23:00) (81 - 109)      Exam: no murmurs  Cardiac Rhythm: sinus rhtythm  Perfusion     [x ]Adequate   [ ]Inadequate  Mentation   [x ]Normal       [ ]Reduced  Extremities  [x ]Warm         [ ]Cool  Volume Status [ ]Hypervolemic [x ]Euvolemic [ ]Hypovolemic  Meds: none      GI/NUTRITION  Exam: soft, moderately tender, especially in epigastrium  Diet: regular   Meds: pantoprazole  Injectable 40 milliGRAM(s) IV Push daily      GENITOURINARY              Labs:     [ ] Lara catheter, indication: N/A  Meds: dextrose 5% + sodium chloride 0.45% with potassium chloride 20 mEq/L 1000 milliLiter(s) IV Continuous <Continuous>        HEMATOLOGIC  Meds: enoxaparin Injectable 40 milliGRAM(s) SubCutaneous daily  [x] VTE Prophylaxis              INFECTIOUS DISEASES          Recent Cultures:  Specimen Source: .Blood Blood-Peripheral, 10-21 @ 23:31; Results   No growth to date.; Gram Stain: --; Organism: --  Specimen Source: .Blood Blood, 10-21 @ 23:30; Results   No growth to date.; Gram Stain: --; Organism: --  Specimen Source: .Blood Blood, 10-21 @ 00:15; Results   No growth to date.; Gram Stain: --; Organism: --  Specimen Source: .Blood Blood-Venous, 10-20 @ 21:57; Results   No growth to date.; Gram Stain: --; Organism: --  Specimen Source: .Blood Blood-Venous, 10-19 @ 18:20; Results   No growth to date.; Gram Stain: --; Organism: --  Specimen Source: .Blood Blood-Peripheral, 10-17 @ 07:32; Results   No growth at 5 days.; Gram Stain: --; Organism: --    Meds: none      ENDOCRINE  Meds: none      ACCESS DEVICES:  [x ] Peripheral IV  [ ] Central Venous Line	[ ] R	[ ] L	[ ] IJ	[ ] Fem	[ ] SC	Placed:   [ ] Arterial Line		[ ] R	[ ] L	[ ] Fem	[ ] Rad	[ ] Ax	Placed:   [ ] PICC:					[ ] Mediport  [ ] Urinary Catheter, Date Placed:   [ ] Necessity of urinary, arterial, and venous catheters discussed    OTHER MEDICATIONS:  benzocaine 15 mG/menthol 3.6 mG Lozenge 1 Lozenge Oral four times a day PRN  naloxone Injectable 0.1 milliGRAM(s) IV Push every 3 minutes PRN      CODE STATUS: Full    IMAGING: no new this AM      A/P: 57 year old female with obstructing common bile duct stone, subsequent cholangitis and pancreatitis now s/p ERCP with biliary stent placement that will require future ERCP for stent removal currently on PCA for pain control.     Neuro: severe pain, not opioid naive  Pain  	- PCA  	- Oxycontin 20 mg q12hrs     Resp: no acute issues   - nasal cannula prn to maintain sat >92%, has not required recently  - monitor for resp depression in setting of high dose opioids    CV: borderline tachycardia currently attributed to SIRS/pain  	- Cardiac monitor, q1h vitals  	- Hold home amlodipine     GI: choledocholithiasis & necrotic pancreatitis  	- ERCP w/ biliary stent placed 10/17  	- Pantoprazole 40mg qday  	- Continue regular diet     :   	- D5 1/2NS w/ KCl @ 50ml/h due to decreased oral intake.   	- Monitor I&Os, replete electrolytes as needed  	    ID: Pancreatitis with 30-50% necrosis, leukocytosis and persistent fevers  - Imipenem d/c'd two days ago as likely no infectious process  - blood cx, UA neg, will continue to watch off abx.     Heme: trend CBCs   - DVT prophylaxis  	    Endo: No active issues/interventions    Disposition: probably floor HISTORY  57yF w/ PMH significant for HTN and nephrolithiasis, s/p appendectomy that presented to [facility] ED on 10/16/2017 c/o acute onset severe sharp stabbing upper abdominal pain that radiates to the back. She says the pain woke her up in the middle of the night causing her to develop nausea and NBNB emesis x3 episodes. She presented to the emergency department hypotensive was worked up with abdominal imaging findings concerning for CBD obstructing stone and was admitted, MRCP ordered and completed, and showed a distal CBD stone elevating concern for acute obstructive cholangitis. Labs resulted with evidence of pancreatitis with lipase > 8500. SICU was consulted as she was tachycardic to the 130s and breaking through dilaudid, tylenol, and toradol. GI was consulted urgently and ERCP was performed with biliary stent placement. Pt continued to have pain after placement of the ERCP and this has been attributed to pancreatic necrosis.     24 HOUR EVENTS: Oxycontin increased to 20 mg, PCA demand dose increased, febrile x 3, C.diff was negative, advanced to regular diet, still has decreased oral intake, RUQ U/S ordered but not performed yet.     SUBJECTIVE/ROS:  [ x] A ten-point review of systems was otherwise negative except as noted.  [ ] Due to altered mental status/intubation, subjective information were not able to be obtained from the patient. History was obtained, to the extent possible, from review of the chart and collateral sources of information.      NEURO  CAM ICU: neg  Exam: A&Ox4  Meds: acetaminophen  IVPB. 1000 milliGRAM(s) IV Intermittent once  HYDROmorphone  Injectable 0.5 milliGRAM(s) IV Push every 4 hours PRN breakthrough pain  HYDROmorphone PCA (1 mG/mL) 30 milliLiter(s) PCA Continuous PCA Continuous  HYDROmorphone PCA (1 mG/mL) Rescue Clinician Bolus 0.5 milliGRAM(s) IV Push every 15 minutes PRN for Pain Scale GREATER THAN 6  ondansetron Injectable 4 milliGRAM(s) IV Push every 6 hours PRN Nausea  oxyCODONE  ER Tablet 15 milliGRAM(s) Oral every 12 hours  [x] Adequacy of sedation and pain control has been assessed and adjusted      RESPIRATORY  RR: 26 (23 Oct 2017 23:00) (12 - 38)  SpO2: 94% (23 Oct 2017 23:00) (83% - 99%)  Wt(kg): --  Exam: unlabored, clear to auscultation bilaterally  Meds: diphenhydrAMINE   Injectable 25 milliGRAM(s) IV Push every 4 hours PRN Rash and/or Itching        CARDIOVASCULAR  Vital Signs Last 24 Hrs  HR: 96 (23 Oct 2017 23:00) (86 - 112)  BP: 110/67 (23 Oct 2017 23:00) (110/67 - 141/90)  BP(mean): 81 (23 Oct 2017 23:00) (81 - 109)      Exam: no murmurs  Cardiac Rhythm: sinus rhtythm  Perfusion     [x ]Adequate   [ ]Inadequate  Mentation   [x ]Normal       [ ]Reduced  Extremities  [x ]Warm         [ ]Cool  Volume Status [ ]Hypervolemic [x ]Euvolemic [ ]Hypovolemic  Meds: none      GI/NUTRITION  Exam: soft, moderately tender, especially in epigastrium  Diet: regular   Meds: pantoprazole  Injectable 40 milliGRAM(s) IV Push daily      GENITOURINARY              Labs:     [ ] Lara catheter, indication: N/A  Meds: dextrose 5% + sodium chloride 0.45% with potassium chloride 20 mEq/L 1000 milliLiter(s) IV Continuous <Continuous>        HEMATOLOGIC  Meds: enoxaparin Injectable 40 milliGRAM(s) SubCutaneous daily  [x] VTE Prophylaxis              INFECTIOUS DISEASES          Recent Cultures:  Specimen Source: .Blood Blood-Peripheral, 10-21 @ 23:31; Results   No growth to date.; Gram Stain: --; Organism: --  Specimen Source: .Blood Blood, 10-21 @ 23:30; Results   No growth to date.; Gram Stain: --; Organism: --  Specimen Source: .Blood Blood, 10-21 @ 00:15; Results   No growth to date.; Gram Stain: --; Organism: --  Specimen Source: .Blood Blood-Venous, 10-20 @ 21:57; Results   No growth to date.; Gram Stain: --; Organism: --  Specimen Source: .Blood Blood-Venous, 10-19 @ 18:20; Results   No growth to date.; Gram Stain: --; Organism: --  Specimen Source: .Blood Blood-Peripheral, 10-17 @ 07:32; Results   No growth at 5 days.; Gram Stain: --; Organism: --    Meds: none      ENDOCRINE  Meds: none      ACCESS DEVICES:  [x ] Peripheral IV  [ ] Central Venous Line	[ ] R	[ ] L	[ ] IJ	[ ] Fem	[ ] SC	Placed:   [ ] Arterial Line		[ ] R	[ ] L	[ ] Fem	[ ] Rad	[ ] Ax	Placed:   [ ] PICC:					[ ] Mediport  [ ] Urinary Catheter, Date Placed:   [ ] Necessity of urinary, arterial, and venous catheters discussed    OTHER MEDICATIONS:  benzocaine 15 mG/menthol 3.6 mG Lozenge 1 Lozenge Oral four times a day PRN  naloxone Injectable 0.1 milliGRAM(s) IV Push every 3 minutes PRN      CODE STATUS: Full    IMAGING: no new this AM      A/P: 57 year old female with obstructing common bile duct stone, subsequent cholangitis and pancreatitis now s/p ERCP with biliary stent placement that will require future ERCP for stent removal currently on PCA for pain control.     Neuro: severe pain, not opioid naive  Pain  	- PCA  	- Oxycontin 20 mg q12hrs     Resp: no acute issues   - nasal cannula prn to maintain sat >92%, has not required recently  - monitor for resp depression in setting of high dose opioids    CV: borderline tachycardia currently attributed to SIRS/pain  	- Cardiac monitor, q1h vitals  	- Hold home amlodipine     GI: choledocholithiasis & necrotic pancreatitis              - increasing wbc downtrending procalcitonin, still with significant pain worse with eating, ordered ultrasound yesterday and spoke with [email or web address] multiple times but it was not done. Will be done this morning.  	- ERCP w/ biliary stent placed 10/17  	- Pantoprazole 40mg qday  	- Continue regular diet     :   	- D5 1/2NS w/ KCl @ 50ml/h due to decreased oral intake.   	- Monitor I&Os, replete electrolytes as needed  	    ID: Pancreatitis with 30-50% necrosis, leukocytosis and persistent fevers  - Imipenem d/c'd two days ago as likely no infectious process  - blood cx, UA neg, will continue to watch off abx.     Heme: trend CBCs   - DVT prophylaxis  	    Endo: No active issues/interventions    Disposition: probably floor

## 2017-10-24 NOTE — PROGRESS NOTE ADULT - SUBJECTIVE AND OBJECTIVE BOX
Chief Complaint:  Patient is a 57y old  Female who presents with a chief complaint of Abdominal pain (16 Oct 2017 09:50)      Interval Events: Patient with ongoing epigastric abd pain. She denies vomiting. She had fever overnight to 102.5. She was on abx last week and over the weekend but they have been discontinued due to (-) cultures so far. She had CT over the weekend which showed pancreatitis with pancreatic necrosis.     she ate cereal this AM without nausea or vomiting.     Allergies:  No Known Allergies      Hospital Medications:  dextrose 5% + sodium chloride 0.45% with potassium chloride 20 mEq/L 1000 milliLiter(s) IV Continuous <Continuous>  diphenhydrAMINE   Injectable 25 milliGRAM(s) IV Push every 4 hours PRN  enoxaparin Injectable 40 milliGRAM(s) SubCutaneous daily  HYDROmorphone  Injectable 0.5 milliGRAM(s) IV Push every 4 hours PRN  HYDROmorphone PCA (1 mG/mL) 30 milliLiter(s) PCA Continuous PCA Continuous  HYDROmorphone PCA (1 mG/mL) Rescue Clinician Bolus 0.5 milliGRAM(s) IV Push every 15 minutes PRN  naloxone Injectable 0.1 milliGRAM(s) IV Push every 3 minutes PRN  ondansetron Injectable 4 milliGRAM(s) IV Push every 6 hours PRN  oxyCODONE  ER Tablet 20 milliGRAM(s) Oral every 12 hours  pantoprazole    Tablet 40 milliGRAM(s) Oral before breakfast      PMHX/PSHX:  Kidney stone  Hypertension  No pertinent past medical history  Kidney stones  History of appendectomy      Family history:  No pertinent family history in first degree relatives      ROS:     General:  (+) fevers, (-) chills,  Eyes:  Good vision, no reported pain  ENT:  No sore throat, pain, runny nose  CV:  No chest pain, palpitations  Resp:  No cough, wheezing, SOB  GI:  See HPI  :  No pain, bleeding, incontinence, nocturia  Muscle:  No pain, weakness  Neuro:  No weakness, tingling, memory problems  Psych:  No fatigue, insomnia, mood problems, depression  Endocrine:  No cold/heat intolerance  Heme:  No petechiae, ecchymosis, easy bruising  Skin:  No rash, edema      PHYSICAL EXAM:   Vital Signs Last 24 Hrs  T(C): 36.8 (24 Oct 2017 15:00), Max: 39.2 (24 Oct 2017 03:00)  T(F): 98.2 (24 Oct 2017 15:00), Max: 102.5 (24 Oct 2017 03:00)  HR: 73 (24 Oct 2017 15:00) (68 - 112)  BP: 101/52 (24 Oct 2017 15:00) (99/54 - 143/77)  BP(mean): 72 (24 Oct 2017 15:00) (69 - 103)  RR: 24 (24 Oct 2017 15:00) (12 - 37)  SpO2: 93% (24 Oct 2017 15:00) (91% - 99%)    GENERAL:  NAD  HEENT:  sclera anicteric  CHEST:  no respiratory distress, CTAB  HEART:  RRR, no MRG, no edema  ABDOMEN:  distended, tender to palpation throughout with (+) guarding, no rigidity   EXTREMITIES:  no cyanosis, no edema  SKIN:  No rash  NEURO:  Alert, oriented      LABS:                           10.3   30.8  )-----------( 413      ( 24 Oct 2017 03:14 )             30.8     10-24    136  |  99  |  2<L>  ----------------------------<  102<H>  5.0   |  22  |  0.47<L>    Ca    7.9<L>      24 Oct 2017 03:14  Phos  3.4     10-24  Mg     2.0     10-24    TPro  5.9<L>  /  Alb  2.6<L>  /  TBili  0.3  /  DBili  <0.1  /  AST  35  /  ALT  16  /  AlkPhos  197<H>  10-24        Imaging:  < from: CT Abdomen and Pelvis w/ IV Cont (10.21.17 @ 16:37) >  IMPRESSION:    Acute necrotizing pancreatitis,30-50%. Peripancreatic fluid.    Short segment thrombus of the splenic vein at the portosplenic confluence.    Persistent anterior right portal vein thrombosis with perfusional   abnormalities of the right hepatic lobe as described above.    Left lower lobe atelectasis /consolidation and small left pleural   effusion. Trace right pleural effusion with adjacent compressive   atelectasis.    Interval placement of CBD stent.    < end of copied text >    < from: ERCP (10.17.17 @ 11:24) >  Impression:      Ascending cholangitis with choledocholithiasis s/p ERCP with biliary stent placement  Recommendation:      - [facility] for [facility] care.                                       - Consider cholecystectomy                                       - Repeat ERCP with stent removal, sphincterotomy, and stone extraction at later date    < end of copied text >      UA - (-) LE, (-) nitrite

## 2017-10-24 NOTE — PROGRESS NOTE ADULT - ATTENDING COMMENTS
Impression:    #1.  Necrotizing pancreatitis    #2.  Fever to 102F    #3.  Cholangitis, s/p ERCP/stent placement last week.    #4.  Acute diarrhea, Cdiff PCR negative.    Recommendations:    #1. IV fluids    #2. Agree with NJ feeds.    #3.  Restart antibiotics for cholangitis and concern for infected pancreatic necrosis.

## 2017-10-24 NOTE — PROGRESS NOTE ADULT - ATTENDING COMMENTS
Pt seen and examined today at 10am, agree with above. Pt still has significant epigastric pain, somewhat better with pain medication (PCA and IV Tylenol), no N/V. Pain worse with eating. RUQ without cholecystitis. Recent CT with pancreatic necrosis and peripancreatic fluid, R PV thrombus and splenic vein thrombus, but no gas in fluid collections to suggest infected necrosis. C.diff negative. Urinalysis negative. Blood cultures negative. Cholangitis treated. Procalcitonin decreasing. Will followup tomorrow's WBC- if continues to rise and particularly if pt has any hemodynamic changes, will consider FNA of peripancreatic fluid collections for culture. Also, I discussed with pt this morning that if she didn't tolerate po today we will likely place a feeding tube for enteral access to provide adequate nutrition.

## 2017-10-24 NOTE — PROGRESS NOTE ADULT - ASSESSMENT
56 yo woman with gallstone pancreatitis s/p ERCP on 10/17 for cholangitis, pancreatitis c/b pancreatic necrosis, splenic vein thrombus now with fever, marked leukocytosis and diarrhea, C diff (-). DDX pancreatitis with pancreatic necrosis vs infected necrosis vs cholecystitis.

## 2017-10-24 NOTE — PROGRESS NOTE ADULT - ATTENDING COMMENTS
Dr. Nichols (Attending Physician)  Persistent pain, fever, leukocytosis but procalcitonin decreased >80% from presentation.  Alkphos slightly increased.  We believe this is pancreatitis causing her persistent pain and SIRS response; however, we are attempting to evaluate for cholecystitis with ultrasound. Repeat ultrasound of right upper quadrant not done yesterday despite multiple discussions with radiology will be done this am.  Will continue holding abx unless cultures positive, bilirubin, or procalcitonin increasing. Dr. Nichols (Attending Physician)  Persistent pain, fever, leukocytosis but procalcitonin decreased >80% from presentation.  Alkphos slightly increased.  We believe this is pancreatitis causing her persistent pain and SIRS response; however, we are attempting to evaluate for cholecystitis with ultrasound. Repeat ultrasound of right upper quadrant not done yesterday despite multiple discussions with radiology will be done this am.  Will continue holding abx unless cultures positive, bilirubin, or procalcitonin increasing. Pain worsening with eating, will place post pyloric feeding tube.

## 2017-10-25 LAB
ALBUMIN SERPL ELPH-MCNC: 2.5 G/DL — LOW (ref 3.3–5)
ALP SERPL-CCNC: 179 U/L — HIGH (ref 40–120)
ALT FLD-CCNC: 15 U/L RC — SIGNIFICANT CHANGE UP (ref 10–45)
ANION GAP SERPL CALC-SCNC: 13 MMOL/L — SIGNIFICANT CHANGE UP (ref 5–17)
APTT BLD: 33.5 SEC — SIGNIFICANT CHANGE UP (ref 27.5–37.4)
AST SERPL-CCNC: 23 U/L — SIGNIFICANT CHANGE UP (ref 10–40)
BILIRUB DIRECT SERPL-MCNC: 0.1 MG/DL — SIGNIFICANT CHANGE UP (ref 0–0.2)
BILIRUB INDIRECT FLD-MCNC: 0.2 MG/DL — SIGNIFICANT CHANGE UP (ref 0.2–1)
BILIRUB SERPL-MCNC: 0.3 MG/DL — SIGNIFICANT CHANGE UP (ref 0.2–1.2)
BUN SERPL-MCNC: 4 MG/DL — LOW (ref 7–23)
CALCIUM SERPL-MCNC: 8 MG/DL — LOW (ref 8.4–10.5)
CHLORIDE SERPL-SCNC: 100 MMOL/L — SIGNIFICANT CHANGE UP (ref 96–108)
CO2 SERPL-SCNC: 25 MMOL/L — SIGNIFICANT CHANGE UP (ref 22–31)
CREAT SERPL-MCNC: 0.37 MG/DL — LOW (ref 0.5–1.3)
CULTURE RESULTS: SIGNIFICANT CHANGE UP
GLUCOSE SERPL-MCNC: 101 MG/DL — HIGH (ref 70–99)
HCT VFR BLD CALC: 29.6 % — LOW (ref 34.5–45)
HGB BLD-MCNC: 10 G/DL — LOW (ref 11.5–15.5)
INR BLD: 1.46 RATIO — HIGH (ref 0.88–1.16)
MAGNESIUM SERPL-MCNC: 1.7 MG/DL — SIGNIFICANT CHANGE UP (ref 1.6–2.6)
MCHC RBC-ENTMCNC: 30.5 PG — SIGNIFICANT CHANGE UP (ref 27–34)
MCHC RBC-ENTMCNC: 33.7 GM/DL — SIGNIFICANT CHANGE UP (ref 32–36)
MCV RBC AUTO: 90.4 FL — SIGNIFICANT CHANGE UP (ref 80–100)
PHOSPHATE SERPL-MCNC: 3.4 MG/DL — SIGNIFICANT CHANGE UP (ref 2.5–4.5)
PLATELET # BLD AUTO: 513 K/UL — HIGH (ref 150–400)
POTASSIUM SERPL-MCNC: 4.1 MMOL/L — SIGNIFICANT CHANGE UP (ref 3.5–5.3)
POTASSIUM SERPL-SCNC: 4.1 MMOL/L — SIGNIFICANT CHANGE UP (ref 3.5–5.3)
PROT SERPL-MCNC: 5.8 G/DL — LOW (ref 6–8.3)
PROTHROM AB SERPL-ACNC: 16 SEC — HIGH (ref 9.8–12.7)
RBC # BLD: 3.28 M/UL — LOW (ref 3.8–5.2)
RBC # FLD: 12.8 % — SIGNIFICANT CHANGE UP (ref 10.3–14.5)
SODIUM SERPL-SCNC: 138 MMOL/L — SIGNIFICANT CHANGE UP (ref 135–145)
SPECIMEN SOURCE: SIGNIFICANT CHANGE UP
WBC # BLD: 29 K/UL — HIGH (ref 3.8–10.5)
WBC # FLD AUTO: 29 K/UL — HIGH (ref 3.8–10.5)

## 2017-10-25 PROCEDURE — 71010: CPT | Mod: 26

## 2017-10-25 PROCEDURE — 99233 SBSQ HOSP IP/OBS HIGH 50: CPT | Mod: GC

## 2017-10-25 PROCEDURE — 99232 SBSQ HOSP IP/OBS MODERATE 35: CPT | Mod: GC

## 2017-10-25 RX ORDER — MAGNESIUM SULFATE 500 MG/ML
2 VIAL (ML) INJECTION ONCE
Qty: 0 | Refills: 0 | Status: COMPLETED | OUTPATIENT
Start: 2017-10-25 | End: 2017-10-25

## 2017-10-25 RX ORDER — OXYCODONE HYDROCHLORIDE 5 MG/1
20 TABLET ORAL EVERY 8 HOURS
Qty: 0 | Refills: 0 | Status: DISCONTINUED | OUTPATIENT
Start: 2017-10-25 | End: 2017-10-30

## 2017-10-25 RX ORDER — GABAPENTIN 400 MG/1
300 CAPSULE ORAL DAILY
Qty: 0 | Refills: 0 | Status: DISCONTINUED | OUTPATIENT
Start: 2017-10-25 | End: 2017-10-30

## 2017-10-25 RX ORDER — CEFOTETAN DISODIUM 1 G
1 VIAL (EA) INJECTION EVERY 12 HOURS
Qty: 0 | Refills: 0 | Status: DISCONTINUED | OUTPATIENT
Start: 2017-10-25 | End: 2017-10-27

## 2017-10-25 RX ORDER — ACETAMINOPHEN 500 MG
1000 TABLET ORAL ONCE
Qty: 0 | Refills: 0 | Status: COMPLETED | OUTPATIENT
Start: 2017-10-25 | End: 2017-10-25

## 2017-10-25 RX ORDER — METRONIDAZOLE 500 MG
500 TABLET ORAL EVERY 8 HOURS
Qty: 0 | Refills: 0 | Status: DISCONTINUED | OUTPATIENT
Start: 2017-10-25 | End: 2017-10-26

## 2017-10-25 RX ADMIN — OXYCODONE HYDROCHLORIDE 20 MILLIGRAM(S): 5 TABLET ORAL at 05:53

## 2017-10-25 RX ADMIN — Medication 1000 MILLIGRAM(S): at 04:30

## 2017-10-25 RX ADMIN — HYDROMORPHONE HYDROCHLORIDE 1 MILLIGRAM(S): 2 INJECTION INTRAMUSCULAR; INTRAVENOUS; SUBCUTANEOUS at 22:35

## 2017-10-25 RX ADMIN — Medication 25 MILLIGRAM(S): at 00:19

## 2017-10-25 RX ADMIN — HYDROMORPHONE HYDROCHLORIDE 30 MILLILITER(S): 2 INJECTION INTRAMUSCULAR; INTRAVENOUS; SUBCUTANEOUS at 04:13

## 2017-10-25 RX ADMIN — OXYCODONE HYDROCHLORIDE 20 MILLIGRAM(S): 5 TABLET ORAL at 22:00

## 2017-10-25 RX ADMIN — Medication 100 MILLIGRAM(S): at 13:04

## 2017-10-25 RX ADMIN — HYDROMORPHONE HYDROCHLORIDE 1 MILLIGRAM(S): 2 INJECTION INTRAMUSCULAR; INTRAVENOUS; SUBCUTANEOUS at 11:01

## 2017-10-25 RX ADMIN — OXYCODONE HYDROCHLORIDE 20 MILLIGRAM(S): 5 TABLET ORAL at 06:20

## 2017-10-25 RX ADMIN — GABAPENTIN 300 MILLIGRAM(S): 400 CAPSULE ORAL at 15:09

## 2017-10-25 RX ADMIN — HYDROMORPHONE HYDROCHLORIDE 30 MILLILITER(S): 2 INJECTION INTRAMUSCULAR; INTRAVENOUS; SUBCUTANEOUS at 17:58

## 2017-10-25 RX ADMIN — HYDROMORPHONE HYDROCHLORIDE 1 MILLIGRAM(S): 2 INJECTION INTRAMUSCULAR; INTRAVENOUS; SUBCUTANEOUS at 13:00

## 2017-10-25 RX ADMIN — ENOXAPARIN SODIUM 40 MILLIGRAM(S): 100 INJECTION SUBCUTANEOUS at 13:03

## 2017-10-25 RX ADMIN — HYDROMORPHONE HYDROCHLORIDE 30 MILLILITER(S): 2 INJECTION INTRAMUSCULAR; INTRAVENOUS; SUBCUTANEOUS at 07:30

## 2017-10-25 RX ADMIN — HYDROMORPHONE HYDROCHLORIDE 1 MILLIGRAM(S): 2 INJECTION INTRAMUSCULAR; INTRAVENOUS; SUBCUTANEOUS at 07:50

## 2017-10-25 RX ADMIN — Medication 25 MILLIGRAM(S): at 15:09

## 2017-10-25 RX ADMIN — DEXTROSE MONOHYDRATE, SODIUM CHLORIDE, AND POTASSIUM CHLORIDE 50 MILLILITER(S): 50; .745; 4.5 INJECTION, SOLUTION INTRAVENOUS at 00:18

## 2017-10-25 RX ADMIN — Medication 10 MILLIGRAM(S): at 13:03

## 2017-10-25 RX ADMIN — Medication 25 MILLIGRAM(S): at 05:09

## 2017-10-25 RX ADMIN — OXYCODONE HYDROCHLORIDE 20 MILLIGRAM(S): 5 TABLET ORAL at 15:00

## 2017-10-25 RX ADMIN — Medication 10 MILLIGRAM(S): at 00:19

## 2017-10-25 RX ADMIN — BENZOCAINE AND MENTHOL 1 LOZENGE: 5; 1 LIQUID ORAL at 00:18

## 2017-10-25 RX ADMIN — IMIPENEM AND CILASTATIN 100 MILLIGRAM(S): 250; 250 INJECTION, POWDER, FOR SOLUTION INTRAVENOUS at 05:09

## 2017-10-25 RX ADMIN — PANTOPRAZOLE SODIUM 40 MILLIGRAM(S): 20 TABLET, DELAYED RELEASE ORAL at 08:24

## 2017-10-25 RX ADMIN — Medication 10 MILLIGRAM(S): at 05:09

## 2017-10-25 RX ADMIN — Medication 400 MILLIGRAM(S): at 04:09

## 2017-10-25 RX ADMIN — Medication 400 MILLIGRAM(S): at 05:00

## 2017-10-25 RX ADMIN — OXYCODONE HYDROCHLORIDE 20 MILLIGRAM(S): 5 TABLET ORAL at 15:30

## 2017-10-25 RX ADMIN — DEXTROSE MONOHYDRATE, SODIUM CHLORIDE, AND POTASSIUM CHLORIDE 50 MILLILITER(S): 50; .745; 4.5 INJECTION, SOLUTION INTRAVENOUS at 19:03

## 2017-10-25 RX ADMIN — Medication 50 GRAM(S): at 06:54

## 2017-10-25 RX ADMIN — Medication 25 MILLIGRAM(S): at 08:24

## 2017-10-25 RX ADMIN — IMIPENEM AND CILASTATIN 100 MILLIGRAM(S): 250; 250 INJECTION, POWDER, FOR SOLUTION INTRAVENOUS at 00:17

## 2017-10-25 RX ADMIN — Medication 100 MILLIGRAM(S): at 22:00

## 2017-10-25 RX ADMIN — Medication 25 MILLIGRAM(S): at 20:11

## 2017-10-25 RX ADMIN — Medication 120 GRAM(S): at 17:59

## 2017-10-25 RX ADMIN — BENZOCAINE AND MENTHOL 1 LOZENGE: 5; 1 LIQUID ORAL at 05:09

## 2017-10-25 NOTE — PROGRESS NOTE ADULT - SUBJECTIVE AND OBJECTIVE BOX
SUBJECTIVE:  Started on imipenem/cilastatin overnight for cholangitis.   No overnight events.     OBJECTIVE:     ** VITAL SIGNS / I&O's **    T(C): 37.3 (10-25-17 @ 03:00), Max: 38 (10-24-17 @ 07:00)  T(F): 99.1 (10-25-17 @ 03:00), Max: 100.4 (10-24-17 @ 07:00)  HR: 106 (10-25-17 @ 04:00) (68 - 106)  BP: 114/59 (10-25-17 @ 04:00) (99/54 - 135/82)  RR: 19 (10-25-17 @ 04:00) (12 - 37)  SpO2: 95% (10-25-17 @ 04:00) (91% - 98%)      23 Oct 2017 07:01  -  24 Oct 2017 07:00  --------------------------------------------------------  IN:    dextrose 5% + sodium chloride 0.45% with potassium chloride 20 mEq/L: 1150 mL    Oral Fluid: 1170 mL    Solution: 50 mL  Total IN: 2370 mL    OUT:    Voided: 3000 mL  Total OUT: 3000 mL    Total NET: -630 mL      24 Oct 2017 07:01  -  25 Oct 2017 05:31  --------------------------------------------------------  IN:    dextrose 5% + sodium chloride 0.45% with potassium chloride 20 mEq/L: 1000 mL    Solution: 100 mL    Solution: 300 mL  Total IN: 1400 mL    OUT:    Voided: 1600 mL  Total OUT: 1600 mL    Total NET: -200 mL          ** PHYSICAL EXAM **    -- CONSTITUTIONAL: AOx3. NAD.   -- HEENT:  -- NECK:   -- CARDIOVASCULAR: Regular rate and rhythm. S1, S2.  -- RESPIRATORY: Bilateral breath sounds.   -- CHEST:  -- ABDOMEN:   -- EXTREMITIES:   -- VASCULAR:   -- NEUROLOGICAL:     ** LABS **                 10.0   29.0   )----------(  513       ( 25 Oct 2017 03:40 )               29.6      138    |  100    |  4      ----------------------------<  101        ( 25 Oct 2017 03:40 )  4.1     |  25     |  0.37     Ca    8.0        ( 25 Oct 2017 03:40 )  Phos  3.4       ( 25 Oct 2017 03:40 )  Mg     1.7       ( 25 Oct 2017 03:40 )    TPro  5.8    /  Alb  2.5    /  TBili  0.3    /  DBili  0.1    /  AST  23     /  ALT  15     /  AlkPhos  179    ( 25 Oct 2017 03:40 )    PT/INR -  16.0 sec / 1.46 ratio   ( 25 Oct 2017 03:40 )       PTT -  33.5 sec   ( 25 Oct 2017 03:40 )  CAPILLARY BLOOD GLUCOSE SUBJECTIVE:  Started on imipenem/cilastatin overnight for cholangitis.   No overnight events.   Pain significantly improved.   Feels hungry.     OBJECTIVE:     ** VITAL SIGNS / I&O's **    T(C): 37.3 (10-25-17 @ 03:00), Max: 38 (10-24-17 @ 07:00)  T(F): 99.1 (10-25-17 @ 03:00), Max: 100.4 (10-24-17 @ 07:00)  HR: 106 (10-25-17 @ 04:00) (68 - 106)  BP: 114/59 (10-25-17 @ 04:00) (99/54 - 135/82)  RR: 19 (10-25-17 @ 04:00) (12 - 37)  SpO2: 95% (10-25-17 @ 04:00) (91% - 98%)      23 Oct 2017 07:01  -  24 Oct 2017 07:00  --------------------------------------------------------  IN:    dextrose 5% + sodium chloride 0.45% with potassium chloride 20 mEq/L: 1150 mL    Oral Fluid: 1170 mL    Solution: 50 mL  Total IN: 2370 mL    OUT:    Voided: 3000 mL  Total OUT: 3000 mL    Total NET: -630 mL      24 Oct 2017 07:01  -  25 Oct 2017 05:31  --------------------------------------------------------  IN:    dextrose 5% + sodium chloride 0.45% with potassium chloride 20 mEq/L: 1000 mL    Solution: 100 mL    Solution: 300 mL  Total IN: 1400 mL    OUT:    Voided: 1600 mL  Total OUT: 1600 mL    Total NET: -200 mL    ** PHYSICAL EXAM **    -- CONSTITUTIONAL: AOx3. NAD.   -- HEENT: NJ tube in left nare   -- RESPIRATORY: breathing comfortably    -- ABDOMEN: soft, nontender, nondistended     ** LABS **                 10.0   29.0   )----------(  513       ( 25 Oct 2017 03:40 )               29.6      138    |  100    |  4      ----------------------------<  101        ( 25 Oct 2017 03:40 )  4.1     |  25     |  0.37     Ca    8.0        ( 25 Oct 2017 03:40 )  Phos  3.4       ( 25 Oct 2017 03:40 )  Mg     1.7       ( 25 Oct 2017 03:40 )    TPro  5.8    /  Alb  2.5    /  TBili  0.3    /  DBili  0.1    /  AST  23     /  ALT  15     /  AlkPhos  179    ( 25 Oct 2017 03:40 )    PT/INR -  16.0 sec / 1.46 ratio   ( 25 Oct 2017 03:40 )       PTT -  33.5 sec   ( 25 Oct 2017 03:40 )  CAPILLARY BLOOD GLUCOSE

## 2017-10-25 NOTE — PROVIDER CONTACT NOTE (OTHER) - ASSESSMENT
a&ox4, PCA dilaudid for pain. room air. sinus tachy on monitor. recently febrile 103.0F. tylenol given.

## 2017-10-25 NOTE — PROGRESS NOTE ADULT - ASSESSMENT
A/P: 57 year old female with obstructing common bile duct stone, subsequent cholangitis and pancreatitis now s/p ERCP with biliary stent placement that will require future ERCP for stent removal currently on PCA for pain control.     Neuro: severe pain, not opioid naive  Pain  	- PCA  	- Oxycontin 20 mg q12hrs     Resp: no acute issues   - saturating well on RA    CV: borderline tachycardia currently attributed to SIRS/pain    GI: choledocholithiasis & necrotic pancreatitis              - Confirm tiger tube placement this AM  	- ERCP w/ biliary stent placed 10/17. C/w imipenem for 14 days after ERCP  	- Pantoprazole 40mg qday  	- NPO    :   	- D5 1/2NS w/ KCl @ 50ml/h due to decreased oral intake.   	- Monitor I&Os, replete electrolytes as needed  	  ID: Pancreatitis with 30-50% necrosis, leukocytosis and persistent fevers              - Imipenem restarted              - blood cx, UA neg, will continue to watch off abx    Heme: trend CBCs               - DVT prophylaxis  	    Endo: No active issues/interventions    Disposition: probably floor A/P: 57 year old female with obstructing common bile duct stone, subsequent cholangitis and pancreatitis now s/p ERCP with biliary stent placement that will require future ERCP for stent removal currently on PCA for pain control.     Neuro: severe pain, not opioid naive  Pain  	- PCA  	- Oxycontin 20 mg q12hrs     Resp: no acute issues   - saturating well on RA    CV: borderline tachycardia currently attributed to SIRS/pain    GI: choledocholithiasis & necrotic pancreatitis              - Confirm tiger tube placement this AM  	- ERCP w/ biliary stent placed 10/17. C/w imipenem for 14 days after ERCP  	- Pantoprazole 40mg qday  	- NPO    :   	- D5 1/2NS w/ KCl @ 50ml/h due to decreased oral intake.   	- Monitor I&Os, replete electrolytes as needed  	  ID: Pancreatitis with 30-50% necrosis, leukocytosis and persistent fevers              - Imipenem restarted    Heme: trend CBCs               - DVT prophylaxis  	    Endo: No active issues/interventions    Disposition: probably floor A/P: 57 year old female with obstructing common bile duct stone, subsequent cholangitis and pancreatitis now s/p ERCP with biliary stent placement that will require future ERCP for stent removal currently on PCA for pain control.     Neuro: severe pain, not opioid naive  Pain  	- PCA  	- Oxycontin 20 mg q12hrs     Resp: no acute issues   - saturating well on RA    CV: borderline tachycardia currently attributed to SIRS/pain    GI: choledocholithiasis & necrotic pancreatitis              - Replace tiger tube for tube feeds, pulled overnight  	- ERCP w/ biliary stent placed 10/17. C/w imipenem for 14 days after ERCP  	- Pantoprazole 40mg qday  	- NPO    :   	- D5 1/2NS w/ KCl @ 50ml/h due to decreased oral intake.   	- Monitor I&Os, replete electrolytes as needed  	  ID: Pancreatitis with 30-50% necrosis, leukocytosis and persistent fevers              - Imipenem restarted    Heme: trend CBCs               - DVT prophylaxis  	  Endo: No active issues/interventions    Disposition: probably floor

## 2017-10-25 NOTE — PROGRESS NOTE ADULT - ASSESSMENT
ASSESSNEnt: Elba Coleman is a 57 year old woman with necrotizing pancreatitis, s/p ERCP (sphincterotomy and plastic stent placement) with acute necrotizing pancreatitis (30-50%), splenic and portal vein thrombosis. Pain was controlled with PCA. No current signs of cholangitis; no fever, chills, AMS, increasing pain, tachycardia, or hypotension.     PLAN:  - Pain control: PCA, will attempt to replace PCEA if pain increases again   - Diet: CLD  - Encourage IS as tolerated; wean supplemental O2 as tolerated  - PT: home w/ assist; outpatient PT  - Would consider anticoagulation given that portal vein thrombosis is now relatively remote   - Care as per SICU    P: [id number] ASSESSMENT: Elba Coleman is a 57 year old woman with necrotizing pancreatitis, s/p ERCP (sphincterotomy and plastic stent placement) with acute necrotizing pancreatitis (30-50%), splenic and portal vein thrombosis. Pain is controlled with PCA and improving. No current signs of cholangitis; no fever, chills, AMS, increasing pain, tachycardia, or hypotension.     PLAN:  - Pain control: PCA, will attempt to replace PCEA if pain increases again   - Diet: tube feeds through NJ tube   - Encourage IS as tolerated; wean supplemental O2 as tolerated  - PT: home w/ assist; outpatient PT  - DVT Prophylaxis: Lovenox   - Care as per SICU    P: [id number]

## 2017-10-25 NOTE — CHART NOTE - NSCHARTNOTEFT_GEN_A_CORE
Pt seen for nutrition follow up, as per departmental protocol.    Hospital Course: 57 year old female with obstructing common bile duct stone, subsequent cholangitis and pancreatitis now S/P ERCP with biliary stent placement that will require future ERCP for stent removal currently on PCA for pain control.     SICU RD f/u: Per team, pt with choledocholithiasis & necrotic pancreatitis. Patient's diet was advanced briefly with minimal po intake. Pt wsa made NPO yesterday secondary to pain; tiger tube was placed but now pulled out.     Source: Patient [ ]    Family [ ]     other [X ] medical record, team rounds    Diet : NPO except medications    +BM 10/25. Diarrhea noted 10/24.    Enteral /Parenteral Nutrition: n/a    Current Weight: 70.3Kg (10/25), 66.5Kg (10/17 dosing)  Edema: 1+ generalized    Pertinent Medications: MEDICATIONS  (STANDING):  dextrose 5% + sodium chloride 0.45% with potassium chloride 20 mEq/L 1000 milliLiter(s) (50 mL/Hr) IV Continuous <Continuous>  enoxaparin Injectable 40 milliGRAM(s) SubCutaneous daily  HYDROmorphone PCA (1 mG/mL) 30 milliLiter(s) PCA Continuous PCA Continuous  imipenem/cilastatin  IVPB 500 milliGRAM(s) IV Intermittent every 6 hours  metoclopramide Injectable 10 milliGRAM(s) IV Push every 6 hours  oxyCODONE  ER Tablet 20 milliGRAM(s) Oral every 12 hours  pantoprazole    Tablet 40 milliGRAM(s) Oral before breakfast  tetracaine/benzocaine/butamben Spray 1 Spray(s) Topical once    MEDICATIONS  (PRN):  benzocaine 15 mG/menthol 3.6 mG Lozenge 1 Lozenge Oral every 2 hours PRN Sore Throat  diphenhydrAMINE   Injectable 25 milliGRAM(s) IV Push every 4 hours PRN Rash and/or Itching  HYDROmorphone PCA (1 mG/mL) Rescue Clinician Bolus 1 milliGRAM(s) IV Push every 15 minutes PRN for Pain Scale GREATER THAN 6  ondansetron Injectable 4 milliGRAM(s) IV Push every 4 hours PRN Nausea and/or Vomiting    Pertinent Labs:  10-25 Na138 mmol/L Glu 101 mg/dL<H> K+ 4.1 mmol/L Cr  0.37 mg/dL<L> BUN 4 mg/dL<L> Phos 3.4 mg/dL Alb 2.5 g/dL<L>     Skin: no pressure injuries    Estimated Needs:   [X ] no change since previous assessment  [ ] recalculated:     Previous Nutrition Diagnosis:     [X ] Inadequate Protein-Energy Intake     Nutrition Diagnosis is [X ] ongoing     New Nutrition Diagnosis: [X ] not applicable       Interventions:     Recommend:  1) Initiate EN pending tiger tube placement and confirmation: Vital 1.2, initiate at 10m, increase by 10m q6hrs to goal rate 50ml/hr x 24 hours to provide 1200 ml formula, 1440cal/day, 90 Gm protein/day, 973ml free water; meets 22cal/Kg and 1.4Gm/Kg protein per dosing wt 66.5Kg).   2) Monitor weight, lab values, skin, EN provision and GI tolerance      Monitoring and Evaluation:   Follow up per protocol  RD to remain available for further nutritional interventions as indicated.   Anabela Molina, MS RD [facility], #[phone].

## 2017-10-25 NOTE — PROGRESS NOTE ADULT - SUBJECTIVE AND OBJECTIVE BOX
Day 8\6 of Anesthesia Pain Management Service    SUBJECTIVE: Patient is doing well with IV PCA    Pain Scale Score:	[X] Refer to charted pain scores    THERAPY:    [ ] IV PCA Morphine		[ ] 5 mg/mL	[ ] 1 mg/mL  [X] IV PCA Hydromorphone	[ ] 5 mg/mL	[X] 1 mg/mL  [ ] IV PCA Fentanyl		[ ] 50 micrograms/mL    Demand dose: 0.5 mg     Lockout: 6 minutes   Continuous Rate: 0 mg/hr  4 Hour Limit: 11 mg    MEDICATIONS  (STANDING):  cefoTEtan  IVPB 1 Gram(s) IV Intermittent every 12 hours  dextrose 5% + sodium chloride 0.45% with potassium chloride 20 mEq/L 1000 milliLiter(s) (50 mL/Hr) IV Continuous <Continuous>  enoxaparin Injectable 40 milliGRAM(s) SubCutaneous daily  HYDROmorphone PCA (1 mG/mL) 30 milliLiter(s) PCA Continuous PCA Continuous  metoclopramide Injectable 10 milliGRAM(s) IV Push every 6 hours  metroNIDAZOLE  IVPB 500 milliGRAM(s) IV Intermittent every 8 hours  oxyCODONE  ER Tablet 20 milliGRAM(s) Oral every 12 hours  pantoprazole    Tablet 40 milliGRAM(s) Oral before breakfast  tetracaine/benzocaine/butamben Spray 1 Spray(s) Topical once    MEDICATIONS  (PRN):  benzocaine 15 mG/menthol 3.6 mG Lozenge 1 Lozenge Oral every 2 hours PRN Sore Throat  diphenhydrAMINE   Injectable 25 milliGRAM(s) IV Push every 4 hours PRN Rash and/or Itching  HYDROmorphone PCA (1 mG/mL) Rescue Clinician Bolus 1 milliGRAM(s) IV Push every 15 minutes PRN for Pain Scale GREATER THAN 6  ondansetron Injectable 4 milliGRAM(s) IV Push every 4 hours PRN Nausea and/or Vomiting      OBJECTIVE:    Sedation Score:	[ X] Alert	[ ] Drowsy 	[ ] Arousable	[ ] Asleep	[ ] Unresponsive    Side Effects:	[X ] None	[ ] Nausea	[ ] Vomiting	[ ] Pruritus  		[ ] Other:    Vital Signs Last 24 Hrs  T(C): 36.9 (25 Oct 2017 07:00), Max: 39.4 (25 Oct 2017 05:00)  T(F): 98.5 (25 Oct 2017 07:00), Max: 103 (25 Oct 2017 05:00)  HR: 84 (25 Oct 2017 10:00) (68 - 116)  BP: 96/61 (25 Oct 2017 10:00) (96/61 - 135/82)  BP(mean): 74 (25 Oct 2017 10:00) (69 - 104)  RR: 28 (25 Oct 2017 10:00) (13 - 36)  SpO2: 94% (25 Oct 2017 10:00) (90% - 98%)    ASSESSMENT/ PLAN    Therapy to  be:               [X] Continued   [ ] Discontinued   [ ] Changed to PRN Analgesics    Documentation and Verification of current medications:   [X] Done	[ ] Not done, not eligible    Comments: OOB in chair. Continues to report abdominal pain. Continue PCA.

## 2017-10-25 NOTE — PROGRESS NOTE ADULT - ATTENDING COMMENTS
Dr. Nichols (Attending Physician)  Pt. with persistent fevers and leukocytosis.  GI requesting 7-10 day course of abx.  Would avoid carbapenems because of potential for drug resistant organisms and culture negative.  Will change imipenem to cefotetan flagyl until 10/27.  Replace tiger tube to start enteric feeding since patient. did not tolerate eating secondary to pain. No evidence of cholecystitis on ultrasound. Dr. Nichols (Attending Physician)  Pain still difficult to control. On PCA and oxycodone ER. Intermittent toradol given for pain. Pt. with persistent fevers and leukocytosis.  After discussion with GI will complete 10 day course of abx.  Would avoid carbapenems because of potential for drug resistant organisms and culture negative.  Will change imipenem to cefotetan flagyl until 10/27.  Replace tiger tube to start enteric feeding since patient. did not tolerate eating secondary to pain. No evidence of cholecystitis on ultrasound.

## 2017-10-25 NOTE — PROGRESS NOTE ADULT - SUBJECTIVE AND OBJECTIVE BOX
Chief Complaint:  Patient is a 57y old  Female who presents with a chief complaint of Abdominal pain (16 Oct 2017 09:50)      Interval Events: Patient with ongoing epigastric abd pain. She denies vomiting. She had fever @ 5 AM to 103. ABX were resumed - she is receiving cefotetan and metronidazole.     she ate breakfast this AM without nausea or vomiting.     Allergies:  No Known Allergies      Hospital Medications:  dextrose 5% + sodium chloride 0.45% with potassium chloride 20 mEq/L 1000 milliLiter(s) IV Continuous <Continuous>  diphenhydrAMINE   Injectable 25 milliGRAM(s) IV Push every 4 hours PRN  enoxaparin Injectable 40 milliGRAM(s) SubCutaneous daily  HYDROmorphone  Injectable 0.5 milliGRAM(s) IV Push every 4 hours PRN  HYDROmorphone PCA (1 mG/mL) 30 milliLiter(s) PCA Continuous PCA Continuous  HYDROmorphone PCA (1 mG/mL) Rescue Clinician Bolus 0.5 milliGRAM(s) IV Push every 15 minutes PRN  naloxone Injectable 0.1 milliGRAM(s) IV Push every 3 minutes PRN  ondansetron Injectable 4 milliGRAM(s) IV Push every 6 hours PRN  oxyCODONE  ER Tablet 20 milliGRAM(s) Oral every 12 hours  pantoprazole    Tablet 40 milliGRAM(s) Oral before breakfast      PMHX/PSHX:  Kidney stone  Hypertension  No pertinent past medical history  Kidney stones  History of appendectomy      Family history:  No pertinent family history in first degree relatives      ROS:     General:  (+) fevers, (-) chills,  Eyes:  Good vision, no reported pain  ENT:  No sore throat, pain, runny nose  CV:  No chest pain, palpitations  Resp:  No cough, wheezing, SOB  GI:  See HPI  :  No pain, bleeding, incontinence, nocturia  Muscle:  No pain, weakness  Neuro:  No weakness, tingling, memory problems  Psych:  No fatigue, insomnia, mood problems, depression  Endocrine:  No cold/heat intolerance  Heme:  No petechiae, ecchymosis, easy bruising  Skin:  No rash, edema      PHYSICAL EXAM:   Vital Signs Last 24 Hrs  T(C): 36.9 (25 Oct 2017 15:00), Max: 39.4 (25 Oct 2017 05:00)  T(F): 98.5 (25 Oct 2017 15:00), Max: 103 (25 Oct 2017 05:00)  HR: 90 (25 Oct 2017 16:00) (82 - 116)  BP: 121/65 (25 Oct 2017 16:00) (96/54 - 135/82)  BP(mean): 87 (25 Oct 2017 16:00) (72 - 104)  RR: 18 (25 Oct 2017 16:00) (13 - 51)  SpO2: 97% (25 Oct 2017 16:00) (90% - 97%)    GENERAL:  NAD  HEENT:  sclera anicteric  CHEST:  no respiratory distress, CTAB  HEART:  RRR, no MRG, no edema  ABDOMEN:  distended, tender to palpation throughout with (+) guarding, no rigidity   EXTREMITIES:  no cyanosis, no edema  SKIN:  No rash  NEURO:  Alert, oriented      LABS:                                   10.0   29.0  )-----------( 513      ( 25 Oct 2017 03:40 )             29.6     10-25    138  |  100  |  4<L>  ----------------------------<  101<H>  4.1   |  25  |  0.37<L>    Ca    8.0<L>      25 Oct 2017 03:40  Phos  3.4     10-25  Mg     1.7     10-25    TPro  5.8<L>  /  Alb  2.5<L>  /  TBili  0.3  /  DBili  0.1  /  AST  23  /  ALT  15  /  AlkPhos  179<H>  10-25          Imaging:  < from: CT Abdomen and Pelvis w/ IV Cont (10.21.17 @ 16:37) >  IMPRESSION:    Acute necrotizing pancreatitis,30-50%. Peripancreatic fluid.    Short segment thrombus of the splenic vein at the portosplenic confluence.    Persistent anterior right portal vein thrombosis with perfusional   abnormalities of the right hepatic lobe as described above.    Left lower lobe atelectasis /consolidation and small left pleural   effusion. Trace right pleural effusion with adjacent compressive   atelectasis.    Interval placement of CBD stent.    < end of copied text >    < from: ERCP (10.17.17 @ 11:24) >  Impression:      Ascending cholangitis with choledocholithiasis s/p ERCP with biliary stent placement  Recommendation:      - [facility].                                       - Consider cholecystectomy                                       - Repeat ERCP with stent removal, sphincterotomy, and stone extraction at later date    < end of copied text >      UA - (-) LE, (-) nitrite Chief Complaint:  Patient is a 57y old  Female who presents with a chief complaint of Abdominal pain (16 Oct 2017 09:50)      Interval Events: Patient with ongoing epigastric abd pain. She denies vomiting. She had fever @ 5 AM to 103. ABX were resumed - she is receiving cefotetan and metronidazole.     Tiger tube NGT was placed today with plan for tube feeding.     Allergies:  No Known Allergies      Hospital Medications:  dextrose 5% + sodium chloride 0.45% with potassium chloride 20 mEq/L 1000 milliLiter(s) IV Continuous <Continuous>  diphenhydrAMINE   Injectable 25 milliGRAM(s) IV Push every 4 hours PRN  enoxaparin Injectable 40 milliGRAM(s) SubCutaneous daily  HYDROmorphone  Injectable 0.5 milliGRAM(s) IV Push every 4 hours PRN  HYDROmorphone PCA (1 mG/mL) 30 milliLiter(s) PCA Continuous PCA Continuous  HYDROmorphone PCA (1 mG/mL) Rescue Clinician Bolus 0.5 milliGRAM(s) IV Push every 15 minutes PRN  naloxone Injectable 0.1 milliGRAM(s) IV Push every 3 minutes PRN  ondansetron Injectable 4 milliGRAM(s) IV Push every 6 hours PRN  oxyCODONE  ER Tablet 20 milliGRAM(s) Oral every 12 hours  pantoprazole    Tablet 40 milliGRAM(s) Oral before breakfast      PMHX/PSHX:  Kidney stone  Hypertension  No pertinent past medical history  Kidney stones  History of appendectomy      Family history:  No pertinent family history in first degree relatives      ROS:     General:  (+) fevers, (-) chills,  Eyes:  Good vision, no reported pain  ENT:  No sore throat, pain, runny nose  CV:  No chest pain, palpitations  Resp:  No cough, wheezing, SOB  GI:  See HPI  :  No pain, bleeding, incontinence, nocturia  Muscle:  No pain, weakness  Neuro:  No weakness, tingling, memory problems  Psych:  No fatigue, insomnia, mood problems, depression  Endocrine:  No cold/heat intolerance  Heme:  No petechiae, ecchymosis, easy bruising  Skin:  No rash, edema      PHYSICAL EXAM:   Vital Signs Last 24 Hrs  T(C): 36.9 (25 Oct 2017 15:00), Max: 39.4 (25 Oct 2017 05:00)  T(F): 98.5 (25 Oct 2017 15:00), Max: 103 (25 Oct 2017 05:00)  HR: 90 (25 Oct 2017 16:00) (82 - 116)  BP: 121/65 (25 Oct 2017 16:00) (96/54 - 135/82)  BP(mean): 87 (25 Oct 2017 16:00) (72 - 104)  RR: 18 (25 Oct 2017 16:00) (13 - 51)  SpO2: 97% (25 Oct 2017 16:00) (90% - 97%)    GENERAL:  NAD  HEENT:  sclera anicteric, NGT in place  CHEST:  no respiratory distress, CTAB  HEART:  RRR, no MRG, no edema  ABDOMEN:  distended, tender to palpation throughout with (+) guarding, no rigidity   EXTREMITIES:  no cyanosis, no edema  SKIN:  No rash  NEURO:  Alert, oriented      LABS:                                   10.0   29.0  )-----------( 513      ( 25 Oct 2017 03:40 )             29.6     10-25    138  |  100  |  4<L>  ----------------------------<  101<H>  4.1   |  25  |  0.37<L>    Ca    8.0<L>      25 Oct 2017 03:40  Phos  3.4     10-25  Mg     1.7     10-25    TPro  5.8<L>  /  Alb  2.5<L>  /  TBili  0.3  /  DBili  0.1  /  AST  23  /  ALT  15  /  AlkPhos  179<H>  10-25          Imaging:  < from: CT Abdomen and Pelvis w/ IV Cont (10.21.17 @ 16:37) >  IMPRESSION:    Acute necrotizing pancreatitis,30-50%. Peripancreatic fluid.    Short segment thrombus of the splenic vein at the portosplenic confluence.    Persistent anterior right portal vein thrombosis with perfusional   abnormalities of the right hepatic lobe as described above.    Left lower lobe atelectasis /consolidation and small left pleural   effusion. Trace right pleural effusion with adjacent compressive   atelectasis.    Interval placement of CBD stent.    < end of copied text >    < from: ERCP (10.17.17 @ 11:24) >  Impression:      Ascending cholangitis with choledocholithiasis s/p ERCP with biliary stent placement  Recommendation:      - [facility].                                       - Consider cholecystectomy                                       - Repeat ERCP with stent removal, sphincterotomy, and stone extraction at later date    < end of copied text >      UA - (-) LE, (-) nitrite Chief Complaint:  Patient is a 57y old  Female who presents with a chief complaint of Abdominal pain (16 Oct 2017 09:50)      Interval Events: Patient with ongoing epigastric abd pain. She denies vomiting. She had fever @ 5 AM to 103. ABX were resumed - she is receiving cefotetan and metronidazole.     Tiger tube NJT was placed today with plan for tube feeding.     Allergies:  No Known Allergies      Hospital Medications:  dextrose 5% + sodium chloride 0.45% with potassium chloride 20 mEq/L 1000 milliLiter(s) IV Continuous <Continuous>  diphenhydrAMINE   Injectable 25 milliGRAM(s) IV Push every 4 hours PRN  enoxaparin Injectable 40 milliGRAM(s) SubCutaneous daily  HYDROmorphone  Injectable 0.5 milliGRAM(s) IV Push every 4 hours PRN  HYDROmorphone PCA (1 mG/mL) 30 milliLiter(s) PCA Continuous PCA Continuous  HYDROmorphone PCA (1 mG/mL) Rescue Clinician Bolus 0.5 milliGRAM(s) IV Push every 15 minutes PRN  naloxone Injectable 0.1 milliGRAM(s) IV Push every 3 minutes PRN  ondansetron Injectable 4 milliGRAM(s) IV Push every 6 hours PRN  oxyCODONE  ER Tablet 20 milliGRAM(s) Oral every 12 hours  pantoprazole    Tablet 40 milliGRAM(s) Oral before breakfast      PMHX/PSHX:  Kidney stone  Hypertension  No pertinent past medical history  Kidney stones  History of appendectomy      Family history:  No pertinent family history in first degree relatives      ROS:     General:  (+) fevers, (-) chills,  Eyes:  Good vision, no reported pain  ENT:  No sore throat, pain, runny nose  CV:  No chest pain, palpitations  Resp:  No cough, wheezing, SOB  GI:  See HPI  :  No pain, bleeding, incontinence, nocturia  Muscle:  No pain, weakness  Neuro:  No weakness, tingling, memory problems  Psych:  No fatigue, insomnia, mood problems, depression  Endocrine:  No cold/heat intolerance  Heme:  No petechiae, ecchymosis, easy bruising  Skin:  No rash, edema      PHYSICAL EXAM:   Vital Signs Last 24 Hrs  T(C): 36.9 (25 Oct 2017 15:00), Max: 39.4 (25 Oct 2017 05:00)  T(F): 98.5 (25 Oct 2017 15:00), Max: 103 (25 Oct 2017 05:00)  HR: 90 (25 Oct 2017 16:00) (82 - 116)  BP: 121/65 (25 Oct 2017 16:00) (96/54 - 135/82)  BP(mean): 87 (25 Oct 2017 16:00) (72 - 104)  RR: 18 (25 Oct 2017 16:00) (13 - 51)  SpO2: 97% (25 Oct 2017 16:00) (90% - 97%)    GENERAL:  NAD  HEENT:  sclera anicteric, NGT in place  CHEST:  no respiratory distress, CTAB  HEART:  RRR, no MRG, no edema  ABDOMEN:  distended, tender to palpation throughout with (+) guarding, no rigidity   EXTREMITIES:  no cyanosis, no edema  SKIN:  No rash  NEURO:  Alert, oriented      LABS:                                   10.0   29.0  )-----------( 513      ( 25 Oct 2017 03:40 )             29.6     10-25    138  |  100  |  4<L>  ----------------------------<  101<H>  4.1   |  25  |  0.37<L>    Ca    8.0<L>      25 Oct 2017 03:40  Phos  3.4     10-25  Mg     1.7     10-25    TPro  5.8<L>  /  Alb  2.5<L>  /  TBili  0.3  /  DBili  0.1  /  AST  23  /  ALT  15  /  AlkPhos  179<H>  10-25          Imaging:  < from: CT Abdomen and Pelvis w/ IV Cont (10.21.17 @ 16:37) >  IMPRESSION:    Acute necrotizing pancreatitis,30-50%. Peripancreatic fluid.    Short segment thrombus of the splenic vein at the portosplenic confluence.    Persistent anterior right portal vein thrombosis with perfusional   abnormalities of the right hepatic lobe as described above.    Left lower lobe atelectasis /consolidation and small left pleural   effusion. Trace right pleural effusion with adjacent compressive   atelectasis.    Interval placement of CBD stent.    < end of copied text >    < from: ERCP (10.17.17 @ 11:24) >  Impression:      Ascending cholangitis with choledocholithiasis s/p ERCP with biliary stent placement  Recommendation:      - [facility].                                       - Consider cholecystectomy                                       - Repeat ERCP with stent removal, sphincterotomy, and stone extraction at later date    < end of copied text >      UA - (-) LE, (-) nitrite

## 2017-10-25 NOTE — PROGRESS NOTE ADULT - ASSESSMENT
58 yo woman with gallstone pancreatitis s/p ERCP on 10/17 for cholangitis, pancreatitis c/b pancreatic necrosis, splenic vein thrombus now with fever, marked leukocytosis and diarrhea, C diff (-). DDX pancreatitis with pancreatic necrosis vs infected necrosis vs cholecystitis. 58 yo woman with necrotizing pancreatitis, cholangitis, s/p ERCP/biliary stent palcement last week, splenic vein thrombus now with fever, marked leukocytosis and diarrhea, C diff (-). DDX pancreatitis with pancreatic necrosis vs infected necrosis vs cholecystitis.

## 2017-10-25 NOTE — PROGRESS NOTE ADULT - SUBJECTIVE AND OBJECTIVE BOX
HISTORY  57yF w/ PMH significant for HTN and nephrolithiasis, s/p appendectomy that presented to [facility] ED on 10/16/2017 c/o acute onset severe sharp stabbing upper abdominal pain that radiates to the back. She says the pain woke her up in the middle of the night causing her to develop nausea and NBNB emesis x3 episodes. She presented to the emergency department hypotensive was worked up with abdominal imaging findings concerning for CBD obstructing stone and was admitted, MRCP ordered and completed, and showed a distal CBD stone elevating concern for acute obstructive cholangitis. Labs resulted with evidence of pancreatitis with lipase > 8500. SICU was consulted as she was tachycardic to the 130s and breaking through dilaudid, tylenol, and toradol. GI was consulted urgently and ERCP was performed with biliary stent placement. Pt continued to have pain after placement of the ERCP and this has been attributed to pancreatic necrosis.     24 HOUR EVENTS: Diet downgraded to NPO yesterday AM because patient had increased pain after breakfast. RUQ sono did not show evidence of cholecystitis, so her pain is primary attributed to her pancreas. A tiger tube was placed, and is awaiting AM AXR to confirm placement. Febrile x 1 overnight. Imipenem was restarted at gastroenterology's request for patient to continue antibiotics for at least 2 weeks after ERCP.    SUBJECTIVE/ROS:  [ x] A ten-point review of systems was otherwise negative except as noted.  [ ] Due to altered mental status/intubation, subjective information were not able to be obtained from the patient. History was obtained, to the extent possible, from review of the chart and collateral sources of information.      NEURO  CAM ICU: neg  Exam: A&Ox4  Meds: HYDROmorphone PCA (1 mG/mL) 30 milliLiter(s) PCA Continuous PCA Continuous  HYDROmorphone PCA (1 mG/mL) Rescue Clinician Bolus 1 milliGRAM(s) IV Push every 15 minutes PRN  metoclopramide Injectable 10 milliGRAM(s) IV Push every 6 hours  ondansetron Injectable 4 milliGRAM(s) IV Push every 4 hours PRN  oxyCODONE  ER Tablet 20 milliGRAM(s) Oral every 12 hours  [x] Adequacy of sedation and pain control has been assessed and adjusted      RESPIRATORY  RR: 26 (23 Oct 2017 23:00) (12 - 38)  SpO2: 94% (23 Oct 2017 23:00) (83% - 99%)  Wt(kg): --  Exam: unlabored, clear to auscultation bilaterally  Meds: diphenhydrAMINE   Injectable 25 milliGRAM(s) IV Push every 4 hours PRN Rash and/or Itching      CARDIOVASCULAR  Vital Signs Last 24 Hrs  HR: 96 (23 Oct 2017 23:00) (86 - 112)  BP: 110/67 (23 Oct 2017 23:00) (110/67 - 141/90)  BP(mean): 81 (23 Oct 2017 23:00) (81 - 109)      Exam: no murmurs  Cardiac Rhythm: sinus rhtythm  Perfusion     [x ]Adequate   [ ]Inadequate  Mentation   [x ]Normal       [ ]Reduced  Extremities  [x ]Warm         [ ]Cool  Volume Status [ ]Hypervolemic [x ]Euvolemic [ ]Hypovolemic        Meds: none      GI/NUTRITION  Exam: soft, moderately tender, especially in epigastrium  Diet: regular   Meds: pantoprazole  Injectable 40 milliGRAM(s) IV Push daily      BMP (10-25 @ 03:40)       138     |  100     |  4<L>  			Ca++ --      Ca 8.0<L>       ---------------------------------( 101<H>		Mg 1.7          4.1     |  25      |  0.37<L>			Ph 3.4     BMP (10-24 @ 03:14)       136     |  99      |  2<L>  			Ca++ --      Ca 7.9<L>       ---------------------------------( 102<H>		Mg 2.0          5.0     |  22      |  0.47<L>			Ph 3.4         LFTs (10-25 @ 03:40)      TPro 5.8<L> / Alb 2.5<L> / TBili 0.3 / DBili 0.1 / AST 23 / ALT 15 / AlkPhos 179<H>  LFTs (10-24 @ 03:14)      TPro 5.9<L> / Alb 2.6<L> / TBili 0.3 / DBili <0.1 / AST 35 / ALT 16 / AlkPhos 197<H>       GENITOURINARY        [ ] Lara catheter, indication: N/A  Meds: dextrose 5% + sodium chloride 0.45% with potassium chloride 20 mEq/L 1000 milliLiter(s) IV Continuous <Continuous>        HEMATOLOGIC  Meds: enoxaparin Injectable 40 milliGRAM(s) SubCutaneous daily  [x] VTE Prophylaxis    CBC (10-25 @ 03:40)                          10.0<L>                   29.0<H>  )--------------(  513<H>     --    % Neuts, --    % Lymphs, ANC: --                              29.6<L>  CBC (10-24 @ 03:14)                          10.3<L>                   30.8<H>  )--------------(  413<H>     --    % Neuts, --    % Lymphs, ANC: --                              30.8<L>      INFECTIOUS DISEASES    Recent Cultures:  Specimen Source: .Blood Blood-Peripheral, 10-21 @ 23:31; Results   No growth to date.; Gram Stain: --; Organism: --  Specimen Source: .Blood Blood, 10-21 @ 23:30; Results   No growth to date.; Gram Stain: --; Organism: --  Specimen Source: .Blood Blood, 10-21 @ 00:15; Results   No growth to date.; Gram Stain: --; Organism: --  Specimen Source: .Blood Blood-Venous, 10-20 @ 21:57; Results   No growth to date.; Gram Stain: --; Organism: --  Specimen Source: .Blood Blood-Venous, 10-19 @ 18:20; Results   No growth to date.; Gram Stain: --; Organism: --  Specimen Source: .Blood Blood-Peripheral, 10-17 @ 07:32; Results   No growth at 5 days.; Gram Stain: --; Organism: --    Meds: imipenem/cilastatin  IVPB 500 milliGRAM(s) IV Intermittent every 6 hours      ENDOCRINE  Meds: none      ACCESS DEVICES:  [x ] Peripheral IV  [ ] Central Venous Line	[ ] R	[ ] L	[ ] IJ	[ ] Fem	[ ] SC	Placed:   [ ] Arterial Line		[ ] R	[ ] L	[ ] Fem	[ ] Rad	[ ] Ax	Placed:   [ ] PICC:					[ ] Mediport  [ ] Urinary Catheter, Date Placed:   [ ] Necessity of urinary, arterial, and venous catheters discussed    OTHER MEDICATIONS:  benzocaine 15 mG/menthol 3.6 mG Lozenge 1 Lozenge Oral four times a day PRN  naloxone Injectable 0.1 milliGRAM(s) IV Push every 3 minutes PRN      CODE STATUS: Full    IMAGING: no new this AM HISTORY  57yF w/ PMH significant for HTN and nephrolithiasis, s/p appendectomy that presented to [facility] ED on 10/16/2017 c/o acute onset severe sharp stabbing upper abdominal pain that radiates to the back. She says the pain woke her up in the middle of the night causing her to develop nausea and NBNB emesis x3 episodes. She presented to the emergency department hypotensive was worked up with abdominal imaging findings concerning for CBD obstructing stone and was admitted, MRCP ordered and completed, and showed a distal CBD stone elevating concern for acute obstructive cholangitis. Labs resulted with evidence of pancreatitis with lipase > 8500. SICU was consulted as she was tachycardic to the 130s and breaking through dilaudid, tylenol, and toradol. GI was consulted urgently and ERCP was performed with biliary stent placement. Pt continued to have pain after placement of the ERCP and this has been attributed to pancreatic necrosis.     24 HOUR EVENTS: Diet downgraded to NPO yesterday AM because patient had increased pain after breakfast. RUQ sono did not show evidence of cholecystitis, so her pain is primary attributed to her pancreas. A tiger tube was placed, and is awaiting AM AXR to confirm placement. Febrile x 1 overnight. Imipenem was restarted at gastroenterology's request for patient to continue antibiotics for at least 2 weeks after ERCP.    SUBJECTIVE/ROS:  [ x] A ten-point review of systems was otherwise negative except as noted.  [ ] Due to altered mental status/intubation, subjective information were not able to be obtained from the patient. History was obtained, to the extent possible, from review of the chart and collateral sources of information.      NEURO  CAM ICU: neg  Exam: A&Ox4  Meds: HYDROmorphone PCA (1 mG/mL) 30 milliLiter(s) PCA Continuous PCA Continuous  HYDROmorphone PCA (1 mG/mL) Rescue Clinician Bolus 1 milliGRAM(s) IV Push every 15 minutes PRN  metoclopramide Injectable 10 milliGRAM(s) IV Push every 6 hours  ondansetron Injectable 4 milliGRAM(s) IV Push every 4 hours PRN  oxyCODONE  ER Tablet 20 milliGRAM(s) Oral every 12 hours  [x] Adequacy of sedation and pain control has been assessed and adjusted      RESPIRATORY  RR: 26 (23 Oct 2017 23:00) (12 - 38)  SpO2: 94% (23 Oct 2017 23:00) (83% - 99%)  Wt(kg): --  Exam: unlabored, clear to auscultation bilaterally  Meds: diphenhydrAMINE   Injectable 25 milliGRAM(s) IV Push every 4 hours PRN Rash and/or Itching      CARDIOVASCULAR  Vital Signs Last 24 Hrs  HR: 96 (23 Oct 2017 23:00) (86 - 112)  BP: 110/67 (23 Oct 2017 23:00) (110/67 - 141/90)  BP(mean): 81 (23 Oct 2017 23:00) (81 - 109)      Exam: no murmurs  Cardiac Rhythm: sinus rhtythm  Perfusion     [x ]Adequate   [ ]Inadequate  Mentation   [x ]Normal       [ ]Reduced  Extremities  [x ]Warm         [ ]Cool  Volume Status [ ]Hypervolemic [x ]Euvolemic [ ]Hypovolemic        Meds: none      GI/NUTRITION  Exam: soft, moderately tender, especially in epigastrium  Diet: regular   Meds: pantoprazole  Injectable 40 milliGRAM(s) IV Push daily      BMP (10-25 @ 03:40)       138     |  100     |  4<L>  			Ca++ --      Ca 8.0<L>       ---------------------------------( 101<H>		Mg 1.7          4.1     |  25      |  0.37<L>			Ph 3.4     BMP (10-24 @ 03:14)       136     |  99      |  2<L>  			Ca++ --      Ca 7.9<L>       ---------------------------------( 102<H>		Mg 2.0          5.0     |  22      |  0.47<L>			Ph 3.4         LFTs (10-25 @ 03:40)      TPro 5.8<L> / Alb 2.5<L> / TBili 0.3 / DBili 0.1 / AST 23 / ALT 15 / AlkPhos 179<H>  LFTs (10-24 @ 03:14)      TPro 5.9<L> / Alb 2.6<L> / TBili 0.3 / DBili <0.1 / AST 35 / ALT 16 / AlkPhos 197<H>       GENITOURINARY  I&O's Detail    24 Oct 2017 07:01  -  25 Oct 2017 07:00  --------------------------------------------------------  IN:    dextrose 5% + sodium chloride 0.45% with potassium chloride 20 mEq/L: 1000 mL    Solution: 400 mL    Solution: 200 mL    Solution: 50 mL  Total IN: 1650 mL    OUT:    Voided: 2200 mL  Total OUT: 2200 mL    Total NET: -550 mL            [ ] Lara catheter, indication: N/A  Meds: dextrose 5% + sodium chloride 0.45% with potassium chloride 20 mEq/L 1000 milliLiter(s) IV Continuous <Continuous>        HEMATOLOGIC  Meds: enoxaparin Injectable 40 milliGRAM(s) SubCutaneous daily  [x] VTE Prophylaxis    CBC (10-25 @ 03:40)                          10.0<L>                   29.0<H>  )--------------(  513<H>     --    % Neuts, --    % Lymphs, ANC: --                              29.6<L>  CBC (10-24 @ 03:14)                          10.3<L>                   30.8<H>  )--------------(  413<H>     --    % Neuts, --    % Lymphs, ANC: --                              30.8<L>      INFECTIOUS DISEASES    Recent Cultures:  Specimen Source: .Blood Blood-Peripheral, 10-21 @ 23:31; Results   No growth to date.; Gram Stain: --; Organism: --  Specimen Source: .Blood Blood, 10-21 @ 23:30; Results   No growth to date.; Gram Stain: --; Organism: --  Specimen Source: .Blood Blood, 10-21 @ 00:15; Results   No growth to date.; Gram Stain: --; Organism: --  Specimen Source: .Blood Blood-Venous, 10-20 @ 21:57; Results   No growth to date.; Gram Stain: --; Organism: --  Specimen Source: .Blood Blood-Venous, 10-19 @ 18:20; Results   No growth to date.; Gram Stain: --; Organism: --  Specimen Source: .Blood Blood-Peripheral, 10-17 @ 07:32; Results   No growth at 5 days.; Gram Stain: --; Organism: --    Meds: imipenem/cilastatin  IVPB 500 milliGRAM(s) IV Intermittent every 6 hours      ENDOCRINE  Meds: none      ACCESS DEVICES:  [x ] Peripheral IV  [ ] Central Venous Line	[ ] R	[ ] L	[ ] IJ	[ ] Fem	[ ] SC	Placed:   [ ] Arterial Line		[ ] R	[ ] L	[ ] Fem	[ ] Rad	[ ] Ax	Placed:   [ ] PICC:					[ ] Mediport  [ ] Urinary Catheter, Date Placed:   [ ] Necessity of urinary, arterial, and venous catheters discussed    OTHER MEDICATIONS:  benzocaine 15 mG/menthol 3.6 mG Lozenge 1 Lozenge Oral four times a day PRN  naloxone Injectable 0.1 milliGRAM(s) IV Push every 3 minutes PRN      CODE STATUS: Full    IMAGING: no new this AM HISTORY  57yF w/ PMH significant for HTN and nephrolithiasis, s/p appendectomy that presented to [facility] ED on 10/16/2017 c/o acute onset severe sharp stabbing upper abdominal pain that radiates to the back. She says the pain woke her up in the middle of the night causing her to develop nausea and NBNB emesis x3 episodes. She presented to the emergency department hypotensive was worked up with abdominal imaging findings concerning for CBD obstructing stone and was admitted, MRCP ordered and completed, and showed a distal CBD stone elevating concern for acute obstructive cholangitis. Labs resulted with evidence of pancreatitis with lipase > 8500. SICU was consulted as she was tachycardic to the 130s and breaking through dilaudid, tylenol, and toradol. GI was consulted urgently and ERCP was performed with biliary stent placement. Pt continued to have pain after placement of the ERCP and this has been attributed to pancreatic necrosis.     24 HOUR EVENTS: Diet downgraded to NPO yesterday AM because patient had increased pain after breakfast. RUQ sono did not show evidence of cholecystitis, so her pain is primary attributed to her pancreas. A tiger tube was placed, and is awaiting AM AXR to confirm placement. Febrile x 1 overnight. Imipenem was restarted at gastroenterology's request for patient to continue antibiotics for at least 2 weeks after ERCP.    SUBJECTIVE/ROS:  [ x] A ten-point review of systems was otherwise negative except as noted.  [ ] Due to altered mental status/intubation, subjective information were not able to be obtained from the patient. History was obtained, to the extent possible, from review of the chart and collateral sources of information.      NEURO  CAM ICU: neg  Exam: A&Ox4  Meds: HYDROmorphone PCA (1 mG/mL) 30 milliLiter(s) PCA Continuous PCA Continuous  HYDROmorphone PCA (1 mG/mL) Rescue Clinician Bolus 1 milliGRAM(s) IV Push every 15 minutes PRN  metoclopramide Injectable 10 milliGRAM(s) IV Push every 6 hours  ondansetron Injectable 4 milliGRAM(s) IV Push every 4 hours PRN  oxyCODONE  ER Tablet 20 milliGRAM(s) Oral every 12 hours  [x] Adequacy of sedation and pain control has been assessed and adjusted      RESPIRATORY  RR: 26 (23 Oct 2017 23:00) (12 - 38)  SpO2: 94% (23 Oct 2017 23:00) (83% - 99%)  Wt(kg): --  Exam: unlabored, clear to auscultation bilaterally  Meds: diphenhydrAMINE   Injectable 25 milliGRAM(s) IV Push every 4 hours PRN Rash and/or Itching      CARDIOVASCULAR  Vital Signs Last 24 Hrs  HR: 96 (23 Oct 2017 23:00) (86 - 112)  BP: 110/67 (23 Oct 2017 23:00) (110/67 - 141/90)  BP(mean): 81 (23 Oct 2017 23:00) (81 - 109)      Exam: no murmurs  Cardiac Rhythm: sinus rhtythm  Perfusion     [x ]Adequate   [ ]Inadequate  Mentation   [x ]Normal       [ ]Reduced  Extremities  [x ]Warm         [ ]Cool  Volume Status [ ]Hypervolemic [x ]Euvolemic [ ]Hypovolemic    Meds: none    GI/NUTRITION  Exam: soft, moderately tender, especially in epigastrium  Diet: regular   Meds: pantoprazole  Injectable 40 milliGRAM(s) IV Push daily    BMP (10-25 @ 03:40)       138     |  100     |  4<L>  			Ca++ --      Ca 8.0<L>       ---------------------------------( 101<H>		Mg 1.7          4.1     |  25      |  0.37<L>			Ph 3.4     BMP (10-24 @ 03:14)       136     |  99      |  2<L>  			Ca++ --      Ca 7.9<L>       ---------------------------------( 102<H>		Mg 2.0          5.0     |  22      |  0.47<L>			Ph 3.4         LFTs (10-25 @ 03:40)      TPro 5.8<L> / Alb 2.5<L> / TBili 0.3 / DBili 0.1 / AST 23 / ALT 15 / AlkPhos 179<H>  LFTs (10-24 @ 03:14)      TPro 5.9<L> / Alb 2.6<L> / TBili 0.3 / DBili <0.1 / AST 35 / ALT 16 / AlkPhos 197<H>       GENITOURINARY  I&O's Detail    24 Oct 2017 07:01  -  25 Oct 2017 07:00  --------------------------------------------------------  IN:    dextrose 5% + sodium chloride 0.45% with potassium chloride 20 mEq/L: 1000 mL    Solution: 400 mL    Solution: 200 mL    Solution: 50 mL  Total IN: 1650 mL    OUT:    Voided: 2200 mL  Total OUT: 2200 mL    Total NET: -550 mL    [ ] Lara catheter, indication: N/A  Meds: dextrose 5% + sodium chloride 0.45% with potassium chloride 20 mEq/L 1000 milliLiter(s) IV Continuous <Continuous>      HEMATOLOGIC  Meds: enoxaparin Injectable 40 milliGRAM(s) SubCutaneous daily  [x] VTE Prophylaxis    CBC (10-25 @ 03:40)                          10.0<L>                   29.0<H>  )--------------(  513<H>     --    % Neuts, --    % Lymphs, ANC: --                              29.6<L>  CBC (10-24 @ 03:14)                          10.3<L>                   30.8<H>  )--------------(  413<H>     --    % Neuts, --    % Lymphs, ANC: --                              30.8<L>      INFECTIOUS DISEASES    Recent Cultures:  Specimen Source: .Blood Blood-Peripheral, 10-21 @ 23:31; Results   No growth to date.; Gram Stain: --; Organism: --  Specimen Source: .Blood Blood, 10-21 @ 23:30; Results   No growth to date.; Gram Stain: --; Organism: --  Specimen Source: .Blood Blood, 10-21 @ 00:15; Results   No growth to date.; Gram Stain: --; Organism: --  Specimen Source: .Blood Blood-Venous, 10-20 @ 21:57; Results   No growth to date.; Gram Stain: --; Organism: --  Specimen Source: .Blood Blood-Venous, 10-19 @ 18:20; Results   No growth to date.; Gram Stain: --; Organism: --  Specimen Source: .Blood Blood-Peripheral, 10-17 @ 07:32; Results   No growth at 5 days.; Gram Stain: --; Organism: --    Meds: imipenem/cilastatin  IVPB 500 milliGRAM(s) IV Intermittent every 6 hours      ENDOCRINE  Meds: none      ACCESS DEVICES:  [x ] Peripheral IV  [ ] Central Venous Line	[ ] R	[ ] L	[ ] IJ	[ ] Fem	[ ] SC	Placed:   [ ] Arterial Line		[ ] R	[ ] L	[ ] Fem	[ ] Rad	[ ] Ax	Placed:   [ ] PICC:					[ ] Mediport  [ ] Urinary Catheter, Date Placed:   [ ] Necessity of urinary, arterial, and venous catheters discussed    OTHER MEDICATIONS:  benzocaine 15 mG/menthol 3.6 mG Lozenge 1 Lozenge Oral four times a day PRN  naloxone Injectable 0.1 milliGRAM(s) IV Push every 3 minutes PRN      CODE STATUS: Full    IMAGING: no new this AM

## 2017-10-25 NOTE — PROGRESS NOTE ADULT - ATTENDING COMMENTS
As above.  Necrotizing pancreatitis with clinically improved abd pain  Tolerating low volume tube feeds.  No signs of sepsis, although she has developed severe leukocytosis.  If leukocytosis doesn't improve, or she develops fever, repeat imaging.

## 2017-10-25 NOTE — PROGRESS NOTE ADULT - PROBLEM SELECTOR PLAN 1
- cotinue IV abx for cholangitis and possible infected pancreatic necrosis   - IV fluids with LR  - analgesia  - diet as tolerated, if patient cannot tolerate PO diet would consider NGT with tube feedings - cotinue IV abx for treatment of cholangitis and possible infected pancreatic necrosis   - IV fluids with LR  - analgesia per SICU team  - tube feeds per NGT  - necrosis collection is not yet mature for endoscopic drainange - continue IV abx for treatment of cholangitis and possible infected pancreatic necrosis   - IV fluids with LR  - analgesia per SICU team  - tube feeds per NGT  - necrosis collection is not yet mature for endoscopic drainange

## 2017-10-26 LAB
ALBUMIN SERPL ELPH-MCNC: 2.4 G/DL — LOW (ref 3.3–5)
ALP SERPL-CCNC: 164 U/L — HIGH (ref 40–120)
ALT FLD-CCNC: 12 U/L RC — SIGNIFICANT CHANGE UP (ref 10–45)
ANION GAP SERPL CALC-SCNC: 12 MMOL/L — SIGNIFICANT CHANGE UP (ref 5–17)
APTT BLD: 44.8 SEC — HIGH (ref 27.5–37.4)
AST SERPL-CCNC: 24 U/L — SIGNIFICANT CHANGE UP (ref 10–40)
BILIRUB DIRECT SERPL-MCNC: 0.1 MG/DL — SIGNIFICANT CHANGE UP (ref 0–0.2)
BILIRUB INDIRECT FLD-MCNC: 0.2 MG/DL — SIGNIFICANT CHANGE UP (ref 0.2–1)
BILIRUB SERPL-MCNC: 0.3 MG/DL — SIGNIFICANT CHANGE UP (ref 0.2–1.2)
BUN SERPL-MCNC: 5 MG/DL — LOW (ref 7–23)
CA-I BLD-SCNC: 1.07 MMOL/L — LOW (ref 1.12–1.3)
CALCIUM SERPL-MCNC: 7.9 MG/DL — LOW (ref 8.4–10.5)
CHLORIDE SERPL-SCNC: 100 MMOL/L — SIGNIFICANT CHANGE UP (ref 96–108)
CO2 SERPL-SCNC: 25 MMOL/L — SIGNIFICANT CHANGE UP (ref 22–31)
CREAT SERPL-MCNC: 0.49 MG/DL — LOW (ref 0.5–1.3)
CULTURE RESULTS: SIGNIFICANT CHANGE UP
GLUCOSE SERPL-MCNC: 111 MG/DL — HIGH (ref 70–99)
HCT VFR BLD CALC: 27.3 % — LOW (ref 34.5–45)
HGB BLD-MCNC: 9.3 G/DL — LOW (ref 11.5–15.5)
INR BLD: 1.69 RATIO — HIGH (ref 0.88–1.16)
MAGNESIUM SERPL-MCNC: 2 MG/DL — SIGNIFICANT CHANGE UP (ref 1.6–2.6)
MCHC RBC-ENTMCNC: 30.8 PG — SIGNIFICANT CHANGE UP (ref 27–34)
MCHC RBC-ENTMCNC: 33.9 GM/DL — SIGNIFICANT CHANGE UP (ref 32–36)
MCV RBC AUTO: 90.8 FL — SIGNIFICANT CHANGE UP (ref 80–100)
PHOSPHATE SERPL-MCNC: 3.3 MG/DL — SIGNIFICANT CHANGE UP (ref 2.5–4.5)
PLATELET # BLD AUTO: 563 K/UL — HIGH (ref 150–400)
POTASSIUM SERPL-MCNC: 3.7 MMOL/L — SIGNIFICANT CHANGE UP (ref 3.5–5.3)
POTASSIUM SERPL-SCNC: 3.7 MMOL/L — SIGNIFICANT CHANGE UP (ref 3.5–5.3)
PROCALCITONIN SERPL-MCNC: 41.9 NG/ML — HIGH (ref 0–0.04)
PROT SERPL-MCNC: 5.6 G/DL — LOW (ref 6–8.3)
PROTHROM AB SERPL-ACNC: 18.5 SEC — HIGH (ref 9.8–12.7)
RBC # BLD: 3.01 M/UL — LOW (ref 3.8–5.2)
RBC # FLD: 12.6 % — SIGNIFICANT CHANGE UP (ref 10.3–14.5)
SODIUM SERPL-SCNC: 137 MMOL/L — SIGNIFICANT CHANGE UP (ref 135–145)
SPECIMEN SOURCE: SIGNIFICANT CHANGE UP
WBC # BLD: 24.5 K/UL — HIGH (ref 3.8–10.5)
WBC # FLD AUTO: 24.5 K/UL — HIGH (ref 3.8–10.5)

## 2017-10-26 PROCEDURE — 99233 SBSQ HOSP IP/OBS HIGH 50: CPT | Mod: GC

## 2017-10-26 PROCEDURE — 74000: CPT | Mod: 26

## 2017-10-26 PROCEDURE — 99232 SBSQ HOSP IP/OBS MODERATE 35: CPT | Mod: GC

## 2017-10-26 RX ORDER — POTASSIUM CHLORIDE 20 MEQ
20 PACKET (EA) ORAL
Qty: 0 | Refills: 0 | Status: DISCONTINUED | OUTPATIENT
Start: 2017-10-26 | End: 2017-10-26

## 2017-10-26 RX ORDER — CALCIUM GLUCONATE 100 MG/ML
2 VIAL (ML) INTRAVENOUS ONCE
Qty: 0 | Refills: 0 | Status: COMPLETED | OUTPATIENT
Start: 2017-10-26 | End: 2017-10-26

## 2017-10-26 RX ADMIN — ENOXAPARIN SODIUM 40 MILLIGRAM(S): 100 INJECTION SUBCUTANEOUS at 11:53

## 2017-10-26 RX ADMIN — Medication 200 GRAM(S): at 08:04

## 2017-10-26 RX ADMIN — Medication 20 MILLIEQUIVALENT(S): at 06:08

## 2017-10-26 RX ADMIN — PANTOPRAZOLE SODIUM 40 MILLIGRAM(S): 20 TABLET, DELAYED RELEASE ORAL at 08:04

## 2017-10-26 RX ADMIN — OXYCODONE HYDROCHLORIDE 20 MILLIGRAM(S): 5 TABLET ORAL at 06:07

## 2017-10-26 RX ADMIN — OXYCODONE HYDROCHLORIDE 20 MILLIGRAM(S): 5 TABLET ORAL at 13:00

## 2017-10-26 RX ADMIN — Medication 120 GRAM(S): at 20:10

## 2017-10-26 RX ADMIN — HYDROMORPHONE HYDROCHLORIDE 30 MILLILITER(S): 2 INJECTION INTRAMUSCULAR; INTRAVENOUS; SUBCUTANEOUS at 22:48

## 2017-10-26 RX ADMIN — OXYCODONE HYDROCHLORIDE 20 MILLIGRAM(S): 5 TABLET ORAL at 21:27

## 2017-10-26 RX ADMIN — HYDROMORPHONE HYDROCHLORIDE 1 MILLIGRAM(S): 2 INJECTION INTRAMUSCULAR; INTRAVENOUS; SUBCUTANEOUS at 22:52

## 2017-10-26 RX ADMIN — Medication 120 GRAM(S): at 06:07

## 2017-10-26 RX ADMIN — OXYCODONE HYDROCHLORIDE 20 MILLIGRAM(S): 5 TABLET ORAL at 22:00

## 2017-10-26 RX ADMIN — Medication 25 MILLIGRAM(S): at 11:31

## 2017-10-26 RX ADMIN — GABAPENTIN 300 MILLIGRAM(S): 400 CAPSULE ORAL at 11:53

## 2017-10-26 RX ADMIN — HYDROMORPHONE HYDROCHLORIDE 1 MILLIGRAM(S): 2 INJECTION INTRAMUSCULAR; INTRAVENOUS; SUBCUTANEOUS at 06:54

## 2017-10-26 RX ADMIN — HYDROMORPHONE HYDROCHLORIDE 30 MILLILITER(S): 2 INJECTION INTRAMUSCULAR; INTRAVENOUS; SUBCUTANEOUS at 19:10

## 2017-10-26 RX ADMIN — Medication 25 MILLIGRAM(S): at 20:10

## 2017-10-26 RX ADMIN — HYDROMORPHONE HYDROCHLORIDE 1 MILLIGRAM(S): 2 INJECTION INTRAMUSCULAR; INTRAVENOUS; SUBCUTANEOUS at 10:24

## 2017-10-26 RX ADMIN — HYDROMORPHONE HYDROCHLORIDE 30 MILLILITER(S): 2 INJECTION INTRAMUSCULAR; INTRAVENOUS; SUBCUTANEOUS at 10:23

## 2017-10-26 RX ADMIN — OXYCODONE HYDROCHLORIDE 20 MILLIGRAM(S): 5 TABLET ORAL at 06:37

## 2017-10-26 RX ADMIN — Medication 100 MILLIGRAM(S): at 06:43

## 2017-10-26 RX ADMIN — OXYCODONE HYDROCHLORIDE 20 MILLIGRAM(S): 5 TABLET ORAL at 13:30

## 2017-10-26 RX ADMIN — Medication 25 MILLIGRAM(S): at 02:49

## 2017-10-26 RX ADMIN — HYDROMORPHONE HYDROCHLORIDE 30 MILLILITER(S): 2 INJECTION INTRAMUSCULAR; INTRAVENOUS; SUBCUTANEOUS at 06:54

## 2017-10-26 NOTE — PROGRESS NOTE ADULT - ASSESSMENT
Ms. Coleman is a 57 year old woman with necrotizing pancreatitis, s/p ERCP (sphincterotomy and plastic stent placement) with acute necrotizing pancreatitis (30-50%), splenic and portal vein thrombosis. Pain is controlled with PCA and improving. No current signs of cholangitis; no fever, chills, AMS, increasing pain, tachycardia, or hypotension. Lab is significant for leukocytosis and elevated Alk phosph.    PLAN:  - Pain control: PCA, will attempt to replace PCEA if pain increases again   - Diet: NPO with tube feeds through tiger tube   - ABx: Cefotetan, flagyl  - Encourage IS as tolerated; wean supplemental O2 as tolerated  - PT: home w/ assist; outpatient PT  - DVT Prophylaxis: Lovenox   - Care as per SICU

## 2017-10-26 NOTE — PROGRESS NOTE ADULT - SUBJECTIVE AND OBJECTIVE BOX
Surgery Trauma Team Progress Note    SUBJECTIVE:   Patient was seen and examined this morning. There was no acute event overnight. She is resting comfortably in bed. Pain is well controlled. She does not report pain, fever, n/v, chest pain, or shortness of breath. She took out the tiger tube yesterday, and it was placed back.    OBJECTIVE:   T(C): 37.7 (10-26-17 @ 03:00), Max: 39.4 (10-25-17 @ 18:00)  HR: 101 (10-26-17 @ 04:00) (82 - 116)  BP: 115/56 (10-26-17 @ 04:00) (96/54 - 136/61)  RR: 19 (10-26-17 @ 04:00) (13 - 51)  SpO2: 92% (10-26-17 @ 04:00) (90% - 99%)  Wt(kg): --  CAPILLARY BLOOD GLUCOSE        I&O's Detail    24 Oct 2017 07:01  -  25 Oct 2017 07:00  --------------------------------------------------------  IN:    dextrose 5% + sodium chloride 0.45% with potassium chloride 20 mEq/L: 1050 mL    Solution: 50 mL    Solution: 400 mL    Solution: 200 mL  Total IN: 1700 mL    OUT:    Voided: 2200 mL  Total OUT: 2200 mL    Total NET: -500 mL      25 Oct 2017 07:01  -  26 Oct 2017 05:23  --------------------------------------------------------  IN:    dextrose 5% + sodium chloride 0.45% with potassium chloride 20 mEq/L: 900 mL    Solution: 100 mL    Solution: 100 mL  Total IN: 1100 mL    OUT:    Voided: 1400 mL  Total OUT: 1400 mL    Total NET: -300 mL          PHYSICAL EXAM:  Gen: Well-nourished, well-developed, A&O x3, resting in bed in no acute distress, with feeding tube  CV: RRR  Resp: Patent airways, unlabored   Abdomen: Soft, nontender, nondistended  Extremities: All 4 extremities warm and well perfused, no edema

## 2017-10-26 NOTE — PROGRESS NOTE ADULT - PROBLEM SELECTOR PLAN 1
- continue IV abx for treatment of cholangitis and possible infected pancreatic necrosis - would add metronidazole for anaerobic coverage  - IV fluids with LR  - analgesia per SICU team  - tube feeds per NGT  - necrosis collection is not yet mature for endoscopic drainage - continue IV abx for treatment of cholangitis and possible infected pancreatic necrosis   - IV fluids with LR  - analgesia per SICU team  - tube feeds per NGT  - necrosis collection is not yet mature for endoscopic drainage

## 2017-10-26 NOTE — PROGRESS NOTE ADULT - ASSESSMENT
56 yo woman with necrotizing pancreatitis, cholangitis, s/p ERCP/biliary stent palcement last week, splenic vein thrombus now with fever, marked leukocytosis and diarrhea, C diff (-). DDX pancreatitis with pancreatic necrosis vs infected necrosis.

## 2017-10-26 NOTE — PROGRESS NOTE ADULT - ATTENDING COMMENTS
As above.  Still with abd pain, fever, severe leukocytosis.  Looks clinically comfortable.  Vital signs stable.  High procalcitonin levels    Impression:  Necrotizing pancreatitis, possibly infected.  Cholangitis, s/p ERCP/biliary stent    Recommendation:  Agree with CT scan tomorrow for surveillance.  Change antibiotics to carbapenem for increased penetration of pancreatic necrosis.  Message left for Dr. Arita.

## 2017-10-26 NOTE — CHART NOTE - NSCHARTNOTEFT_GEN_A_CORE
Pt transferred to the floor without any events. Doing well. Laying comfortably in bed.  Pain controlled with PCA, neurontin, and oxycodone ER regimen.   +flatus/ + BM (Loose x3 today)    AVSS    PE:   Gen: NAD, A&O x3  Abdomen: soft, mod tenderness at epigastric, non distended, no rebound/guarding    A/P: 56 y/o female w/ obstructing CBD stone, subsequent cholangitis and pancreatitis, s/p ERCP w/ biliary stent placement 10/17  - Continue pain control regimen (PCA, Oxy ER, neurontin)  - Confirm tiger tube placement, start Jevity 1.2  - Cefotetan through 10/27  - Poss rpt CT a/p Friday 10/27  - Appreciate GI input    JUDI Steele-C  (P) [id number]

## 2017-10-26 NOTE — PROGRESS NOTE ADULT - ATTENDING COMMENTS
Dr. Nichols (Attending Physician)  Pain worsening with tube feeds yesterday.  Tiger tube migrating into position.  Continues to be febrile will send cultures.  On cefotetan for abx coverage.  Will send procalcitonin. Dr. Nichols (Attending Physician)  Pain worsening with tube feeds yesterday.  Tiger tube placed and currently tip is postpyloric.  today pain is minimal and she is much more comfortable with tube feeds ongoing.  Continues to be febrile will send cultures and procalcitonin; however, WBC decreasing today.  On cefotetan/flagyl for abx treatment of cholangitis post ERCP. Do not believe patient has infected pancreatitis as cultures remain negative and procalcitonin has been downtrending.  If suspicion for infected pancreatitis increases would start imipenem. Will discuss repeat imaging with Dr. Arita.  No longer tachycardic will likely transfer to a floor bed pending discussion with Acute Care Surgery.

## 2017-10-26 NOTE — PROGRESS NOTE ADULT - ASSESSMENT
A/P: 57 year old female with obstructing common bile duct stone, subsequent cholangitis and pancreatitis now s/p ERCP with biliary stent placement that will require future ERCP for stent removal currently on PCA for pain control.     Neuro: severe pain, not opioid naive  Pain  	- PCA  	- Oxycontin 20 TID plus neurontin 300 daily  	- Discussed with acute pain service regarding celiac block but this procedure is unable to be offered    Resp: no acute issues   - saturating well on RA    CV: borderline tachycardia currently attributed to SIRS/pain    GI: choledocholithiasis & necrotic pancreatitis              - Tiger tube replaced; started on Jevity 1.2  	- ERCP w/ biliary stent placed 10/17; flagyl / cefotetan through 10 / 27.               - Last CT a/p 10/21; will consider repeat CTa/p possibly Friday  	- Pantoprazole 40mg qday    :   	- D5 1/2NS w/ KCl @ 50ml/h due to decreased oral intake.   	- Monitor I&Os, replete electrolytes as needed  	  ID: Pancreatitis with 30-50% necrosis, leukocytosis and persistent fevers              - Flagyl / Cefotetan through 10 / 27    Heme: trend CBCs               - DVT prophylaxis  	  Endo: No active issues/interventions    Disposition: probably floor

## 2017-10-26 NOTE — PROGRESS NOTE ADULT - SUBJECTIVE AND OBJECTIVE BOX
Chief Complaint:  Patient is a 57y old  Female who presents with a chief complaint of Abdominal pain (16 Oct 2017 09:50)      Interval Events: Patient with ongoing epigastric abd pain. She denies vomiting. She had fever 102.9 last night. She is on ABX with cefotetan. She has an NGT for feeding and is receiving TFs at 30 mL/hr.       Allergies:  No Known Allergies      Hospital Medications:  MEDICATIONS  (STANDING):  cefoTEtan  IVPB 1 Gram(s) IV Intermittent every 12 hours  dextrose 5% + sodium chloride 0.45% with potassium chloride 20 mEq/L 1000 milliLiter(s) (50 mL/Hr) IV Continuous <Continuous>  enoxaparin Injectable 40 milliGRAM(s) SubCutaneous daily  gabapentin 300 milliGRAM(s) Oral daily  HYDROmorphone PCA (1 mG/mL) 30 milliLiter(s) PCA Continuous PCA Continuous  oxyCODONE  ER Tablet 20 milliGRAM(s) Oral every 8 hours  pantoprazole    Tablet 40 milliGRAM(s) Oral before breakfast  tetracaine/benzocaine/butamben Spray 1 Spray(s) Topical once        PMHX/PSHX:  Kidney stone  Hypertension  No pertinent past medical history  Kidney stones  History of appendectomy      Family history:  No pertinent family history in first degree relatives      ROS:     General:  (+) fevers, (-) chills,  Eyes:  Good vision, no reported pain  ENT:  No sore throat, pain, runny nose  CV:  No chest pain, palpitations  Resp:  No cough, wheezing, SOB  GI:  See HPI  :  No pain, bleeding, incontinence, nocturia  Muscle:  No pain, weakness  Neuro:  No weakness, tingling, memory problems  Psych:  No fatigue, insomnia, mood problems, depression  Endocrine:  No cold/heat intolerance  Heme:  No petechiae, ecchymosis, easy bruising  Skin:  No rash, edema      PHYSICAL EXAM:   ICU Vital Signs Last 24 Hrs  T(C): 37.2 (26 Oct 2017 07:00), Max: 39.4 (25 Oct 2017 18:00)  T(F): 99 (26 Oct 2017 07:00), Max: 102.9 (25 Oct 2017 18:00)  HR: 87 (26 Oct 2017 08:00) (83 - 112)  BP: 104/63 (26 Oct 2017 08:00) (99/54 - 136/61)  BP(mean): 79 (26 Oct 2017 08:00) (12 - 97)  ABP: --  ABP(mean): --  RR: 26 (26 Oct 2017 08:00) (14 - 34)  SpO2: 94% (26 Oct 2017 08:00) (90% - 99%)    GENERAL:  NAD  HEENT:  sclera anicteric, NGT in place  CHEST:  no respiratory distress, CTAB  HEART:  RRR, no MRG, no edema  ABDOMEN:  distended, tender to palpation throughout with (+) guarding, no rigidity   EXTREMITIES:  no cyanosis, no edema  SKIN:  No rash  NEURO:  Alert, oriented      LABS:                                              9.3    24.5  )-----------( 563      ( 26 Oct 2017 03:57 )             27.3   10-26    137  |  100  |  5<L>  ----------------------------<  111<H>  3.7   |  25  |  0.49<L>    Ca    7.9<L>      26 Oct 2017 03:57  Phos  3.3     10-26  Mg     2.0     10-26    TPro  5.6<L>  /  Alb  2.4<L>  /  TBili  0.3  /  DBili  0.1  /  AST  24  /  ALT  12  /  AlkPhos  164<H>  10-26        Imaging:  < from: CT Abdomen and Pelvis w/ IV Cont (10.21.17 @ 16:37) >  IMPRESSION:    Acute necrotizing pancreatitis,30-50%. Peripancreatic fluid.    Short segment thrombus of the splenic vein at the portosplenic confluence.    Persistent anterior right portal vein thrombosis with perfusional   abnormalities of the right hepatic lobe as described above.    Left lower lobe atelectasis /consolidation and small left pleural   effusion. Trace right pleural effusion with adjacent compressive   atelectasis.    Interval placement of CBD stent.    < end of copied text >    < from: ERCP (10.17.17 @ 11:24) >  Impression:      Ascending cholangitis with choledocholithiasis s/p ERCP with biliary stent placement  Recommendation:      - Return patient to [facility] for ongoing care.                                       - Consider cholecystectomy                                       - Repeat ERCP with stent removal, sphincterotomy, and stone extraction at later date    < end of copied text >      UA - (-) KLAUS, (-) nitrite

## 2017-10-26 NOTE — PROGRESS NOTE ADULT - SUBJECTIVE AND OBJECTIVE BOX
Day 9\7 of Anesthesia Pain Management Service    SUBJECTIVE: Patient is doing well with IV PCA    Pain Scale Score:	[X] Refer to charted pain scores    THERAPY:    [ ] IV PCA Morphine		[ ] 5 mg/mL	[ ] 1 mg/mL  [X] IV PCA Hydromorphone	[ ] 5 mg/mL	[X] 1 mg/mL  [ ] IV PCA Fentanyl		[ ] 50 micrograms/mL    Demand dose: 0.5 mg     Lockout: 6 minutes   Continuous Rate: 0 mg/hr  4 Hour Limit: 11 mg    MEDICATIONS  (STANDING):  cefoTEtan  IVPB 1 Gram(s) IV Intermittent every 12 hours  dextrose 5% + sodium chloride 0.45% with potassium chloride 20 mEq/L 1000 milliLiter(s) (50 mL/Hr) IV Continuous <Continuous>  enoxaparin Injectable 40 milliGRAM(s) SubCutaneous daily  gabapentin 300 milliGRAM(s) Oral daily  HYDROmorphone PCA (1 mG/mL) 30 milliLiter(s) PCA Continuous PCA Continuous  oxyCODONE  ER Tablet 20 milliGRAM(s) Oral every 8 hours  pantoprazole    Tablet 40 milliGRAM(s) Oral before breakfast  tetracaine/benzocaine/butamben Spray 1 Spray(s) Topical once    MEDICATIONS  (PRN):  benzocaine 15 mG/menthol 3.6 mG Lozenge 1 Lozenge Oral every 2 hours PRN Sore Throat  diphenhydrAMINE   Injectable 25 milliGRAM(s) IV Push every 4 hours PRN Rash and/or Itching  HYDROmorphone PCA (1 mG/mL) Rescue Clinician Bolus 1 milliGRAM(s) IV Push every 15 minutes PRN for Pain Scale GREATER THAN 6  ondansetron Injectable 4 milliGRAM(s) IV Push every 4 hours PRN Nausea and/or Vomiting      OBJECTIVE:    Sedation Score:	[ X] Alert	[ ] Drowsy 	[ ] Arousable	[ ] Asleep	[ ] Unresponsive    Side Effects:	[X ] None	[ ] Nausea	[ ] Vomiting	[ ] Pruritus  		[ ] Other:    Vital Signs Last 24 Hrs  T(C): 37.2 (26 Oct 2017 07:00), Max: 39.4 (25 Oct 2017 18:00)  T(F): 99 (26 Oct 2017 07:00), Max: 102.9 (25 Oct 2017 18:00)  HR: 87 (26 Oct 2017 08:00) (83 - 112)  BP: 104/63 (26 Oct 2017 08:00) (99/54 - 136/61)  BP(mean): 79 (26 Oct 2017 08:00) (12 - 97)  RR: 26 (26 Oct 2017 08:00) (14 - 34)  SpO2: 94% (26 Oct 2017 08:00) (90% - 99%)    ASSESSMENT/ PLAN    Therapy to  be:               [X] Continued   [ ] Discontinued   [ ] Changed to PRN Analgesics    Documentation and Verification of current medications:   [X] Done	[ ] Not done, not eligible    Comments: Pain better controlled with medication adjustment yesterday: increased Oxycontin 20mg to TID and addition of Neurontin 300 QD.

## 2017-10-26 NOTE — PROGRESS NOTE ADULT - SUBJECTIVE AND OBJECTIVE BOX
Pain Management Attending Addendum    SUBJECTIVE: Improved pain    Therapy:	  [ x] IV PCA	   [ ] Epidural           [ ] s/p Spinal Opoid              [ ] Postpartum infusion	  [ ] Patient controlled regional anesthesia (PCRA)    [ ] prn Analgesics    OBJECTIVE:   [x ] No new signs     [ ] Other:    Side Effects:  [x ] None			[ ] Other:    Assessment of Catheter Site:		[ ] Intact		[ ] Other:    ASSESSMENT/PLAN  [x ] Continue current therapy    [ ] Therapy changed to:    [ ] IV PCA       [ ] Epidural     [ ] prn Analgesics     [ ] post partum infusion    Comments:

## 2017-10-26 NOTE — PROGRESS NOTE ADULT - SUBJECTIVE AND OBJECTIVE BOX
HISTORY  57yF w/ PMH significant for HTN and nephrolithiasis, s/p appendectomy that presented to [facility] ED on 10/16/2017 c/o acute onset severe sharp stabbing upper abdominal pain that radiates to the back. She says the pain woke her up in the middle of the night causing her to develop nausea and NBNB emesis x3 episodes. She presented to the emergency department hypotensive was worked up with abdominal imaging findings concerning for CBD obstructing stone and was admitted, MRCP ordered and completed, and showed a distal CBD stone elevating concern for acute obstructive cholangitis. Labs resulted with evidence of pancreatitis with lipase > 8500. SICU was consulted as she was tachycardic to the 130s and breaking through dilaudid, tylenol, and toradol. GI was consulted urgently and ERCP was performed with biliary stent placement. Pt continued to have pain after placement of the ERCP and this has been attributed to pancreatic necrosis.     24 HOUR EVENTS: Tiger tube replaced yesterday and she was initiated on Jevity 1.2. Patient continues to report significant abdominal pain so oxycodone increased to 20 TID from BID and neurontin added. She was febrile overnight to a Tmax of 39.4 and received Tylenol for symptomatic control. She was started on flagyl / cefotetan which will complete on Friday, 10/27.     SUBJECTIVE/ROS:  [x] A ten-point review of systems was otherwise negative except as noted.  [ ] Due to altered mental status/intubation, subjective information were not able to be obtained from the patient. History was obtained, to the extent possible, from review of the chart and collateral sources of information.      NEURO  [facility] ICU: neg  Exam: A&Ox4  Meds: HYDROmorphone PCA (1 mG/mL) 30 milliLiter(s) PCA Continuous PCA Continuous  HYDROmorphone PCA (1 mG/mL) Rescue Clinician Bolus 1 milliGRAM(s) IV Push every 15 minutes PRN  metoclopramide Injectable 10 milliGRAM(s) IV Push every 6 hours  ondansetron Injectable 4 milliGRAM(s) IV Push every 4 hours PRN  oxyCODONE  ER Tablet 20 milliGRAM(s) Oral every 12 hours  [x] Adequacy of sedation and pain control has been assessed and adjusted      RESPIRATORY  RR: 26 (23 Oct 2017 23:00) (12 - 38)  SpO2: 94% (23 Oct 2017 23:00) (83% - 99%)  Wt(kg): --  Exam: unlabored, clear to auscultation bilaterally  Meds: diphenhydrAMINE   Injectable 25 milliGRAM(s) IV Push every 4 hours PRN Rash and/or Itching      CARDIOVASCULAR  Vital Signs Last 24 Hrs  HR: 96 (23 Oct 2017 23:00) (86 - 112)  BP: 110/67 (23 Oct 2017 23:00) (110/67 - 141/90)  BP(mean): 81 (23 Oct 2017 23:00) (81 - 109)      Exam: no murmurs  Cardiac Rhythm: sinus rhtythm  Perfusion     [x ]Adequate   [ ]Inadequate  Mentation   [x ]Normal       [ ]Reduced  Extremities  [x ]Warm         [ ]Cool  Volume Status [ ]Hypervolemic [x ]Euvolemic [ ]Hypovolemic    Meds: none    GI/NUTRITION  Exam: soft, moderately tender, especially in epigastrium  Diet: regular   Meds: pantoprazole  Injectable 40 milliGRAM(s) IV Push daily    BMP (10-25 @ 03:40)       138     |  100     |  4<L>  			Ca++ --      Ca 8.0<L>       ---------------------------------( 101<H>		Mg 1.7          4.1     |  25      |  0.37<L>			Ph 3.4     BMP (10-24 @ 03:14)       136     |  99      |  2<L>  			Ca++ --      Ca 7.9<L>       ---------------------------------( 102<H>		Mg 2.0          5.0     |  22      |  0.47<L>			Ph 3.4         LFTs (10-25 @ 03:40)      TPro 5.8<L> / Alb 2.5<L> / TBili 0.3 / DBili 0.1 / AST 23 / ALT 15 / AlkPhos 179<H>  LFTs (10-24 @ 03:14)      TPro 5.9<L> / Alb 2.6<L> / TBili 0.3 / DBili <0.1 / AST 35 / ALT 16 / AlkPhos 197<H>       GENITOURINARY  I&O's Detail    24 Oct 2017 07:01  -  25 Oct 2017 07:00  --------------------------------------------------------  IN:    dextrose 5% + sodium chloride 0.45% with potassium chloride 20 mEq/L: 1000 mL    Solution: 400 mL    Solution: 200 mL    Solution: 50 mL  Total IN: 1650 mL    OUT:    Voided: 2200 mL  Total OUT: 2200 mL    Total NET: -550 mL    [ ] Lara catheter, indication: N/A  Meds: dextrose 5% + sodium chloride 0.45% with potassium chloride 20 mEq/L 1000 milliLiter(s) IV Continuous <Continuous>      HEMATOLOGIC  Meds: enoxaparin Injectable 40 milliGRAM(s) SubCutaneous daily  [x] VTE Prophylaxis    CBC (10-25 @ 03:40)                          10.0<L>                   29.0<H>  )--------------(  513<H>     --    % Neuts, --    % Lymphs, ANC: --                              29.6<L>  CBC (10-24 @ 03:14)                          10.3<L>                   30.8<H>  )--------------(  413<H>     --    % Neuts, --    % Lymphs, ANC: --                              30.8<L>      INFECTIOUS DISEASES    Recent Cultures:  Specimen Source: .Blood Blood-Peripheral, 10-21 @ 23:31; Results   No growth to date.; Gram Stain: --; Organism: --  Specimen Source: .Blood Blood, 10-21 @ 23:30; Results   No growth to date.; Gram Stain: --; Organism: --  Specimen Source: .Blood Blood, 10-21 @ 00:15; Results   No growth to date.; Gram Stain: --; Organism: --  Specimen Source: .Blood Blood-Venous, 10-20 @ 21:57; Results   No growth to date.; Gram Stain: --; Organism: --  Specimen Source: .Blood Blood-Venous, 10-19 @ 18:20; Results   No growth to date.; Gram Stain: --; Organism: --  Specimen Source: .Blood Blood-Peripheral, 10-17 @ 07:32; Results   No growth at 5 days.; Gram Stain: --; Organism: --    Meds: imipenem/cilastatin  IVPB 500 milliGRAM(s) IV Intermittent every 6 hours      ENDOCRINE  Meds: none      ACCESS DEVICES:  [x ] Peripheral IV  [ ] Central Venous Line	[ ] R	[ ] L	[ ] IJ	[ ] Fem	[ ] SC	Placed:   [ ] Arterial Line		[ ] R	[ ] L	[ ] Fem	[ ] Rad	[ ] Ax	Placed:   [ ] PICC:					[ ] Mediport  [ ] Urinary Catheter, Date Placed:   [ ] Necessity of urinary, arterial, and venous catheters discussed

## 2017-10-27 LAB
ALBUMIN SERPL ELPH-MCNC: 2.5 G/DL — LOW (ref 3.3–5)
ALP SERPL-CCNC: 181 U/L — HIGH (ref 40–120)
ALT FLD-CCNC: 14 U/L — SIGNIFICANT CHANGE UP (ref 10–45)
ANION GAP SERPL CALC-SCNC: 13 MMOL/L — SIGNIFICANT CHANGE UP (ref 5–17)
APTT BLD: 50.8 SEC — HIGH (ref 27.5–37.4)
AST SERPL-CCNC: 34 U/L — SIGNIFICANT CHANGE UP (ref 10–40)
BILIRUB DIRECT SERPL-MCNC: 0.1 MG/DL — SIGNIFICANT CHANGE UP (ref 0–0.2)
BILIRUB INDIRECT FLD-MCNC: 0.1 MG/DL — LOW (ref 0.2–1)
BILIRUB SERPL-MCNC: 0.2 MG/DL — SIGNIFICANT CHANGE UP (ref 0.2–1.2)
BUN SERPL-MCNC: 5 MG/DL — LOW (ref 7–23)
CA-I BLD-SCNC: 1.14 MMOL/L — SIGNIFICANT CHANGE UP (ref 1.12–1.3)
CALCIUM SERPL-MCNC: 8.1 MG/DL — LOW (ref 8.4–10.5)
CHLORIDE SERPL-SCNC: 99 MMOL/L — SIGNIFICANT CHANGE UP (ref 96–108)
CO2 SERPL-SCNC: 26 MMOL/L — SIGNIFICANT CHANGE UP (ref 22–31)
CREAT SERPL-MCNC: 0.49 MG/DL — LOW (ref 0.5–1.3)
CULTURE RESULTS: SIGNIFICANT CHANGE UP
CULTURE RESULTS: SIGNIFICANT CHANGE UP
GLUCOSE SERPL-MCNC: 125 MG/DL — HIGH (ref 70–99)
HCT VFR BLD CALC: 27.4 % — LOW (ref 34.5–45)
HGB BLD-MCNC: 9 G/DL — LOW (ref 11.5–15.5)
INR BLD: 1.34 RATIO — HIGH (ref 0.88–1.16)
MAGNESIUM SERPL-MCNC: 2 MG/DL — SIGNIFICANT CHANGE UP (ref 1.6–2.6)
MCHC RBC-ENTMCNC: 29.2 PG — SIGNIFICANT CHANGE UP (ref 27–34)
MCHC RBC-ENTMCNC: 32.8 GM/DL — SIGNIFICANT CHANGE UP (ref 32–36)
MCV RBC AUTO: 89 FL — SIGNIFICANT CHANGE UP (ref 80–100)
PHOSPHATE SERPL-MCNC: 3.7 MG/DL — SIGNIFICANT CHANGE UP (ref 2.5–4.5)
PLATELET # BLD AUTO: 654 K/UL — HIGH (ref 150–400)
POTASSIUM SERPL-MCNC: 4.6 MMOL/L — SIGNIFICANT CHANGE UP (ref 3.5–5.3)
POTASSIUM SERPL-SCNC: 4.6 MMOL/L — SIGNIFICANT CHANGE UP (ref 3.5–5.3)
PROT SERPL-MCNC: 6 G/DL — SIGNIFICANT CHANGE UP (ref 6–8.3)
PROTHROM AB SERPL-ACNC: 15.2 SEC — HIGH (ref 10–13.1)
RBC # BLD: 3.08 M/UL — LOW (ref 3.8–5.2)
RBC # FLD: 14.9 % — HIGH (ref 10.3–14.5)
SODIUM SERPL-SCNC: 138 MMOL/L — SIGNIFICANT CHANGE UP (ref 135–145)
SPECIMEN SOURCE: SIGNIFICANT CHANGE UP
SPECIMEN SOURCE: SIGNIFICANT CHANGE UP
WBC # BLD: 19.46 K/UL — HIGH (ref 3.8–10.5)
WBC # FLD AUTO: 19.46 K/UL — HIGH (ref 3.8–10.5)

## 2017-10-27 PROCEDURE — 99233 SBSQ HOSP IP/OBS HIGH 50: CPT

## 2017-10-27 PROCEDURE — 99232 SBSQ HOSP IP/OBS MODERATE 35: CPT | Mod: GC

## 2017-10-27 PROCEDURE — 74000: CPT | Mod: 26

## 2017-10-27 RX ORDER — ACETAMINOPHEN 500 MG
1000 TABLET ORAL ONCE
Qty: 0 | Refills: 0 | Status: COMPLETED | OUTPATIENT
Start: 2017-10-27 | End: 2017-10-27

## 2017-10-27 RX ADMIN — HYDROMORPHONE HYDROCHLORIDE 30 MILLILITER(S): 2 INJECTION INTRAMUSCULAR; INTRAVENOUS; SUBCUTANEOUS at 12:03

## 2017-10-27 RX ADMIN — OXYCODONE HYDROCHLORIDE 20 MILLIGRAM(S): 5 TABLET ORAL at 22:22

## 2017-10-27 RX ADMIN — Medication 25 MILLIGRAM(S): at 16:18

## 2017-10-27 RX ADMIN — GABAPENTIN 300 MILLIGRAM(S): 400 CAPSULE ORAL at 13:00

## 2017-10-27 RX ADMIN — HYDROMORPHONE HYDROCHLORIDE 30 MILLILITER(S): 2 INJECTION INTRAMUSCULAR; INTRAVENOUS; SUBCUTANEOUS at 19:16

## 2017-10-27 RX ADMIN — OXYCODONE HYDROCHLORIDE 20 MILLIGRAM(S): 5 TABLET ORAL at 13:45

## 2017-10-27 RX ADMIN — OXYCODONE HYDROCHLORIDE 20 MILLIGRAM(S): 5 TABLET ORAL at 05:50

## 2017-10-27 RX ADMIN — HYDROMORPHONE HYDROCHLORIDE 30 MILLILITER(S): 2 INJECTION INTRAMUSCULAR; INTRAVENOUS; SUBCUTANEOUS at 07:17

## 2017-10-27 RX ADMIN — OXYCODONE HYDROCHLORIDE 20 MILLIGRAM(S): 5 TABLET ORAL at 13:14

## 2017-10-27 RX ADMIN — OXYCODONE HYDROCHLORIDE 20 MILLIGRAM(S): 5 TABLET ORAL at 05:20

## 2017-10-27 RX ADMIN — ENOXAPARIN SODIUM 40 MILLIGRAM(S): 100 INJECTION SUBCUTANEOUS at 13:00

## 2017-10-27 RX ADMIN — Medication 400 MILLIGRAM(S): at 01:39

## 2017-10-27 RX ADMIN — Medication 120 GRAM(S): at 05:21

## 2017-10-27 RX ADMIN — PANTOPRAZOLE SODIUM 40 MILLIGRAM(S): 20 TABLET, DELAYED RELEASE ORAL at 06:21

## 2017-10-27 RX ADMIN — ONDANSETRON 4 MILLIGRAM(S): 8 TABLET, FILM COATED ORAL at 23:39

## 2017-10-27 NOTE — PROGRESS NOTE ADULT - ASSESSMENT
58 y/o F with necrotizing pancreatitis, cholangitis, s/p ERCP/biliary stent palcement last week, splenic vein thrombus now with fever and marked leukocytosis.    Impression:  1) Pancreatic Necrosis  2) Fever    Plan:  - continue IV abx  - IV fluids with LR  - analgesia per SICU team  - tube feeds per NGT  - necrosis collection is not yet mature for endoscopic drainage. - continue IV abx for treatment of cholangitis and possible infected pancreatic necrosis - would add metronidazole for anaerobic coverage  - F/u cultures  - Agree with CT scan tomorrow for surveillance 58 y/o F with necrotizing pancreatitis, cholangitis, s/p ERCP/biliary stent placement last week, splenic vein thrombus now with fever and marked leukocytosis.    Impression:  1) Pancreatic Necrosis  2) Fever    Plan:  - continue IV abx  - IV fluids with LR  - analgesia per SICU team  - tube feeds per NGT  - necrosis collection is not yet mature for endoscopic drainage. - continue IV abx for treatment of cholangitis and possible infected pancreatic necrosis - would add metronidazole for anaerobic coverage  - F/u cultures  - Agree with CT scan tomorrow for surveillance 58 y/o F with necrotizing pancreatitis, cholangitis, s/p ERCP/biliary stent placement last week, splenic vein thrombus now with fever and marked leukocytosis.    Impression:  1) Pancreatic Necrosis  2) Fever    Plan:  - continue IV abx  - IV fluids with LR  - analgesia per SICU team  - tube feeds per NJ tube    - F/u cultures  - Agree with CT scan tomorrow for surveillance

## 2017-10-27 NOTE — PROGRESS NOTE ADULT - SUBJECTIVE AND OBJECTIVE BOX
Day 10\8 of Anesthesia Pain Management Service    SUBJECTIVE: Patient is doing well with IV PCA    Pain Scale Score:	[X] Refer to charted pain scores    THERAPY:    [ ] IV PCA Morphine		[ ] 5 mg/mL	[ ] 1 mg/mL  [X] IV PCA Hydromorphone	[ ] 5 mg/mL	[X] 1 mg/mL  [ ] IV PCA Fentanyl		[ ] 50 micrograms/mL    Demand dose: 0.5 mg     Lockout: 6 minutes   Continuous Rate: 0 mg/hr  4 Hour Limit: 11 mg    MEDICATIONS  (STANDING):  dextrose 5% + sodium chloride 0.45% with potassium chloride 20 mEq/L 1000 milliLiter(s) (50 mL/Hr) IV Continuous <Continuous>  enoxaparin Injectable 40 milliGRAM(s) SubCutaneous daily  gabapentin 300 milliGRAM(s) Oral daily  HYDROmorphone PCA (1 mG/mL) 30 milliLiter(s) PCA Continuous PCA Continuous  oxyCODONE  ER Tablet 20 milliGRAM(s) Oral every 8 hours  pantoprazole    Tablet 40 milliGRAM(s) Oral before breakfast  tetracaine/benzocaine/butamben Spray 1 Spray(s) Topical once    MEDICATIONS  (PRN):  benzocaine 15 mG/menthol 3.6 mG Lozenge 1 Lozenge Oral every 2 hours PRN Sore Throat  diphenhydrAMINE   Injectable 25 milliGRAM(s) IV Push every 4 hours PRN Rash and/or Itching  HYDROmorphone PCA (1 mG/mL) Rescue Clinician Bolus 1 milliGRAM(s) IV Push every 15 minutes PRN for Pain Scale GREATER THAN 6  ondansetron Injectable 4 milliGRAM(s) IV Push every 4 hours PRN Nausea and/or Vomiting      OBJECTIVE:    Sedation Score:	[ X] Alert	[ ] Drowsy 	[ ] Arousable	[ ] Asleep	[ ] Unresponsive    Side Effects:	[X ] None	[ ] Nausea	[ ] Vomiting	[ ] Pruritus  		[ ] Other:    Vital Signs Last 24 Hrs  T(C): 38.3 (27 Oct 2017 06:00), Max: 38.4 (27 Oct 2017 00:56)  T(F): 100.9 (27 Oct 2017 06:00), Max: 101.1 (27 Oct 2017 00:56)  HR: 89 (27 Oct 2017 06:00) (87 - 102)  BP: 113/73 (27 Oct 2017 06:00) (105/80 - 119/80)  BP(mean): 84 (26 Oct 2017 14:00) (78 - 89)  RR: 18 (27 Oct 2017 06:00) (18 - 67)  SpO2: 94% (27 Oct 2017 06:00) (90% - 95%)    ASSESSMENT/ PLAN    Therapy to  be:               [X] Continued   [ ] Discontinued   [ ] Changed to PRN Analgesics    Documentation and Verification of current medications:   [X] Done	[ ] Not done, not eligible    Comments: Reporting increased pain since tube feeding initiated. Using 2-5x/hr. Continue PCA

## 2017-10-27 NOTE — PROGRESS NOTE ADULT - SUBJECTIVE AND OBJECTIVE BOX
SUBJECTIVE:  - Pt reports epigastric pain  - She had a temperature to 101.1 last night  - No nausea or vomiting    OBJECTIVE:    Vital Signs Last 24 Hrs  T(C): 37.1 (27 Oct 2017 17:35), Max: 38.4 (27 Oct 2017 00:56)  T(F): 98.8 (27 Oct 2017 17:35), Max: 101.1 (27 Oct 2017 00:56)  HR: 88 (27 Oct 2017 17:35) (87 - 102)  BP: 118/76 (27 Oct 2017 17:35) (112/73 - 119/80)  BP(mean): --  RR: 18 (27 Oct 2017 17:35) (18 - 18)  SpO2: 95% (27 Oct 2017 17:35) (90% - 95%)    PHYSICAL EXAM:  Constitutional: no acute distress; NGT in place  Eyes: no icterus  Neck: no masses, no LAD  Respiratory: normal inspiratory effort; no wheezing or crackles  Cardiovascular: RRR, normal S1/S2, no murmurs/rubs/gallops  Gastrointestinal: soft, nondistended, epigastric tenderness, +BS  Extremities: no LE edema  Neurological: AAOx3, no asterixis  Skin: no rashes, bruises, petechiae    LABS:                        9.0    19.46 )-----------( 654      ( 27 Oct 2017 08:33 )             27.4     138  |  99  |  5<L>  ----------------------------<  125<H>  4.6   |  26  |  0.49<L>    Ca    8.1<L>      27 Oct 2017 08:49  Phos  3.7     10-27  Mg     2.0     10-27    TPro  6.0  /  Alb  2.5<L>  /  TBili  0.2  /  DBili  0.1  /  AST  34  /  ALT  14  /  AlkPhos  181<H>  10-27  PT/INR - ( 27 Oct 2017 08:33 )   PT: 15.2 sec;   INR: 1.34 ratio    PTT - ( 27 Oct 2017 08:33 )  PTT:50.8 sec  LIVER FUNCTIONS - ( 27 Oct 2017 08:49 )  Alb: 2.5 g/dL / Pro: 6.0 g/dL / ALK PHOS: 181 U/L / ALT: 14 U/L / AST: 34 U/L / GGT: x

## 2017-10-27 NOTE — PROGRESS NOTE ADULT - ATTENDING COMMENTS
As above.  Improving fever curve and leukocytosis with antibiotics.  Agree with CT scan for surveillance.  Discussed with surgeon Dr. Ariat.

## 2017-10-27 NOTE — PROGRESS NOTE ADULT - ASSESSMENT
ASSESSMENT:   Ms. Coleman is a 57 year old woman with necrotizing pancreatitis, s/p ERCP (sphincterotomy and plastic stent placement) with acute necrotizing pancreatitis (30-50%), splenic and portal vein thrombosis. Pain is somewhat controlled with PCA. No current signs of cholangitis; no fever, chills, AMS, increasing pain, tachycardia, or hypotension. The patient did have a temperature of 38.4 overnight.     PLAN:  - Pain control: PCA, will attempt to replace PCEA if pain increases again   - Diet: NPO with tube feeds through tiger tube   - ABx: Cefotetan, flagyl  - Encourage IS as tolerated; wean supplemental O2 as tolerated  - DVT Prophylaxis: Lovenox

## 2017-10-27 NOTE — PROGRESS NOTE ADULT - SUBJECTIVE AND OBJECTIVE BOX
SUBJECTIVE:  Continues to complain of abdominal pain, right > left.  Also complains of intermittent chills. Does not feel like she has a fever.    OBJECTIVE:     ** VITAL SIGNS / I&O's **    T(C): 37 (10-27-17 @ 16:00), Max: 38.4 (10-27-17 @ 00:56)  T(F): 98.6 (10-27-17 @ 16:00), Max: 101.1 (10-27-17 @ 00:56)  HR: 90 (10-27-17 @ 16:00) (87 - 102)  BP: 117/73 (10-27-17 @ 16:00) (110/72 - 119/80)  RR: 18 (10-27-17 @ 16:00) (18 - 18)  SpO2: 95% (10-27-17 @ 16:00) (90% - 95%)      26 Oct 2017 07:01  -  27 Oct 2017 07:00  --------------------------------------------------------  IN:    dextrose 5% + sodium chloride 0.45% with potassium chloride 20 mEq/L: 1050 mL    Jevity: 640 mL    Solution: 100 mL    Solution: 100 mL  Total IN: 1890 mL    OUT:    Voided: 1250 mL  Total OUT: 1250 mL    Total NET: 640 mL      27 Oct 2017 07:01  -  27 Oct 2017 16:52  --------------------------------------------------------  IN:  Total IN: 0 mL    OUT:    Voided: 850 mL  Total OUT: 850 mL    Total NET: -850 mL          ** PHYSICAL EXAM **    -- CONSTITUTIONAL: AOx3. NAD.   -- HEENT: NJ tube   -- CARDIOVASCULAR: normotensive, regular rate   -- RESPIRATORY: breathing comfortably   -- ABDOMEN: epigastric and LUQ tenderness, soft, nondistended     ** LABS **                 9.0    19.46  )----------(  654       ( 27 Oct 2017 08:33 )               27.4      138    |  99     |  5      ----------------------------<  125        ( 27 Oct 2017 08:49 )  4.6     |  26     |  0.49     Ca    8.1        ( 27 Oct 2017 08:49 )  Phos  3.7       ( 27 Oct 2017 08:49 )  Mg     2.0       ( 27 Oct 2017 08:49 )    TPro  6.0    /  Alb  2.5    /  TBili  0.2    /  DBili  0.1    /  AST  34     /  ALT  14     /  AlkPhos  181    ( 27 Oct 2017 08:49 )    PT/INR -  15.2 sec / 1.34 ratio   ( 27 Oct 2017 08:33 )       PTT -  50.8 sec   ( 27 Oct 2017 08:33 )  CAPILLARY BLOOD GLUCOSE

## 2017-10-27 NOTE — PROGRESS NOTE ADULT - ATTENDING COMMENTS
Pt seen and examined on 10/27 at noon, agree with above. Pt with moderately severe acute necrotizing pancreatitis (no organ failure) as well as ascending cholangitis at admission, treated with antibiotic course (complete) and ERCP with CBD stent placement. Currently, pt continues to have severe epigastric pain related to the pancreatitis, better with PCA, tolerating tube feeds via N-D tube. SIRS response secondary to pancreatitis. Leukocytosis is improving, although procalcitonin level increased sharply yesterday, unclear reason. Plan to repeat CT a/p tomorrow to reassess pancreas as well as evaluate for any radiographic signs of infected necrosis, including gas in the retroperitoneum. D/w  of GI. Will hold off on antibiotics for now. If CT does not show any evidence of infected necrosis but pt's WBC starts to rise again, she is febrile, or has other signs of clinical deterioration, will plan for percutaneous fine needle aspiration for culture.

## 2017-10-28 LAB
ALBUMIN SERPL ELPH-MCNC: 2.5 G/DL — LOW (ref 3.3–5)
ALP SERPL-CCNC: 173 U/L — HIGH (ref 40–120)
ALT FLD-CCNC: 21 U/L — SIGNIFICANT CHANGE UP (ref 10–45)
AMYLASE P1 CFR SERPL: 122 U/L — SIGNIFICANT CHANGE UP (ref 25–125)
ANION GAP SERPL CALC-SCNC: 16 MMOL/L — SIGNIFICANT CHANGE UP (ref 5–17)
APTT BLD: 46.8 SEC — HIGH (ref 27.5–37.4)
AST SERPL-CCNC: 42 U/L — HIGH (ref 10–40)
BILIRUB DIRECT SERPL-MCNC: 0.1 MG/DL — SIGNIFICANT CHANGE UP (ref 0–0.2)
BILIRUB INDIRECT FLD-MCNC: SIGNIFICANT CHANGE UP (ref 0.2–1)
BILIRUB SERPL-MCNC: <0.2 MG/DL — SIGNIFICANT CHANGE UP (ref 0.2–1.2)
BUN SERPL-MCNC: 6 MG/DL — LOW (ref 7–23)
CALCIUM SERPL-MCNC: 8.3 MG/DL — LOW (ref 8.4–10.5)
CHLORIDE SERPL-SCNC: 97 MMOL/L — SIGNIFICANT CHANGE UP (ref 96–108)
CO2 SERPL-SCNC: 23 MMOL/L — SIGNIFICANT CHANGE UP (ref 22–31)
CREAT SERPL-MCNC: 0.47 MG/DL — LOW (ref 0.5–1.3)
GLUCOSE SERPL-MCNC: 123 MG/DL — HIGH (ref 70–99)
HCT VFR BLD CALC: 27.3 % — LOW (ref 34.5–45)
HGB BLD-MCNC: 8.8 G/DL — LOW (ref 11.5–15.5)
INR BLD: 1.16 RATIO — SIGNIFICANT CHANGE UP (ref 0.88–1.16)
LIDOCAIN IGE QN: 65 U/L — HIGH (ref 7–60)
MAGNESIUM SERPL-MCNC: 1.9 MG/DL — SIGNIFICANT CHANGE UP (ref 1.6–2.6)
MCHC RBC-ENTMCNC: 28.2 PG — SIGNIFICANT CHANGE UP (ref 27–34)
MCHC RBC-ENTMCNC: 32.2 GM/DL — SIGNIFICANT CHANGE UP (ref 32–36)
MCV RBC AUTO: 87.5 FL — SIGNIFICANT CHANGE UP (ref 80–100)
PHOSPHATE SERPL-MCNC: 2.9 MG/DL — SIGNIFICANT CHANGE UP (ref 2.5–4.5)
PLATELET # BLD AUTO: 825 K/UL — HIGH (ref 150–400)
POTASSIUM SERPL-MCNC: 4.5 MMOL/L — SIGNIFICANT CHANGE UP (ref 3.5–5.3)
POTASSIUM SERPL-SCNC: 4.5 MMOL/L — SIGNIFICANT CHANGE UP (ref 3.5–5.3)
PROT SERPL-MCNC: 6 G/DL — SIGNIFICANT CHANGE UP (ref 6–8.3)
PROTHROM AB SERPL-ACNC: 13.1 SEC — SIGNIFICANT CHANGE UP (ref 10–13.1)
RBC # BLD: 3.12 M/UL — LOW (ref 3.8–5.2)
RBC # FLD: 15.2 % — HIGH (ref 10.3–14.5)
SODIUM SERPL-SCNC: 136 MMOL/L — SIGNIFICANT CHANGE UP (ref 135–145)
WBC # BLD: 17.68 K/UL — HIGH (ref 3.8–10.5)
WBC # FLD AUTO: 17.68 K/UL — HIGH (ref 3.8–10.5)

## 2017-10-28 PROCEDURE — 99233 SBSQ HOSP IP/OBS HIGH 50: CPT

## 2017-10-28 PROCEDURE — 74000: CPT | Mod: 26

## 2017-10-28 PROCEDURE — 74177 CT ABD & PELVIS W/CONTRAST: CPT | Mod: 26

## 2017-10-28 RX ORDER — ACETAMINOPHEN 500 MG
1000 TABLET ORAL ONCE
Qty: 0 | Refills: 0 | Status: COMPLETED | OUTPATIENT
Start: 2017-10-28 | End: 2017-10-28

## 2017-10-28 RX ORDER — ACETAMINOPHEN 500 MG
1000 TABLET ORAL ONCE
Qty: 0 | Refills: 0 | Status: COMPLETED | OUTPATIENT
Start: 2017-10-29 | End: 2017-10-29

## 2017-10-28 RX ORDER — FENTANYL CITRATE 50 UG/ML
1 INJECTION INTRAVENOUS
Qty: 0 | Refills: 0 | Status: DISCONTINUED | OUTPATIENT
Start: 2017-10-28 | End: 2017-10-30

## 2017-10-28 RX ADMIN — HYDROMORPHONE HYDROCHLORIDE 30 MILLILITER(S): 2 INJECTION INTRAMUSCULAR; INTRAVENOUS; SUBCUTANEOUS at 19:21

## 2017-10-28 RX ADMIN — HYDROMORPHONE HYDROCHLORIDE 30 MILLILITER(S): 2 INJECTION INTRAMUSCULAR; INTRAVENOUS; SUBCUTANEOUS at 02:22

## 2017-10-28 RX ADMIN — Medication 400 MILLIGRAM(S): at 22:46

## 2017-10-28 RX ADMIN — HYDROMORPHONE HYDROCHLORIDE 30 MILLILITER(S): 2 INJECTION INTRAMUSCULAR; INTRAVENOUS; SUBCUTANEOUS at 07:20

## 2017-10-28 RX ADMIN — Medication 1000 MILLIGRAM(S): at 23:06

## 2017-10-28 RX ADMIN — FENTANYL CITRATE 1 PATCH: 50 INJECTION INTRAVENOUS at 16:43

## 2017-10-28 RX ADMIN — Medication 25 MILLIGRAM(S): at 19:20

## 2017-10-28 RX ADMIN — HYDROMORPHONE HYDROCHLORIDE 30 MILLILITER(S): 2 INJECTION INTRAMUSCULAR; INTRAVENOUS; SUBCUTANEOUS at 22:54

## 2017-10-28 RX ADMIN — DEXTROSE MONOHYDRATE, SODIUM CHLORIDE, AND POTASSIUM CHLORIDE 50 MILLILITER(S): 50; .745; 4.5 INJECTION, SOLUTION INTRAVENOUS at 13:59

## 2017-10-28 RX ADMIN — Medication 400 MILLIGRAM(S): at 16:38

## 2017-10-28 RX ADMIN — ENOXAPARIN SODIUM 40 MILLIGRAM(S): 100 INJECTION SUBCUTANEOUS at 14:00

## 2017-10-28 NOTE — PROGRESS NOTE ADULT - SUBJECTIVE AND OBJECTIVE BOX
Day __9_ of Anesthesia Pain Management Service    SUBJECTIVE:    Pain Scale Score	At rest: ___ 	With Activity: ___ 	[x ] Refer to charted pain scores    THERAPY:    [ ] IV PCA Morphine		[ ] 5 mg/mL	[ ] 1 mg/mL  [x ] IV PCA Hydromorphone	[ ] 5 mg/mL	[ ] 1 mg/mL  [ ] IV PCA Fentanyl		[ ] 50 micrograms/mL    Demand dose    lockout    (minutes) Continuous Rate    Total:     Daily      MEDICATIONS  (STANDING):  dextrose 5% + sodium chloride 0.45% with potassium chloride 20 mEq/L 1000 milliLiter(s) (50 mL/Hr) IV Continuous <Continuous>  enoxaparin Injectable 40 milliGRAM(s) SubCutaneous daily  gabapentin 300 milliGRAM(s) Oral daily  HYDROmorphone PCA (1 mG/mL) 30 milliLiter(s) PCA Continuous PCA Continuous  oxyCODONE  ER Tablet 20 milliGRAM(s) Oral every 8 hours  pantoprazole    Tablet 40 milliGRAM(s) Oral before breakfast  tetracaine/benzocaine/butamben Spray 1 Spray(s) Topical once    MEDICATIONS  (PRN):  benzocaine 15 mG/menthol 3.6 mG Lozenge 1 Lozenge Oral every 2 hours PRN Sore Throat  diphenhydrAMINE   Injectable 25 milliGRAM(s) IV Push every 4 hours PRN Rash and/or Itching  HYDROmorphone PCA (1 mG/mL) Rescue Clinician Bolus 1 milliGRAM(s) IV Push every 15 minutes PRN for Pain Scale GREATER THAN 6      OBJECTIVE:    Sedation Score:	[x ] Alert	[ ] Drowsy 	[ ] Arousable	[ ] Asleep	[ ] Unresponsive    Side Effects:	[x ] None	[ ] Nausea	[ ] Vomiting	[ ] Pruritus  		[ ] Other:    Vital Signs Last 24 Hrs  T(C): 37.2 (28 Oct 2017 11:23), Max: 37.3 (27 Oct 2017 21:53)  T(F): 99 (28 Oct 2017 11:23), Max: 99.2 (27 Oct 2017 21:53)  HR: 80 (28 Oct 2017 11:23) (80 - 93)  BP: 111/76 (28 Oct 2017 11:23) (107/70 - 118/76)  BP(mean): --  RR: 18 (28 Oct 2017 11:23) (18 - 18)  SpO2: 92% (28 Oct 2017 11:23) (92% - 95%)    ASSESSMENT/ PLAN    Therapy to  be:	[ x] Continue   [ ] Discontinued   [ ] Change to prn Analgesics    Documentation and Verification of current medications:   [X] Done	[ ] Not done, not elligible    Comments:

## 2017-10-28 NOTE — PROGRESS NOTE ADULT - SUBJECTIVE AND OBJECTIVE BOX
SUBJECTIVE:  Two episodes of nausea and vomiting overnight. Vomit appeared to be tube feeds.  Continues abdominal pain, not completely managed on current regimen.   Passing flatus and having bowel movements.     OBJECTIVE:     ** VITAL SIGNS / I&O's **    27 Oct 2017 07:01  -  28 Oct 2017 07:00  --------------------------------------------------------  IN:    dextrose 5% + sodium chloride 0.45% with potassium chloride 20 mEq/L: 1200 mL    Jevity: 1130 mL  Total IN: 2330 mL    OUT:    Emesis: 200 mL    Voided: 1550 mL  Total OUT: 1750 mL    Total NET: 580 mL      28 Oct 2017 07:01  -  28 Oct 2017 21:52  --------------------------------------------------------  IN:    dextrose 5% + sodium chloride 0.45% with potassium chloride 20 mEq/L: 600 mL    Solution: 100 mL  Total IN: 700 mL    OUT:    Voided: 1900 mL  Total OUT: 1900 mL    Total NET: -1200 mL          ** PHYSICAL EXAM **    -- CONSTITUTIONAL: AOx3. NAD.   -- HEENT:  -- NECK:   -- CARDIOVASCULAR: normotensive, regular rate   -- RESPIRATORY: breathing comfortably   -- CHEST:  -- ABDOMEN:   -- EXTREMITIES:   -- VASCULAR:   -- NEUROLOGICAL:     ** LABS **                 8.8    17.68  )----------(  825       ( 28 Oct 2017 08:51 )               27.3      136    |  97     |  6      ----------------------------<  123        ( 28 Oct 2017 08:51 )  4.5     |  23     |  0.47     Ca    8.3        ( 28 Oct 2017 08:51 )  Phos  2.9       ( 28 Oct 2017 08:51 )  Mg     1.9       ( 28 Oct 2017 08:51 )    TPro  6.0    /  Alb  2.5    /  TBili  <0.2   /  DBili  0.1    /  AST  42     /  ALT  21     /  AlkPhos  173    ( 28 Oct 2017 08:51 )    PT/INR -  13.1 sec / 1.16 ratio   ( 28 Oct 2017 08:51 )       PTT -  46.8 sec   ( 28 Oct 2017 08:51 )  CAPILLARY BLOOD GLUCOSE

## 2017-10-28 NOTE — PROGRESS NOTE ADULT - ASSESSMENT
ASSESSMENT:   Ms. Coleman is a 57 year old woman with necrotizing pancreatitis, s/p ERCP (sphincterotomy and plastic stent placement) with acute necrotizing pancreatitis (30-50%), splenic and portal vein thrombosis. Pain is somewhat controlled with PCA. No current signs of cholangitis; no fever, chills, AMS, increasing pain, tachycardia, or hypotension. Afebrile overnight. Nausea and vomiting overnight despite good GI function.      PLAN:  - Pain control: PCA  - Diet: pause tube feeds due to nausea and vomiting   - CT Abd/pelvis   - ABx: Cefotetan, flagyl  - Encourage IS as tolerated; wean supplemental O2 as tolerated  - DVT Prophylaxis: Lovenox

## 2017-10-28 NOTE — PROGRESS NOTE ADULT - ATTENDING COMMENTS
Pt seen and examined 10/28 at 2 pm, agree with above. Pt says that her epigastric pain is worse today, and she had some N/V. Tube feeds have been held (pt has N-D tube). For CT a/p today to evaluate pancreas and evaluate for signs of infected necrosis. Given high opiate needs and unlikely rapid resolution of symptoms, will start fentanyl patch at 12.5mcg. We discussed with pt that this type of pancreatitis is associated with pain, which can become chronic, and that this will take many weeks or months to completely resolve. We explained that she may need interventions or even surgery in the long term, and that she may have ups and downs that we will continue to evaluate and manage with her.

## 2017-10-29 LAB
ALBUMIN SERPL ELPH-MCNC: 2.6 G/DL — LOW (ref 3.3–5)
ALP SERPL-CCNC: 175 U/L — HIGH (ref 40–120)
ALT FLD-CCNC: 18 U/L — SIGNIFICANT CHANGE UP (ref 10–45)
ANION GAP SERPL CALC-SCNC: 13 MMOL/L — SIGNIFICANT CHANGE UP (ref 5–17)
APTT BLD: 30.3 SEC — SIGNIFICANT CHANGE UP (ref 27.5–37.4)
AST SERPL-CCNC: 48 U/L — HIGH (ref 10–40)
BILIRUB DIRECT SERPL-MCNC: 0.1 MG/DL — SIGNIFICANT CHANGE UP (ref 0–0.2)
BILIRUB INDIRECT FLD-MCNC: SIGNIFICANT CHANGE UP (ref 0.2–1)
BILIRUB SERPL-MCNC: <0.2 MG/DL — SIGNIFICANT CHANGE UP (ref 0.2–1.2)
BUN SERPL-MCNC: 6 MG/DL — LOW (ref 7–23)
CALCIUM SERPL-MCNC: 8.3 MG/DL — LOW (ref 8.4–10.5)
CHLORIDE SERPL-SCNC: 96 MMOL/L — SIGNIFICANT CHANGE UP (ref 96–108)
CO2 SERPL-SCNC: 25 MMOL/L — SIGNIFICANT CHANGE UP (ref 22–31)
CREAT SERPL-MCNC: 0.45 MG/DL — LOW (ref 0.5–1.3)
CULTURE RESULTS: SIGNIFICANT CHANGE UP
CULTURE RESULTS: SIGNIFICANT CHANGE UP
GLUCOSE SERPL-MCNC: 103 MG/DL — HIGH (ref 70–99)
HCT VFR BLD CALC: 27.1 % — LOW (ref 34.5–45)
HCT VFR BLD CALC: 27.6 % — LOW (ref 34.5–45)
HGB BLD-MCNC: 8.8 G/DL — LOW (ref 11.5–15.5)
HGB BLD-MCNC: 9.6 G/DL — LOW (ref 11.5–15.5)
INR BLD: 1.1 RATIO — SIGNIFICANT CHANGE UP (ref 0.88–1.16)
LIDOCAIN IGE QN: 127 U/L — HIGH (ref 7–60)
MCHC RBC-ENTMCNC: 28.3 PG — SIGNIFICANT CHANGE UP (ref 27–34)
MCHC RBC-ENTMCNC: 31.1 PG — SIGNIFICANT CHANGE UP (ref 27–34)
MCHC RBC-ENTMCNC: 32.5 GM/DL — SIGNIFICANT CHANGE UP (ref 32–36)
MCHC RBC-ENTMCNC: 34.7 GM/DL — SIGNIFICANT CHANGE UP (ref 32–36)
MCV RBC AUTO: 87.1 FL — SIGNIFICANT CHANGE UP (ref 80–100)
MCV RBC AUTO: 89.6 FL — SIGNIFICANT CHANGE UP (ref 80–100)
PLATELET # BLD AUTO: 1028 K/UL — CRITICAL HIGH (ref 150–400)
PLATELET # BLD AUTO: 959 K/UL — HIGH (ref 150–400)
POTASSIUM SERPL-MCNC: 4.8 MMOL/L — SIGNIFICANT CHANGE UP (ref 3.5–5.3)
POTASSIUM SERPL-SCNC: 4.8 MMOL/L — SIGNIFICANT CHANGE UP (ref 3.5–5.3)
PROCALCITONIN SERPL-MCNC: 6.05 NG/ML — HIGH (ref 0–0.04)
PROT SERPL-MCNC: 6.1 G/DL — SIGNIFICANT CHANGE UP (ref 6–8.3)
PROTHROM AB SERPL-ACNC: 12.5 SEC — SIGNIFICANT CHANGE UP (ref 10–13.1)
RBC # BLD: 3.08 M/UL — LOW (ref 3.8–5.2)
RBC # BLD: 3.11 M/UL — LOW (ref 3.8–5.2)
RBC # FLD: 12.6 % — SIGNIFICANT CHANGE UP (ref 10.3–14.5)
RBC # FLD: 15 % — HIGH (ref 10.3–14.5)
SODIUM SERPL-SCNC: 134 MMOL/L — LOW (ref 135–145)
SPECIMEN SOURCE: SIGNIFICANT CHANGE UP
SPECIMEN SOURCE: SIGNIFICANT CHANGE UP
WBC # BLD: 17.77 K/UL — HIGH (ref 3.8–10.5)
WBC # BLD: 18.4 K/UL — HIGH (ref 3.8–10.5)
WBC # FLD AUTO: 17.77 K/UL — HIGH (ref 3.8–10.5)
WBC # FLD AUTO: 18.4 K/UL — HIGH (ref 3.8–10.5)

## 2017-10-29 PROCEDURE — 71010: CPT | Mod: 26

## 2017-10-29 PROCEDURE — 99232 SBSQ HOSP IP/OBS MODERATE 35: CPT

## 2017-10-29 PROCEDURE — 74000: CPT | Mod: 26

## 2017-10-29 RX ORDER — PANTOPRAZOLE SODIUM 20 MG/1
40 TABLET, DELAYED RELEASE ORAL DAILY
Qty: 0 | Refills: 0 | Status: DISCONTINUED | OUTPATIENT
Start: 2017-10-29 | End: 2017-11-10

## 2017-10-29 RX ORDER — ACETAMINOPHEN 500 MG
1000 TABLET ORAL ONCE
Qty: 0 | Refills: 0 | Status: COMPLETED | OUTPATIENT
Start: 2017-10-29 | End: 2017-10-29

## 2017-10-29 RX ADMIN — BENZOCAINE AND MENTHOL 1 LOZENGE: 5; 1 LIQUID ORAL at 13:14

## 2017-10-29 RX ADMIN — HYDROMORPHONE HYDROCHLORIDE 30 MILLILITER(S): 2 INJECTION INTRAMUSCULAR; INTRAVENOUS; SUBCUTANEOUS at 07:33

## 2017-10-29 RX ADMIN — Medication 400 MILLIGRAM(S): at 13:14

## 2017-10-29 RX ADMIN — Medication 25 MILLIGRAM(S): at 15:08

## 2017-10-29 RX ADMIN — HYDROMORPHONE HYDROCHLORIDE 1 MILLIGRAM(S): 2 INJECTION INTRAMUSCULAR; INTRAVENOUS; SUBCUTANEOUS at 02:20

## 2017-10-29 RX ADMIN — PANTOPRAZOLE SODIUM 40 MILLIGRAM(S): 20 TABLET, DELAYED RELEASE ORAL at 13:15

## 2017-10-29 RX ADMIN — HYDROMORPHONE HYDROCHLORIDE 1 MILLIGRAM(S): 2 INJECTION INTRAMUSCULAR; INTRAVENOUS; SUBCUTANEOUS at 22:22

## 2017-10-29 RX ADMIN — ENOXAPARIN SODIUM 40 MILLIGRAM(S): 100 INJECTION SUBCUTANEOUS at 13:15

## 2017-10-29 RX ADMIN — HYDROMORPHONE HYDROCHLORIDE 30 MILLILITER(S): 2 INJECTION INTRAMUSCULAR; INTRAVENOUS; SUBCUTANEOUS at 12:27

## 2017-10-29 RX ADMIN — Medication 1000 MILLIGRAM(S): at 13:45

## 2017-10-29 RX ADMIN — DEXTROSE MONOHYDRATE, SODIUM CHLORIDE, AND POTASSIUM CHLORIDE 50 MILLILITER(S): 50; .745; 4.5 INJECTION, SOLUTION INTRAVENOUS at 13:15

## 2017-10-29 RX ADMIN — Medication 1000 MILLIGRAM(S): at 05:41

## 2017-10-29 RX ADMIN — Medication 25 MILLIGRAM(S): at 20:24

## 2017-10-29 RX ADMIN — Medication 25 MILLIGRAM(S): at 09:43

## 2017-10-29 RX ADMIN — HYDROMORPHONE HYDROCHLORIDE 30 MILLILITER(S): 2 INJECTION INTRAMUSCULAR; INTRAVENOUS; SUBCUTANEOUS at 19:31

## 2017-10-29 RX ADMIN — Medication 400 MILLIGRAM(S): at 23:10

## 2017-10-29 RX ADMIN — Medication 400 MILLIGRAM(S): at 05:21

## 2017-10-29 NOTE — PROGRESS NOTE ADULT - SUBJECTIVE AND OBJECTIVE BOX
Day __ of Anesthesia Pain Management Service    SUBJECTIVE: Patient is doing well with IV PCA    Pain Scale Score:	[X] Refer to charted pain scores    THERAPY:    [ ] IV PCA Morphine		[ ] 5 mg/mL	[ ] 1 mg/mL  [X] IV PCA Hydromorphone	[ ] 5 mg/mL	[X] 1 mg/mL  [ ] IV PCA Fentanyl		[ ] 50 micrograms/mL    Demand dose: 0.5 mg     Lockout: 6 minutes   Continuous Rate: 0 mg/hr      MEDICATIONS  (STANDING):  dextrose 5% + sodium chloride 0.45% with potassium chloride 20 mEq/L 1000 milliLiter(s) (50 mL/Hr) IV Continuous <Continuous>  enoxaparin Injectable 40 milliGRAM(s) SubCutaneous daily  fentaNYL   Patch  12 MICROgram(s)/Hr 1 Patch Transdermal every 72 hours  gabapentin 300 milliGRAM(s) Oral daily  HYDROmorphone PCA (1 mG/mL) 30 milliLiter(s) PCA Continuous PCA Continuous  oxyCODONE  ER Tablet 20 milliGRAM(s) Oral every 8 hours  pantoprazole  Injectable 40 milliGRAM(s) IV Push daily  tetracaine/benzocaine/butamben Spray 1 Spray(s) Topical once    MEDICATIONS  (PRN):  benzocaine 15 mG/menthol 3.6 mG Lozenge 1 Lozenge Oral every 2 hours PRN Sore Throat  diphenhydrAMINE   Injectable 25 milliGRAM(s) IV Push every 4 hours PRN Rash and/or Itching  HYDROmorphone PCA (1 mG/mL) Rescue Clinician Bolus 1 milliGRAM(s) IV Push every 15 minutes PRN for Pain Scale GREATER THAN 6      OBJECTIVE:    Sedation Score:	[ X] Alert	[ ] Drowsy 	[ ] Arousable	[ ] Asleep	[ ] Unresponsive    Side Effects:	[X ] None	[ ] Nausea	[ ] Vomiting	[ ] Pruritus  		[ ] Other:    Vital Signs Last 24 Hrs  T(C): 37.2 (29 Oct 2017 09:41), Max: 37.9 (29 Oct 2017 05:21)  T(F): 99 (29 Oct 2017 09:41), Max: 100.2 (29 Oct 2017 05:21)  HR: 85 (29 Oct 2017 09:41) (79 - 96)  BP: 118/84 (29 Oct 2017 09:41) (90/58 - 118/84)  BP(mean): --  RR: 18 (29 Oct 2017 09:41) (18 - 18)  SpO2: 95% (29 Oct 2017 09:41) (93% - 96%)    ASSESSMENT/ PLAN: Fentanyl patch was added; NGT still in place    Therapy to  be:               [X] Continued   [ ] Discontinued   [ ] Changed to PRN Analgesics    Documentation and Verification of current medications:   [X] Done	[ ] Not done, not eligible    Comments:

## 2017-10-30 LAB
ALBUMIN SERPL ELPH-MCNC: 2.4 G/DL — LOW (ref 3.3–5)
ALP SERPL-CCNC: 162 U/L — HIGH (ref 40–120)
ALT FLD-CCNC: 17 U/L — SIGNIFICANT CHANGE UP (ref 10–45)
ANION GAP SERPL CALC-SCNC: 14 MMOL/L — SIGNIFICANT CHANGE UP (ref 5–17)
APPEARANCE UR: ABNORMAL
AST SERPL-CCNC: 39 U/L — SIGNIFICANT CHANGE UP (ref 10–40)
BACTERIA # UR AUTO: ABNORMAL /HPF
BILIRUB SERPL-MCNC: 0.2 MG/DL — SIGNIFICANT CHANGE UP (ref 0.2–1.2)
BILIRUB UR-MCNC: NEGATIVE — SIGNIFICANT CHANGE UP
BUN SERPL-MCNC: 5 MG/DL — LOW (ref 7–23)
CALCIUM SERPL-MCNC: 8.7 MG/DL — SIGNIFICANT CHANGE UP (ref 8.4–10.5)
CHLORIDE SERPL-SCNC: 92 MMOL/L — LOW (ref 96–108)
CO2 SERPL-SCNC: 27 MMOL/L — SIGNIFICANT CHANGE UP (ref 22–31)
COLOR SPEC: YELLOW — SIGNIFICANT CHANGE UP
CREAT SERPL-MCNC: 0.46 MG/DL — LOW (ref 0.5–1.3)
DIFF PNL FLD: NEGATIVE — SIGNIFICANT CHANGE UP
EPI CELLS # UR: SIGNIFICANT CHANGE UP /HPF
GLUCOSE SERPL-MCNC: 102 MG/DL — HIGH (ref 70–99)
GLUCOSE UR QL: NEGATIVE — SIGNIFICANT CHANGE UP
GRAM STN FLD: SIGNIFICANT CHANGE UP
HCT VFR BLD CALC: 28.5 % — LOW (ref 34.5–45)
HGB BLD-MCNC: 9.4 G/DL — LOW (ref 11.5–15.5)
KETONES UR-MCNC: NEGATIVE — SIGNIFICANT CHANGE UP
LEUKOCYTE ESTERASE UR-ACNC: NEGATIVE — SIGNIFICANT CHANGE UP
MCHC RBC-ENTMCNC: 28.8 PG — SIGNIFICANT CHANGE UP (ref 27–34)
MCHC RBC-ENTMCNC: 33 GM/DL — SIGNIFICANT CHANGE UP (ref 32–36)
MCV RBC AUTO: 87.4 FL — SIGNIFICANT CHANGE UP (ref 80–100)
NITRITE UR-MCNC: NEGATIVE — SIGNIFICANT CHANGE UP
PH UR: 6.5 — SIGNIFICANT CHANGE UP (ref 5–8)
PLATELET # BLD AUTO: 1151 K/UL — CRITICAL HIGH (ref 150–400)
POTASSIUM SERPL-MCNC: 4.9 MMOL/L — SIGNIFICANT CHANGE UP (ref 3.5–5.3)
POTASSIUM SERPL-SCNC: 4.9 MMOL/L — SIGNIFICANT CHANGE UP (ref 3.5–5.3)
PROT SERPL-MCNC: 6.7 G/DL — SIGNIFICANT CHANGE UP (ref 6–8.3)
PROT UR-MCNC: SIGNIFICANT CHANGE UP
RBC # BLD: 3.26 M/UL — LOW (ref 3.8–5.2)
RBC # FLD: 14.5 % — SIGNIFICANT CHANGE UP (ref 10.3–14.5)
RBC CASTS # UR COMP ASSIST: SIGNIFICANT CHANGE UP /HPF (ref 0–2)
SODIUM SERPL-SCNC: 133 MMOL/L — LOW (ref 135–145)
SP GR SPEC: 1.02 — SIGNIFICANT CHANGE UP (ref 1.01–1.02)
SPECIMEN SOURCE: SIGNIFICANT CHANGE UP
UROBILINOGEN FLD QL: NEGATIVE — SIGNIFICANT CHANGE UP
WBC # BLD: 20.95 K/UL — HIGH (ref 3.8–10.5)
WBC # FLD AUTO: 20.95 K/UL — HIGH (ref 3.8–10.5)
WBC UR QL: SIGNIFICANT CHANGE UP /HPF (ref 0–5)

## 2017-10-30 PROCEDURE — 76942 ECHO GUIDE FOR BIOPSY: CPT | Mod: 26

## 2017-10-30 PROCEDURE — 10022: CPT

## 2017-10-30 PROCEDURE — 99232 SBSQ HOSP IP/OBS MODERATE 35: CPT | Mod: GC

## 2017-10-30 PROCEDURE — 99233 SBSQ HOSP IP/OBS HIGH 50: CPT

## 2017-10-30 RX ORDER — ONDANSETRON 8 MG/1
4 TABLET, FILM COATED ORAL ONCE
Qty: 0 | Refills: 0 | Status: COMPLETED | OUTPATIENT
Start: 2017-10-30 | End: 2017-10-30

## 2017-10-30 RX ORDER — ACETAMINOPHEN 500 MG
1000 TABLET ORAL ONCE
Qty: 0 | Refills: 0 | Status: COMPLETED | OUTPATIENT
Start: 2017-10-30 | End: 2017-10-30

## 2017-10-30 RX ORDER — HEPARIN SODIUM 5000 [USP'U]/ML
5000 INJECTION INTRAVENOUS; SUBCUTANEOUS EVERY 8 HOURS
Qty: 0 | Refills: 0 | Status: DISCONTINUED | OUTPATIENT
Start: 2017-10-30 | End: 2017-10-30

## 2017-10-30 RX ORDER — KETOROLAC TROMETHAMINE 30 MG/ML
15 SYRINGE (ML) INJECTION EVERY 6 HOURS
Qty: 0 | Refills: 0 | Status: DISCONTINUED | OUTPATIENT
Start: 2017-10-30 | End: 2017-10-31

## 2017-10-30 RX ORDER — FENTANYL CITRATE 50 UG/ML
1 INJECTION INTRAVENOUS
Qty: 0 | Refills: 0 | Status: DISCONTINUED | OUTPATIENT
Start: 2017-10-30 | End: 2017-11-04

## 2017-10-30 RX ORDER — ENOXAPARIN SODIUM 100 MG/ML
40 INJECTION SUBCUTANEOUS DAILY
Qty: 0 | Refills: 0 | Status: DISCONTINUED | OUTPATIENT
Start: 2017-10-30 | End: 2017-12-01

## 2017-10-30 RX ADMIN — Medication 25 MILLIGRAM(S): at 23:45

## 2017-10-30 RX ADMIN — HYDROMORPHONE HYDROCHLORIDE 1 MILLIGRAM(S): 2 INJECTION INTRAMUSCULAR; INTRAVENOUS; SUBCUTANEOUS at 22:08

## 2017-10-30 RX ADMIN — Medication 400 MILLIGRAM(S): at 17:54

## 2017-10-30 RX ADMIN — Medication 15 MILLIGRAM(S): at 23:20

## 2017-10-30 RX ADMIN — FENTANYL CITRATE 1 PATCH: 50 INJECTION INTRAVENOUS at 13:40

## 2017-10-30 RX ADMIN — Medication 15 MILLIGRAM(S): at 11:08

## 2017-10-30 RX ADMIN — Medication 15 MILLIGRAM(S): at 18:22

## 2017-10-30 RX ADMIN — Medication 400 MILLIGRAM(S): at 11:08

## 2017-10-30 RX ADMIN — Medication 15 MILLIGRAM(S): at 11:38

## 2017-10-30 RX ADMIN — FENTANYL CITRATE 1 PATCH: 50 INJECTION INTRAVENOUS at 13:30

## 2017-10-30 RX ADMIN — Medication 15 MILLIGRAM(S): at 17:53

## 2017-10-30 RX ADMIN — HYDROMORPHONE HYDROCHLORIDE 1 MILLIGRAM(S): 2 INJECTION INTRAMUSCULAR; INTRAVENOUS; SUBCUTANEOUS at 06:52

## 2017-10-30 RX ADMIN — HYDROMORPHONE HYDROCHLORIDE 30 MILLILITER(S): 2 INJECTION INTRAMUSCULAR; INTRAVENOUS; SUBCUTANEOUS at 01:25

## 2017-10-30 RX ADMIN — Medication 1000 MILLIGRAM(S): at 11:38

## 2017-10-30 RX ADMIN — HEPARIN SODIUM 5000 UNIT(S): 5000 INJECTION INTRAVENOUS; SUBCUTANEOUS at 13:38

## 2017-10-30 RX ADMIN — Medication 15 MILLIGRAM(S): at 23:00

## 2017-10-30 RX ADMIN — Medication 15 MILLIGRAM(S): at 07:10

## 2017-10-30 RX ADMIN — HYDROMORPHONE HYDROCHLORIDE 30 MILLILITER(S): 2 INJECTION INTRAMUSCULAR; INTRAVENOUS; SUBCUTANEOUS at 15:22

## 2017-10-30 RX ADMIN — Medication 1000 MILLIGRAM(S): at 18:22

## 2017-10-30 RX ADMIN — HYDROMORPHONE HYDROCHLORIDE 30 MILLILITER(S): 2 INJECTION INTRAMUSCULAR; INTRAVENOUS; SUBCUTANEOUS at 19:16

## 2017-10-30 RX ADMIN — Medication 25 MILLIGRAM(S): at 19:20

## 2017-10-30 RX ADMIN — Medication 15 MILLIGRAM(S): at 06:55

## 2017-10-30 RX ADMIN — HYDROMORPHONE HYDROCHLORIDE 1 MILLIGRAM(S): 2 INJECTION INTRAMUSCULAR; INTRAVENOUS; SUBCUTANEOUS at 17:55

## 2017-10-30 RX ADMIN — PANTOPRAZOLE SODIUM 40 MILLIGRAM(S): 20 TABLET, DELAYED RELEASE ORAL at 11:09

## 2017-10-30 RX ADMIN — Medication 25 MILLIGRAM(S): at 07:47

## 2017-10-30 RX ADMIN — HYDROMORPHONE HYDROCHLORIDE 1 MILLIGRAM(S): 2 INJECTION INTRAMUSCULAR; INTRAVENOUS; SUBCUTANEOUS at 11:11

## 2017-10-30 RX ADMIN — ONDANSETRON 4 MILLIGRAM(S): 8 TABLET, FILM COATED ORAL at 01:20

## 2017-10-30 RX ADMIN — HYDROMORPHONE HYDROCHLORIDE 30 MILLILITER(S): 2 INJECTION INTRAMUSCULAR; INTRAVENOUS; SUBCUTANEOUS at 07:28

## 2017-10-30 NOTE — PROGRESS NOTE ADULT - ATTENDING COMMENTS
seen and examined 10/30/2017 @ 1050    ascending cholangitis s/p ERCP / stent on 10/17  -completed therapeutic ABx on 10/27 (Zosyn -> Imipenem -> cefotetan/Flagyl)    acute necrotic collections  -on repeat CT abd/pelvis (10/28), the body of the pancreas now appears necrotic  -she has nightly fevers and persistent leukocytosis  -in the setting of recent cholangitis, there is increased concern for infected necrosis, so we will have percutaneous FNA by IR today

## 2017-10-30 NOTE — PROGRESS NOTE ADULT - ATTENDING COMMENTS
As above.  Impression:    #1.  Necrotizing pancreatitis x approximately 2.5 weeks, with CT demonstration of developing walled off necrosis, with fever and worsening leukocytosis off antibiotics.  Concern for infected necrosis.  S/p IR guided aspiration today.  #2.  Cholangitis, s/p ERCP/biliary stent, without evidence of recurrence by LFTs    Recommendations:  #1.  Await aspirate gram stain/culture  #2.  Favor resuming broad spectrum antibiotics then adjusting to culture results.  #3.  Follow CBC/LFTs  #4.  Will consider advancement of NJ tube into duodenum/jejunum based on clinical course.

## 2017-10-30 NOTE — PROGRESS NOTE ADULT - SUBJECTIVE AND OBJECTIVE BOX
Day 13/11 of Anesthesia Pain Management Service    SUBJECTIVE: I'm having pain    Pain Scale Score:	[X] Refer to charted pain scores    THERAPY:    [ ] IV PCA Morphine		[ ] 5 mg/mL	[ ] 1 mg/mL  [X] IV PCA Hydromorphone	[ ] 5 mg/mL	[X] 1 mg/mL  [ ] IV PCA Fentanyl		[ ] 50 micrograms/mL    Demand dose: 0.5 mg     Lockout: 6 minutes   Continuous Rate: 0 mg/hr  4 Hour Limit: 11 mg    MEDICATIONS  (STANDING):  dextrose 5% + sodium chloride 0.45% with potassium chloride 20 mEq/L 1000 milliLiter(s) (50 mL/Hr) IV Continuous <Continuous>  fentaNYL   Patch  12 MICROgram(s)/Hr 1 Patch Transdermal every 72 hours  heparin  Injectable 5000 Unit(s) SubCutaneous every 8 hours  HYDROmorphone PCA (1 mG/mL) 30 milliLiter(s) PCA Continuous PCA Continuous  ketorolac   Injectable 15 milliGRAM(s) IV Push every 6 hours  pantoprazole  Injectable 40 milliGRAM(s) IV Push daily    MEDICATIONS  (PRN):  benzocaine 15 mG/menthol 3.6 mG Lozenge 1 Lozenge Oral every 2 hours PRN Sore Throat  diphenhydrAMINE   Injectable 25 milliGRAM(s) IV Push every 4 hours PRN Rash and/or Itching  HYDROmorphone PCA (1 mG/mL) Rescue Clinician Bolus 1 milliGRAM(s) IV Push every 15 minutes PRN for Pain Scale GREATER THAN 6      OBJECTIVE:    Sedation Score:	[ X] Alert	[ ] Drowsy 	[ ] Arousable	[ ] Asleep	[ ] Unresponsive    Side Effects:	[ ] None	[X ] Nausea: antiemetics PRN	[ ] Vomiting	[ ] Pruritus  		[ ] Other:    Vital Signs Last 24 Hrs  T(C): 37.3 (30 Oct 2017 09:22), Max: 38.3 (30 Oct 2017 06:41)  T(F): 99.1 (30 Oct 2017 09:22), Max: 101 (30 Oct 2017 06:41)  HR: 85 (30 Oct 2017 09:22) (81 - 98)  BP: 101/68 (30 Oct 2017 09:22) (101/65 - 115/72)  BP(mean): --  RR: 17 (30 Oct 2017 09:22) (17 - 18)  SpO2: 92% (30 Oct 2017 09:22) (92% - 96%)    ASSESSMENT/ PLAN    Therapy to  be:               [X] Continued   [ ] Discontinued   [ ] Changed to PRN Analgesics    Documentation and Verification of current medications:   [X] Done	[ ] Not done, not eligible    Comments: OxycontinER changed to Duragesic 12.5mcg/hr secondary to nausea with inability to tolerate tube feeds.  Continues to report abdominal pain.  Patient febrile last night with elevated WBC Lovenox given 10\29 @1pm. Discussed with anesthesia possible epidural placement for better analgesia. Given elevated WBC and fever, would not recommend epidural placement at this time. Recommend chronic pain management consult.

## 2017-10-30 NOTE — CONSULT NOTE ADULT - SUBJECTIVE AND OBJECTIVE BOX
We are requested to see this 57-year-old patient with chronic abdominal pain secondary to necrotizing pancreatitis. Patient has progressive disease and has had worsening pain despite the use of the PCA.  She states that her fentanyl patch was recently increased but she has not seen a significant change in her pain.  She is well educated in the use of the PCA and is using it correctly.  Patient with nausea and vomiting and is presently NPO.  It appears the patient will remain NPO for the perceivable future.  Patient denies any untoward side effects from her present narcotic regimen. Please see the hospital chart for complete history and physical on this patient.

## 2017-10-30 NOTE — CHART NOTE - NSCHARTNOTEFT_GEN_A_CORE
Source: Patient [X ]    Family [X ]     other [X ] chart/medical record, PA    Diet : NPO     Pt seen for nutrition follow-up. Currently NPO. Previously received Jevity 1.2 via tiger tube (in duodenum per chart review) with limited tolerance. Noted with emesis 10/28 and 10/29. Per chart, team with plans to have GI or IR advance tube into jejunum. Pt reports flatus +BM (last BM 10/29).     Hospital Course: Pt admitted with necrotizing pancreatitis, s/p ERCP (sphincterotomy and plastic stent placement) with acute necrotizing pancreatitis, splenic and portal vein thrombosis.    Enteral /Parenteral Nutrition:     Current Weight: 10/26: 153.2 pounds 10/17: 146.6 pounds, pt noted with 1+ general + suze. foot/ankle edema as of 10/29    Pertinent Medications: MEDICATIONS  (STANDING):  dextrose 5% + sodium chloride 0.45% with potassium chloride 20 mEq/L 1000 milliLiter(s) (50 mL/Hr) IV Continuous <Continuous>  enoxaparin Injectable 40 milliGRAM(s) SubCutaneous daily  fentaNYL   Patch  12 MICROgram(s)/Hr 1 Patch Transdermal every 72 hours  HYDROmorphone PCA (1 mG/mL) 30 milliLiter(s) PCA Continuous PCA Continuous  ketorolac   Injectable 15 milliGRAM(s) IV Push every 6 hours  pantoprazole  Injectable 40 milliGRAM(s) IV Push daily    Pertinent Labs:  10-29 Na134 mmol/L<L> Glu 103 mg/dL<H> K+ 4.8 mmol/L Cr  0.45 mg/dL<L> BUN 6 mg/dL<L> Phos n/a     Skin: No pressure injuries    Estimated Needs:   [X ] no change since previous assessment  [ ] recalculated:       Previous Nutrition Diagnosis: [X ] Inadequate Protein-Energy Intake      Nutrition Diagnosis is [X ] ongoing  [ ] resolved [ ] not applicable       Recommend    [ ] Change Diet To:    [ ] Nutrition Supplement    [X ] Nutrition Support: When medically feasible, pending tiger tube repositioning, consider VitalAF @10ml/hr x24 hrs with advancement as tolerated to goal. Suggested goal: Vital AF @50ml/hr x24 hrs. Provides: 1440kcal (22 pollo/g, 1.4gm/kg based on dosing wt of 66.5kg).     [ ] Other:        Monitoring and Evaluation:     [X ] PO intake [X ] Tolerance to diet prescription [X ] weights [X ] follow up per protocol    [X ] other: 1. Monitor enteral feed initiation and tolerance, weights, and lab values. 2. RD to remain available for further nutritional interventions as indicated/requested by medical team/pt.

## 2017-10-30 NOTE — PROGRESS NOTE ADULT - SUBJECTIVE AND OBJECTIVE BOX
Surgery Trauma Team Progress Note    SUBJECTIVE:   Patient was seen and examined this morning. There was no acute event overnight. She spikes fever last night, and fever work up was ordered. She had an episode of bilious emesis last night (150 cc). This morning she was in excruciating abdomina pain which radiates to her back. She received Toradol in addition to her pain regimen. She does not report chest pain, or shortness of breath.     OBJECTIVE:   T(C): 37.3 (10-30-17 @ 09:22), Max: 38.3 (10-30-17 @ 06:41)  HR: 85 (10-30-17 @ 09:22) (81 - 98)  BP: 101/68 (10-30-17 @ 09:22) (101/65 - 115/72)  RR: 17 (10-30-17 @ 09:22) (17 - 18)  SpO2: 92% (10-30-17 @ 09:22) (92% - 96%)  Wt(kg): --  CAPILLARY BLOOD GLUCOSE        I&O's Detail    29 Oct 2017 07:01  -  30 Oct 2017 07:00  --------------------------------------------------------  IN:    dextrose 5% + sodium chloride 0.45% with potassium chloride 20 mEq/L: 600 mL  Total IN: 600 mL    OUT:    Emesis: 200 mL    Stool: 1 mL    Voided: 600 mL  Total OUT: 801 mL    Total NET: -201 mL          PHYSICAL EXAM:  Gen: Well-nourished, well-developed, A&O x3, sitting in her chair and is in pain, with tiger tube   CV: RRR  Resp: Patent airways, unlabored   Abdomen: Soft, nontender, nondistended  Extremities: All 4 extremities warm and well perfused, no edema

## 2017-10-30 NOTE — CHART NOTE - NSCHARTNOTEFT_GEN_A_CORE
Procedure: FNA of peripancreatic fluid     Subjective: 58 y/o Female, s/p FNA of peripancreatic fluid, c/o of abdominal pain. Pt denies chest pain, sob, palpitations, n/v, and chills and pt denies bowel movements.     Objective:   Vital Signs Last 24 Hrs  T(C): 36.4 (30 Oct 2017 21:23), Max: 38.3 (30 Oct 2017 06:41)  T(F): 97.5 (30 Oct 2017 21:23), Max: 101 (30 Oct 2017 06:41)  HR: 78 (30 Oct 2017 21:23) (70 - 95)  BP: 103/69 (30 Oct 2017 21:23) (93/55 - 115/72)  BP(mean): --  RR: 18 (30 Oct 2017 21:23) (17 - 18)  SpO2: 93% (30 Oct 2017 21:23) (92% - 94%)    I&O's    29 Oct 2017 07:01  -  30 Oct 2017 07:00  --------------------------------------------------------  IN: 600 mL / OUT: 801 mL / NET: -201 mL    30 Oct 2017 07:01  -  30 Oct 2017 22:33  --------------------------------------------------------  IN: 900 mL / OUT: 400 mL / NET: 500 mL      PHYSICAL EXAM:  General: AOx3, NAD  Abdominal: Soft, non distended, tenderness upon light palpation in all 4 quadrants    A/P: 58 y/o Female, s/p FNA of peripancreatic fluid, c/o of abdominal pain  - Continue Lovenox for dvt ppx  - NPO  - Pt to go for endoscopic J tube repositioning with GI veronica   - F/U cultures from FNA of peripancreatic fluid  - Continue pain control with PCA  - Monitor fevers  - F/U fever workup

## 2017-10-30 NOTE — PROGRESS NOTE ADULT - ASSESSMENT
56 y/o F with necrotizing pancreatitis, cholangitis, s/p ERCP/biliary stent placement 10/20, with splenic vein thrombus now with fever and marked leukocytosis.    Impression:  1) Pancreatic Necrosis  2) Fever    Plan:  - f/u gram stain and culture of pancreatic necrosis aspirate   - IV abx  - IV fluids with LR  - analgesia per primary team  - tube feeds per NJ tube, will discuss endoscopy for advancement of feeding tube for post-pyloric feeding given vomiting    - with necrosis becoming more mature, plan for endoscopic drainage during the next week based on clinical status  -

## 2017-10-30 NOTE — PROGRESS NOTE ADULT - SUBJECTIVE AND OBJECTIVE BOX
Interventional Radiology Pre-Procedure Note    Procedure: FNA peripancreatic fluid    Diagnosis/Indication: Patient is a 57y old  female with necrotizing pancreatitis with fever and leukocytosis, presenting for fine needle aspiration of peripancreatic fluid.      PAST MEDICAL & SURGICAL HISTORY:  Kidney stone  Hypertension  Kidney stones  History of appendectomy       Allergies: No Known Allergies      LABS:  CBC Full  -  ( 30 Oct 2017 08:48 )  WBC Count : 20.95 K/uL  Hemoglobin : 9.4 g/dL  Hematocrit : 28.5 %  Platelet Count - Automated : 1151 K/uL  Mean Cell Volume : 87.4 fl  Mean Cell Hemoglobin : 28.8 pg  Mean Cell Hemoglobin Concentration : 33.0 gm/dL  Auto Neutrophil # : x  Auto Lymphocyte # : x  Auto Monocyte # : x  Auto Eosinophil # : x  Auto Basophil # : x  Auto Neutrophil % : x  Auto Lymphocyte % : x  Auto Monocyte % : x  Auto Eosinophil % : x  Auto Basophil % : x    10-30    133<L>  |  92<L>  |  5<L>  ----------------------------<  102<H>  4.9   |  27  |  0.46<L>    Ca    8.7      30 Oct 2017 09:03    TPro  6.7  /  Alb  2.4<L>  /  TBili  0.2  /  DBili  x   /  AST  39  /  ALT  17  /  AlkPhos  162<H>  10-30    PT/INR - ( 29 Oct 2017 08:33 )   PT: 12.5 sec;   INR: 1.10 ratio         PTT - ( 29 Oct 2017 08:33 )  PTT:30.3 sec    Procedure/ risks/ benefits/ alternatives were explained, informed consent obtained from patient, verbalizes understanding.

## 2017-10-30 NOTE — PROGRESS NOTE ADULT - SUBJECTIVE AND OBJECTIVE BOX
Interventional Radiology Brief- Operative Note    Procedure: FNA of peripancreatic fluid    Operators: Nella Sher    Anesthesia (type): Local 2% lidocaine    Contrast:  None    EBL: <1 cc    Findings/Follow up Plan of Care: 10 cc dark fluid aspirated and sent for culture.    Specimens Removed: 10 cc dark fluid aspirated and sent for culture.    Implants: None    Complications: None    Condition/Disposition: Stable, floor.    Please call Interventional Radiology x [phone] with any questions, concerns, or issues.

## 2017-10-30 NOTE — PROGRESS NOTE ADULT - ASSESSMENT
Ms. Coleman is a 57 year old woman with necrotizing pancreatitis, s/p ERCP (sphincterotomy and plastic stent placement) with acute necrotizing pancreatitis (30-50%), splenic and portal vein thrombosis. lab is significant for leukocytosis (trending up), thrombocytosis ( >1000 K)    PLAN:  - DVT ppx: SQH  - Diet: NPO (tube feeds has been discontinued two days ago due to nausea and vomiting )  - Pain control: Toradol, PCA, fentanyl patch  - Monitor GI function  - Replete electrolyte as necessary  - Activity: OOb as tolerated  - Consult Acute Pain management for possible PCEA  - Consult IR for advancing the tube and FNA  - f/u with fever work up

## 2017-10-30 NOTE — PROGRESS NOTE ADULT - SUBJECTIVE AND OBJECTIVE BOX
SUBJECTIVE:  - Pt continues to have epigastric pain  - She had a temperature to 101 last night  - (+) nausea and vomiting yesterday    OBJECTIVE:    MEDICATIONS  (STANDING):  acetaminophen  IVPB. 1000 milliGRAM(s) IV Intermittent once  dextrose 5% + sodium chloride 0.45% with potassium chloride 20 mEq/L 1000 milliLiter(s) (75 mL/Hr) IV Continuous <Continuous>  enoxaparin Injectable 40 milliGRAM(s) SubCutaneous daily  fentaNYL   Patch  25 MICROgram(s)/Hr 1 Patch Transdermal every 72 hours  HYDROmorphone PCA (1 mG/mL) 30 milliLiter(s) PCA Continuous PCA Continuous  ketorolac   Injectable 15 milliGRAM(s) IV Push every 6 hours  pantoprazole  Injectable 40 milliGRAM(s) IV Push daily      Vital Signs Last 24 Hrs  T(C): 37.3 (30 Oct 2017 14:21), Max: 38.3 (30 Oct 2017 06:41)  T(F): 99.1 (30 Oct 2017 14:21), Max: 101 (30 Oct 2017 06:41)  HR: 70 (30 Oct 2017 14:21) (70 - 98)  BP: 100/66 (30 Oct 2017 14:21) (100/66 - 115/72)  BP(mean): --  RR: 18 (30 Oct 2017 14:21) (17 - 18)  SpO2: 94% (30 Oct 2017 14:21) (92% - 96%)    PHYSICAL EXAM:  Constitutional: no acute distress; NGT in place  Eyes: no icterus  Neck: no masses, no LAD  Respiratory: normal inspiratory effort; no wheezing or crackles  Cardiovascular: RRR, normal S1/S2, no murmurs/rubs/gallops  Gastrointestinal: soft, nondistended, epigastric tenderness, +BS  Extremities: no LE edema  Neurological: AAOx3, no asterixis  Skin: no rashes, bruises, petechiae    LABS:                                   9.4    20.95 )-----------( 1151     ( 30 Oct 2017 08:48 )             28.5     10-30    133<L>  |  92<L>  |  5<L>  ----------------------------<  102<H>  4.9   |  27  |  0.46<L>    Ca    8.7      30 Oct 2017 09:03    TPro  6.7  /  Alb  2.4<L>  /  TBili  0.2  /  DBili  x   /  AST  39  /  ALT  17  /  AlkPhos  162<H>  10-30    < from: CT Abdomen and Pelvis w/ IV Cont (10.28.17 @ 15:44) >  PANCREAS: Diffuse hypoenhancement of the body of the pancreas extending   to the tail. There is peripancreatic inflammation and fluid. Centered   within the lesser sac is a 8.8 x 4.2 cm collection (series 2, image 40).   There is an additional another collection adjacent to the uncinate   process and pancreatic head that measure 4.2 x 3.1 cm (series 2, image   60).    There is fluid in the right and left anterior pararenal space. The fluid   along the left pararenal space is partially loculated.     IMPRESSION:   1.  The findings are compatible with necrotizing pancreatitis.   2.  The collection likely represent pseudocysts and measure 8.8 x 4.2 cm   collection (series 2, image 40) and 4.2 x 3.1 cm (series 2, image 60).  3.  Unchanged segment thrombus of the splenic vein.  4.  Small left pleural effusions with bibasilar subsegmental atelectasis.    < end of copied text >

## 2017-10-31 LAB
ALBUMIN SERPL ELPH-MCNC: 2.3 G/DL — LOW (ref 3.3–5)
ALP SERPL-CCNC: 166 U/L — HIGH (ref 40–120)
ALT FLD-CCNC: 13 U/L — SIGNIFICANT CHANGE UP (ref 10–45)
AMYLASE P1 CFR SERPL: 304 U/L — HIGH (ref 25–125)
ANION GAP SERPL CALC-SCNC: 13 MMOL/L — SIGNIFICANT CHANGE UP (ref 5–17)
APTT BLD: 48.5 SEC — HIGH (ref 27.5–37.4)
AST SERPL-CCNC: 37 U/L — SIGNIFICANT CHANGE UP (ref 10–40)
BILIRUB SERPL-MCNC: 0.2 MG/DL — SIGNIFICANT CHANGE UP (ref 0.2–1.2)
BUN SERPL-MCNC: 8 MG/DL — SIGNIFICANT CHANGE UP (ref 7–23)
CALCIUM SERPL-MCNC: 8.6 MG/DL — SIGNIFICANT CHANGE UP (ref 8.4–10.5)
CHLORIDE SERPL-SCNC: 96 MMOL/L — SIGNIFICANT CHANGE UP (ref 96–108)
CO2 SERPL-SCNC: 25 MMOL/L — SIGNIFICANT CHANGE UP (ref 22–31)
CREAT SERPL-MCNC: 0.64 MG/DL — SIGNIFICANT CHANGE UP (ref 0.5–1.3)
CULTURE RESULTS: SIGNIFICANT CHANGE UP
CULTURE RESULTS: SIGNIFICANT CHANGE UP
GLUCOSE SERPL-MCNC: 94 MG/DL — SIGNIFICANT CHANGE UP (ref 70–99)
GRAM STN FLD: SIGNIFICANT CHANGE UP
HCT VFR BLD CALC: 25.8 % — LOW (ref 34.5–45)
HGB BLD-MCNC: 8.4 G/DL — LOW (ref 11.5–15.5)
INR BLD: 1.43 RATIO — HIGH (ref 0.88–1.16)
LIDOCAIN IGE QN: 132 U/L — HIGH (ref 7–60)
MCHC RBC-ENTMCNC: 28.3 PG — SIGNIFICANT CHANGE UP (ref 27–34)
MCHC RBC-ENTMCNC: 32.6 GM/DL — SIGNIFICANT CHANGE UP (ref 32–36)
MCV RBC AUTO: 86.9 FL — SIGNIFICANT CHANGE UP (ref 80–100)
PLATELET # BLD AUTO: 1125 K/UL — CRITICAL HIGH (ref 150–400)
POTASSIUM SERPL-MCNC: 5.2 MMOL/L — SIGNIFICANT CHANGE UP (ref 3.5–5.3)
POTASSIUM SERPL-SCNC: 5.2 MMOL/L — SIGNIFICANT CHANGE UP (ref 3.5–5.3)
PROT SERPL-MCNC: 6.4 G/DL — SIGNIFICANT CHANGE UP (ref 6–8.3)
PROTHROM AB SERPL-ACNC: 16.3 SEC — HIGH (ref 10–13.1)
RBC # BLD: 2.97 M/UL — LOW (ref 3.8–5.2)
RBC # FLD: 14.9 % — HIGH (ref 10.3–14.5)
SODIUM SERPL-SCNC: 134 MMOL/L — LOW (ref 135–145)
SPECIMEN SOURCE: SIGNIFICANT CHANGE UP
SPECIMEN SOURCE: SIGNIFICANT CHANGE UP
WBC # BLD: 16.98 K/UL — HIGH (ref 3.8–10.5)
WBC # FLD AUTO: 16.98 K/UL — HIGH (ref 3.8–10.5)

## 2017-10-31 PROCEDURE — 99233 SBSQ HOSP IP/OBS HIGH 50: CPT

## 2017-10-31 PROCEDURE — 99231 SBSQ HOSP IP/OBS SF/LOW 25: CPT

## 2017-10-31 PROCEDURE — 99232 SBSQ HOSP IP/OBS MODERATE 35: CPT | Mod: GC

## 2017-10-31 RX ORDER — ACETAMINOPHEN 500 MG
1000 TABLET ORAL ONCE
Qty: 0 | Refills: 0 | Status: COMPLETED | OUTPATIENT
Start: 2017-11-01 | End: 2017-11-01

## 2017-10-31 RX ORDER — ACETAMINOPHEN 500 MG
1000 TABLET ORAL ONCE
Qty: 0 | Refills: 0 | Status: COMPLETED | OUTPATIENT
Start: 2017-10-31 | End: 2017-10-31

## 2017-10-31 RX ORDER — HYDROMORPHONE HYDROCHLORIDE 2 MG/ML
30 INJECTION INTRAMUSCULAR; INTRAVENOUS; SUBCUTANEOUS
Qty: 0 | Refills: 0 | Status: DISCONTINUED | OUTPATIENT
Start: 2017-10-31 | End: 2017-11-04

## 2017-10-31 RX ORDER — HYDROMORPHONE HYDROCHLORIDE 2 MG/ML
1 INJECTION INTRAMUSCULAR; INTRAVENOUS; SUBCUTANEOUS
Qty: 0 | Refills: 0 | Status: DISCONTINUED | OUTPATIENT
Start: 2017-10-31 | End: 2017-11-07

## 2017-10-31 RX ORDER — ONDANSETRON 8 MG/1
4 TABLET, FILM COATED ORAL ONCE
Qty: 0 | Refills: 0 | Status: COMPLETED | OUTPATIENT
Start: 2017-10-31 | End: 2017-11-01

## 2017-10-31 RX ADMIN — HYDROMORPHONE HYDROCHLORIDE 30 MILLILITER(S): 2 INJECTION INTRAMUSCULAR; INTRAVENOUS; SUBCUTANEOUS at 19:07

## 2017-10-31 RX ADMIN — Medication 400 MILLIGRAM(S): at 10:02

## 2017-10-31 RX ADMIN — HYDROMORPHONE HYDROCHLORIDE 30 MILLILITER(S): 2 INJECTION INTRAMUSCULAR; INTRAVENOUS; SUBCUTANEOUS at 02:50

## 2017-10-31 RX ADMIN — Medication 25 MILLIGRAM(S): at 07:42

## 2017-10-31 RX ADMIN — HYDROMORPHONE HYDROCHLORIDE 30 MILLILITER(S): 2 INJECTION INTRAMUSCULAR; INTRAVENOUS; SUBCUTANEOUS at 07:12

## 2017-10-31 RX ADMIN — ENOXAPARIN SODIUM 40 MILLIGRAM(S): 100 INJECTION SUBCUTANEOUS at 11:38

## 2017-10-31 RX ADMIN — HYDROMORPHONE HYDROCHLORIDE 1 MILLIGRAM(S): 2 INJECTION INTRAMUSCULAR; INTRAVENOUS; SUBCUTANEOUS at 15:08

## 2017-10-31 RX ADMIN — HYDROMORPHONE HYDROCHLORIDE 1 MILLIGRAM(S): 2 INJECTION INTRAMUSCULAR; INTRAVENOUS; SUBCUTANEOUS at 18:21

## 2017-10-31 RX ADMIN — Medication 15 MILLIGRAM(S): at 05:37

## 2017-10-31 RX ADMIN — Medication 1000 MILLIGRAM(S): at 03:14

## 2017-10-31 RX ADMIN — Medication 400 MILLIGRAM(S): at 02:54

## 2017-10-31 RX ADMIN — Medication 400 MILLIGRAM(S): at 18:19

## 2017-10-31 RX ADMIN — Medication 15 MILLIGRAM(S): at 05:17

## 2017-10-31 RX ADMIN — PANTOPRAZOLE SODIUM 40 MILLIGRAM(S): 20 TABLET, DELAYED RELEASE ORAL at 11:38

## 2017-10-31 RX ADMIN — HYDROMORPHONE HYDROCHLORIDE 30 MILLILITER(S): 2 INJECTION INTRAMUSCULAR; INTRAVENOUS; SUBCUTANEOUS at 13:56

## 2017-10-31 RX ADMIN — HYDROMORPHONE HYDROCHLORIDE 1 MILLIGRAM(S): 2 INJECTION INTRAMUSCULAR; INTRAVENOUS; SUBCUTANEOUS at 11:37

## 2017-10-31 RX ADMIN — HYDROMORPHONE HYDROCHLORIDE 1 MILLIGRAM(S): 2 INJECTION INTRAMUSCULAR; INTRAVENOUS; SUBCUTANEOUS at 22:16

## 2017-10-31 RX ADMIN — Medication 25 MILLIGRAM(S): at 15:10

## 2017-10-31 RX ADMIN — Medication 25 MILLIGRAM(S): at 23:28

## 2017-10-31 NOTE — PROGRESS NOTE ADULT - ATTENDING COMMENTS
As above.  Awaiting culture of walled off pancreatic necrosis.  Plan endoscopic drainage of this, likely next week.  Will need repeat imaging to visualize evolution of the wall for maturity, likely this coming weekend.  Mild improvement in leukocytosis.  Still favor antibiotics.

## 2017-10-31 NOTE — PROGRESS NOTE ADULT - ASSESSMENT
58 y/o F with necrotizing pancreatitis, cholangitis, s/p ERCP/biliary stent placement 10/20, with splenic vein thrombus now with fever and marked leukocytosis.    Impression:  1) Pancreatic Necrosis  2) Fever    Plan:  - f/u gram stain and culture of pancreatic necrosis aspirate   - IV abx  - IV fluids with LR  - analgesia per primary team  - will discuss endoscopy for advancement of feeding tube for post-pyloric feeding given vomiting    - with necrosis becoming more mature, plan for endoscopic drainage during the next week based on clinical status  - may be able to complete both of the above during a single endoscopy later this week

## 2017-10-31 NOTE — PROGRESS NOTE ADULT - SUBJECTIVE AND OBJECTIVE BOX
Interventional Radiology Follow- Up Note      This is a 57y Female with h/o HTN, nephrolithiasis who is s/p lap appy presented to [facility] on 10/16 with worsening abdominal pain. Pt was found to have choledocholithiases on MRCP and during hospital course became tachycardic and poor pain control, she underwent urgent ERCP with GI at which time biliary stent was placed. Pt then found to have necrotizing pancreatitis, she had persistent pain, febrile and leukocytosis and CT abd/pelvis 10/28 demonstrated peripancreatic fluid collection and pt referred to IR for aspiration. She is currently s/p FNA of peripancreatic fluid collection in IR with Dr. Sher on 10/30/17. Patient seen and examined @ bedside. Reports persistent epigastric and diffuse left sided abdominal pain which is unchanged prior to procedure.     PAST MEDICAL & SURGICAL HISTORY:  Kidney stone  Hypertension  Kidney stones  History of appendectomy      Allergies: No Known Allergies      LABS:                        8.4    16.98 )-----------( 1125     ( 31 Oct 2017 08:56 )             25.8     10-31    134<L>  |  96  |  8   ----------------------------<  94  5.2   |  25  |  0.64    Ca    8.6      31 Oct 2017 08:49    TPro  6.4  /  Alb  2.3<L>  /  TBili  0.2  /  DBili  x   /  AST  37  /  ALT  13  /  AlkPhos  166<H>  10-31    PT/INR - ( 31 Oct 2017 09:00 )   PT: 16.3 sec;   INR: 1.43 ratio         PTT - ( 31 Oct 2017 09:00 )  PTT:48.5 sec    Peripancreatic fluid Cultures: neg to date    Vitals: T(F): 98.3 (10-31-17 @ 12:48), Max: 101 (10-31-17 @ 09:55)  HR: 75 (10-31-17 @ 12:48) (70 - 94)  BP: 100/63 (10-31-17 @ 12:48) (93/55 - 116/76)  RR: 17 (10-31-17 @ 12:48) (17 - 18)  SpO2: 93% (10-31-17 @ 12:48) (91% - 94%)  Wt(kg): --    PHYSICAL EXAM:  Gen: NAD, A&Ox3  Abd: ND, soft, TTP at epigastric and LUQ.       A/P: 57y Female s/p FNA of peripancreatic fluid collection on 10/30/17 in IR with Dr. Sher  -Continue pain control per primary team  -Continue global care per primary team  -trend vitals, labs  -will discuss with IR attending     Please call IR at extension [phone] with any questions, concerns, or issues regarding above.

## 2017-10-31 NOTE — PROGRESS NOTE ADULT - SUBJECTIVE AND OBJECTIVE BOX
SUBJECTIVE:  Increasing abdominal pain overnight.   No associated nausea, vomiting, fevers, or chills.   Went for FNA with IR yesterday.     OBJECTIVE:     ** VITAL SIGNS / I&O's **    T(C): 38.3 (10-31-17 @ 09:55), Max: 38.3 (10-31-17 @ 09:55)  T(F): 101 (10-31-17 @ 09:55), Max: 101 (10-31-17 @ 09:55)  HR: 94 (10-31-17 @ 09:10) (70 - 94)  BP: 116/76 (10-31-17 @ 09:10) (93/55 - 116/76)  RR: 18 (10-31-17 @ 09:10) (18 - 18)  SpO2: 91% (10-31-17 @ 09:10) (91% - 94%)      30 Oct 2017 07:01  -  31 Oct 2017 07:00  --------------------------------------------------------  IN:    dextrose 5% + sodium chloride 0.45% with potassium chloride 20 mEq/L: 1800 mL    Solution: 100 mL  Total IN: 1900 mL    OUT:    Voided: 900 mL  Total OUT: 900 mL    Total NET: 1000 mL          ** PHYSICAL EXAM **    -- CONSTITUTIONAL: AOx3. NAD.   -- HEENT: NJ tube   -- CARDIOVASCULAR: normotensive, regular rate   -- RESPIRATORY: breathing comfortably   -- ABDOMEN: soft, minimally distended, tender to epigastric and RUQ    ** LABS **       134    |  96     |  8      ----------------------------<  94         ( 31 Oct 2017 08:49 )  5.2     |  25     |  0.64     Ca    8.6        ( 31 Oct 2017 08:49 )    TPro  6.4    /  Alb  2.3    /  TBili  0.2    /  DBili  x      /  AST  37     /  ALT  13     /  AlkPhos  166    ( 31 Oct 2017 08:49 )    PT/INR -  16.3 sec / 1.43 ratio   ( 31 Oct 2017 09:00 )       PTT -  48.5 sec   ( 31 Oct 2017 09:00 )  CAPILLARY BLOOD GLUCOSE

## 2017-10-31 NOTE — PROGRESS NOTE ADULT - SUBJECTIVE AND OBJECTIVE BOX
Pain Management Attending Addendum    SUBJECTIVE:    Therapy:	  PCA	x   Epidural           s/p Spinal Opoid              Postpartum infusion	  Patient controlled regional anesthesia (PCRA)    prn Analgesics    OBJECTIVE: No new signs x     Other:    Side Effects:    None	x		 Other:    Assessment of Catheter Site:		 Intact		 Other:    ASSESSMENT/PLAN  Continue current therapyx    Therapy changed to:     IV PCA        Epidural      prn Analgesics     post partum infusion    Comments:

## 2017-10-31 NOTE — PROGRESS NOTE ADULT - ASSESSMENT
ASSESSMENT: Elba Coleman is a 57 y.o. woman with history of nephrolithiasis, HTN, and appendectomy admitted on 10/16 for gallstone pancreatitis c/b necrotizing pancreatitis and ascending cholangitis s/p ERCP and stent placement (10/21) and FNA of perihepatic fluid (10/31). Continued pain. Stable vital signs.     PLAN:   - Endoscopic advancement of NJ tube  - Tube feeds when NJ placement is confirmed   - Pain management  - F/u results of FNA

## 2017-10-31 NOTE — PROGRESS NOTE ADULT - SUBJECTIVE AND OBJECTIVE BOX
SUBJECTIVE:  - Pt continues to have epigastric pain  - She had a temperature to 101 this AM  - (+) nausea and vomiting yesterday  - No tube feeds x2 days  - Patient had CT guided aspirate of pancreatic necrosis yesterday, initial gram stain (-)    OBJECTIVE:    MEDICATIONS  (STANDING):  dextrose 5% + sodium chloride 0.45% with potassium chloride 20 mEq/L 1000 milliLiter(s) (75 mL/Hr) IV Continuous <Continuous>  enoxaparin Injectable 40 milliGRAM(s) SubCutaneous daily  fentaNYL   Patch  25 MICROgram(s)/Hr 1 Patch Transdermal every 72 hours  HYDROmorphone PCA (1 mG/mL) 30 milliLiter(s) PCA Continuous PCA Continuous  pantoprazole  Injectable 40 milliGRAM(s) IV Push daily        Vital Signs Last 24 Hrs  Vital Signs Last 24 Hrs  T(C): 37.4 (31 Oct 2017 10:55), Max: 38.3 (31 Oct 2017 09:55)  T(F): 99.4 (31 Oct 2017 10:55), Max: 101 (31 Oct 2017 09:55)  HR: 94 (31 Oct 2017 09:10) (70 - 94)  BP: 116/76 (31 Oct 2017 09:10) (93/55 - 116/76)  BP(mean): --  RR: 18 (31 Oct 2017 09:10) (18 - 18)  SpO2: 91% (31 Oct 2017 09:10) (91% - 94%)    PHYSICAL EXAM:  Constitutional: no acute distress; NGT in place  Eyes: no icterus  Neck: no masses, no LAD  Respiratory: normal inspiratory effort; no wheezing or crackles  Cardiovascular: RRR, normal S1/S2, no murmurs/rubs/gallops  Gastrointestinal: soft, nondistended, epigastric tenderness, +BS  Extremities: no LE edema  Neurological: AAOx3, no asterixis  Skin: no rashes, bruises, petechiae    LABS:                                              8.4    16.98 )-----------( 1125     ( 31 Oct 2017 08:56 )             25.8     10-31    134<L>  |  96  |  8   ----------------------------<  94  5.2   |  25  |  0.64    Ca    8.6      31 Oct 2017 08:49    TPro  6.4  /  Alb  2.3<L>  /  TBili  0.2  /  DBili  x   /  AST  37  /  ALT  13  /  AlkPhos  166<H>  10-31      < from: CT Abdomen and Pelvis w/ IV Cont (10.28.17 @ 15:44) >  PANCREAS: Diffuse hypoenhancement of the body of the pancreas extending   to the tail. There is peripancreatic inflammation and fluid. Centered   within the lesser sac is a 8.8 x 4.2 cm collection (series 2, image 40).   There is an additional another collection adjacent to the uncinate   process and pancreatic head that measure 4.2 x 3.1 cm (series 2, image   60).    There is fluid in the right and left anterior pararenal space. The fluid   along the left pararenal space is partially loculated.     IMPRESSION:   1.  The findings are compatible with necrotizing pancreatitis.   2.  The collection likely represent pseudocysts and measure 8.8 x 4.2 cm   collection (series 2, image 40) and 4.2 x 3.1 cm (series 2, image 60).  3.  Unchanged segment thrombus of the splenic vein.  4.  Small left pleural effusions with bibasilar subsegmental atelectasis.    < end of copied text >

## 2017-10-31 NOTE — PROGRESS NOTE ADULT - SUBJECTIVE AND OBJECTIVE BOX
Day 14/12 of Anesthesia Pain Management Service    SUBJECTIVE: Patient is doing well with IV PCA    Pain Scale Score:	[X] Refer to charted pain scores    THERAPY:    [ ] IV PCA Morphine		[ ] 5 mg/mL	[ ] 1 mg/mL  [X] IV PCA Hydromorphone	[ ] 5 mg/mL	[X] 1 mg/mL  [ ] IV PCA Fentanyl		[ ] 50 micrograms/mL    Demand dose: 0.5 mg     Lockout: 6 minutes   Continuous Rate: 0 mg/hr  4 Hour Limit: 11 mg    MEDICATIONS  (STANDING):  dextrose 5% + sodium chloride 0.45% with potassium chloride 20 mEq/L 1000 milliLiter(s) (75 mL/Hr) IV Continuous <Continuous>  enoxaparin Injectable 40 milliGRAM(s) SubCutaneous daily  fentaNYL   Patch  25 MICROgram(s)/Hr 1 Patch Transdermal every 72 hours  HYDROmorphone PCA (1 mG/mL) 30 milliLiter(s) PCA Continuous PCA Continuous  pantoprazole  Injectable 40 milliGRAM(s) IV Push daily    MEDICATIONS  (PRN):  diphenhydrAMINE   Injectable 25 milliGRAM(s) IV Push every 4 hours PRN Rash and/or Itching  HYDROmorphone PCA (1 mG/mL) Rescue Clinician Bolus 1 milliGRAM(s) IV Push every 15 minutes PRN for Pain Scale GREATER THAN 6      OBJECTIVE:    Sedation Score:	[ X] Alert	[ ] Drowsy 	[ ] Arousable	[ ] Asleep	[ ] Unresponsive    Side Effects:	[X ] None	[ ] Nausea	[ ] Vomiting	[ ] Pruritus  		[ ] Other:    Vital Signs Last 24 Hrs  T(C): 38.3 (31 Oct 2017 09:55), Max: 38.3 (31 Oct 2017 09:55)  T(F): 101 (31 Oct 2017 09:55), Max: 101 (31 Oct 2017 09:55)  HR: 94 (31 Oct 2017 09:10) (70 - 94)  BP: 116/76 (31 Oct 2017 09:10) (93/55 - 116/76)  BP(mean): --  RR: 18 (31 Oct 2017 09:10) (18 - 18)  SpO2: 91% (31 Oct 2017 09:10) (91% - 94%)    ASSESSMENT/ PLAN    Therapy to  be:               [X] Continued   [ ] Discontinued   [ ] Changed to PRN Analgesics    Documentation and Verification of current medications:   [X] Done	[ ] Not done, not eligible    Comments: Reporting some right sided abdominal pain. Using 1-4x/hr. Duragesic increased to 25mcg/hr yesterday. Continue current therapy.

## 2017-11-01 LAB
-  AMPICILLIN: SIGNIFICANT CHANGE UP
-  CIPROFLOXACIN: SIGNIFICANT CHANGE UP
-  ERYTHROMYCIN: SIGNIFICANT CHANGE UP
-  TETRACYCLINE: SIGNIFICANT CHANGE UP
-  VANCOMYCIN: SIGNIFICANT CHANGE UP
ALBUMIN SERPL ELPH-MCNC: 2.5 G/DL — LOW (ref 3.3–5)
ALP SERPL-CCNC: 141 U/L — HIGH (ref 40–120)
ALT FLD-CCNC: 12 U/L — SIGNIFICANT CHANGE UP (ref 10–45)
AMYLASE P1 CFR SERPL: 274 U/L — HIGH (ref 25–125)
ANION GAP SERPL CALC-SCNC: 13 MMOL/L — SIGNIFICANT CHANGE UP (ref 5–17)
AST SERPL-CCNC: 27 U/L — SIGNIFICANT CHANGE UP (ref 10–40)
BILIRUB SERPL-MCNC: 0.2 MG/DL — SIGNIFICANT CHANGE UP (ref 0.2–1.2)
BUN SERPL-MCNC: 6 MG/DL — LOW (ref 7–23)
CALCIUM SERPL-MCNC: 8.5 MG/DL — SIGNIFICANT CHANGE UP (ref 8.4–10.5)
CHLORIDE SERPL-SCNC: 96 MMOL/L — SIGNIFICANT CHANGE UP (ref 96–108)
CO2 SERPL-SCNC: 24 MMOL/L — SIGNIFICANT CHANGE UP (ref 22–31)
CREAT SERPL-MCNC: 0.38 MG/DL — LOW (ref 0.5–1.3)
GLUCOSE SERPL-MCNC: 99 MG/DL — SIGNIFICANT CHANGE UP (ref 70–99)
HCT VFR BLD CALC: 26.4 % — LOW (ref 34.5–45)
HGB BLD-MCNC: 8.6 G/DL — LOW (ref 11.5–15.5)
LIDOCAIN IGE QN: 107 U/L — HIGH (ref 7–60)
MCHC RBC-ENTMCNC: 28.2 PG — SIGNIFICANT CHANGE UP (ref 27–34)
MCHC RBC-ENTMCNC: 32.6 GM/DL — SIGNIFICANT CHANGE UP (ref 32–36)
MCV RBC AUTO: 86.6 FL — SIGNIFICANT CHANGE UP (ref 80–100)
METHOD TYPE: SIGNIFICANT CHANGE UP
PLATELET # BLD AUTO: 1206 K/UL — CRITICAL HIGH (ref 150–400)
POTASSIUM SERPL-MCNC: 4.4 MMOL/L — SIGNIFICANT CHANGE UP (ref 3.5–5.3)
POTASSIUM SERPL-SCNC: 4.4 MMOL/L — SIGNIFICANT CHANGE UP (ref 3.5–5.3)
PROT SERPL-MCNC: 6.1 G/DL — SIGNIFICANT CHANGE UP (ref 6–8.3)
RBC # BLD: 3.05 M/UL — LOW (ref 3.8–5.2)
RBC # FLD: 14.9 % — HIGH (ref 10.3–14.5)
SODIUM SERPL-SCNC: 133 MMOL/L — LOW (ref 135–145)
WBC # BLD: 14.53 K/UL — HIGH (ref 3.8–10.5)
WBC # FLD AUTO: 14.53 K/UL — HIGH (ref 3.8–10.5)

## 2017-11-01 PROCEDURE — 99233 SBSQ HOSP IP/OBS HIGH 50: CPT

## 2017-11-01 RX ORDER — IMIPENEM AND CILASTATIN 250; 250 MG/100ML; MG/100ML
500 INJECTION, POWDER, FOR SOLUTION INTRAVENOUS ONCE
Qty: 0 | Refills: 0 | Status: COMPLETED | OUTPATIENT
Start: 2017-11-01 | End: 2017-11-01

## 2017-11-01 RX ORDER — DIPHENHYDRAMINE HCL 50 MG
25 CAPSULE ORAL ONCE
Qty: 0 | Refills: 0 | Status: COMPLETED | OUTPATIENT
Start: 2017-11-01 | End: 2017-11-01

## 2017-11-01 RX ORDER — CIPROFLOXACIN LACTATE 400MG/40ML
400 VIAL (ML) INTRAVENOUS ONCE
Qty: 0 | Refills: 0 | Status: COMPLETED | OUTPATIENT
Start: 2017-11-01 | End: 2017-11-01

## 2017-11-01 RX ORDER — CIPROFLOXACIN LACTATE 400MG/40ML
400 VIAL (ML) INTRAVENOUS EVERY 12 HOURS
Qty: 0 | Refills: 0 | Status: DISCONTINUED | OUTPATIENT
Start: 2017-11-02 | End: 2017-11-16

## 2017-11-01 RX ORDER — ACETAMINOPHEN 500 MG
1000 TABLET ORAL ONCE
Qty: 0 | Refills: 0 | Status: COMPLETED | OUTPATIENT
Start: 2017-11-01 | End: 2017-11-01

## 2017-11-01 RX ORDER — CIPROFLOXACIN LACTATE 400MG/40ML
VIAL (ML) INTRAVENOUS
Qty: 0 | Refills: 0 | Status: DISCONTINUED | OUTPATIENT
Start: 2017-11-01 | End: 2017-11-16

## 2017-11-01 RX ORDER — IMIPENEM AND CILASTATIN 250; 250 MG/100ML; MG/100ML
INJECTION, POWDER, FOR SOLUTION INTRAVENOUS
Qty: 0 | Refills: 0 | Status: DISCONTINUED | OUTPATIENT
Start: 2017-11-01 | End: 2017-11-01

## 2017-11-01 RX ORDER — IMIPENEM AND CILASTATIN 250; 250 MG/100ML; MG/100ML
500 INJECTION, POWDER, FOR SOLUTION INTRAVENOUS EVERY 8 HOURS
Qty: 0 | Refills: 0 | Status: DISCONTINUED | OUTPATIENT
Start: 2017-11-01 | End: 2017-11-01

## 2017-11-01 RX ORDER — ONDANSETRON 8 MG/1
4 TABLET, FILM COATED ORAL EVERY 4 HOURS
Qty: 0 | Refills: 0 | Status: DISCONTINUED | OUTPATIENT
Start: 2017-11-01 | End: 2017-11-02

## 2017-11-01 RX ADMIN — Medication 200 MILLIGRAM(S): at 23:54

## 2017-11-01 RX ADMIN — Medication 25 MILLIGRAM(S): at 21:36

## 2017-11-01 RX ADMIN — ENOXAPARIN SODIUM 40 MILLIGRAM(S): 100 INJECTION SUBCUTANEOUS at 13:20

## 2017-11-01 RX ADMIN — IMIPENEM AND CILASTATIN 100 MILLIGRAM(S): 250; 250 INJECTION, POWDER, FOR SOLUTION INTRAVENOUS at 21:11

## 2017-11-01 RX ADMIN — IMIPENEM AND CILASTATIN 100 MILLIGRAM(S): 250; 250 INJECTION, POWDER, FOR SOLUTION INTRAVENOUS at 09:16

## 2017-11-01 RX ADMIN — HYDROMORPHONE HYDROCHLORIDE 1 MILLIGRAM(S): 2 INJECTION INTRAMUSCULAR; INTRAVENOUS; SUBCUTANEOUS at 19:50

## 2017-11-01 RX ADMIN — DEXTROSE MONOHYDRATE, SODIUM CHLORIDE, AND POTASSIUM CHLORIDE 75 MILLILITER(S): 50; .745; 4.5 INJECTION, SOLUTION INTRAVENOUS at 13:21

## 2017-11-01 RX ADMIN — Medication 400 MILLIGRAM(S): at 23:51

## 2017-11-01 RX ADMIN — IMIPENEM AND CILASTATIN 100 MILLIGRAM(S): 250; 250 INJECTION, POWDER, FOR SOLUTION INTRAVENOUS at 15:43

## 2017-11-01 RX ADMIN — Medication 400 MILLIGRAM(S): at 13:19

## 2017-11-01 RX ADMIN — Medication 400 MILLIGRAM(S): at 01:07

## 2017-11-01 RX ADMIN — ONDANSETRON 4 MILLIGRAM(S): 8 TABLET, FILM COATED ORAL at 05:21

## 2017-11-01 RX ADMIN — HYDROMORPHONE HYDROCHLORIDE 1 MILLIGRAM(S): 2 INJECTION INTRAMUSCULAR; INTRAVENOUS; SUBCUTANEOUS at 23:56

## 2017-11-01 RX ADMIN — HYDROMORPHONE HYDROCHLORIDE 1 MILLIGRAM(S): 2 INJECTION INTRAMUSCULAR; INTRAVENOUS; SUBCUTANEOUS at 09:22

## 2017-11-01 RX ADMIN — PANTOPRAZOLE SODIUM 40 MILLIGRAM(S): 20 TABLET, DELAYED RELEASE ORAL at 13:20

## 2017-11-01 RX ADMIN — Medication 1000 MILLIGRAM(S): at 01:22

## 2017-11-01 RX ADMIN — HYDROMORPHONE HYDROCHLORIDE 30 MILLILITER(S): 2 INJECTION INTRAMUSCULAR; INTRAVENOUS; SUBCUTANEOUS at 07:26

## 2017-11-01 RX ADMIN — HYDROMORPHONE HYDROCHLORIDE 1 MILLIGRAM(S): 2 INJECTION INTRAMUSCULAR; INTRAVENOUS; SUBCUTANEOUS at 07:27

## 2017-11-01 RX ADMIN — HYDROMORPHONE HYDROCHLORIDE 30 MILLILITER(S): 2 INJECTION INTRAMUSCULAR; INTRAVENOUS; SUBCUTANEOUS at 02:10

## 2017-11-01 RX ADMIN — HYDROMORPHONE HYDROCHLORIDE 30 MILLILITER(S): 2 INJECTION INTRAMUSCULAR; INTRAVENOUS; SUBCUTANEOUS at 21:42

## 2017-11-01 RX ADMIN — HYDROMORPHONE HYDROCHLORIDE 30 MILLILITER(S): 2 INJECTION INTRAMUSCULAR; INTRAVENOUS; SUBCUTANEOUS at 19:12

## 2017-11-01 NOTE — PROGRESS NOTE ADULT - ASSESSMENT
Ms. Coleman is a 57 y.o. woman with history of nephrolithiasis, HTN, and appendectomy admitted on 10/16 for gallstone pancreatitis c/b necrotizing pancreatitis and ascending cholangitis s/p ERCP and stent placement (10/21) and FNA of perihepatic fluid (10/31). Continued pain.     PLAN:   - Endoscopic advancement of NJ tube is scheduled for today  - Tube feeds when NJ placement is confirmed   - Pain management  - body fluid gram stained showed rare positive cocci in pair, and culture has been positive for moderate enterococcus faecalis. Changed the ABx to imipenem 500 mg q8  - nauseous and emesis: ordered Zofran IV 4 mg q4 PRN  - WBC is 14 and trending down, will monitor it

## 2017-11-01 NOTE — PROGRESS NOTE ADULT - SUBJECTIVE AND OBJECTIVE BOX
Surgery Trauma Team Progress Note    STATUS POST:  gallstone pancreatitis, necrotizing pancreatitis with cholangitis ERCP with biliary stent placement     SUBJECTIVE:   Patient was seen and examined this morning. Patient has had multiple episodes of vomiting yesterday and this morning. She has an excruciating abdominal pain. She has been afebrile since yesterday morning 9:00 am ( T max: 101). She is very nauseous   She does not report chest pain, or shortness of breath.     OBJECTIVE:   T(C): 37.1 (11-01-17 @ 10:33), Max: 37.8 (10-31-17 @ 17:13)  HR: 82 (11-01-17 @ 10:33) (76 - 86)  BP: 107/69 (11-01-17 @ 10:33) (100/66 - 107/69)  RR: 18 (11-01-17 @ 10:33) (18 - 95)  SpO2: 94% (11-01-17 @ 10:33) (93% - 96%)  Wt(kg): --  CAPILLARY BLOOD GLUCOSE        I&O's Detail    31 Oct 2017 07:01  -  01 Nov 2017 07:00  --------------------------------------------------------  IN:    dextrose 5% + sodium chloride 0.45% with potassium chloride 20 mEq/L: 1700 mL    Solution: 200 mL  Total IN: 1900 mL    OUT:    Voided: 550 mL  Total OUT: 550 mL    Total NET: 1350 mL      01 Nov 2017 07:01  -  01 Nov 2017 14:08  --------------------------------------------------------  IN:  Total IN: 0 mL    OUT:    Voided: 650 mL  Total OUT: 650 mL    Total NET: -650 mL          PHYSICAL EXAM:  Gen: Well-nourished, well-developed, A&O x3, resting in bed in no acute distress  CV: RRR  Resp: Patent airways, unlabored   Abdomen: Soft, RUQ and epigastric tenderness, nondistended  Extremities: All 4 extremities warm and well perfused, no edema

## 2017-11-01 NOTE — PROGRESS NOTE ADULT - SUBJECTIVE AND OBJECTIVE BOX
SUBJECTIVE:  - Pt continues to have epigastric pain  - She had a temperature to 100.1 yesterday afternoon, no fevers recorded  - (+) nausea and spitting up still  - No tube feeds x3 days  - Patient had CT guided aspirate of pancreatic necrosis Monday, growing enterococcus  - patient has been started on imipenem    OBJECTIVE:    MEDICATIONS  (STANDING):  dextrose 5% + sodium chloride 0.45% with potassium chloride 20 mEq/L 1000 milliLiter(s) (75 mL/Hr) IV Continuous <Continuous>  enoxaparin Injectable 40 milliGRAM(s) SubCutaneous daily  fentaNYL   Patch  25 MICROgram(s)/Hr 1 Patch Transdermal every 72 hours  HYDROmorphone PCA (1 mG/mL) 30 milliLiter(s) PCA Continuous PCA Continuous  imipenem/cilastatin  IVPB 500 milliGRAM(s) IV Intermittent every 8 hours  imipenem/cilastatin  IVPB      pantoprazole  Injectable 40 milliGRAM(s) IV Push daily    Vital Signs Last 24 Hrs  T(C): 36.9 (01 Nov 2017 14:16), Max: 37.8 (31 Oct 2017 17:13)  T(F): 98.4 (01 Nov 2017 14:16), Max: 100.1 (31 Oct 2017 17:13)  HR: 84 (01 Nov 2017 14:16) (76 - 86)  BP: 102/65 (01 Nov 2017 14:16) (102/65 - 107/69)  BP(mean): --  RR: 18 (01 Nov 2017 14:16) (18 - 18)  SpO2: 94% (01 Nov 2017 14:16) (93% - 96%)    PHYSICAL EXAM:  Constitutional: no acute distress; NGT in place  Eyes: no icterus  Neck: no masses, no LAD  Respiratory: normal inspiratory effort; no wheezing or crackles  Cardiovascular: RRR, normal S1/S2, no murmurs/rubs/gallops  Gastrointestinal: soft, nondistended, epigastric tenderness, +BS  Extremities: no LE edema  Neurological: AAOx3, no asterixis  Skin: no rashes, bruises, petechiae    LABS:                                                         8.6    14.53 )-----------( 1206     ( 01 Nov 2017 08:42 )             26.4     11-01    133<L>  |  96  |  6<L>  ----------------------------<  99  4.4   |  24  |  0.38<L>    Ca    8.5      01 Nov 2017 08:41    TPro  6.1  /  Alb  2.5<L>  /  TBili  0.2  /  DBili  x   /  AST  27  /  ALT  12  /  AlkPhos  141<H>  11-01        < from: CT Abdomen and Pelvis w/ IV Cont (10.28.17 @ 15:44) >  PANCREAS: Diffuse hypoenhancement of the body of the pancreas extending   to the tail. There is peripancreatic inflammation and fluid. Centered   within the lesser sac is a 8.8 x 4.2 cm collection (series 2, image 40).   There is an additional another collection adjacent to the uncinate   process and pancreatic head that measure 4.2 x 3.1 cm (series 2, image   60).    There is fluid in the right and left anterior pararenal space. The fluid   along the left pararenal space is partially loculated.     IMPRESSION:   1.  The findings are compatible with necrotizing pancreatitis.   2.  The collection likely represent pseudocysts and measure 8.8 x 4.2 cm   collection (series 2, image 40) and 4.2 x 3.1 cm (series 2, image 60).  3.  Unchanged segment thrombus of the splenic vein.  4.  Small left pleural effusions with bibasilar subsegmental atelectasis.    < end of copied text >

## 2017-11-01 NOTE — PROGRESS NOTE ADULT - SUBJECTIVE AND OBJECTIVE BOX
Day 15\14 of Anesthesia Pain Management Service    SUBJECTIVE: Patient is doing well with IV PCA    Pain Scale Score:	[X] Refer to charted pain scores    THERAPY:    [ ] IV PCA Morphine		[ ] 5 mg/mL	[ ] 1 mg/mL  [X] IV PCA Hydromorphone	[ ] 5 mg/mL	[X] 1 mg/mL  [ ] IV PCA Fentanyl		[ ] 50 micrograms/mL    Demand dose: 0.5 mg     Lockout: 6 minutes   Continuous Rate: 0 mg/hr  4 Hour Limit: 11 mg    MEDICATIONS  (STANDING):  dextrose 5% + sodium chloride 0.45% with potassium chloride 20 mEq/L 1000 milliLiter(s) (75 mL/Hr) IV Continuous <Continuous>  enoxaparin Injectable 40 milliGRAM(s) SubCutaneous daily  fentaNYL   Patch  25 MICROgram(s)/Hr 1 Patch Transdermal every 72 hours  HYDROmorphone PCA (1 mG/mL) 30 milliLiter(s) PCA Continuous PCA Continuous  imipenem/cilastatin  IVPB 500 milliGRAM(s) IV Intermittent every 8 hours  imipenem/cilastatin  IVPB      pantoprazole  Injectable 40 milliGRAM(s) IV Push daily    MEDICATIONS  (PRN):  HYDROmorphone PCA (1 mG/mL) Rescue Clinician Bolus 1 milliGRAM(s) IV Push every 15 minutes PRN for Pain Scale GREATER THAN 6      OBJECTIVE:    Sedation Score:	[ X] Alert	[ ] Drowsy 	[ ] Arousable	[ ] Asleep	[ ] Unresponsive    Side Effects:	[X ] None	[ ] Nausea	[ ] Vomiting	[ ] Pruritus  		[ ] Other:    Vital Signs Last 24 Hrs  T(C): 37.1 (01 Nov 2017 10:33), Max: 37.8 (31 Oct 2017 17:13)  T(F): 98.7 (01 Nov 2017 10:33), Max: 100.1 (31 Oct 2017 17:13)  HR: 82 (01 Nov 2017 10:33) (75 - 86)  BP: 107/69 (01 Nov 2017 10:33) (100/63 - 107/69)  BP(mean): --  RR: 18 (01 Nov 2017 10:33) (17 - 95)  SpO2: 94% (01 Nov 2017 10:33) (93% - 96%)    ASSESSMENT/ PLAN    Therapy to  be:               [X] Continued   [ ] Discontinued   [ ] Changed to PRN Analgesics    Documentation and Verification of current medications:   [X] Done	[ ] Not done, not eligible    Comments:

## 2017-11-01 NOTE — PROGRESS NOTE ADULT - SUBJECTIVE AND OBJECTIVE BOX
Pre-Endoscopy Evaluation      Referring Physician:                                    Procedure:    Indication for Procedure:    Pertinent History:    Sedation by Anesthesia [ ]    PAST MEDICAL & SURGICAL HISTORY:  Kidney stone  Hypertension  Kidney stones  History of appendectomy      PMH of Gastroparesis [ ]  Gastric Surgery [ ]  Gastric Outlet Obstruction [ ]    Allergies    No Known Allergies    Intolerances        Latex allergy: [ ] yes [ ] no    Medications:MEDICATIONS  (STANDING):  dextrose 5% + sodium chloride 0.45% with potassium chloride 20 mEq/L 1000 milliLiter(s) (75 mL/Hr) IV Continuous <Continuous>  enoxaparin Injectable 40 milliGRAM(s) SubCutaneous daily  fentaNYL   Patch  25 MICROgram(s)/Hr 1 Patch Transdermal every 72 hours  HYDROmorphone PCA (1 mG/mL) 30 milliLiter(s) PCA Continuous PCA Continuous  imipenem/cilastatin  IVPB 500 milliGRAM(s) IV Intermittent every 8 hours  imipenem/cilastatin  IVPB      pantoprazole  Injectable 40 milliGRAM(s) IV Push daily    MEDICATIONS  (PRN):  HYDROmorphone PCA (1 mG/mL) Rescue Clinician Bolus 1 milliGRAM(s) IV Push every 15 minutes PRN for Pain Scale GREATER THAN 6      Smoking: [ ] yes  [ ] no    AICD/PPM: [ ] yes   [ ] no    Pertinent lab data:                        8.6    14.53 )-----------( 1206     ( 01 Nov 2017 08:42 )             26.4     11-01    133<L>  |  96  |  6<L>  ----------------------------<  99  4.4   |  24  |  0.38<L>    Ca    8.5      01 Nov 2017 08:41    TPro  6.1  /  Alb  2.5<L>  /  TBili  0.2  /  DBili  x   /  AST  27  /  ALT  12  /  AlkPhos  141<H>  11-01    PT/INR - ( 31 Oct 2017 09:00 )   PT: 16.3 sec;   INR: 1.43 ratio         PTT - ( 31 Oct 2017 09:00 )  PTT:48.5 sec              Physical Examination:  Daily     Daily   Vital Signs Last 24 Hrs  T(C): 37.1 (01 Nov 2017 10:33), Max: 37.8 (31 Oct 2017 17:13)  T(F): 98.7 (01 Nov 2017 10:33), Max: 100.1 (31 Oct 2017 17:13)  HR: 82 (01 Nov 2017 10:33) (75 - 86)  BP: 107/69 (01 Nov 2017 10:33) (100/63 - 107/69)  BP(mean): --  RR: 18 (01 Nov 2017 10:33) (17 - 95)  SpO2: 94% (01 Nov 2017 10:33) (93% - 96%)      Constitutional: NAD    HEENT: PERRLA, EOMI,       Neck:  No JVD    Respiratory: CTAB/L    Cardiovascular: S1 and S2    Gastrointestinal: BS+, soft, NT/ND    Extremities: No peripheral edema    Neurological: A/O x 3, no focal deficits    Psychiatric: Normal mood, normal affect    : No Lara    Skin: No rashes    Comments:    ASA Class: I [ ]  II [ ]  III [ ]  IV [ ]    The patient is a suitable candidate for the planned procedure unless box checked [ ]  No, explain: Pre-Endoscopy Evaluation      Referring Physician:  Dr. Woody Beltran                                Procedure: EGD    Indication for Procedure:    Pertinent History: 57 year old female with necrotizing pancreatitis, cholangitis, s/p ERCP/biliary stent placement 10/20, with splenic vein thrombus now with fever and marked leukocytosis.        Sedation by Anesthesia [X]    PAST MEDICAL & SURGICAL HISTORY:  Kidney stone  Hypertension  Kidney stones  History of appendectomy      PMH of Gastroparesis [ ]  Gastric Surgery [ ]  Gastric Outlet Obstruction [ ]    Allergies:    No Known Allergies    Intolerances:    Latex allergy: [ ] yes [X] no    Medications:MEDICATIONS  (STANDING):  dextrose 5% + sodium chloride 0.45% with potassium chloride 20 mEq/L 1000 milliLiter(s) (75 mL/Hr) IV Continuous <Continuous>  enoxaparin Injectable 40 milliGRAM(s) SubCutaneous daily  fentaNYL   Patch  25 MICROgram(s)/Hr 1 Patch Transdermal every 72 hours  HYDROmorphone PCA (1 mG/mL) 30 milliLiter(s) PCA Continuous PCA Continuous  imipenem/cilastatin  IVPB 500 milliGRAM(s) IV Intermittent every 8 hours  imipenem/cilastatin  IVPB      pantoprazole  Injectable 40 milliGRAM(s) IV Push daily    MEDICATIONS  (PRN):  HYDROmorphone PCA (1 mG/mL) Rescue Clinician Bolus 1 milliGRAM(s) IV Push every 15 minutes PRN for Pain Scale GREATER THAN 6      Smoking: [ ] yes  [X] no    AICD/PPM: [ ] yes   [X] no    Pertinent lab data:                        8.6    14.53 )-----------( 1206     ( 01 Nov 2017 08:42 )             26.4     11-01    133<L>  |  96  |  6<L>  ----------------------------<  99  4.4   |  24  |  0.38<L>    Ca    8.5      01 Nov 2017 08:41    TPro  6.1  /  Alb  2.5<L>  /  TBili  0.2  /  DBili  x   /  AST  27  /  ALT  12  /  AlkPhos  141<H>  11-01    PT/INR - ( 31 Oct 2017 09:00 )   PT: 16.3 sec;   INR: 1.43 ratio      PTT - ( 31 Oct 2017 09:00 )  PTT:48.5 sec        Physical Examination:       Daily   Vital Signs Last 24 Hrs  T(C): 37.1 (01 Nov 2017 10:33), Max: 37.8 (31 Oct 2017 17:13)  T(F): 98.7 (01 Nov 2017 10:33), Max: 100.1 (31 Oct 2017 17:13)  HR: 82 (01 Nov 2017 10:33) (75 - 86)  BP: 107/69 (01 Nov 2017 10:33) (100/63 - 107/69)  BP(mean): --  RR: 18 (01 Nov 2017 10:33) (17 - 95)  SpO2: 94% (01 Nov 2017 10:33) (93% - 96%)      Constitutional: NAD     Neck:  No JVD    Respiratory: CTAB/L    Cardiovascular: S1 and S2    Gastrointestinal: BS+, soft, NT/ND    Extremities: No peripheral edema    Neurological: A/O x 3    : No Lara    Skin: No rashes    Comments:    ASA Class: I [ ]  II [ ]  III [X]  IV [ ]    The patient is a suitable candidate for the planned procedure unless box checked [ ]  No, explain: Pre-Endoscopy Evaluation      Referring Physician:  Dr. Woody Beltran                                Procedure: EGD    Indication for Procedure:  Advancement of feeding tube for post-pyloric feeding    Pertinent History: 57 year old female with Necrotizing Pancreatitis, Cholangitis, s/p ERCP/Biliary stent placement 10/20, with Splenic Vein Thrombus, Vomiting    Sedation by Anesthesia [X]    PAST MEDICAL & SURGICAL HISTORY:  Kidney stone  Hypertension  Kidney stones  History of appendectomy      PMH of Gastroparesis [ ]  Gastric Surgery [ ]  Gastric Outlet Obstruction [ ]    Allergies:    No Known Allergies    Intolerances:    Latex allergy: [ ] yes [X] no    Medications:MEDICATIONS  (STANDING):  dextrose 5% + sodium chloride 0.45% with potassium chloride 20 mEq/L 1000 milliLiter(s) (75 mL/Hr) IV Continuous <Continuous>  enoxaparin Injectable 40 milliGRAM(s) SubCutaneous daily  fentaNYL   Patch  25 MICROgram(s)/Hr 1 Patch Transdermal every 72 hours  HYDROmorphone PCA (1 mG/mL) 30 milliLiter(s) PCA Continuous PCA Continuous  imipenem/cilastatin  IVPB 500 milliGRAM(s) IV Intermittent every 8 hours  imipenem/cilastatin  IVPB      pantoprazole  Injectable 40 milliGRAM(s) IV Push daily    MEDICATIONS  (PRN):  HYDROmorphone PCA (1 mG/mL) Rescue Clinician Bolus 1 milliGRAM(s) IV Push every 15 minutes PRN for Pain Scale GREATER THAN 6      Smoking: [ ] yes  [X] no    AICD/PPM: [ ] yes   [X] no    Pertinent lab data:                        8.6    14.53 )-----------( 1206     ( 01 Nov 2017 08:42 )             26.4     11-01    133<L>  |  96  |  6<L>  ----------------------------<  99  4.4   |  24  |  0.38<L>    Ca    8.5      01 Nov 2017 08:41    TPro  6.1  /  Alb  2.5<L>  /  TBili  0.2  /  DBili  x   /  AST  27  /  ALT  12  /  AlkPhos  141<H>  11-01    PT/INR - ( 31 Oct 2017 09:00 )   PT: 16.3 sec;   INR: 1.43 ratio      PTT - ( 31 Oct 2017 09:00 )  PTT:48.5 sec        Physical Examination:       Daily   Vital Signs Last 24 Hrs  T(C): 37.1 (01 Nov 2017 10:33), Max: 37.8 (31 Oct 2017 17:13)  T(F): 98.7 (01 Nov 2017 10:33), Max: 100.1 (31 Oct 2017 17:13)  HR: 82 (01 Nov 2017 10:33) (75 - 86)  BP: 107/69 (01 Nov 2017 10:33) (100/63 - 107/69)  BP(mean): --  RR: 18 (01 Nov 2017 10:33) (17 - 95)  SpO2: 94% (01 Nov 2017 10:33) (93% - 96%)      Constitutional: NAD     Neck:  No JVD    Respiratory: CTAB/L    Cardiovascular: S1 and S2    Gastrointestinal: BS+, soft, NT/ND    Extremities: No peripheral edema    Neurological: A/O x 3    : No Lara    Skin: No rashes    Comments:    ASA Class: I [ ]  II [ ]  III [X]  IV [ ]    The patient is a suitable candidate for the planned procedure unless box checked [ ]  No, explain:

## 2017-11-01 NOTE — PROGRESS NOTE ADULT - ASSESSMENT
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    Impression:  1) Infected pancreatic necrosis  2) Nausea/vomiting    Plan:  - IV abx with imipenem  - IV fluids with LR  - analgesia per primary team  - later this week, would repeat CT scan to assess wall of pancreatic fluid collection    - when necrosis wall is mature, plan for endoscopic drainage   - drainage may alleviate patient's pain, nausea/vomiting by relieving pressure caused by collection at this time

## 2017-11-01 NOTE — PROGRESS NOTE ADULT - ATTENDING COMMENTS
seen and examined 11/1/2017 @ 1210    mild epigastric tenderness  Tm 37.8  WBC 17 -> 14.5    ascending cholangitis s/p ERCP / stent on 10/17  -completed therapeutic ABx on 10/27 (Zosyn -> Imipenem -> cefotetan/Flagyl)    acute necrotic collections  -on repeat CT abd/pelvis (10/28), the body of the pancreas now appears necrotic  -FNA of pancreatic necrosis culture - E faecalis - change ABx Imipenem to ciprofloxacin

## 2017-11-02 LAB
ALBUMIN SERPL ELPH-MCNC: 2.3 G/DL — LOW (ref 3.3–5)
ALP SERPL-CCNC: 129 U/L — HIGH (ref 40–120)
ALT FLD-CCNC: 8 U/L — LOW (ref 10–45)
AMYLASE P1 CFR SERPL: 247 U/L — HIGH (ref 25–125)
ANION GAP SERPL CALC-SCNC: 12 MMOL/L — SIGNIFICANT CHANGE UP (ref 5–17)
APTT BLD: 36.2 SEC — SIGNIFICANT CHANGE UP (ref 27.5–37.4)
AST SERPL-CCNC: 27 U/L — SIGNIFICANT CHANGE UP (ref 10–40)
BILIRUB SERPL-MCNC: 0.2 MG/DL — SIGNIFICANT CHANGE UP (ref 0.2–1.2)
BUN SERPL-MCNC: 6 MG/DL — LOW (ref 7–23)
CALCIUM SERPL-MCNC: 8.7 MG/DL — SIGNIFICANT CHANGE UP (ref 8.4–10.5)
CHLORIDE SERPL-SCNC: 95 MMOL/L — LOW (ref 96–108)
CO2 SERPL-SCNC: 26 MMOL/L — SIGNIFICANT CHANGE UP (ref 22–31)
CREAT SERPL-MCNC: 0.43 MG/DL — LOW (ref 0.5–1.3)
GLUCOSE SERPL-MCNC: 90 MG/DL — SIGNIFICANT CHANGE UP (ref 70–99)
HCT VFR BLD CALC: 26.3 % — LOW (ref 34.5–45)
HGB BLD-MCNC: 8.5 G/DL — LOW (ref 11.5–15.5)
INR BLD: 1.55 RATIO — HIGH (ref 0.88–1.16)
LIDOCAIN IGE QN: 105 U/L — HIGH (ref 7–60)
MAGNESIUM SERPL-MCNC: 1.9 MG/DL — SIGNIFICANT CHANGE UP (ref 1.6–2.6)
MANUAL SMEAR VERIFICATION: SIGNIFICANT CHANGE UP
MCHC RBC-ENTMCNC: 28 PG — SIGNIFICANT CHANGE UP (ref 27–34)
MCHC RBC-ENTMCNC: 32.3 GM/DL — SIGNIFICANT CHANGE UP (ref 32–36)
MCV RBC AUTO: 86.5 FL — SIGNIFICANT CHANGE UP (ref 80–100)
PHOSPHATE SERPL-MCNC: 3.9 MG/DL — SIGNIFICANT CHANGE UP (ref 2.5–4.5)
PLAT MORPH BLD: SIGNIFICANT CHANGE UP
PLATELET # BLD AUTO: 1310 K/UL — CRITICAL HIGH (ref 150–400)
POTASSIUM SERPL-MCNC: 5 MMOL/L — SIGNIFICANT CHANGE UP (ref 3.5–5.3)
POTASSIUM SERPL-SCNC: 5 MMOL/L — SIGNIFICANT CHANGE UP (ref 3.5–5.3)
PROT SERPL-MCNC: 6.4 G/DL — SIGNIFICANT CHANGE UP (ref 6–8.3)
PROTHROM AB SERPL-ACNC: 17.7 SEC — HIGH (ref 10–13.1)
RBC # BLD: 3.04 M/UL — LOW (ref 3.8–5.2)
RBC # FLD: 14.9 % — HIGH (ref 10.3–14.5)
RBC BLD AUTO: SIGNIFICANT CHANGE UP
SODIUM SERPL-SCNC: 133 MMOL/L — LOW (ref 135–145)
WBC # BLD: 12.7 K/UL — HIGH (ref 3.8–10.5)
WBC # FLD AUTO: 12.7 K/UL — HIGH (ref 3.8–10.5)

## 2017-11-02 PROCEDURE — 99232 SBSQ HOSP IP/OBS MODERATE 35: CPT

## 2017-11-02 RX ORDER — ACETAMINOPHEN 500 MG
1000 TABLET ORAL ONCE
Qty: 0 | Refills: 0 | Status: COMPLETED | OUTPATIENT
Start: 2017-11-02 | End: 2017-11-02

## 2017-11-02 RX ORDER — DIPHENHYDRAMINE HCL 50 MG
25 CAPSULE ORAL EVERY 6 HOURS
Qty: 0 | Refills: 0 | Status: DISCONTINUED | OUTPATIENT
Start: 2017-11-02 | End: 2017-11-14

## 2017-11-02 RX ADMIN — PANTOPRAZOLE SODIUM 40 MILLIGRAM(S): 20 TABLET, DELAYED RELEASE ORAL at 12:29

## 2017-11-02 RX ADMIN — HYDROMORPHONE HYDROCHLORIDE 30 MILLILITER(S): 2 INJECTION INTRAMUSCULAR; INTRAVENOUS; SUBCUTANEOUS at 17:48

## 2017-11-02 RX ADMIN — Medication 25 MILLIGRAM(S): at 07:52

## 2017-11-02 RX ADMIN — Medication 25 MILLIGRAM(S): at 14:00

## 2017-11-02 RX ADMIN — Medication 200 MILLIGRAM(S): at 05:41

## 2017-11-02 RX ADMIN — Medication 200 MILLIGRAM(S): at 17:14

## 2017-11-02 RX ADMIN — Medication 400 MILLIGRAM(S): at 06:03

## 2017-11-02 RX ADMIN — HYDROMORPHONE HYDROCHLORIDE 1 MILLIGRAM(S): 2 INJECTION INTRAMUSCULAR; INTRAVENOUS; SUBCUTANEOUS at 22:51

## 2017-11-02 RX ADMIN — FENTANYL CITRATE 1 PATCH: 50 INJECTION INTRAVENOUS at 13:00

## 2017-11-02 RX ADMIN — Medication 400 MILLIGRAM(S): at 12:28

## 2017-11-02 RX ADMIN — HYDROMORPHONE HYDROCHLORIDE 1 MILLIGRAM(S): 2 INJECTION INTRAMUSCULAR; INTRAVENOUS; SUBCUTANEOUS at 09:05

## 2017-11-02 RX ADMIN — Medication 25 MILLIGRAM(S): at 20:32

## 2017-11-02 RX ADMIN — FENTANYL CITRATE 1 PATCH: 50 INJECTION INTRAVENOUS at 10:58

## 2017-11-02 RX ADMIN — HYDROMORPHONE HYDROCHLORIDE 1 MILLIGRAM(S): 2 INJECTION INTRAMUSCULAR; INTRAVENOUS; SUBCUTANEOUS at 16:25

## 2017-11-02 RX ADMIN — Medication 1000 MILLIGRAM(S): at 19:40

## 2017-11-02 RX ADMIN — Medication 400 MILLIGRAM(S): at 19:24

## 2017-11-02 RX ADMIN — Medication 1000 MILLIGRAM(S): at 13:00

## 2017-11-02 RX ADMIN — HYDROMORPHONE HYDROCHLORIDE 1 MILLIGRAM(S): 2 INJECTION INTRAMUSCULAR; INTRAVENOUS; SUBCUTANEOUS at 19:08

## 2017-11-02 RX ADMIN — HYDROMORPHONE HYDROCHLORIDE 30 MILLILITER(S): 2 INJECTION INTRAMUSCULAR; INTRAVENOUS; SUBCUTANEOUS at 07:11

## 2017-11-02 RX ADMIN — HYDROMORPHONE HYDROCHLORIDE 30 MILLILITER(S): 2 INJECTION INTRAMUSCULAR; INTRAVENOUS; SUBCUTANEOUS at 19:04

## 2017-11-02 RX ADMIN — Medication 1000 MILLIGRAM(S): at 00:05

## 2017-11-02 RX ADMIN — Medication 1000 MILLIGRAM(S): at 06:27

## 2017-11-02 RX ADMIN — ENOXAPARIN SODIUM 40 MILLIGRAM(S): 100 INJECTION SUBCUTANEOUS at 12:29

## 2017-11-02 RX ADMIN — HYDROMORPHONE HYDROCHLORIDE 1 MILLIGRAM(S): 2 INJECTION INTRAMUSCULAR; INTRAVENOUS; SUBCUTANEOUS at 01:47

## 2017-11-02 NOTE — PROGRESS NOTE ADULT - ASSESSMENT
ASSESSMENT: Elba Coleman is a 57 y.o. woman with history of nephrolithiasis, HTN, and appendectomy admitted on 10/16 for gallstone pancreatitis c/b necrotizing pancreatitis and ascending cholangitis s/p ERCP and stent placement (10/21) and FNA of perihepatic fluid (10/31). Continued pain, but improved this morning. Vomited again overnight.      PLAN:   - Plan to discuss endoscopic advancement of NJ tube  - Tube feeds when NJ placement is confirmed   - Pain management  - Change antibiotic to cipro based on FNA culture sensitivity

## 2017-11-02 NOTE — PROGRESS NOTE ADULT - SUBJECTIVE AND OBJECTIVE BOX
Pain Management Attending Addendum    SUBJECTIVE:    Therapy:	  [x ] IV PCA	   [ ] Epidural           [ ] s/p Spinal Opoid              [ ] Postpartum infusion	  [ ] Patient controlled regional anesthesia (PCRA)    [ ] prn Analgesics    OBJECTIVE:   [ x] No new signs     [ ] Other:    Side Effects:  [x ] None			[ ] Other:    Assessment of Catheter Site:		[ ] Intact		[ ] Other:    ASSESSMENT/PLAN  [ x] Continue current therapy    [ ] Therapy changed to:    [ ] IV PCA       [ ] Epidural     [ ] prn Analgesics     [ ] post partum infusion    Comments:

## 2017-11-02 NOTE — PROGRESS NOTE ADULT - ATTENDING COMMENTS
seen and examined 11/2/2017 @ 0855    abd pain significantly improved from yesterday, but still vomited last night    mild epigastric tenderness  now afebrile  WBC 14.5 -> 12.7    ascending cholangitis s/p ERCP / stent on 10/17    acute necrotic collections w/ E faecalis infection  -continue ciprofloxacin    I still think it is a bit early for intervention and she is improving, so may not require drainage or VARD.  Needs NJ if vomiting persists to improve nutrition. Discussing plans daily with GI.

## 2017-11-02 NOTE — PROGRESS NOTE ADULT - SUBJECTIVE AND OBJECTIVE BOX
SUBJECTIVE:  - Pt continues to have epigastric pain  - No fevers over the past 24 hours  - (+) nausea and spitting up still  - No tube feeds x3 days  - Patient had CT guided aspirate of pancreatic necrosis Monday, growing enterococcus  - patient has been switched to ciprofloxacin    OBJECTIVE:    MEDICATIONS  (STANDING):  ciprofloxacin   IVPB      dextrose 5% + sodium chloride 0.45% with potassium chloride 20 mEq/L 1000 milliLiter(s) (75 mL/Hr) IV Continuous <Continuous>  enoxaparin Injectable 40 milliGRAM(s) SubCutaneous daily  fentaNYL   Patch  25 MICROgram(s)/Hr 1 Patch Transdermal every 72 hours  HYDROmorphone PCA (1 mG/mL) 30 milliLiter(s) PCA Continuous PCA Continuous  pantoprazole  Injectable 40 milliGRAM(s) IV Push daily      Vital Signs Last 24 Hrs  T(C): 36.6 (02 Nov 2017 09:31), Max: 37.1 (02 Nov 2017 01:39)  T(F): 97.9 (02 Nov 2017 09:31), Max: 98.8 (02 Nov 2017 01:39)  HR: 83 (02 Nov 2017 09:31) (73 - 87)  BP: 104/60 (02 Nov 2017 09:31) (102/65 - 108/73)  BP(mean): --  RR: 18 (02 Nov 2017 09:31) (18 - 18)  SpO2: 93% (02 Nov 2017 09:31) (93% - 95%)    PHYSICAL EXAM:  Constitutional: no acute distress; NGT in place  Eyes: no icterus  Neck: no masses, no LAD  Respiratory: normal inspiratory effort; no wheezing or crackles  Cardiovascular: RRR, normal S1/S2, no murmurs/rubs/gallops  Gastrointestinal: soft, nondistended, epigastric tenderness, +BS  Extremities: no LE edema  Neurological: AAOx3, no asterixis  Skin: no rashes, bruises, petechiae    LABS:                                                         8.6    14.53 )-----------( 1206     ( 01 Nov 2017 08:42 )             26.4     11-01    133<L>  |  96  |  6<L>  ----------------------------<  99  4.4   |  24  |  0.38<L>    Ca    8.5      01 Nov 2017 08:41    TPro  6.1  /  Alb  2.5<L>  /  TBili  0.2  /  DBili  x   /  AST  27  /  ALT  12  /  AlkPhos  141<H>  11-01        < from: CT Abdomen and Pelvis w/ IV Cont (10.28.17 @ 15:44) >  PANCREAS: Diffuse hypoenhancement of the body of the pancreas extending   to the tail. There is peripancreatic inflammation and fluid. Centered   within the lesser sac is a 8.8 x 4.2 cm collection (series 2, image 40).   There is an additional another collection adjacent to the uncinate   process and pancreatic head that measure 4.2 x 3.1 cm (series 2, image   60).    There is fluid in the right and left anterior pararenal space. The fluid   along the left pararenal space is partially loculated.     IMPRESSION:   1.  The findings are compatible with necrotizing pancreatitis.   2.  The collection likely represent pseudocysts and measure 8.8 x 4.2 cm   collection (series 2, image 40) and 4.2 x 3.1 cm (series 2, image 60).  3.  Unchanged segment thrombus of the splenic vein.  4.  Small left pleural effusions with bibasilar subsegmental atelectasis.    < end of copied text >

## 2017-11-02 NOTE — PROGRESS NOTE ADULT - SUBJECTIVE AND OBJECTIVE BOX
SUBJECTIVE:  Vomited again overnight. Denies ongoing nausea.  Abdominal pain improved this morning.     OBJECTIVE:     ** VITAL SIGNS / I&O's **    T(C): 37.1 (11-02-17 @ 13:29), Max: 37.1 (11-02-17 @ 01:39)  T(F): 98.7 (11-02-17 @ 13:29), Max: 98.8 (11-02-17 @ 01:39)  HR: 80 (11-02-17 @ 13:29) (73 - 87)  BP: 100/58 (11-02-17 @ 13:29) (100/58 - 108/73)  RR: 18 (11-02-17 @ 13:29) (18 - 18)  SpO2: 94% (11-02-17 @ 13:29) (93% - 95%)      01 Nov 2017 07:01  -  02 Nov 2017 07:00  --------------------------------------------------------  IN:    dextrose 5% + sodium chloride 0.45% with potassium chloride 20 mEq/L: 1400 mL    Solution: 100 mL    Solution: 400 mL    Solution: 400 mL  Total IN: 2300 mL    OUT:    Voided: 1600 mL  Total OUT: 1600 mL    Total NET: 700 mL      02 Nov 2017 07:01  -  02 Nov 2017 14:53  --------------------------------------------------------  IN:  Total IN: 0 mL    OUT:    Voided: 750 mL  Total OUT: 750 mL    Total NET: -750 mL    ** PHYSICAL EXAM **    -- CONSTITUTIONAL: AOx3. NAD.   -- HEENT: NJ   -- CARDIOVASCULAR: normotensive, regular rate   -- RESPIRATORY: breathing comfortably   -- ABDOMEN: soft, mildly distended, LUQ and epigastric tenderness without rebound or guarding     ** LABS **                 8.5    12.70  )----------(  1310      ( 02 Nov 2017 08:36 )               26.3      133    |  95     |  6      ----------------------------<  90         ( 02 Nov 2017 08:36 )  5.0     |  26     |  0.43     Ca    8.7        ( 02 Nov 2017 08:36 )  Phos  3.9       ( 02 Nov 2017 08:36 )  Mg     1.9       ( 02 Nov 2017 08:36 )    TPro  6.4    /  Alb  2.3    /  TBili  0.2    /  DBili  x      /  AST  27     /  ALT  8      /  AlkPhos  129    ( 02 Nov 2017 08:36 )    PT/INR -  17.7 sec / 1.55 ratio   ( 02 Nov 2017 08:36 )       PTT -  36.2 sec   ( 02 Nov 2017 08:36 )  CAPILLARY BLOOD GLUCOSE

## 2017-11-02 NOTE — PROGRESS NOTE ADULT - ASSESSMENT
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    Impression:  1) Infected pancreatic necrosis  2) Nausea/vomiting    Plan:  - IV abx  - IV fluids with LR  - analgesia per primary team  - later this week, would repeat CT scan to assess wall of pancreatic fluid collection    - when necrosis wall is mature, plan for endoscopic drainage   - drainage may alleviate patient's pain, nausea/vomiting by relieving pressure caused by collection at this time

## 2017-11-02 NOTE — PROGRESS NOTE ADULT - SUBJECTIVE AND OBJECTIVE BOX
Day 18\15 of Anesthesia Pain Management Service    SUBJECTIVE: Still having pain    Pain Scale Score:	[X] Refer to charted pain scores    THERAPY:    [ ] IV PCA Morphine		[ ] 5 mg/mL	[ ] 1 mg/mL  [X] IV PCA Hydromorphone	[ ] 5 mg/mL	[X] 1 mg/mL  [ ] IV PCA Fentanyl		[ ] 50 micrograms/mL    Demand dose: 0.5 mg     Lockout: 6 minutes   Continuous Rate: 0 mg/hr  4 Hour Limit: 11 mg    MEDICATIONS  (STANDING):  ciprofloxacin   IVPB      dextrose 5% + sodium chloride 0.45% with potassium chloride 20 mEq/L 1000 milliLiter(s) (75 mL/Hr) IV Continuous <Continuous>  enoxaparin Injectable 40 milliGRAM(s) SubCutaneous daily  fentaNYL   Patch  25 MICROgram(s)/Hr 1 Patch Transdermal every 72 hours  HYDROmorphone PCA (1 mG/mL) 30 milliLiter(s) PCA Continuous PCA Continuous  pantoprazole  Injectable 40 milliGRAM(s) IV Push daily    MEDICATIONS  (PRN):  diphenhydrAMINE   Injectable 25 milliGRAM(s) IV Push every 6 hours PRN Rash and/or Itching  HYDROmorphone PCA (1 mG/mL) Rescue Clinician Bolus 1 milliGRAM(s) IV Push every 15 minutes PRN for Pain Scale GREATER THAN 6  ondansetron Injectable 4 milliGRAM(s) IV Push every 4 hours PRN Nausea and/or Vomiting      OBJECTIVE:    Sedation Score:	[ X] Alert	[ ] Drowsy 	[ ] Arousable	[ ] Asleep	[ ] Unresponsive    Side Effects:	[X ] None	[ ] Nausea	[ ] Vomiting	[ ] Pruritus  		[ ] Other:    Vital Signs Last 24 Hrs  T(C): 36.6 (02 Nov 2017 09:31), Max: 37.1 (02 Nov 2017 01:39)  T(F): 97.9 (02 Nov 2017 09:31), Max: 98.8 (02 Nov 2017 01:39)  HR: 83 (02 Nov 2017 09:31) (73 - 87)  BP: 104/60 (02 Nov 2017 09:31) (102/65 - 108/73)  BP(mean): --  RR: 18 (02 Nov 2017 09:31) (18 - 18)  SpO2: 93% (02 Nov 2017 09:31) (93% - 95%)    ASSESSMENT/ PLAN    Therapy to  be:               [X] Continued   [ ] Discontinued   [ ] Changed to PRN Analgesics    Documentation and Verification of current medications:   [X] Done	[ ] Not done, not eligible    Comments: OOB in chair. Continue.

## 2017-11-03 LAB
ALBUMIN SERPL ELPH-MCNC: 2.5 G/DL — LOW (ref 3.3–5)
ALP SERPL-CCNC: 115 U/L — SIGNIFICANT CHANGE UP (ref 40–120)
ALT FLD-CCNC: 9 U/L — LOW (ref 10–45)
AMYLASE P1 CFR SERPL: 245 U/L — HIGH (ref 25–125)
ANION GAP SERPL CALC-SCNC: 13 MMOL/L — SIGNIFICANT CHANGE UP (ref 5–17)
AST SERPL-CCNC: 24 U/L — SIGNIFICANT CHANGE UP (ref 10–40)
BILIRUB DIRECT SERPL-MCNC: 0.1 MG/DL — SIGNIFICANT CHANGE UP (ref 0–0.2)
BILIRUB INDIRECT FLD-MCNC: SIGNIFICANT CHANGE UP (ref 0.2–1)
BILIRUB SERPL-MCNC: <0.2 MG/DL — SIGNIFICANT CHANGE UP (ref 0.2–1.2)
BUN SERPL-MCNC: 4 MG/DL — LOW (ref 7–23)
CALCIUM SERPL-MCNC: 8.5 MG/DL — SIGNIFICANT CHANGE UP (ref 8.4–10.5)
CHLORIDE SERPL-SCNC: 98 MMOL/L — SIGNIFICANT CHANGE UP (ref 96–108)
CO2 SERPL-SCNC: 25 MMOL/L — SIGNIFICANT CHANGE UP (ref 22–31)
CREAT SERPL-MCNC: 0.65 MG/DL — SIGNIFICANT CHANGE UP (ref 0.5–1.3)
GLUCOSE SERPL-MCNC: 107 MG/DL — HIGH (ref 70–99)
HCT VFR BLD CALC: 25.9 % — LOW (ref 34.5–45)
HGB BLD-MCNC: 8.4 G/DL — LOW (ref 11.5–15.5)
LIDOCAIN IGE QN: 123 U/L — HIGH (ref 7–60)
MAGNESIUM SERPL-MCNC: 1.8 MG/DL — SIGNIFICANT CHANGE UP (ref 1.6–2.6)
MCHC RBC-ENTMCNC: 27.8 PG — SIGNIFICANT CHANGE UP (ref 27–34)
MCHC RBC-ENTMCNC: 32.4 GM/DL — SIGNIFICANT CHANGE UP (ref 32–36)
MCV RBC AUTO: 85.8 FL — SIGNIFICANT CHANGE UP (ref 80–100)
PHOSPHATE SERPL-MCNC: 4.1 MG/DL — SIGNIFICANT CHANGE UP (ref 2.5–4.5)
PLATELET # BLD AUTO: 1287 K/UL — CRITICAL HIGH (ref 150–400)
POTASSIUM SERPL-MCNC: 4.6 MMOL/L — SIGNIFICANT CHANGE UP (ref 3.5–5.3)
POTASSIUM SERPL-SCNC: 4.6 MMOL/L — SIGNIFICANT CHANGE UP (ref 3.5–5.3)
PROT SERPL-MCNC: 6.3 G/DL — SIGNIFICANT CHANGE UP (ref 6–8.3)
RBC # BLD: 3.02 M/UL — LOW (ref 3.8–5.2)
RBC # FLD: 14.7 % — HIGH (ref 10.3–14.5)
SODIUM SERPL-SCNC: 136 MMOL/L — SIGNIFICANT CHANGE UP (ref 135–145)
WBC # BLD: 11.92 K/UL — HIGH (ref 3.8–10.5)
WBC # FLD AUTO: 11.92 K/UL — HIGH (ref 3.8–10.5)

## 2017-11-03 PROCEDURE — 74020: CPT | Mod: 26

## 2017-11-03 PROCEDURE — 99232 SBSQ HOSP IP/OBS MODERATE 35: CPT | Mod: GC

## 2017-11-03 RX ORDER — ACETAMINOPHEN 500 MG
1000 TABLET ORAL ONCE
Qty: 0 | Refills: 0 | Status: COMPLETED | OUTPATIENT
Start: 2017-11-03 | End: 2017-11-03

## 2017-11-03 RX ORDER — KETOROLAC TROMETHAMINE 30 MG/ML
15 SYRINGE (ML) INJECTION ONCE
Qty: 0 | Refills: 0 | Status: DISCONTINUED | OUTPATIENT
Start: 2017-11-03 | End: 2017-11-03

## 2017-11-03 RX ORDER — ACETAMINOPHEN 500 MG
1000 TABLET ORAL ONCE
Qty: 0 | Refills: 0 | Status: COMPLETED | OUTPATIENT
Start: 2017-11-04 | End: 2017-11-04

## 2017-11-03 RX ADMIN — ENOXAPARIN SODIUM 40 MILLIGRAM(S): 100 INJECTION SUBCUTANEOUS at 11:29

## 2017-11-03 RX ADMIN — HYDROMORPHONE HYDROCHLORIDE 1 MILLIGRAM(S): 2 INJECTION INTRAMUSCULAR; INTRAVENOUS; SUBCUTANEOUS at 22:35

## 2017-11-03 RX ADMIN — Medication 1000 MILLIGRAM(S): at 10:30

## 2017-11-03 RX ADMIN — HYDROMORPHONE HYDROCHLORIDE 30 MILLILITER(S): 2 INJECTION INTRAMUSCULAR; INTRAVENOUS; SUBCUTANEOUS at 19:25

## 2017-11-03 RX ADMIN — Medication 400 MILLIGRAM(S): at 01:47

## 2017-11-03 RX ADMIN — Medication 25 MILLIGRAM(S): at 23:07

## 2017-11-03 RX ADMIN — HYDROMORPHONE HYDROCHLORIDE 30 MILLILITER(S): 2 INJECTION INTRAMUSCULAR; INTRAVENOUS; SUBCUTANEOUS at 05:03

## 2017-11-03 RX ADMIN — Medication 200 MILLIGRAM(S): at 05:09

## 2017-11-03 RX ADMIN — Medication 15 MILLIGRAM(S): at 14:20

## 2017-11-03 RX ADMIN — Medication 400 MILLIGRAM(S): at 17:03

## 2017-11-03 RX ADMIN — Medication 400 MILLIGRAM(S): at 21:00

## 2017-11-03 RX ADMIN — HYDROMORPHONE HYDROCHLORIDE 30 MILLILITER(S): 2 INJECTION INTRAMUSCULAR; INTRAVENOUS; SUBCUTANEOUS at 17:47

## 2017-11-03 RX ADMIN — Medication 25 MILLIGRAM(S): at 17:54

## 2017-11-03 RX ADMIN — Medication 25 MILLIGRAM(S): at 11:32

## 2017-11-03 RX ADMIN — HYDROMORPHONE HYDROCHLORIDE 30 MILLILITER(S): 2 INJECTION INTRAMUSCULAR; INTRAVENOUS; SUBCUTANEOUS at 07:14

## 2017-11-03 RX ADMIN — Medication 400 MILLIGRAM(S): at 09:59

## 2017-11-03 RX ADMIN — Medication 1000 MILLIGRAM(S): at 21:20

## 2017-11-03 RX ADMIN — HYDROMORPHONE HYDROCHLORIDE 1 MILLIGRAM(S): 2 INJECTION INTRAMUSCULAR; INTRAVENOUS; SUBCUTANEOUS at 09:43

## 2017-11-03 RX ADMIN — Medication 1000 MILLIGRAM(S): at 02:00

## 2017-11-03 RX ADMIN — PANTOPRAZOLE SODIUM 40 MILLIGRAM(S): 20 TABLET, DELAYED RELEASE ORAL at 11:29

## 2017-11-03 RX ADMIN — Medication 1000 MILLIGRAM(S): at 17:33

## 2017-11-03 RX ADMIN — Medication 25 MILLIGRAM(S): at 02:00

## 2017-11-03 RX ADMIN — Medication 15 MILLIGRAM(S): at 13:40

## 2017-11-03 RX ADMIN — Medication 200 MILLIGRAM(S): at 17:05

## 2017-11-03 NOTE — PROGRESS NOTE ADULT - SUBJECTIVE AND OBJECTIVE BOX
Surgery Trauma Team Progress Note    SUBJECTIVE:     Patient was seen and examined this morning. There was no acute event overnight. She is resting in her bed. She feels better, but still has abdominal pain. Pain is well controlled. No episode of emesis in the past 24 hours.  She does not report pain, fever, n/v, chest pain, or shortness of breath.     OBJECTIVE:   T(C): 36.9 (11-03-17 @ 06:05), Max: 37.4 (11-03-17 @ 01:05)  HR: 83 (11-03-17 @ 06:05) (73 - 83)  BP: 107/68 (11-03-17 @ 06:05) (100/58 - 109/72)  RR: 18 (11-03-17 @ 06:05) (18 - 18)  SpO2: 93% (11-03-17 @ 06:05) (93% - 95%)  Wt(kg): --  CAPILLARY BLOOD GLUCOSE        I&O's Detail    02 Nov 2017 07:01  -  03 Nov 2017 07:00  --------------------------------------------------------  IN:    dextrose 5% + sodium chloride 0.45% with potassium chloride 20 mEq/L: 700 mL    Solution: 200 mL  Total IN: 900 mL    OUT:    Voided: 2600 mL  Total OUT: 2600 mL    Total NET: -1700 mL      PHYSICAL EXAM:  Gen: Well-nourished, well-developed, A&O x3, resting in bed in no acute distress  CV: RRR  Resp: Patent airways, unlabored   Abdomen: Soft, epigastric tenderness, nondistended  Extremities: All 4 extremities warm and well perfused, no edema

## 2017-11-03 NOTE — PROGRESS NOTE ADULT - SUBJECTIVE AND OBJECTIVE BOX
SUBJECTIVE:  - Pt continues to have epigastric pain  - No fevers over the past 24 hours  - (+) nausea and spitting up still  - No tube feeds x4 days  - Patient had CT guided aspirate of pancreatic necrosis Monday, growing enterococcus  - patient on abx with ciprofloxacin    OBJECTIVE:    MEDICATIONS  (STANDING):  acetaminophen  IVPB. 1000 milliGRAM(s) IV Intermittent once  acetaminophen  IVPB. 1000 milliGRAM(s) IV Intermittent once  ciprofloxacin   IVPB      dextrose 5% + sodium chloride 0.45% with potassium chloride 20 mEq/L 1000 milliLiter(s) (75 mL/Hr) IV Continuous <Continuous>  enoxaparin Injectable 40 milliGRAM(s) SubCutaneous daily  fentaNYL   Patch  25 MICROgram(s)/Hr 1 Patch Transdermal every 72 hours  HYDROmorphone PCA (1 mG/mL) 30 milliLiter(s) PCA Continuous PCA Continuous  pantoprazole  Injectable 40 milliGRAM(s) IV Push daily      Vital Signs Last 24 Hrs  T(C): 37.6 (03 Nov 2017 09:23), Max: 37.6 (03 Nov 2017 09:23)  T(F): 99.6 (03 Nov 2017 09:23), Max: 99.6 (03 Nov 2017 09:23)  HR: 80 (03 Nov 2017 09:23) (73 - 83)  BP: 111/72 (03 Nov 2017 09:23) (100/58 - 111/72)  BP(mean): --  RR: 17 (03 Nov 2017 09:23) (17 - 18)  SpO2: 93% (03 Nov 2017 09:23) (93% - 95%)    PHYSICAL EXAM:  Constitutional: no acute distress; NGT in place  Eyes: no icterus  Neck: no masses, no LAD  Respiratory: normal inspiratory effort; no wheezing or crackles  Cardiovascular: RRR, normal S1/S2, no murmurs/rubs/gallops  Gastrointestinal: soft, nondistended, diffusely tender  Extremities: no LE edema  Neurological: AAOx3, no asterixis  Skin: no rashes, bruises, petechiae    LABS:                                   8.4    11.92 )-----------( 1287     ( 03 Nov 2017 07:50 )             25.9     11-03    136  |  98  |  4<L>  ----------------------------<  107<H>  4.6   |  25  |  0.65    Ca    8.5      03 Nov 2017 07:52  Phos  4.1     11-03  Mg     1.8     11-03    TPro  6.3  /  Alb  2.5<L>  /  TBili  <0.2  /  DBili  0.1  /  AST  24  /  ALT  9<L>  /  AlkPhos  115  11-03          < from: CT Abdomen and Pelvis w/ IV Cont (10.28.17 @ 15:44) >  PANCREAS: Diffuse hypoenhancement of the body of the pancreas extending   to the tail. There is peripancreatic inflammation and fluid. Centered   within the lesser sac is a 8.8 x 4.2 cm collection (series 2, image 40).   There is an additional another collection adjacent to the uncinate   process and pancreatic head that measure 4.2 x 3.1 cm (series 2, image   60).    There is fluid in the right and left anterior pararenal space. The fluid   along the left pararenal space is partially loculated.     IMPRESSION:   1.  The findings are compatible with necrotizing pancreatitis.   2.  The collection likely represent pseudocysts and measure 8.8 x 4.2 cm   collection (series 2, image 40) and 4.2 x 3.1 cm (series 2, image 60).  3.  Unchanged segment thrombus of the splenic vein.  4.  Small left pleural effusions with bibasilar subsegmental atelectasis.    < end of copied text >

## 2017-11-03 NOTE — PROGRESS NOTE ADULT - ASSESSMENT
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    Impression:  1) Infected pancreatic necrosis  2) Nausea/vomiting    Plan:  - IV abx  - IV fluids with LR  - analgesia per primary team  - this weekend or early next week, would repeat CT scan to assess wall of pancreatic fluid collection    - when necrosis wall is mature, plan for endoscopic drainage   - drainage may alleviate patient's pain, nausea/vomiting by relieving pressure caused by collection at this time

## 2017-11-03 NOTE — PROGRESS NOTE ADULT - ATTENDING COMMENTS
Impression:    #1.  Necrotizing pancreatitis with infected walled off necrosis, diagnosed by CT guided aspiration with positive culture.  No fever.  Stable leukocytosis.    #2.  Abdominal pain, nausea, on narcotic PCA pump    Recommendation:    #1.  Continue antibiotics.    #2.  Repeat CT scan abd/pelvis with contrast today or tomorrow to reassess necrosis and maturity of wall.    #3.  Endoscopic drainage of infected walled off necrosis when wall mature.  We have done this procedure as early as 3 weeks after initial diagnosis of necrotizing pancreatitis.

## 2017-11-03 NOTE — PROGRESS NOTE ADULT - ASSESSMENT
Ms. Coleman is a 57 y.o. woman with history of nephrolithiasis, HTN, and appendectomy admitted on 10/16 for gallstone pancreatitis c/b necrotizing pancreatitis and ascending cholangitis s/p ERCP and stent placement (10/21) and FNA of perihepatic fluid (10/31) which grew faecalis . Continued pain, but improved this morning. no emesis in the 24 hours. Blood pressure have been soft but hemodynamically stable. Lipase level is elevated but stable.     PLAN:   - follow-up with Abdominal x-ray  - DVT ppx: Lovenox   - Diet: NPO  - Pain control: Fentanyl patch, IV Tylenol  - Replete electrolyte as necesary  - Activity: OOb as tolerated  -  cipro to cover faecalis infection

## 2017-11-03 NOTE — PROGRESS NOTE ADULT - SUBJECTIVE AND OBJECTIVE BOX
Day __ of Anesthesia Pain Management Service    SUBJECTIVE: Patient is doing well with IV PCA    Pain Scale Score:	[X] Refer to charted pain scores    THERAPY:    [ ] IV PCA Morphine		[ ] 5 mg/mL	[ ] 1 mg/mL  [X] IV PCA Hydromorphone	[ ] 5 mg/mL	[X] 1 mg/mL  [ ] IV PCA Fentanyl		[ ] 50 micrograms/mL    Demand dose: 0.5 mg     Lockout: 6 minutes   Continuous Rate: 0 mg/hr  4 Hour Limit: 6 mg    MEDICATIONS  (STANDING):  acetaminophen  IVPB. 1000 milliGRAM(s) IV Intermittent once  acetaminophen  IVPB. 1000 milliGRAM(s) IV Intermittent once  ciprofloxacin   IVPB      dextrose 5% + sodium chloride 0.45% with potassium chloride 20 mEq/L 1000 milliLiter(s) (75 mL/Hr) IV Continuous <Continuous>  enoxaparin Injectable 40 milliGRAM(s) SubCutaneous daily  fentaNYL   Patch  25 MICROgram(s)/Hr 1 Patch Transdermal every 72 hours  HYDROmorphone PCA (1 mG/mL) 30 milliLiter(s) PCA Continuous PCA Continuous  pantoprazole  Injectable 40 milliGRAM(s) IV Push daily    MEDICATIONS  (PRN):  diphenhydrAMINE   Injectable 25 milliGRAM(s) IV Push every 6 hours PRN Rash and/or Itching  HYDROmorphone PCA (1 mG/mL) Rescue Clinician Bolus 1 milliGRAM(s) IV Push every 15 minutes PRN for Pain Scale GREATER THAN 6      OBJECTIVE:    Sedation Score:	[ X] Alert	[ ] Drowsy 	[ ] Arousable	[ ] Asleep	[ ] Unresponsive    Side Effects:	[X ] None	[ ] Nausea	[ ] Vomiting	[ ] Pruritus  		[ ] Other:    Vital Signs Last 24 Hrs  T(C): 37.6 (03 Nov 2017 09:23), Max: 37.6 (03 Nov 2017 09:23)  T(F): 99.6 (03 Nov 2017 09:23), Max: 99.6 (03 Nov 2017 09:23)  HR: 80 (03 Nov 2017 09:23) (73 - 83)  BP: 111/72 (03 Nov 2017 09:23) (100/58 - 111/72)  BP(mean): --  RR: 17 (03 Nov 2017 09:23) (17 - 18)  SpO2: 93% (03 Nov 2017 09:23) (93% - 95%)    ASSESSMENT/ PLAN    Therapy to  be:               [X] Continued   [ ] Discontinued   [ ] Changed to PRN Analgesics    Documentation and Verification of current medications:   [X] Done	[ ] Not done, not eligible    Comments:

## 2017-11-03 NOTE — CHART NOTE - NSCHARTNOTEFT_GEN_A_CORE
Upon Nutritional Assessment by the Registered Dietitian your patient was determined to meet criteria / has evidence of the following diagnosis/diagnoses:          [ ]  Mild Protein Calorie Malnutrition        [ ]  Moderate Protein Calorie Malnutrition        [X ] Severe Protein Calorie Malnutrition        [ ] Unspecified Protein Calorie Malnutrition        [ ] Underweight / BMI <19        [ ] Morbid Obesity / BMI > 40      Findings as based on:  [X ] Comprehensive nutrition assessment   [ ] Nutrition Focused Physical Exam  [X ] Other: pancreatitis, prolonged NPO status, >7 days with <75% estimated nutrient needs, edematous       Nutrition Plan/Recommendations:      As medically/surgically feasible, recommend enteral nutrition via jejunum. As tolerated, consider initiating VitalAF at 10ml/hr and advance as tolerated to goal rate. Monitor for risk of refeeding syndrome. As feasible, recommend goal of: Vital AF @50ml/hr x24 hrs. Provides: 1440kcal (22 pollo/g, 1.4gm/kg based on dosing wt of 66.5kg).           PROVIDER Section:     By signing this assessment you are acknowledging and agree with the diagnosis/diagnoses assigned by the Registered Dietitian    Comments:

## 2017-11-03 NOTE — CHART NOTE - NSCHARTNOTEFT_GEN_A_CORE
Source: Patient [X ]    Family [X ]     other [ X] chart/medical record, RN, Resident (Dr. Hunt), Attending (Dr. Arita)     Diet : NPO    Nutrition follow-up for extended NPO status. Pt admitted 10/16, received intermittent po diet of low fat clears and enteral feeds of Jevity 1.2 with poor tolerance (per notes, tiger tube was in duodenum during periods of poor enteral tolerance). Pt now NPO since 10/27. Pt remains with tiger tube in place, team planning on abdominal x-ray today to reconfirm placement. Concerns regarding extended NPO status discussed with resident and escalated to attending. Per discussion between attending and RD, team with goal of feeding pt via enteral nutrition via jejunum, pending placement of tube and as medically/surgically feasible. Malnutrition alert placed in chart, discussed with resident and noted below. RD to remain available as requested.    Pt denies nausea/emesis at time of RD visit (no emesis x24 hrs per pt and chart).     Hospital Course per surgical note: "history of nephrolithiasis, HTN, and appendectomy admitted on 10/16 for gallstone pancreatitis c/b necrotizing pancreatitis and ascending cholangitis s/p ERCP and stent placement (10/21) and FNA of perihepatic fluid (10/31) which grew faecalis . Continued pain, but improved this morning. no emesis in the 24 hours. Blood pressure have been soft but hemodynamically stable. Lipase level is elevated but stable."     Current Weight: No new wts. 10/26: 153.2 pounds 10/17: 146.6 pounds, pt noted with 1+ general + suze. foot/ankle edema as of 10/29    Pertinent Medications: MEDICATIONS  (STANDING):  acetaminophen  IVPB. 1000 milliGRAM(s) IV Intermittent once  acetaminophen  IVPB. 1000 milliGRAM(s) IV Intermittent once  ciprofloxacin   IVPB      dextrose 5% + sodium chloride 0.45% with potassium chloride 20 mEq/L 1000 milliLiter(s) (75 mL/Hr) IV Continuous <Continuous>  enoxaparin Injectable 40 milliGRAM(s) SubCutaneous daily  fentaNYL   Patch  25 MICROgram(s)/Hr 1 Patch Transdermal every 72 hours  HYDROmorphone PCA (1 mG/mL) 30 milliLiter(s) PCA Continuous PCA Continuous  pantoprazole  Injectable 40 milliGRAM(s) IV Push daily    MEDICATIONS  (PRN):  diphenhydrAMINE   Injectable 25 milliGRAM(s) IV Push every 6 hours PRN Rash and/or Itching  HYDROmorphone PCA (1 mG/mL) Rescue Clinician Bolus 1 milliGRAM(s) IV Push every 15 minutes PRN for Pain Scale GREATER THAN 6    Pertinent Labs:  11-03 Na136 mmol/L Glu 107 mg/dL<H> K+ 4.6 mmol/L Cr  0.65 mg/dL BUN 4 mg/dL<L> Phos 4.1 mg/dL Alb 2.5 g/dL<L> PAB n/a       Skin: No pressure injuries. 1+ general + suze. leg/foot/ankle edema.    Estimated Needs:   [X ] no change since previous assessment  [ ] recalculated:       Previous Nutrition Diagnosis: [X ] Inadequate Protein-Energy Intake      Nutrition Diagnosis is [X ] ongoing/upgraded as noted below         New Nutrition Diagnosis: [X ] Malnutrition, severe     Related to: altered GI function    As evidenced by: pancreatitis, prolonged NPO status, >7 days with <75% estimated nutrient needs, edematous     Interventions:     Recommend    [X ] Nutrition Support: As medically/surgically feasible, recommend enteral nutrition via jejunum. As tolerated, consider initiating VitalAF at 10ml/hr and advance as tolerated to goal rate. Monitor for risk of refeeding syndrome. As feasible, recommend goal of: Vital AF @50ml/hr x24 hrs. Provides: 1440kcal (22 pollo/g, 1.4gm/kg based on dosing wt of 66.5kg).       [X] Other: Malnutrition alert placed. MD Hunt informed.        Monitoring and Evaluation:     [ ] PO intake [ ] Tolerance to diet prescription [X ] weights [X ] follow up per protocol    [ X] other: 1. Monitor diet order advancement & tolerance, weights, lab values.  2. RD to remain available for further nutritional interventions as indicated/requested by medical team/pt.

## 2017-11-04 LAB
AMYLASE P1 CFR SERPL: 242 U/L — HIGH (ref 25–125)
ANION GAP SERPL CALC-SCNC: 15 MMOL/L — SIGNIFICANT CHANGE UP (ref 5–17)
APTT BLD: 40.7 SEC — HIGH (ref 27.5–37.4)
BUN SERPL-MCNC: 6 MG/DL — LOW (ref 7–23)
CALCIUM SERPL-MCNC: 8.9 MG/DL — SIGNIFICANT CHANGE UP (ref 8.4–10.5)
CHLORIDE SERPL-SCNC: 97 MMOL/L — SIGNIFICANT CHANGE UP (ref 96–108)
CO2 SERPL-SCNC: 25 MMOL/L — SIGNIFICANT CHANGE UP (ref 22–31)
CREAT SERPL-MCNC: 0.53 MG/DL — SIGNIFICANT CHANGE UP (ref 0.5–1.3)
CULTURE RESULTS: SIGNIFICANT CHANGE UP
GLUCOSE SERPL-MCNC: 88 MG/DL — SIGNIFICANT CHANGE UP (ref 70–99)
HCT VFR BLD CALC: 26.1 % — LOW (ref 34.5–45)
HGB BLD-MCNC: 8.5 G/DL — LOW (ref 11.5–15.5)
INR BLD: 1.9 RATIO — HIGH (ref 0.88–1.16)
LIDOCAIN IGE QN: 128 U/L — HIGH (ref 7–60)
MAGNESIUM SERPL-MCNC: 1.8 MG/DL — SIGNIFICANT CHANGE UP (ref 1.6–2.6)
MCHC RBC-ENTMCNC: 28.3 PG — SIGNIFICANT CHANGE UP (ref 27–34)
MCHC RBC-ENTMCNC: 32.6 GM/DL — SIGNIFICANT CHANGE UP (ref 32–36)
MCV RBC AUTO: 87 FL — SIGNIFICANT CHANGE UP (ref 80–100)
ORGANISM # SPEC MICROSCOPIC CNT: SIGNIFICANT CHANGE UP
ORGANISM # SPEC MICROSCOPIC CNT: SIGNIFICANT CHANGE UP
PHOSPHATE SERPL-MCNC: 4.1 MG/DL — SIGNIFICANT CHANGE UP (ref 2.5–4.5)
PLATELET # BLD AUTO: 1286 K/UL — CRITICAL HIGH (ref 150–400)
POTASSIUM SERPL-MCNC: 4.5 MMOL/L — SIGNIFICANT CHANGE UP (ref 3.5–5.3)
POTASSIUM SERPL-SCNC: 4.5 MMOL/L — SIGNIFICANT CHANGE UP (ref 3.5–5.3)
PROTHROM AB SERPL-ACNC: 21.7 SEC — HIGH (ref 10–13.1)
RBC # BLD: 3 M/UL — LOW (ref 3.8–5.2)
RBC # FLD: 15.1 % — HIGH (ref 10.3–14.5)
SODIUM SERPL-SCNC: 137 MMOL/L — SIGNIFICANT CHANGE UP (ref 135–145)
SPECIMEN SOURCE: SIGNIFICANT CHANGE UP
WBC # BLD: 11.93 K/UL — HIGH (ref 3.8–10.5)
WBC # FLD AUTO: 11.93 K/UL — HIGH (ref 3.8–10.5)

## 2017-11-04 PROCEDURE — 99233 SBSQ HOSP IP/OBS HIGH 50: CPT

## 2017-11-04 RX ORDER — ACETAMINOPHEN 500 MG
1000 TABLET ORAL ONCE
Qty: 0 | Refills: 0 | Status: COMPLETED | OUTPATIENT
Start: 2017-11-05 | End: 2017-11-05

## 2017-11-04 RX ORDER — KETOROLAC TROMETHAMINE 30 MG/ML
30 SYRINGE (ML) INJECTION ONCE
Qty: 0 | Refills: 0 | Status: DISCONTINUED | OUTPATIENT
Start: 2017-11-04 | End: 2017-11-04

## 2017-11-04 RX ORDER — PHYTONADIONE (VIT K1) 5 MG
10 TABLET ORAL ONCE
Qty: 0 | Refills: 0 | Status: COMPLETED | OUTPATIENT
Start: 2017-11-04 | End: 2017-11-04

## 2017-11-04 RX ORDER — FENTANYL CITRATE 50 UG/ML
1 INJECTION INTRAVENOUS
Qty: 0 | Refills: 0 | Status: DISCONTINUED | OUTPATIENT
Start: 2017-11-04 | End: 2017-11-06

## 2017-11-04 RX ORDER — METOCLOPRAMIDE HCL 10 MG
10 TABLET ORAL ONCE
Qty: 0 | Refills: 0 | Status: COMPLETED | OUTPATIENT
Start: 2017-11-04 | End: 2017-11-04

## 2017-11-04 RX ORDER — ACETAMINOPHEN 500 MG
1000 TABLET ORAL ONCE
Qty: 0 | Refills: 0 | Status: COMPLETED | OUTPATIENT
Start: 2017-11-04 | End: 2017-11-04

## 2017-11-04 RX ORDER — DEXTROSE MONOHYDRATE, SODIUM CHLORIDE, AND POTASSIUM CHLORIDE 50; .745; 4.5 G/1000ML; G/1000ML; G/1000ML
1000 INJECTION, SOLUTION INTRAVENOUS
Qty: 0 | Refills: 0 | Status: DISCONTINUED | OUTPATIENT
Start: 2017-11-04 | End: 2017-11-12

## 2017-11-04 RX ORDER — ONDANSETRON 8 MG/1
4 TABLET, FILM COATED ORAL EVERY 4 HOURS
Qty: 0 | Refills: 0 | Status: DISCONTINUED | OUTPATIENT
Start: 2017-11-04 | End: 2017-11-06

## 2017-11-04 RX ORDER — HYDROMORPHONE HYDROCHLORIDE 2 MG/ML
30 INJECTION INTRAMUSCULAR; INTRAVENOUS; SUBCUTANEOUS
Qty: 0 | Refills: 0 | Status: DISCONTINUED | OUTPATIENT
Start: 2017-11-04 | End: 2017-11-08

## 2017-11-04 RX ADMIN — Medication 1000 MILLIGRAM(S): at 03:41

## 2017-11-04 RX ADMIN — HYDROMORPHONE HYDROCHLORIDE 1 MILLIGRAM(S): 2 INJECTION INTRAMUSCULAR; INTRAVENOUS; SUBCUTANEOUS at 04:38

## 2017-11-04 RX ADMIN — Medication 25 MILLIGRAM(S): at 05:04

## 2017-11-04 RX ADMIN — HYDROMORPHONE HYDROCHLORIDE 30 MILLILITER(S): 2 INJECTION INTRAMUSCULAR; INTRAVENOUS; SUBCUTANEOUS at 19:08

## 2017-11-04 RX ADMIN — HYDROMORPHONE HYDROCHLORIDE 30 MILLILITER(S): 2 INJECTION INTRAMUSCULAR; INTRAVENOUS; SUBCUTANEOUS at 05:47

## 2017-11-04 RX ADMIN — HYDROMORPHONE HYDROCHLORIDE 1 MILLIGRAM(S): 2 INJECTION INTRAMUSCULAR; INTRAVENOUS; SUBCUTANEOUS at 07:28

## 2017-11-04 RX ADMIN — Medication 400 MILLIGRAM(S): at 15:08

## 2017-11-04 RX ADMIN — HYDROMORPHONE HYDROCHLORIDE 1 MILLIGRAM(S): 2 INJECTION INTRAMUSCULAR; INTRAVENOUS; SUBCUTANEOUS at 01:04

## 2017-11-04 RX ADMIN — Medication 30 MILLIGRAM(S): at 16:07

## 2017-11-04 RX ADMIN — Medication 30 MILLIGRAM(S): at 16:04

## 2017-11-04 RX ADMIN — Medication 1000 MILLIGRAM(S): at 15:09

## 2017-11-04 RX ADMIN — Medication 400 MILLIGRAM(S): at 09:00

## 2017-11-04 RX ADMIN — Medication 200 MILLIGRAM(S): at 17:07

## 2017-11-04 RX ADMIN — Medication 400 MILLIGRAM(S): at 21:10

## 2017-11-04 RX ADMIN — Medication 10 MILLIGRAM(S): at 23:18

## 2017-11-04 RX ADMIN — Medication 25 MILLIGRAM(S): at 11:30

## 2017-11-04 RX ADMIN — FENTANYL CITRATE 1 PATCH: 50 INJECTION INTRAVENOUS at 13:05

## 2017-11-04 RX ADMIN — HYDROMORPHONE HYDROCHLORIDE 30 MILLILITER(S): 2 INJECTION INTRAMUSCULAR; INTRAVENOUS; SUBCUTANEOUS at 13:13

## 2017-11-04 RX ADMIN — Medication 200 MILLIGRAM(S): at 05:00

## 2017-11-04 RX ADMIN — Medication 1000 MILLIGRAM(S): at 21:40

## 2017-11-04 RX ADMIN — ONDANSETRON 4 MILLIGRAM(S): 8 TABLET, FILM COATED ORAL at 19:50

## 2017-11-04 RX ADMIN — ONDANSETRON 4 MILLIGRAM(S): 8 TABLET, FILM COATED ORAL at 16:01

## 2017-11-04 RX ADMIN — ENOXAPARIN SODIUM 40 MILLIGRAM(S): 100 INJECTION SUBCUTANEOUS at 11:30

## 2017-11-04 RX ADMIN — Medication 102 MILLIGRAM(S): at 21:40

## 2017-11-04 RX ADMIN — PANTOPRAZOLE SODIUM 40 MILLIGRAM(S): 20 TABLET, DELAYED RELEASE ORAL at 11:30

## 2017-11-04 RX ADMIN — Medication 1000 MILLIGRAM(S): at 09:00

## 2017-11-04 RX ADMIN — Medication 400 MILLIGRAM(S): at 03:21

## 2017-11-04 RX ADMIN — HYDROMORPHONE HYDROCHLORIDE 30 MILLILITER(S): 2 INJECTION INTRAMUSCULAR; INTRAVENOUS; SUBCUTANEOUS at 07:04

## 2017-11-04 NOTE — PROGRESS NOTE ADULT - ATTENDING COMMENTS
seen and examined 11/4/2017 @ 0910    epigastric abd pain and vomiting has resolved    mild epigastric tenderness  remains afebrile  WBC = 12    ascending cholangitis s/p ERCP / stent on 10/17    acute necrotic collections w/ E faecalis infection  -continue ciprofloxacin    malnutrition  -attempted post-pyloric tube feeds today, but vomiting so will need NJ tube by GI    I still think it is a bit early for intervention, but despite feeling better, she is still not tolerating feeding, so will likely require drainage or VARD eventually. seen and examined 11/4/2017 @ 0910    epigastric abd pain and vomiting has resolved    mild epigastric tenderness  remains afebrile  WBC = 12    ascending cholangitis s/p ERCP / stent on 10/17    acute necrotic collections w/ E faecalis infection  -continue ciprofloxacin    malnutrition  -attempted post-pyloric tube feeds today, but vomiting so will need NJ tube by GI  -elevated INR from Vit K deficiency - give Vit K  -will get PICC and plan to start TPN    I still think it is a bit early for intervention, but despite feeling better, she is still not tolerating feeding, so will likely require drainage or VARD eventually.

## 2017-11-04 NOTE — PROGRESS NOTE ADULT - SUBJECTIVE AND OBJECTIVE BOX
Day _18/17_ of Anesthesia Pain Management Service    SUBJECTIVE:    Pain Scale Score	At rest: ___ 	With Activity: ___ 	[ x] Refer to charted pain scores    THERAPY:    [ ] IV PCA Morphine		[ ] 5 mg/mL	[ ] 1 mg/mL  [x ] IV PCA Hydromorphone	[ ] 5 mg/mL	[x ] 1 mg/mL  [ ] IV PCA Fentanyl		[ ] 50 micrograms/mL    Demand dose 0.2mg    lockout  6  (minutes) 0Continuous Rate          MEDICATIONS  (STANDING):  ciprofloxacin   IVPB      dextrose 5% + sodium chloride 0.45% with potassium chloride 20 mEq/L 1000 milliLiter(s) (75 mL/Hr) IV Continuous <Continuous>  enoxaparin Injectable 40 milliGRAM(s) SubCutaneous daily  fentaNYL   Patch  25 MICROgram(s)/Hr 1 Patch Transdermal every 72 hours  HYDROmorphone PCA (1 mG/mL) 30 milliLiter(s) PCA Continuous PCA Continuous  pantoprazole  Injectable 40 milliGRAM(s) IV Push daily    MEDICATIONS  (PRN):  diphenhydrAMINE   Injectable 25 milliGRAM(s) IV Push every 6 hours PRN Rash and/or Itching  HYDROmorphone PCA (1 mG/mL) Rescue Clinician Bolus 1 milliGRAM(s) IV Push every 15 minutes PRN for Pain Scale GREATER THAN 6      OBJECTIVE:    Sedation Score:	[x ] Alert	[ ] Drowsy 	[ ] Arousable	[ ] Asleep	[ ] Unresponsive    Side Effects:	[ x] None	[ ] Nausea	[ ] Vomiting	[ ] Pruritus  		[ ] Other:    Vital Signs Last 24 Hrs  T(C): 37.2 (04 Nov 2017 10:00), Max: 37.2 (03 Nov 2017 14:17)  T(F): 99 (04 Nov 2017 10:00), Max: 99 (04 Nov 2017 10:00)  HR: 92 (04 Nov 2017 10:00) (75 - 92)  BP: 107/69 (04 Nov 2017 10:00) (102/65 - 111/69)  BP(mean): --  RR: 18 (04 Nov 2017 10:00) (18 - 20)  SpO2: 94% (04 Nov 2017 10:00) (93% - 96%)    ASSESSMENT/ PLAN    Therapy to  be:	[x ] Continue   [ ] Discontinued   [ ] Change to prn Analgesics    Documentation and Verification of current medications:   [X] Done	[ ] Not done, not elligible    Comments: I was physically present for the key portionjs of the evaluation and management service provided. I agree with the above history, physical, and plan which I have reviewed and edited where appropriate

## 2017-11-04 NOTE — PROGRESS NOTE ADULT - ASSESSMENT
ASSESSMENT: Elba Coleman is a 57 y.o. woman with history of nephrolithiasis, HTN, and appendectomy admitted on 10/16 for gallstone pancreatitis c/b necrotizing pancreatitis and ascending cholangitis s/p ERCP and stent placement (10/21) and FNA of perihepatic fluid (10/31), which grew E. faecalis. Continued pain, but improved this morning. No vomiting overnight.      PLAN:   - Tube feeds; will attempt enteral feeding again    - Pain management  - Change antibiotic to cipro based on FNA culture sensitivity

## 2017-11-04 NOTE — PROGRESS NOTE ADULT - SUBJECTIVE AND OBJECTIVE BOX
SUBJECTIVE:  Improved pain and no nausea or vomiting overnight.     OBJECTIVE:   03 Nov 2017 07:01  -  04 Nov 2017 07:00  --------------------------------------------------------  IN:    dextrose 5% + sodium chloride 0.45% with potassium chloride 20 mEq/L: 1800 mL    Solution: 400 mL    Solution: 400 mL  Total IN: 2600 mL    OUT:    Voided: 1800 mL  Total OUT: 1800 mL    Total NET: 800 mL      04 Nov 2017 08:01  -  04 Nov 2017 22:34  --------------------------------------------------------  IN:    dextrose 5% + sodium chloride 0.45% with potassium chloride 20 mEq/L: 675 mL    dextrose 5% + sodium chloride 0.45% with potassium chloride 20 mEq/L: 250 mL    Solution: 200 mL    Solution: 300 mL    Solution: 50 mL  Total IN: 1475 mL    OUT:    Voided: 1450 mL  Total OUT: 1450 mL    Total NET: 25 mL          ** PHYSICAL EXAM **    -- CONSTITUTIONAL: AOx3. NAD.   -- HEENT: NJ  -- CARDIOVASCULAR: normotensive, regular rate   -- RESPIRATORY: breathing comfortably   -- ABDOMEN: soft, nondistended, mild epigastric and LUQ tenderness without rebound or guarding    ** LABS **                 8.5    11.93  )----------(  1286      ( 04 Nov 2017 08:50 )               26.1      137    |  97     |  6      ----------------------------<  88         ( 04 Nov 2017 08:37 )  4.5     |  25     |  0.53     Ca    8.9        ( 04 Nov 2017 08:37 )  Phos  4.1       ( 04 Nov 2017 08:37 )  Mg     1.8       ( 04 Nov 2017 08:37 )      PT/INR -  21.7 sec / 1.90 ratio   ( 04 Nov 2017 08:44 )       PTT -  40.7 sec   ( 04 Nov 2017 08:44 )  CAPILLARY BLOOD GLUCOSE

## 2017-11-05 DIAGNOSIS — K81.9 CHOLECYSTITIS, UNSPECIFIED: ICD-10-CM

## 2017-11-05 LAB
ALBUMIN SERPL ELPH-MCNC: 2.4 G/DL — LOW (ref 3.3–5)
ALP SERPL-CCNC: 124 U/L — HIGH (ref 40–120)
ALT FLD-CCNC: <4 U/L — LOW (ref 10–45)
AMYLASE P1 CFR SERPL: 219 U/L — HIGH (ref 25–125)
ANION GAP SERPL CALC-SCNC: 20 MMOL/L — HIGH (ref 5–17)
APTT BLD: 34.4 SEC — SIGNIFICANT CHANGE UP (ref 27.5–37.4)
AST SERPL-CCNC: 25 U/L — SIGNIFICANT CHANGE UP (ref 10–40)
BILIRUB DIRECT SERPL-MCNC: 0.1 MG/DL — SIGNIFICANT CHANGE UP (ref 0–0.2)
BILIRUB INDIRECT FLD-MCNC: SIGNIFICANT CHANGE UP (ref 0.2–1)
BILIRUB SERPL-MCNC: <0.2 MG/DL — SIGNIFICANT CHANGE UP (ref 0.2–1.2)
BUN SERPL-MCNC: 6 MG/DL — LOW (ref 7–23)
CALCIUM SERPL-MCNC: 9.1 MG/DL — SIGNIFICANT CHANGE UP (ref 8.4–10.5)
CHLORIDE SERPL-SCNC: 97 MMOL/L — SIGNIFICANT CHANGE UP (ref 96–108)
CO2 SERPL-SCNC: 18 MMOL/L — LOW (ref 22–31)
CREAT SERPL-MCNC: 0.47 MG/DL — LOW (ref 0.5–1.3)
GLUCOSE SERPL-MCNC: 69 MG/DL — LOW (ref 70–99)
HCT VFR BLD CALC: 26.3 % — LOW (ref 34.5–45)
HGB BLD-MCNC: 8.5 G/DL — LOW (ref 11.5–15.5)
INR BLD: 1.34 RATIO — HIGH (ref 0.88–1.16)
LIDOCAIN IGE QN: 119 U/L — HIGH (ref 7–60)
MAGNESIUM SERPL-MCNC: 1.9 MG/DL — SIGNIFICANT CHANGE UP (ref 1.6–2.6)
MCHC RBC-ENTMCNC: 28.1 PG — SIGNIFICANT CHANGE UP (ref 27–34)
MCHC RBC-ENTMCNC: 32.3 GM/DL — SIGNIFICANT CHANGE UP (ref 32–36)
MCV RBC AUTO: 86.8 FL — SIGNIFICANT CHANGE UP (ref 80–100)
PHOSPHATE SERPL-MCNC: 4.2 MG/DL — SIGNIFICANT CHANGE UP (ref 2.5–4.5)
PLATELET # BLD AUTO: 1234 K/UL — CRITICAL HIGH (ref 150–400)
POTASSIUM SERPL-MCNC: 4.4 MMOL/L — SIGNIFICANT CHANGE UP (ref 3.5–5.3)
POTASSIUM SERPL-SCNC: 4.4 MMOL/L — SIGNIFICANT CHANGE UP (ref 3.5–5.3)
PROT SERPL-MCNC: 6.3 G/DL — SIGNIFICANT CHANGE UP (ref 6–8.3)
PROTHROM AB SERPL-ACNC: 15.2 SEC — HIGH (ref 10–13.1)
RBC # BLD: 3.03 M/UL — LOW (ref 3.8–5.2)
RBC # FLD: 14.7 % — HIGH (ref 10.3–14.5)
SODIUM SERPL-SCNC: 135 MMOL/L — SIGNIFICANT CHANGE UP (ref 135–145)
WBC # BLD: 11.69 K/UL — HIGH (ref 3.8–10.5)
WBC # FLD AUTO: 11.69 K/UL — HIGH (ref 3.8–10.5)

## 2017-11-05 PROCEDURE — 99233 SBSQ HOSP IP/OBS HIGH 50: CPT

## 2017-11-05 PROCEDURE — 71010: CPT | Mod: 26

## 2017-11-05 RX ORDER — ACETAMINOPHEN 500 MG
1000 TABLET ORAL ONCE
Qty: 0 | Refills: 0 | Status: COMPLETED | OUTPATIENT
Start: 2017-11-06 | End: 2017-11-06

## 2017-11-05 RX ORDER — ACETAMINOPHEN 500 MG
1000 TABLET ORAL ONCE
Qty: 0 | Refills: 0 | Status: COMPLETED | OUTPATIENT
Start: 2017-11-05 | End: 2017-11-05

## 2017-11-05 RX ORDER — METOCLOPRAMIDE HCL 10 MG
10 TABLET ORAL ONCE
Qty: 0 | Refills: 0 | Status: COMPLETED | OUTPATIENT
Start: 2017-11-05 | End: 2017-11-07

## 2017-11-05 RX ADMIN — HYDROMORPHONE HYDROCHLORIDE 30 MILLILITER(S): 2 INJECTION INTRAMUSCULAR; INTRAVENOUS; SUBCUTANEOUS at 18:58

## 2017-11-05 RX ADMIN — Medication 400 MILLIGRAM(S): at 08:20

## 2017-11-05 RX ADMIN — HYDROMORPHONE HYDROCHLORIDE 1 MILLIGRAM(S): 2 INJECTION INTRAMUSCULAR; INTRAVENOUS; SUBCUTANEOUS at 23:43

## 2017-11-05 RX ADMIN — Medication 25 MILLIGRAM(S): at 14:44

## 2017-11-05 RX ADMIN — Medication 200 MILLIGRAM(S): at 17:09

## 2017-11-05 RX ADMIN — Medication 1000 MILLIGRAM(S): at 08:50

## 2017-11-05 RX ADMIN — HYDROMORPHONE HYDROCHLORIDE 1 MILLIGRAM(S): 2 INJECTION INTRAMUSCULAR; INTRAVENOUS; SUBCUTANEOUS at 18:57

## 2017-11-05 RX ADMIN — HYDROMORPHONE HYDROCHLORIDE 30 MILLILITER(S): 2 INJECTION INTRAMUSCULAR; INTRAVENOUS; SUBCUTANEOUS at 07:17

## 2017-11-05 RX ADMIN — HYDROMORPHONE HYDROCHLORIDE 30 MILLILITER(S): 2 INJECTION INTRAMUSCULAR; INTRAVENOUS; SUBCUTANEOUS at 00:24

## 2017-11-05 RX ADMIN — Medication 1000 MILLIGRAM(S): at 23:03

## 2017-11-05 RX ADMIN — Medication 1000 MILLIGRAM(S): at 03:33

## 2017-11-05 RX ADMIN — Medication 25 MILLIGRAM(S): at 08:20

## 2017-11-05 RX ADMIN — Medication 25 MILLIGRAM(S): at 21:16

## 2017-11-05 RX ADMIN — PANTOPRAZOLE SODIUM 40 MILLIGRAM(S): 20 TABLET, DELAYED RELEASE ORAL at 12:30

## 2017-11-05 RX ADMIN — Medication 400 MILLIGRAM(S): at 22:48

## 2017-11-05 RX ADMIN — Medication 200 MILLIGRAM(S): at 05:07

## 2017-11-05 RX ADMIN — HYDROMORPHONE HYDROCHLORIDE 30 MILLILITER(S): 2 INJECTION INTRAMUSCULAR; INTRAVENOUS; SUBCUTANEOUS at 15:59

## 2017-11-05 RX ADMIN — Medication 400 MILLIGRAM(S): at 03:03

## 2017-11-05 NOTE — CONSULT NOTE ADULT - SUBJECTIVE AND OBJECTIVE BOX
Patient is a 57y Female admitted for Cholecystitis  with pancreatitis associated with pancreatitic necrosis  Multiple attempts have been made to TF patient but she has been intolerant of TF with recurrent vomiting      Vital Signs Last 24 Hrs  T(C): 36.9 (05 Nov 2017 10:00), Max: 37.3 (04 Nov 2017 21:26)  T(F): 98.5 (05 Nov 2017 10:00), Max: 99.2 (04 Nov 2017 21:26)  HR: 78 (05 Nov 2017 10:00) (74 - 94)  BP: 106/70 (05 Nov 2017 10:00) (101/67 - 116/79)  BP(mean): --  RR: 18 (05 Nov 2017 10:00) (18 - 18)  SpO2: 94% (05 Nov 2017 10:00) (93% - 98%)  11-04    137  |  97  |  6<L>  ----------------------------<  88  4.5   |  25  |  0.53    Ca    8.9      04 Nov 2017 08:37  Phos  4.1     11-04  Mg     1.8     11-04      PT/INR - ( 05 Nov 2017 08:32 )   PT: 15.2 sec;   INR: 1.34 ratio         PTT - ( 05 Nov 2017 08:32 )  PTT:34.4 sec

## 2017-11-05 NOTE — PROGRESS NOTE ADULT - ATTENDING COMMENTS
epigastric abd pain resolved  but vomited again with post-pyloric tube feeds yesterday    mild epigastric tenderness  remains afebrile  WBC = 12    ascending cholangitis s/p ERCP / stent on 10/17    acute necrotic collections w/ E faecalis infection  -continue ciprofloxacin    malnutrition  -elevated INR from Vit K deficiency resolved with vit K  -will get PICC and plan to start TPN    I still think it is a bit early for intervention, but despite feeling better, she is still not tolerating feeding, so will likely require drainage or VARD eventually.

## 2017-11-05 NOTE — CONSULT NOTE ADULT - ASSESSMENT
Pt is an appropriate candidate for PN since she has failed repeated attempts at TF  Risks & benefits of PN discussed with pt and spouse who agree to PICC placement and PN

## 2017-11-05 NOTE — PROGRESS NOTE ADULT - SUBJECTIVE AND OBJECTIVE BOX
Day ___ of Anesthesia Pain Management Service    SUBJECTIVE:    Pain Scale Score	At rest: ___ 	With Activity: ___ 	[ x] Refer to charted pain scores    THERAPY:    [ ] IV PCA Morphine		[ ] 5 mg/mL	[ ] 1 mg/mL  [ x] IV PCA Hydromorphone	[ ] 5 mg/mL	[ ] 1 mg/mL  [ ] IV PCA Fentanyl		[ ] 50 micrograms/mL    Demand dose    lockout    (minutes) Continuous Rate    Total:     Daily      MEDICATIONS  (STANDING):  ciprofloxacin   IVPB      dextrose 5% + sodium chloride 0.45% with potassium chloride 20 mEq/L 1000 milliLiter(s) (125 mL/Hr) IV Continuous <Continuous>  enoxaparin Injectable 40 milliGRAM(s) SubCutaneous daily  fentaNYL   Patch  25 MICROgram(s)/Hr. 1 Patch Transdermal every 72 hours  HYDROmorphone PCA (1 mG/mL) 30 milliLiter(s) PCA Continuous PCA Continuous  pantoprazole  Injectable 40 milliGRAM(s) IV Push daily    MEDICATIONS  (PRN):  diphenhydrAMINE   Injectable 25 milliGRAM(s) IV Push every 6 hours PRN Rash and/or Itching  HYDROmorphone PCA (1 mG/mL) Rescue Clinician Bolus 1 milliGRAM(s) IV Push every 15 minutes PRN for Pain Scale GREATER THAN 6  metoclopramide Injectable 10 milliGRAM(s) IV Push once PRN Nausea  ondansetron Injectable 4 milliGRAM(s) IV Push every 4 hours PRN Nausea      OBJECTIVE:    Sedation Score:	[ x] Alert	[ ] Drowsy 	[ ] Arousable	[ ] Asleep	[ ] Unresponsive    Side Effects:	[ x] None	[ ] Nausea	[ ] Vomiting	[ ] Pruritus  		[ ] Other:    Vital Signs Last 24 Hrs  T(C): 37 (05 Nov 2017 05:26), Max: 37.3 (04 Nov 2017 21:26)  T(F): 98.6 (05 Nov 2017 05:26), Max: 99.2 (04 Nov 2017 21:26)  HR: 78 (05 Nov 2017 05:26) (74 - 94)  BP: 114/74 (05 Nov 2017 05:26) (101/67 - 116/79)  BP(mean): --  RR: 18 (05 Nov 2017 05:26) (18 - 18)  SpO2: 93% (05 Nov 2017 05:26) (93% - 98%)    ASSESSMENT/ PLAN    Therapy to  be:	[x] Continue   [ ] Discontinued   [ ] Change to prn Analgesics    Documentation and Verification of current medications:   [X] Done	[ ] Not done, not elligible    Comments:

## 2017-11-05 NOTE — PROGRESS NOTE ADULT - SUBJECTIVE AND OBJECTIVE BOX
Surgery Trauma Team Progress Note    SUBJECTIVE:   Patient was seen and examined this morning. There was no acute event overnight. She was fed through Tiger tube, but she became nauseous and vomited. She reported mild epigastric pain. She does not report pain, fever, n/v, chest pain, or shortness of breath. She recieved a PICC line this morning for TPN    OBJECTIVE:   T(C): 36.9 (11-05-17 @ 10:00), Max: 37.3 (11-04-17 @ 21:26)  HR: 78 (11-05-17 @ 10:00) (74 - 94)  BP: 106/70 (11-05-17 @ 10:00) (101/67 - 116/79)  RR: 18 (11-05-17 @ 10:00) (18 - 18)  SpO2: 94% (11-05-17 @ 10:00) (93% - 98%)  Wt(kg): --  CAPILLARY BLOOD GLUCOSE        I&O's Detail    04 Nov 2017 08:01  -  05 Nov 2017 07:00  --------------------------------------------------------  IN:    dextrose 5% + sodium chloride 0.45% with potassium chloride 20 mEq/L: 675 mL    dextrose 5% + sodium chloride 0.45% with potassium chloride 20 mEq/L: 1875 mL    Solution: 300 mL    Solution: 400 mL    Solution: 50 mL  Total IN: 3300 mL    OUT:    Voided: 2400 mL  Total OUT: 2400 mL    Total NET: 900 mL      05 Nov 2017 07:01  -  05 Nov 2017 14:03  --------------------------------------------------------  IN:    Solution: 100 mL  Total IN: 100 mL    OUT:    Voided: 500 mL  Total OUT: 500 mL    Total NET: -400 mL        PHYSICAL EXAM:  Gen: Well-nourished, well-developed, A&O x3, resting in bed in no acute distress with PICC line and tiger tube  CV: RRR  Resp: Patent airways, unlabored   Abdomen: Soft, LUQ and epigastric pain and tenderness, nondistended  Extremities: All 4 extremities warm and well perfused, no edema

## 2017-11-05 NOTE — PROGRESS NOTE ADULT - ASSESSMENT
Ms. Coleman is a 57 y.o. woman with history of nephrolithiasis, HTN, and appendectomy admitted on 10/16 for gallstone pancreatitis c/b necrotizing pancreatitis and ascending cholangitis s/p ERCP and stent placement (10/21) and FNA of perihepatic fluid (10/31), which grew E. faecalis. Continued pain, but improved this morning. She was not able to tolerate tube feeding and is planned for TPN. She is hemodynamically stable. lab is significant for leukocytosis    -Leukocytosis: most likely 2/2 acute necrotic collections w/ E faecalis infection, is afebrile, will continue with cipro and trend temperature curve and WBC  - necrotic pancreatitis: early for intervention but possibly need drainage later  - DVT ppx: Lovenox   - malnutrition is not able to tolerate TF  will start TPN today, INR is normalized with vitamin K  - Pain control: PVA, fentanyl patch Ms. Coleman is a 57 y.o. woman with history of nephrolithiasis, HTN, and appendectomy admitted on 10/16 for gallstone pancreatitis c/b necrotizing pancreatitis and ascending cholangitis s/p ERCP and stent placement (10/21) and FNA of perihepatic fluid (10/31), which grew E. faecalis. Continued pain, but improved this morning. She was not able to tolerate tube feeding and is planned for TPN. She is hemodynamically stable. lab is significant for leukocytosis    -Leukocytosis: most likely 2/2 acute necrotic collections w/ E faecalis infection, is afebrile, will continue with cipro and trend temperature curve and WBC  - necrotic pancreatitis: early for intervention but possibly need drainage later  - DVT ppx: Lovenox   - malnutrition is not able to tolerate TF  will start TPN at 55 cc/hr today, INR is normalized with vitamin K  - Pain control: PVA, fentanyl patch

## 2017-11-06 LAB
ALBUMIN SERPL ELPH-MCNC: 2.2 G/DL — LOW (ref 3.3–5)
ALP SERPL-CCNC: 116 U/L — SIGNIFICANT CHANGE UP (ref 40–120)
ALT FLD-CCNC: <4 U/L — LOW (ref 10–45)
ANION GAP SERPL CALC-SCNC: 13 MMOL/L — SIGNIFICANT CHANGE UP (ref 5–17)
AST SERPL-CCNC: 22 U/L — SIGNIFICANT CHANGE UP (ref 10–40)
BILIRUB SERPL-MCNC: 0.2 MG/DL — SIGNIFICANT CHANGE UP (ref 0.2–1.2)
BUN SERPL-MCNC: 8 MG/DL — SIGNIFICANT CHANGE UP (ref 7–23)
CALCIUM SERPL-MCNC: 8.7 MG/DL — SIGNIFICANT CHANGE UP (ref 8.4–10.5)
CHLORIDE SERPL-SCNC: 100 MMOL/L — SIGNIFICANT CHANGE UP (ref 96–108)
CO2 SERPL-SCNC: 25 MMOL/L — SIGNIFICANT CHANGE UP (ref 22–31)
CREAT SERPL-MCNC: 0.38 MG/DL — LOW (ref 0.5–1.3)
GLUCOSE SERPL-MCNC: 105 MG/DL — HIGH (ref 70–99)
HCT VFR BLD CALC: 25.2 % — LOW (ref 34.5–45)
HGB BLD-MCNC: 8.1 G/DL — LOW (ref 11.5–15.5)
MCHC RBC-ENTMCNC: 27.9 PG — SIGNIFICANT CHANGE UP (ref 27–34)
MCHC RBC-ENTMCNC: 32.1 GM/DL — SIGNIFICANT CHANGE UP (ref 32–36)
MCV RBC AUTO: 86.9 FL — SIGNIFICANT CHANGE UP (ref 80–100)
PLATELET # BLD AUTO: 1067 K/UL — CRITICAL HIGH (ref 150–400)
POTASSIUM SERPL-MCNC: 4.6 MMOL/L — SIGNIFICANT CHANGE UP (ref 3.5–5.3)
POTASSIUM SERPL-SCNC: 4.6 MMOL/L — SIGNIFICANT CHANGE UP (ref 3.5–5.3)
PROT SERPL-MCNC: 6.1 G/DL — SIGNIFICANT CHANGE UP (ref 6–8.3)
RBC # BLD: 2.9 M/UL — LOW (ref 3.8–5.2)
RBC # FLD: 14.9 % — HIGH (ref 10.3–14.5)
SODIUM SERPL-SCNC: 138 MMOL/L — SIGNIFICANT CHANGE UP (ref 135–145)
WBC # BLD: 10.75 K/UL — HIGH (ref 3.8–10.5)
WBC # FLD AUTO: 10.75 K/UL — HIGH (ref 3.8–10.5)

## 2017-11-06 PROCEDURE — 99232 SBSQ HOSP IP/OBS MODERATE 35: CPT | Mod: GC

## 2017-11-06 PROCEDURE — 99232 SBSQ HOSP IP/OBS MODERATE 35: CPT

## 2017-11-06 RX ORDER — ACETAMINOPHEN 500 MG
1000 TABLET ORAL ONCE
Qty: 0 | Refills: 0 | Status: COMPLETED | OUTPATIENT
Start: 2017-11-07 | End: 2017-11-07

## 2017-11-06 RX ORDER — FENTANYL CITRATE 50 UG/ML
1 INJECTION INTRAVENOUS
Qty: 0 | Refills: 0 | Status: DISCONTINUED | OUTPATIENT
Start: 2017-11-06 | End: 2017-11-08

## 2017-11-06 RX ADMIN — Medication 400 MILLIGRAM(S): at 05:06

## 2017-11-06 RX ADMIN — Medication 400 MILLIGRAM(S): at 10:07

## 2017-11-06 RX ADMIN — Medication 200 MILLIGRAM(S): at 17:04

## 2017-11-06 RX ADMIN — HYDROMORPHONE HYDROCHLORIDE 30 MILLILITER(S): 2 INJECTION INTRAMUSCULAR; INTRAVENOUS; SUBCUTANEOUS at 16:42

## 2017-11-06 RX ADMIN — Medication 25 MILLIGRAM(S): at 19:29

## 2017-11-06 RX ADMIN — Medication 400 MILLIGRAM(S): at 21:20

## 2017-11-06 RX ADMIN — HYDROMORPHONE HYDROCHLORIDE 30 MILLILITER(S): 2 INJECTION INTRAMUSCULAR; INTRAVENOUS; SUBCUTANEOUS at 07:32

## 2017-11-06 RX ADMIN — Medication 200 MILLIGRAM(S): at 05:07

## 2017-11-06 RX ADMIN — Medication 25 MILLIGRAM(S): at 02:02

## 2017-11-06 RX ADMIN — Medication 25 MILLIGRAM(S): at 14:47

## 2017-11-06 RX ADMIN — HYDROMORPHONE HYDROCHLORIDE 1 MILLIGRAM(S): 2 INJECTION INTRAMUSCULAR; INTRAVENOUS; SUBCUTANEOUS at 03:56

## 2017-11-06 RX ADMIN — Medication 1000 MILLIGRAM(S): at 21:35

## 2017-11-06 RX ADMIN — HYDROMORPHONE HYDROCHLORIDE 1 MILLIGRAM(S): 2 INJECTION INTRAMUSCULAR; INTRAVENOUS; SUBCUTANEOUS at 16:47

## 2017-11-06 RX ADMIN — FENTANYL CITRATE 1 PATCH: 50 INJECTION INTRAVENOUS at 14:43

## 2017-11-06 RX ADMIN — HYDROMORPHONE HYDROCHLORIDE 1 MILLIGRAM(S): 2 INJECTION INTRAMUSCULAR; INTRAVENOUS; SUBCUTANEOUS at 12:36

## 2017-11-06 RX ADMIN — HYDROMORPHONE HYDROCHLORIDE 1 MILLIGRAM(S): 2 INJECTION INTRAMUSCULAR; INTRAVENOUS; SUBCUTANEOUS at 22:23

## 2017-11-06 RX ADMIN — HYDROMORPHONE HYDROCHLORIDE 30 MILLILITER(S): 2 INJECTION INTRAMUSCULAR; INTRAVENOUS; SUBCUTANEOUS at 19:03

## 2017-11-06 RX ADMIN — HYDROMORPHONE HYDROCHLORIDE 30 MILLILITER(S): 2 INJECTION INTRAMUSCULAR; INTRAVENOUS; SUBCUTANEOUS at 03:56

## 2017-11-06 RX ADMIN — ENOXAPARIN SODIUM 40 MILLIGRAM(S): 100 INJECTION SUBCUTANEOUS at 11:44

## 2017-11-06 RX ADMIN — Medication 1000 MILLIGRAM(S): at 17:21

## 2017-11-06 RX ADMIN — Medication 400 MILLIGRAM(S): at 16:35

## 2017-11-06 RX ADMIN — PANTOPRAZOLE SODIUM 40 MILLIGRAM(S): 20 TABLET, DELAYED RELEASE ORAL at 11:43

## 2017-11-06 RX ADMIN — Medication 25 MILLIGRAM(S): at 08:26

## 2017-11-06 RX ADMIN — Medication 1000 MILLIGRAM(S): at 05:20

## 2017-11-06 RX ADMIN — FENTANYL CITRATE 1 PATCH: 50 INJECTION INTRAVENOUS at 14:48

## 2017-11-06 RX ADMIN — DEXTROSE MONOHYDRATE, SODIUM CHLORIDE, AND POTASSIUM CHLORIDE 125 MILLILITER(S): 50; .745; 4.5 INJECTION, SOLUTION INTRAVENOUS at 14:55

## 2017-11-06 NOTE — PROGRESS NOTE ADULT - SUBJECTIVE AND OBJECTIVE BOX
SUBJECTIVE:  - Pt continues to have epigastric pain  - No fevers over the past 24 hours  - (+) nausea and spitting up still  - Patient with PICC line for TPN  - patient on abx with ciprofloxacin for infected pancreatic necrosis    OBJECTIVE:    MEDICATIONS  (STANDING):  ciprofloxacin   IVPB      dextrose 5% + sodium chloride 0.45% with potassium chloride 20 mEq/L 1000 milliLiter(s) (125 mL/Hr) IV Continuous <Continuous>  enoxaparin Injectable 40 milliGRAM(s) SubCutaneous daily  fentaNYL   Patch  25 MICROgram(s)/Hr. 1 Patch Transdermal every 72 hours  HYDROmorphone PCA (1 mG/mL) 30 milliLiter(s) PCA Continuous PCA Continuous  pantoprazole  Injectable 40 milliGRAM(s) IV Push daily      Vital Signs Last 24 Hrs  T(C): 36.7 (06 Nov 2017 13:03), Max: 37.3 (06 Nov 2017 06:05)  T(F): 98 (06 Nov 2017 13:03), Max: 99.2 (06 Nov 2017 06:05)  HR: 87 (06 Nov 2017 13:03) (70 - 87)  BP: 111/72 (06 Nov 2017 13:03) (105/70 - 122/75)  BP(mean): --  RR: 17 (06 Nov 2017 13:03) (17 - 18)  SpO2: 92% (06 Nov 2017 13:03) (92% - 94%)    PHYSICAL EXAM:  Constitutional: no acute distress; NGT in place  Eyes: no icterus  Neck: no masses, no LAD  Respiratory: normal inspiratory effort; no wheezing or crackles  Cardiovascular: RRR, normal S1/S2, no murmurs/rubs/gallops  Gastrointestinal: soft, nondistended, diffusely tender  Extremities: no LE edema  Neurological: AAOx3, no asterixis  Skin: no rashes, bruises, petechiae    LABS:                                              8.1    10.75 )-----------( 1067     ( 06 Nov 2017 08:32 )             25.2     11-06    138  |  100  |  8   ----------------------------<  105<H>  4.6   |  25  |  0.38<L>    Ca    8.7      06 Nov 2017 08:33  Phos  4.2     11-05  Mg     1.9     11-05    TPro  6.1  /  Alb  2.2<L>  /  TBili  0.2  /  DBili  x   /  AST  22  /  ALT  <4<L>  /  AlkPhos  116  11-06        < from: CT Abdomen and Pelvis w/ IV Cont (10.28.17 @ 15:44) >  PANCREAS: Diffuse hypoenhancement of the body of the pancreas extending   to the tail. There is peripancreatic inflammation and fluid. Centered   within the lesser sac is a 8.8 x 4.2 cm collection (series 2, image 40).   There is an additional another collection adjacent to the uncinate   process and pancreatic head that measure 4.2 x 3.1 cm (series 2, image   60).    There is fluid in the right and left anterior pararenal space. The fluid   along the left pararenal space is partially loculated.     IMPRESSION:   1.  The findings are compatible with necrotizing pancreatitis.   2.  The collection likely represent pseudocysts and measure 8.8 x 4.2 cm   collection (series 2, image 40) and 4.2 x 3.1 cm (series 2, image 60).  3.  Unchanged segment thrombus of the splenic vein.  4.  Small left pleural effusions with bibasilar subsegmental atelectasis.    < end of copied text >

## 2017-11-06 NOTE — PROGRESS NOTE ADULT - ASSESSMENT
Ms. Coleman is a 57 y.o. woman with history of nephrolithiasis, HTN, and appendectomy admitted on 10/16 for gallstone pancreatitis c/b necrotizing pancreatitis and ascending cholangitis s/p ERCP and stent placement (10/21) and FNA of perihepatic fluid (10/31), which grew E. faecalis. Continued pain, but improved this morning. She was not able to tolerate tube feeding and is planned for TPN. She is hemodynamically stable.      -Leukocytosis: most likely 2/2 acute necrotic collections w/ E faecalis infection, is afebrile, will continue with cipro and trend temperature curve and WBC  - necrotic pancreatitis: early for intervention but possibly need drainage later  - DVT ppx: Lovenox   - malnutrition: started on TPN at 55 cc/hr today  - Pain control: PCA, fentanyl patch, Tylenol IV Ms. Coleman is a 57 y.o. woman with history of nephrolithiasis, HTN, and appendectomy admitted on 10/16 for gallstone pancreatitis c/b necrotizing pancreatitis and ascending cholangitis s/p ERCP and stent placement (10/21) and FNA of perihepatic fluid (10/31), which grew E. faecalis. Continued pain, but improved this morning. She was not able to tolerate tube feeding and is planned for TPN. She is hemodynamically stable. lab is significant for leukocytosis ( WBC: 11.7 ->10.7)      -Leukocytosis: most likely 2/2 acute necrotic collections w/ E faecalis infection, is afebrile, will continue with cipro and trend temperature curve and WBC  - necrotic pancreatitis: early for intervention but possibly need drainage later  - DVT ppx: Lovenox   - malnutrition: started on TPN at 55 cc/hr today  - Pain control: PCA, fentanyl patch, Tylenol IV

## 2017-11-06 NOTE — CHART NOTE - NSCHARTNOTEFT_GEN_A_CORE
Pt seen for malnutrition follow up and initiation of TPN, as per department protocol.    Hospital Course: Pt is a 56 yo woman with history of nephrolithiasis, HTN, and appendectomy admitted on 10/16 for gallstone pancreatitis c/b necrotizing pancreatitis and ascending cholangitis S/P ERCP and stent placement (10/21) and FNA of perihepatic fluid (10/31), which grew E. faecalis.     Pt admitted 10/16, received intermittent po diet of low fat clears and enteral feeds of Jevity 1.2 with poor tolerance (per notes, tiger tube was in duodenum during periods of poor enteral tolerance). Pt was NPO since 10/27; TPN ordered 11/5. Per Nutrition Support MD, PN will continue until GI access can be obtained and pt is tolerating enteral diet.    Source: Patient [X ]    Family [X ]     other [ X]; medical record, Unit RD    Diet : NPO except medications; TPN Day #1    Per chart, last noted BM on 10/29.      Enteral /Parenteral Nutrition: TPN infusing at 55ml/hr (84Gm amino acids, 192Gm dextrose, 57ml 20%lipids) to [id number] (16.6 pollo/Kg, 1.26 Gm protein/Kg per dosing wt 66.5Kg); with 10cc MVI 9+5, 3cc MTE-5.     Weight: 69.5Kg (10/26), 66.5Kg (10/17); weight change likely reflects fluid shifts  Edema: none noted    Pertinent Medications: MEDICATIONS  (STANDING):  ciprofloxacin   IVPB      dextrose 5% + sodium chloride 0.45% with potassium chloride 20 mEq/L 1000 milliLiter(s) (125 mL/Hr) IV Continuous <Continuous>  enoxaparin Injectable 40 milliGRAM(s) SubCutaneous daily  fentaNYL   Patch  25 MICROgram(s)/Hr. 1 Patch Transdermal every 72 hours  HYDROmorphone PCA (1 mG/mL) 30 milliLiter(s) PCA Continuous PCA Continuous  pantoprazole  Injectable 40 milliGRAM(s) IV Push daily    MEDICATIONS  (PRN):  diphenhydrAMINE   Injectable 25 milliGRAM(s) IV Push every 6 hours PRN Rash and/or Itching  HYDROmorphone PCA (1 mG/mL) Rescue Clinician Bolus 1 milliGRAM(s) IV Push every 15 minutes PRN for Pain Scale GREATER THAN 6  metoclopramide Injectable 10 milliGRAM(s) IV Push once PRN Nausea  ondansetron Injectable 4 milliGRAM(s) IV Push every 4 hours PRN Nausea    Pertinent Labs:  11-06 Na138 mmol/L Glu 105 mg/dL<H> K+ 4.6 mmol/L Cr  0.38 mg/dL<L> BUN 8 mg/dL Phos n/a   Alb 2.2 g/dL<L>   Fingersticks x 24 hours: 128-153mg/dL    Skin: no pressure injuries    Estimated Needs:   [ X] no change since previous assessment  [ ] recalculated:       Previous Nutrition Diagnosis:     [X ] Malnutrition; severe    Nutrition Diagnosis is [X ] ongoing; being addressed with PN    New Nutrition Diagnosis: [ X] not applicable     Interventions:     Recommend:  1) PN per MD Guallpa  2) Pending tiger tube replacement and placement confirmation in the jejunum: Vital 1.2, initiate at 10m, increase by 10m q6hrs to goal rate 50ml/hr x 24 hours to provide 1200 ml formula, 1440cal/day, 90 Gm protein/day, 973ml free water; meets 22cal/Kg and 1.4Gm/Kg protein per dosing wt 66.5Kg).   3) Monitor weight, lab values, skin, po intake and GI tolerance    Monitoring and Evaluation:   Follow up per protocol  RD to remain available for further nutritional interventions as indicated.   Anabela Molina, MS RD N [facility], #[phone]. Pt seen for malnutrition follow up and initiation of TPN, as per department protocol.    Hospital Course: Pt is a 58 yo woman with history of nephrolithiasis, HTN, and appendectomy admitted on 10/16 for gallstone pancreatitis c/b necrotizing pancreatitis and ascending cholangitis S/P ERCP and stent placement (10/21) and FNA of perihepatic fluid (10/31), which grew E. faecalis.     Pt admitted 10/16, received intermittent po diet of low fat clears and enteral feeds of Jevity 1.2 with poor tolerance (per notes, tiger tube was in duodenum during periods of poor enteral tolerance). Pt was NPO since 10/27; TPN ordered 11/5. Per Nutrition Support MD, PN will continue until GI access can be obtained and pt is tolerating enteral diet.    Source: Patient [X ]    Family [X ]     other [ X]; medical record, Unit RD    Diet : NPO except medications; TPN Day #1    Per chart, last noted BM on 10/29.      Enteral /Parenteral Nutrition: TPN infusing at 55ml/hr (84Gm amino acids, 192Gm dextrose, 57ml 20%lipids) to [id number] (16.6 pollo/Kg, 1.26 Gm protein/Kg per dosing wt 66.5Kg); with 10cc MVI 9+5, 3cc MTE-5.     Weight: 69.5Kg (10/26), 66.5Kg (10/17); weight change likely reflects fluid shifts  Edema: none noted    Pertinent Medications: MEDICATIONS  (STANDING):  ciprofloxacin   IVPB      dextrose 5% + sodium chloride 0.45% with potassium chloride 20 mEq/L 1000 milliLiter(s) (125 mL/Hr) IV Continuous <Continuous>  enoxaparin Injectable 40 milliGRAM(s) SubCutaneous daily  fentaNYL   Patch  25 MICROgram(s)/Hr. 1 Patch Transdermal every 72 hours  HYDROmorphone PCA (1 mG/mL) 30 milliLiter(s) PCA Continuous PCA Continuous  pantoprazole  Injectable 40 milliGRAM(s) IV Push daily    MEDICATIONS  (PRN):  diphenhydrAMINE   Injectable 25 milliGRAM(s) IV Push every 6 hours PRN Rash and/or Itching  HYDROmorphone PCA (1 mG/mL) Rescue Clinician Bolus 1 milliGRAM(s) IV Push every 15 minutes PRN for Pain Scale GREATER THAN 6  metoclopramide Injectable 10 milliGRAM(s) IV Push once PRN Nausea  ondansetron Injectable 4 milliGRAM(s) IV Push every 4 hours PRN Nausea    Pertinent Labs:  11-06 Na138 mmol/L Glu 105 mg/dL<H> K+ 4.6 mmol/L Cr  0.38 mg/dL<L> BUN 8 mg/dL Phos n/a   Alb 2.2 g/dL<L>   Fingersticks x 24 hours: 128-153mg/dL    Skin: no pressure injuries    Estimated Needs:   [ X] no change since previous assessment  [ ] recalculated:       Previous Nutrition Diagnosis:     [X ] Malnutrition; severe    Nutrition Diagnosis is [X ] ongoing; being addressed with PN    New Nutrition Diagnosis: [ X] not applicable     Interventions:     Recommend:  1) PN per MD Guallpa  2) Pending tiger tube replacement and placement confirmation in the jejunum: Vital 1.2, initiate at 10m, increase as tolerated to goal rate 50ml/hr x 24 hours to provide 1200 ml formula, 1440cal/day, 90 Gm protein/day, 973ml free water; meets 22cal/Kg and 1.4Gm/Kg protein per dosing wt 66.5Kg).   3) Monitor weight, lab values, skin, po intake and GI tolerance    Monitoring and Evaluation:   Follow up per protocol  RD to remain available for further nutritional interventions as indicated.   Anabela Molina, MS RD N [facility], #[phone].

## 2017-11-06 NOTE — PROGRESS NOTE ADULT - SUBJECTIVE AND OBJECTIVE BOX
Day # 1 of PN  11-05 @ 07:01  -  11-06 @ 07:00  --------------------------------------------------------  IN:    dextrose 5% + sodium chloride 0.45% with potassium chloride 20 mEq/L: 2800 mL    Solution: 300 mL    Solution: 200 mL    TPN (Total Parenteral Nutrition): 825 mL  Total IN: 4125 mL    OUT:    Voided: 1950 mL  Total OUT: 1950 mL    Total NET: 2175 mL        T(C): 37.1 (11-06-17 @ 09:16), Max: 37.3 (11-06-17 @ 06:05)  HR: 83 (11-06-17 @ 09:16) (70 - 85)  BP: 115/75 (11-06-17 @ 09:16) (105/70 - 122/75)  RR: 18 (11-06-17 @ 09:16) (18 - 18)  SpO2: 92% (11-06-17 @ 09:16) (92% - 94%)  Wt(kg): --    Labs   11-05    135  |  97  |  6<L>  ----------------------------<  69<L>  4.4   |  18<L>  |  0.47<L>    Ca    9.1      05 Nov 2017 11:35  Phos  4.2     11-05  Mg     1.9     11-05    TPro  6.3  /  Alb  2.4<L>  /  TBili  <0.2  /  DBili  0.1  /  AST  25  /  ALT  <4<L>  /  AlkPhos  124<H>  11-05      LIVER FUNCTIONS - ( 05 Nov 2017 11:35 )  Alb: 2.4 g/dL / Pro: 6.3 g/dL / ALK PHOS: 124 U/L / ALT: <4 U/L / AST: 25 U/L / GGT: x           CAPILLARY BLOOD GLUCOSE      POCT Blood Glucose.: 153 mg/dL (06 Nov 2017 06:04)  POCT Blood Glucose.: 128 mg/dL (06 Nov 2017 00:16)    I&O's Detail    05 Nov 2017 07:01  -  06 Nov 2017 07:00  --------------------------------------------------------  IN:    dextrose 5% + sodium chloride 0.45% with potassium chloride 20 mEq/L: 2800 mL    Solution: 300 mL    Solution: 200 mL    TPN (Total Parenteral Nutrition): 825 mL  Total IN: 4125 mL    OUT:    Voided: 1950 mL  Total OUT: 1950 mL    Total NET: 2175 mL

## 2017-11-06 NOTE — PROGRESS NOTE ADULT - ASSESSMENT
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    Impression:  1) Infected pancreatic necrosis  2) Nausea/vomiting    Plan:  - IV abx  - IV fluids with LR  - analgesia per primary team  - would repeat CT scan to assess wall of pancreatic fluid collection    - when necrosis wall is mature, plan for endoscopic drainage   - drainage may alleviate patient's pain, nausea/vomiting by relieving pressure caused by collection at this time

## 2017-11-06 NOTE — PROGRESS NOTE ADULT - SUBJECTIVE AND OBJECTIVE BOX
Day 20 of Anesthesia Pain Management Service    SUBJECTIVE: Patient is resting comfortably with  asleep aside her    Pain Scale Score:	[X] Refer to charted pain scores    THERAPY:    [ ] IV PCA Morphine		[ ] 5 mg/mL	[ ] 1 mg/mL  [X] IV PCA Hydromorphone	[ ] 5 mg/mL	[X] 1 mg/mL  [ ] IV PCA Fentanyl		[ ] 50 micrograms/mL    Demand dose: 0.5 mg     Lockout: 6 minutes   Continuous Rate: 0 mg/hr  4 Hour Limit: 11 mg    MEDICATIONS  (STANDING):  ciprofloxacin   IVPB      dextrose 5% + sodium chloride 0.45% with potassium chloride 20 mEq/L 1000 milliLiter(s) (125 mL/Hr) IV Continuous <Continuous>  enoxaparin Injectable 40 milliGRAM(s) SubCutaneous daily  fentaNYL   Patch  25 MICROgram(s)/Hr. 1 Patch Transdermal every 72 hours  HYDROmorphone PCA (1 mG/mL) 30 milliLiter(s) PCA Continuous PCA Continuous  pantoprazole  Injectable 40 milliGRAM(s) IV Push daily    MEDICATIONS  (PRN):  diphenhydrAMINE   Injectable 25 milliGRAM(s) IV Push every 6 hours PRN Rash and/or Itching  HYDROmorphone PCA (1 mG/mL) Rescue Clinician Bolus 1 milliGRAM(s) IV Push every 15 minutes PRN for Pain Scale GREATER THAN 6  metoclopramide Injectable 10 milliGRAM(s) IV Push once PRN Nausea  ondansetron Injectable 4 milliGRAM(s) IV Push every 4 hours PRN Nausea      OBJECTIVE:    Sedation Score:	[  ] Alert	[ ] Drowsy 	[ ] Arousable	[X ] Asleep	[ ] Unresponsive    Side Effects:	[X ] None	[ ] Nausea	[ ] Vomiting	[ ] Pruritus  		[ ] Other:    Vital Signs Last 24 Hrs  T(C): 37.1 (06 Nov 2017 09:16), Max: 37.3 (06 Nov 2017 06:05)  T(F): 98.7 (06 Nov 2017 09:16), Max: 99.2 (06 Nov 2017 06:05)  HR: 83 (06 Nov 2017 09:16) (70 - 85)  BP: 115/75 (06 Nov 2017 09:16) (105/70 - 122/75)  BP(mean): --  RR: 18 (06 Nov 2017 09:16) (18 - 18)  SpO2: 92% (06 Nov 2017 09:16) (92% - 94%)    ASSESSMENT/ PLAN    Therapy to  be:               [X] Continued   [ ] Discontinued   [ ] Changed to PRN Analgesics    Documentation and Verification of current medications:   [X] Done	[ ] Not done, not eligible    Comments:

## 2017-11-06 NOTE — PROGRESS NOTE ADULT - SUBJECTIVE AND OBJECTIVE BOX
Pain Management Attending Addendum    SUBJECTIVE:    Therapy:	  [x ] IV PCA	   [ ] Epidural           [ ] s/p Spinal Opoid              [ ] Postpartum infusion	  [ ] Patient controlled regional anesthesia (PCRA)    [ ] prn Analgesics    OBJECTIVE:   [ ] No new signs     [x ] Other pt c/o pain but slightly lethargic    Side Effects:  [x ] None			[ ] Other:    Assessment of Catheter Site:		[ ] Intact		[ ] Other:    ASSESSMENT/PLAN  [x ] Continue current therapy    [ ] Therapy changed to:    [ ] IV PCA       [ ] Epidural     [ ] prn Analgesics     [ ] post partum infusion    Comments: floor suggested for surg reeval.

## 2017-11-06 NOTE — PROGRESS NOTE ADULT - SUBJECTIVE AND OBJECTIVE BOX
Surgery Trauma Team Progress Note      SUBJECTIVE:   Patient was seen and examined this morning. There was no acute event overnight. She is resting comfortably in bed. Pain is well controlled. She does not report pain, fever, , chest pain, or shortness of breath. Yesterday, she received a PICC line, and is now on TPN (55 cc/hr). She does not have any episode of nausea or vomiting.       OBJECTIVE:   T(C): 37.3 (11-06-17 @ 06:05), Max: 37.3 (11-06-17 @ 06:05)  HR: 85 (11-06-17 @ 06:05) (76 - 85)  BP: 106/70 (11-06-17 @ 06:05) (105/71 - 122/75)  RR: 18 (11-06-17 @ 06:05) (18 - 18)  SpO2: 92% (11-06-17 @ 06:05) (92% - 94%)  Wt(kg): --  CAPILLARY BLOOD GLUCOSE      POCT Blood Glucose.: 153 mg/dL (06 Nov 2017 06:04)  POCT Blood Glucose.: 128 mg/dL (06 Nov 2017 00:16)    I&O's Detail    04 Nov 2017 08:01  -  05 Nov 2017 07:00  --------------------------------------------------------  IN:    dextrose 5% + sodium chloride 0.45% with potassium chloride 20 mEq/L: 675 mL    dextrose 5% + sodium chloride 0.45% with potassium chloride 20 mEq/L: 1875 mL    Solution: 50 mL    Solution: 300 mL    Solution: 400 mL  Total IN: 3300 mL    OUT:    Voided: 2400 mL  Total OUT: 2400 mL    Total NET: 900 mL      05 Nov 2017 07:01  -  06 Nov 2017 06:44  --------------------------------------------------------  IN:    dextrose 5% + sodium chloride 0.45% with potassium chloride 20 mEq/L: 1500 mL    Solution: 200 mL    TPN (Total Parenteral Nutrition): 165 mL  Total IN: 1865 mL    OUT:    Voided: 1950 mL  Total OUT: 1950 mL    Total NET: -85 mL        PHYSICAL EXAM:  Gen: Well-nourished, well-developed, A&O x3, resting in bed in no acute distress with PICC line   CV: RRR  Resp: Patent airways, unlabored   Abdomen: Soft, mild LUQ and epigastric pain and tenderness (improving compare to yesterday), nondistended  Extremities: All 4 extremities warm and well perfused, no edema Surgery Trauma Team Progress Note      SUBJECTIVE:   Patient was seen and examined this morning. There was no acute event overnight. She is resting comfortably in bed. Pain is well controlled. She does not report pain, fever, chest pain, or shortness of breath. Yesterday, she received a PICC line, and is now on TPN (55 cc/hr). She does not have any episode of nausea or vomiting.       OBJECTIVE:   T(C): 37.3 (11-06-17 @ 06:05), Max: 37.3 (11-06-17 @ 06:05)  HR: 85 (11-06-17 @ 06:05) (76 - 85)  BP: 106/70 (11-06-17 @ 06:05) (105/71 - 122/75)  RR: 18 (11-06-17 @ 06:05) (18 - 18)  SpO2: 92% (11-06-17 @ 06:05) (92% - 94%)  Wt(kg): --  CAPILLARY BLOOD GLUCOSE      POCT Blood Glucose.: 153 mg/dL (06 Nov 2017 06:04)  POCT Blood Glucose.: 128 mg/dL (06 Nov 2017 00:16)    I&O's Detail    04 Nov 2017 08:01  -  05 Nov 2017 07:00  --------------------------------------------------------  IN:    dextrose 5% + sodium chloride 0.45% with potassium chloride 20 mEq/L: 675 mL    dextrose 5% + sodium chloride 0.45% with potassium chloride 20 mEq/L: 1875 mL    Solution: 50 mL    Solution: 300 mL    Solution: 400 mL  Total IN: 3300 mL    OUT:    Voided: 2400 mL  Total OUT: 2400 mL    Total NET: 900 mL      05 Nov 2017 07:01  -  06 Nov 2017 06:44  --------------------------------------------------------  IN:    dextrose 5% + sodium chloride 0.45% with potassium chloride 20 mEq/L: 1500 mL    Solution: 200 mL    TPN (Total Parenteral Nutrition): 165 mL  Total IN: 1865 mL    OUT:    Voided: 1950 mL  Total OUT: 1950 mL    Total NET: -85 mL        PHYSICAL EXAM:  Gen: Well-nourished, well-developed, A&O x3, resting in bed in no acute distress with PICC line   CV: RRR  Resp: Patent airways, unlabored   Abdomen: Soft, mild LUQ and epigastric pain and tenderness (improving compare to yesterday), nondistended  Extremities: All 4 extremities warm and well perfused, no edema

## 2017-11-06 NOTE — PROGRESS NOTE ADULT - ASSESSMENT
PICC placed and PN started  There is a slight elevation in FSBG will monitor and adjust PN if indicated

## 2017-11-06 NOTE — PROGRESS NOTE ADULT - ATTENDING COMMENTS
Pt seen and examined today at 10am, agree with above. Pt says that overall she is feeling better, no N/V today. No fevers or chills. Abdominal pain well-controlled with pain medication. Leukocytosis has nearly resolved.    Necrotizing gallstone pancreatitis with infected peripancreatic fluid collection: on Cipro, doing better. Will obtain repeat CT this week.  Malnutrition: TPN started.

## 2017-11-07 LAB
ALBUMIN SERPL ELPH-MCNC: 2.5 G/DL — LOW (ref 3.3–5)
ALP SERPL-CCNC: 115 U/L — SIGNIFICANT CHANGE UP (ref 40–120)
ALT FLD-CCNC: <4 U/L — LOW (ref 10–45)
ANION GAP SERPL CALC-SCNC: 16 MMOL/L — SIGNIFICANT CHANGE UP (ref 5–17)
AST SERPL-CCNC: 17 U/L — SIGNIFICANT CHANGE UP (ref 10–40)
BILIRUB SERPL-MCNC: <0.2 MG/DL — SIGNIFICANT CHANGE UP (ref 0.2–1.2)
BUN SERPL-MCNC: 7 MG/DL — SIGNIFICANT CHANGE UP (ref 7–23)
CALCIUM SERPL-MCNC: 8.7 MG/DL — SIGNIFICANT CHANGE UP (ref 8.4–10.5)
CHLORIDE SERPL-SCNC: 97 MMOL/L — SIGNIFICANT CHANGE UP (ref 96–108)
CO2 SERPL-SCNC: 22 MMOL/L — SIGNIFICANT CHANGE UP (ref 22–31)
CREAT SERPL-MCNC: 0.44 MG/DL — LOW (ref 0.5–1.3)
GLUCOSE SERPL-MCNC: 130 MG/DL — HIGH (ref 70–99)
HCT VFR BLD CALC: 25.1 % — LOW (ref 34.5–45)
HGB BLD-MCNC: 8.1 G/DL — LOW (ref 11.5–15.5)
MCHC RBC-ENTMCNC: 27.9 PG — SIGNIFICANT CHANGE UP (ref 27–34)
MCHC RBC-ENTMCNC: 32.3 GM/DL — SIGNIFICANT CHANGE UP (ref 32–36)
MCV RBC AUTO: 86.6 FL — SIGNIFICANT CHANGE UP (ref 80–100)
PLATELET # BLD AUTO: 955 K/UL — HIGH (ref 150–400)
POTASSIUM SERPL-MCNC: 4.5 MMOL/L — SIGNIFICANT CHANGE UP (ref 3.5–5.3)
POTASSIUM SERPL-SCNC: 4.5 MMOL/L — SIGNIFICANT CHANGE UP (ref 3.5–5.3)
PROT SERPL-MCNC: 6.4 G/DL — SIGNIFICANT CHANGE UP (ref 6–8.3)
RBC # BLD: 2.9 M/UL — LOW (ref 3.8–5.2)
RBC # FLD: 14.9 % — HIGH (ref 10.3–14.5)
SODIUM SERPL-SCNC: 135 MMOL/L — SIGNIFICANT CHANGE UP (ref 135–145)
WBC # BLD: 12.81 K/UL — HIGH (ref 3.8–10.5)
WBC # FLD AUTO: 12.81 K/UL — HIGH (ref 3.8–10.5)

## 2017-11-07 PROCEDURE — 99232 SBSQ HOSP IP/OBS MODERATE 35: CPT

## 2017-11-07 PROCEDURE — 74177 CT ABD & PELVIS W/CONTRAST: CPT | Mod: 26

## 2017-11-07 PROCEDURE — 99232 SBSQ HOSP IP/OBS MODERATE 35: CPT | Mod: GC

## 2017-11-07 RX ORDER — DIPHENHYDRAMINE HCL 50 MG
12.5 CAPSULE ORAL ONCE
Qty: 0 | Refills: 0 | Status: COMPLETED | OUTPATIENT
Start: 2017-11-07 | End: 2017-11-07

## 2017-11-07 RX ORDER — KETOROLAC TROMETHAMINE 30 MG/ML
15 SYRINGE (ML) INJECTION EVERY 6 HOURS
Qty: 0 | Refills: 0 | Status: DISCONTINUED | OUTPATIENT
Start: 2017-11-07 | End: 2017-11-08

## 2017-11-07 RX ORDER — HYDROMORPHONE HYDROCHLORIDE 2 MG/ML
1 INJECTION INTRAMUSCULAR; INTRAVENOUS; SUBCUTANEOUS
Qty: 0 | Refills: 0 | Status: DISCONTINUED | OUTPATIENT
Start: 2017-11-07 | End: 2017-11-08

## 2017-11-07 RX ADMIN — Medication 400 MILLIGRAM(S): at 09:38

## 2017-11-07 RX ADMIN — Medication 15 MILLIGRAM(S): at 14:57

## 2017-11-07 RX ADMIN — Medication 200 MILLIGRAM(S): at 05:25

## 2017-11-07 RX ADMIN — HYDROMORPHONE HYDROCHLORIDE 1 MILLIGRAM(S): 2 INJECTION INTRAMUSCULAR; INTRAVENOUS; SUBCUTANEOUS at 14:14

## 2017-11-07 RX ADMIN — Medication 1000 MILLIGRAM(S): at 10:08

## 2017-11-07 RX ADMIN — Medication 400 MILLIGRAM(S): at 04:40

## 2017-11-07 RX ADMIN — ENOXAPARIN SODIUM 40 MILLIGRAM(S): 100 INJECTION SUBCUTANEOUS at 11:05

## 2017-11-07 RX ADMIN — Medication 400 MILLIGRAM(S): at 15:59

## 2017-11-07 RX ADMIN — HYDROMORPHONE HYDROCHLORIDE 30 MILLILITER(S): 2 INJECTION INTRAMUSCULAR; INTRAVENOUS; SUBCUTANEOUS at 19:11

## 2017-11-07 RX ADMIN — HYDROMORPHONE HYDROCHLORIDE 1 MILLIGRAM(S): 2 INJECTION INTRAMUSCULAR; INTRAVENOUS; SUBCUTANEOUS at 00:55

## 2017-11-07 RX ADMIN — HYDROMORPHONE HYDROCHLORIDE 30 MILLILITER(S): 2 INJECTION INTRAMUSCULAR; INTRAVENOUS; SUBCUTANEOUS at 06:56

## 2017-11-07 RX ADMIN — HYDROMORPHONE HYDROCHLORIDE 1 MILLIGRAM(S): 2 INJECTION INTRAMUSCULAR; INTRAVENOUS; SUBCUTANEOUS at 23:38

## 2017-11-07 RX ADMIN — Medication 25 MILLIGRAM(S): at 09:38

## 2017-11-07 RX ADMIN — Medication 200 MILLIGRAM(S): at 17:19

## 2017-11-07 RX ADMIN — Medication 25 MILLIGRAM(S): at 15:57

## 2017-11-07 RX ADMIN — Medication 15 MILLIGRAM(S): at 19:52

## 2017-11-07 RX ADMIN — PANTOPRAZOLE SODIUM 40 MILLIGRAM(S): 20 TABLET, DELAYED RELEASE ORAL at 11:05

## 2017-11-07 RX ADMIN — HYDROMORPHONE HYDROCHLORIDE 1 MILLIGRAM(S): 2 INJECTION INTRAMUSCULAR; INTRAVENOUS; SUBCUTANEOUS at 13:23

## 2017-11-07 RX ADMIN — Medication 25 MILLIGRAM(S): at 01:49

## 2017-11-07 RX ADMIN — HYDROMORPHONE HYDROCHLORIDE 30 MILLILITER(S): 2 INJECTION INTRAMUSCULAR; INTRAVENOUS; SUBCUTANEOUS at 13:24

## 2017-11-07 RX ADMIN — Medication 1000 MILLIGRAM(S): at 22:30

## 2017-11-07 RX ADMIN — Medication 15 MILLIGRAM(S): at 20:07

## 2017-11-07 RX ADMIN — Medication 400 MILLIGRAM(S): at 22:14

## 2017-11-07 RX ADMIN — HYDROMORPHONE HYDROCHLORIDE 1 MILLIGRAM(S): 2 INJECTION INTRAMUSCULAR; INTRAVENOUS; SUBCUTANEOUS at 09:00

## 2017-11-07 RX ADMIN — Medication 12.5 MILLIGRAM(S): at 19:51

## 2017-11-07 RX ADMIN — Medication 1000 MILLIGRAM(S): at 16:29

## 2017-11-07 RX ADMIN — HYDROMORPHONE HYDROCHLORIDE 1 MILLIGRAM(S): 2 INJECTION INTRAMUSCULAR; INTRAVENOUS; SUBCUTANEOUS at 04:37

## 2017-11-07 RX ADMIN — HYDROMORPHONE HYDROCHLORIDE 30 MILLILITER(S): 2 INJECTION INTRAMUSCULAR; INTRAVENOUS; SUBCUTANEOUS at 02:26

## 2017-11-07 RX ADMIN — Medication 10 MILLIGRAM(S): at 05:25

## 2017-11-07 RX ADMIN — Medication 15 MILLIGRAM(S): at 14:27

## 2017-11-07 RX ADMIN — Medication 1000 MILLIGRAM(S): at 04:55

## 2017-11-07 NOTE — PROGRESS NOTE ADULT - SUBJECTIVE AND OBJECTIVE BOX
Pain Management Attending Addendum    SUBJECTIVE: Patient seen and examined at the bedside. Still experiencing pain, but reports its incrementally better than the day before. Bolus dose given at bedside with NP present. Nurse informed     Therapy:	  [X ] IV PCA	   [ ] Epidural           [ ] s/p Spinal Opoid              [ ] Postpartum infusion	  [ ] Patient controlled regional anesthesia (PCRA)    [ ] prn Analgesics    OBJECTIVE:   [ X] No new signs     [ ] Other:    Side Effects:  [X ] None			[ ] Other:    Assessment of Catheter Site:		[ ] Intact		[ ] Other:    ASSESSMENT/PLAN  [X ] Continue current therapy    [ ] Therapy changed to:    [ ] IV PCA       [ ] Epidural     [ ] prn Analgesics     [ ] post partum infusion    Comments:

## 2017-11-07 NOTE — PROGRESS NOTE ADULT - SUBJECTIVE AND OBJECTIVE BOX
Day 21 of Anesthesia Pain Management Service    SUBJECTIVE: Patient is doing well with IV PCA    Pain Scale Score:	[X] Refer to charted pain scores    THERAPY:    [ ] IV PCA Morphine		[ ] 5 mg/mL	[ ] 1 mg/mL  [X] IV PCA Hydromorphone	[ ] 5 mg/mL	[X] 1 mg/mL  [ ] IV PCA Fentanyl		[ ] 50 micrograms/mL    Demand dose: 0.5 mg     Lockout: 6 minutes   Continuous Rate: 0 mg/hr  4 Hour Limit: 11 mg    MEDICATIONS  (STANDING):  ciprofloxacin   IVPB      dextrose 5% + sodium chloride 0.45% with potassium chloride 20 mEq/L 1000 milliLiter(s) (125 mL/Hr) IV Continuous <Continuous>  enoxaparin Injectable 40 milliGRAM(s) SubCutaneous daily  fentaNYL   Patch  25 MICROgram(s)/Hr. 1 Patch Transdermal every 72 hours  HYDROmorphone PCA (1 mG/mL) 30 milliLiter(s) PCA Continuous PCA Continuous  pantoprazole  Injectable 40 milliGRAM(s) IV Push daily    MEDICATIONS  (PRN):  diphenhydrAMINE   Injectable 25 milliGRAM(s) IV Push every 6 hours PRN Rash and/or Itching  HYDROmorphone PCA (1 mG/mL) Rescue Clinician Bolus 1 milliGRAM(s) IV Push every 15 minutes PRN for Pain Scale GREATER THAN 6      OBJECTIVE:    Sedation Score:	[ X] Alert	[ ] Drowsy 	[ ] Arousable	[ ] Asleep	[ ] Unresponsive    Side Effects:	[X ] None	[ ] Nausea	[ ] Vomiting	[ ] Pruritus  		[ ] Other:    Vital Signs Last 24 Hrs  T(C): 37.4 (07 Nov 2017 09:29), Max: 37.4 (07 Nov 2017 04:41)  T(F): 99.3 (07 Nov 2017 09:29), Max: 99.3 (07 Nov 2017 04:41)  HR: 90 (07 Nov 2017 09:29) (85 - 99)  BP: 112/74 (07 Nov 2017 09:29) (109/70 - 128/84)  BP(mean): --  RR: 16 (07 Nov 2017 09:29) (16 - 18)  SpO2: 92% (07 Nov 2017 09:29) (92% - 95%)    ASSESSMENT/ PLAN    Therapy to  be:               [X] Continued   [ ] Discontinued   [ ] Changed to PRN Analgesics    Documentation and Verification of current medications:   [X] Done	[ ] Not done, not eligible    Comments: OOB in chair. Reporting abdominal pain. Bolused with 1mg via PCA. Continue PCA.

## 2017-11-07 NOTE — PROGRESS NOTE ADULT - ATTENDING COMMENTS
As above.  Recommend repeat CT to reassess walled off necrosis to assess for maturity for endoscopic drainage.

## 2017-11-07 NOTE — PROGRESS NOTE ADULT - ATTENDING COMMENTS
Pt seen and examined today at 11am, agree with above. Pt says her epigastric pain is slightly worse today and she had some nausea this morning. No V. Plan to repeat CT today. Continue antibiotics for infected peripancreatic collections in setting of necrotizing pancreatitis.

## 2017-11-07 NOTE — PROGRESS NOTE ADULT - SUBJECTIVE AND OBJECTIVE BOX
Surgery Trauma Team Progress Note    SUBJECTIVE:   Patient was seen and examined this morning. There was no acute event overnight. She is resting comfortably in bed. Pain is well controlled. She does not report pain, fever, n/v, chest pain, or shortness of breath. Patient has flatus, but no bowel movement.     OBJECTIVE:   T(C): 37.4 (11-07-17 @ 09:29), Max: 37.4 (11-07-17 @ 04:41)  HR: 90 (11-07-17 @ 09:29) (85 - 99)  BP: 112/74 (11-07-17 @ 09:29) (109/70 - 128/84)  RR: 16 (11-07-17 @ 09:29) (16 - 18)  SpO2: 92% (11-07-17 @ 09:29) (92% - 95%)  Wt(kg): --  CAPILLARY BLOOD GLUCOSE  170 (07 Nov 2017 05:51)  136 (07 Nov 2017 00:44)  143 (06 Nov 2017 12:32)      POCT Blood Glucose.: 170 mg/dL (07 Nov 2017 05:50)  POCT Blood Glucose.: 136 mg/dL (07 Nov 2017 00:44)  POCT Blood Glucose.: 145 mg/dL (06 Nov 2017 17:24)  POCT Blood Glucose.: 143 mg/dL (06 Nov 2017 12:24)    I&O's Detail    06 Nov 2017 07:01  -  07 Nov 2017 07:00  --------------------------------------------------------  IN:    dextrose 5% + sodium chloride 0.45% with potassium chloride 20 mEq/L: 2400 mL    Solution: 400 mL    Solution: 400 mL    TPN (Total Parenteral Nutrition): 1320 mL  Total IN: 4520 mL    OUT:    Voided: 2500 mL  Total OUT: 2500 mL    Total NET: 2020 mL          Physical exam:  General: Surgery Trauma Team Progress Note    SUBJECTIVE:   Patient was seen and examined this morning. There was no acute event overnight. She is resting comfortably in bed. Pain is well controlled. She does not report pain, fever, n/v, chest pain, or shortness of breath. Patient has flatus, but no bowel movement. She has PICC line and receiving TPN. no episode of vomiting in the past 48 hours    OBJECTIVE:   T(C): 37.4 (11-07-17 @ 09:29), Max: 37.4 (11-07-17 @ 04:41)  HR: 90 (11-07-17 @ 09:29) (85 - 99)  BP: 112/74 (11-07-17 @ 09:29) (109/70 - 128/84)  RR: 16 (11-07-17 @ 09:29) (16 - 18)  SpO2: 92% (11-07-17 @ 09:29) (92% - 95%)  Wt(kg): --  CAPILLARY BLOOD GLUCOSE  170 (07 Nov 2017 05:51)  136 (07 Nov 2017 00:44)  143 (06 Nov 2017 12:32)      POCT Blood Glucose.: 170 mg/dL (07 Nov 2017 05:50)  POCT Blood Glucose.: 136 mg/dL (07 Nov 2017 00:44)  POCT Blood Glucose.: 145 mg/dL (06 Nov 2017 17:24)  POCT Blood Glucose.: 143 mg/dL (06 Nov 2017 12:24)    I&O's Detail    06 Nov 2017 07:01  -  07 Nov 2017 07:00  --------------------------------------------------------  IN:    dextrose 5% + sodium chloride 0.45% with potassium chloride 20 mEq/L: 2400 mL    Solution: 400 mL    Solution: 400 mL    TPN (Total Parenteral Nutrition): 1320 mL  Total IN: 4520 mL    OUT:    Voided: 2500 mL  Total OUT: 2500 mL    Total NET: 2020 mL        Gen: Well-nourished, well-developed, A&O x3, resting in bed in no acute distress with PICC line   CV: RRR  Resp: Patent airways, unlabored   Abdomen: Soft, mild LUQ and epigastric pain and tenderness, nondistended  Extremities: All 4 extremities warm and well perfused, no edema

## 2017-11-07 NOTE — PROGRESS NOTE ADULT - SUBJECTIVE AND OBJECTIVE BOX
SUBJECTIVE:  - Pt reports some improvement in her epigastric pain  - She is on ciprofloxacin for infected pancreatic necrosis  - No fevers over the past 24 hours  - (+) nausea but no vomiting over the past 24 hours  - She started TPN yesterday      OBJECTIVE:    MEDICATIONS  (STANDING):  ciprofloxacin   IVPB      dextrose 5% + sodium chloride 0.45% with potassium chloride 20 mEq/L 1000 milliLiter(s) (125 mL/Hr) IV Continuous <Continuous>  enoxaparin Injectable 40 milliGRAM(s) SubCutaneous daily  fentaNYL   Patch  25 MICROgram(s)/Hr. 1 Patch Transdermal every 72 hours  HYDROmorphone PCA (1 mG/mL) 30 milliLiter(s) PCA Continuous PCA Continuous  pantoprazole  Injectable 40 milliGRAM(s) IV Push daily    Vital Signs Last 24 Hrs  T(C): 36.8 (07 Nov 2017 14:02), Max: 37.4 (07 Nov 2017 04:41)  T(F): 98.3 (07 Nov 2017 14:02), Max: 99.3 (07 Nov 2017 04:41)  HR: 88 (07 Nov 2017 14:02) (85 - 99)  BP: 102/69 (07 Nov 2017 14:02) (102/69 - 128/84)  BP(mean): --  RR: 18 (07 Nov 2017 14:02) (16 - 18)  SpO2: 92% (07 Nov 2017 14:02) (92% - 95%)    PHYSICAL EXAM:  Constitutional: no acute distress; NGT in place  Eyes: no icterus  Neck: no masses, no LAD  Respiratory: normal inspiratory effort; no wheezing or crackles  Cardiovascular: RRR, normal S1/S2, no murmurs/rubs/gallops  Gastrointestinal: soft, nondistended, diffusely tender  Extremities: no LE edema  Neurological: AAOx3, no asterixis  Skin: no rashes, bruises, petechiae    LABS:                                              8.1    12.81 )-----------( 955      ( 07 Nov 2017 09:08 )             25.1     11-07    135  |  97  |  7   ----------------------------<  130<H>  4.5   |  22  |  0.44<L>    Ca    8.7      07 Nov 2017 09:35    TPro  6.4  /  Alb  2.5<L>  /  TBili  <0.2  /  DBili  x   /  AST  17  /  ALT  <4<L>  /  AlkPhos  115  11-07        < from: CT Abdomen and Pelvis w/ IV Cont (10.28.17 @ 15:44) >  PANCREAS: Diffuse hypoenhancement of the body of the pancreas extending   to the tail. There is peripancreatic inflammation and fluid. Centered   within the lesser sac is a 8.8 x 4.2 cm collection (series 2, image 40).   There is an additional another collection adjacent to the uncinate   process and pancreatic head that measure 4.2 x 3.1 cm (series 2, image   60).    There is fluid in the right and left anterior pararenal space. The fluid   along the left pararenal space is partially loculated.     IMPRESSION:   1.  The findings are compatible with necrotizing pancreatitis.   2.  The collection likely represent pseudocysts and measure 8.8 x 4.2 cm   collection (series 2, image 40) and 4.2 x 3.1 cm (series 2, image 60).  3.  Unchanged segment thrombus of the splenic vein.  4.  Small left pleural effusions with bibasilar subsegmental atelectasis.    < end of copied text >

## 2017-11-07 NOTE — PROGRESS NOTE ADULT - SUBJECTIVE AND OBJECTIVE BOX
Day # 3 of PN  Pt remains NPO  11-06 @ 07:01  -  11-07 @ 07:00  --------------------------------------------------------  IN:    dextrose 5% + sodium chloride 0.45% with potassium chloride 20 mEq/L: 2400 mL    Solution: 400 mL    Solution: 400 mL    TPN (Total Parenteral Nutrition): 1320 mL  Total IN: 4520 mL    OUT:    Voided: 2500 mL  Total OUT: 2500 mL    Total NET: 2020 mL        T(C): 37.4 (11-07-17 @ 04:41), Max: 37.4 (11-07-17 @ 04:41)  HR: 99 (11-07-17 @ 04:41) (70 - 99)  BP: 126/80 (11-07-17 @ 04:41) (105/70 - 128/84)  RR: 18 (11-07-17 @ 04:41) (17 - 18)  SpO2: 95% (11-07-17 @ 04:41) (92% - 95%)  Wt(kg): --    Labs   11-06    138  |  100  |  8   ----------------------------<  105<H>  4.6   |  25  |  0.38<L>    Ca    8.7      06 Nov 2017 08:33  Phos  4.2     11-05  Mg     1.9     11-05    TPro  6.1  /  Alb  2.2<L>  /  TBili  0.2  /  DBili  x   /  AST  22  /  ALT  <4<L>  /  AlkPhos  116  11-06      LIVER FUNCTIONS - ( 06 Nov 2017 08:33 )  Alb: 2.2 g/dL / Pro: 6.1 g/dL / ALK PHOS: 116 U/L / ALT: <4 U/L / AST: 22 U/L / GGT: x           CAPILLARY BLOOD GLUCOSE  170 (07 Nov 2017 05:51)  136 (07 Nov 2017 00:44)  143 (06 Nov 2017 12:32)      POCT Blood Glucose.: 170 mg/dL (07 Nov 2017 05:50)  POCT Blood Glucose.: 136 mg/dL (07 Nov 2017 00:44)  POCT Blood Glucose.: 145 mg/dL (06 Nov 2017 17:24)  POCT Blood Glucose.: 143 mg/dL (06 Nov 2017 12:24)    I&O's Detail    06 Nov 2017 07:01  -  07 Nov 2017 07:00  --------------------------------------------------------  IN:    dextrose 5% + sodium chloride 0.45% with potassium chloride 20 mEq/L: 2400 mL    Solution: 400 mL    Solution: 400 mL    TPN (Total Parenteral Nutrition): 1320 mL  Total IN: 4520 mL    OUT:    Voided: 2500 mL  Total OUT: 2500 mL    Total NET: 2020 mL

## 2017-11-07 NOTE — PROGRESS NOTE ADULT - ASSESSMENT
BG is increasing but is labile with blood glucoses even in the high 60's   Will monitor but I am hesitant to add insulin at this point

## 2017-11-07 NOTE — PROGRESS NOTE ADULT - ASSESSMENT
Ms. Coleman is a 57 y.o. woman with history of nephrolithiasis, HTN, and appendectomy admitted on 10/16 for gallstone pancreatitis c/b necrotizing pancreatitis and ascending cholangitis s/p ERCP and stent placement (10/21) and FNA of perihepatic fluid (10/31), which grew E. faecalis. Continued pain, but improved this morning. She was not able to tolerate tube feeding and is planned for TPN. She is hemodynamically stable. lab is significant for leukocytosis ( WBC: 10.7 -> 12.81), platelets count is elevated (955) but trending down       -Leukocytosis: most likely 2/2 acute necrotic collections w/ E faecalis infection, is afebrile, will continue with cipro and trend temperature curve and WBC  - necrotic pancreatitis: will repeat CT later this week, but it is still  early for intervention, possibly need drainage later  - DVT ppx: Lovenox   - malnutrition: continue with TPN at 55 cc/hr today  - Pain control: PCA, fentanyl patch, Tylenol IV Ms. Coleman is a 57 y.o. woman with history of nephrolithiasis, HTN, and appendectomy admitted on 10/16 for gallstone pancreatitis c/b necrotizing pancreatitis and ascending cholangitis s/p ERCP and stent placement (10/21) and FNA of perihepatic fluid (10/31), which grew E. faecalis. Continued pain, but improved this morning. She was not able to tolerate tube feeding and is receiving TPN. She is hemodynamically stable. lab is significant for leukocytosis ( WBC: 10.7 -> 12.81), platelets count is elevated (955) but trending down       -Leukocytosis: most likely 2/2 acute necrotic collections w/ E faecalis infection, is afebrile, will continue with cipro and trend temperature curve and WBC  - necrotic pancreatitis: will repeat CT later this week, but it is still  early for intervention, possibly need drainage later  - DVT ppx: Lovenox   - malnutrition: continue with TPN at 55 cc/hr today  - Pain control: PCA, fentanyl patch, Tylenol IV

## 2017-11-08 LAB
ANION GAP SERPL CALC-SCNC: 12 MMOL/L — SIGNIFICANT CHANGE UP (ref 5–17)
APTT BLD: 44.5 SEC — HIGH (ref 27.5–37.4)
BLD GP AB SCN SERPL QL: NEGATIVE — SIGNIFICANT CHANGE UP
BUN SERPL-MCNC: 8 MG/DL — SIGNIFICANT CHANGE UP (ref 7–23)
CALCIUM SERPL-MCNC: 8.2 MG/DL — LOW (ref 8.4–10.5)
CHLORIDE SERPL-SCNC: 98 MMOL/L — SIGNIFICANT CHANGE UP (ref 96–108)
CO2 SERPL-SCNC: 25 MMOL/L — SIGNIFICANT CHANGE UP (ref 22–31)
CREAT SERPL-MCNC: 0.36 MG/DL — LOW (ref 0.5–1.3)
GLUCOSE SERPL-MCNC: 116 MG/DL — HIGH (ref 70–99)
HCT VFR BLD CALC: 24.1 % — LOW (ref 34.5–45)
HGB BLD-MCNC: 8 G/DL — LOW (ref 11.5–15.5)
INR BLD: 1.29 RATIO — HIGH (ref 0.88–1.16)
LACTATE SERPL-SCNC: 1 MMOL/L — SIGNIFICANT CHANGE UP (ref 0.7–2)
MAGNESIUM SERPL-MCNC: 1.9 MG/DL — SIGNIFICANT CHANGE UP (ref 1.6–2.6)
MCHC RBC-ENTMCNC: 29.8 PG — SIGNIFICANT CHANGE UP (ref 27–34)
MCHC RBC-ENTMCNC: 33.3 GM/DL — SIGNIFICANT CHANGE UP (ref 32–36)
MCV RBC AUTO: 89.6 FL — SIGNIFICANT CHANGE UP (ref 80–100)
PHOSPHATE SERPL-MCNC: 3.1 MG/DL — SIGNIFICANT CHANGE UP (ref 2.5–4.5)
PLATELET # BLD AUTO: 931 K/UL — HIGH (ref 150–400)
POTASSIUM SERPL-MCNC: 4.4 MMOL/L — SIGNIFICANT CHANGE UP (ref 3.5–5.3)
POTASSIUM SERPL-SCNC: 4.4 MMOL/L — SIGNIFICANT CHANGE UP (ref 3.5–5.3)
PROTHROM AB SERPL-ACNC: 14 SEC — HIGH (ref 9.8–12.7)
RBC # BLD: 2.68 M/UL — LOW (ref 3.8–5.2)
RBC # FLD: 13.2 % — SIGNIFICANT CHANGE UP (ref 10.3–14.5)
RH IG SCN BLD-IMP: POSITIVE — SIGNIFICANT CHANGE UP
SODIUM SERPL-SCNC: 135 MMOL/L — SIGNIFICANT CHANGE UP (ref 135–145)
WBC # BLD: 10.7 K/UL — HIGH (ref 3.8–10.5)
WBC # FLD AUTO: 10.7 K/UL — HIGH (ref 3.8–10.5)

## 2017-11-08 PROCEDURE — 71010: CPT | Mod: 26

## 2017-11-08 PROCEDURE — 99232 SBSQ HOSP IP/OBS MODERATE 35: CPT | Mod: GC

## 2017-11-08 PROCEDURE — 43240 EGD W/TRANSMURAL DRAIN CYST: CPT | Mod: 59,GC

## 2017-11-08 PROCEDURE — 43237 ENDOSCOPIC US EXAM ESOPH: CPT | Mod: 59,GC

## 2017-11-08 RX ORDER — HYDROMORPHONE HYDROCHLORIDE 2 MG/ML
0.5 INJECTION INTRAMUSCULAR; INTRAVENOUS; SUBCUTANEOUS
Qty: 0 | Refills: 0 | Status: DISCONTINUED | OUTPATIENT
Start: 2017-11-08 | End: 2017-11-14

## 2017-11-08 RX ORDER — ACETAMINOPHEN 500 MG
1000 TABLET ORAL ONCE
Qty: 0 | Refills: 0 | Status: COMPLETED | OUTPATIENT
Start: 2017-11-08 | End: 2017-11-08

## 2017-11-08 RX ORDER — HYDROMORPHONE HYDROCHLORIDE 2 MG/ML
1 INJECTION INTRAMUSCULAR; INTRAVENOUS; SUBCUTANEOUS ONCE
Qty: 0 | Refills: 0 | Status: DISCONTINUED | OUTPATIENT
Start: 2017-11-08 | End: 2017-11-09

## 2017-11-08 RX ORDER — ACETAMINOPHEN 500 MG
1000 TABLET ORAL ONCE
Qty: 0 | Refills: 0 | Status: COMPLETED | OUTPATIENT
Start: 2017-11-09 | End: 2017-11-09

## 2017-11-08 RX ORDER — NALOXONE HYDROCHLORIDE 4 MG/.1ML
0.1 SPRAY NASAL
Qty: 0 | Refills: 0 | Status: DISCONTINUED | OUTPATIENT
Start: 2017-11-08 | End: 2017-12-01

## 2017-11-08 RX ORDER — KETOROLAC TROMETHAMINE 30 MG/ML
15 SYRINGE (ML) INJECTION ONCE
Qty: 0 | Refills: 0 | Status: DISCONTINUED | OUTPATIENT
Start: 2017-11-08 | End: 2017-11-08

## 2017-11-08 RX ORDER — FENTANYL CITRATE 50 UG/ML
1 INJECTION INTRAVENOUS
Qty: 0 | Refills: 0 | Status: DISCONTINUED | OUTPATIENT
Start: 2017-11-08 | End: 2017-11-14

## 2017-11-08 RX ORDER — ONDANSETRON 8 MG/1
4 TABLET, FILM COATED ORAL ONCE
Qty: 0 | Refills: 0 | Status: COMPLETED | OUTPATIENT
Start: 2017-11-08 | End: 2017-11-08

## 2017-11-08 RX ORDER — ONDANSETRON 8 MG/1
4 TABLET, FILM COATED ORAL EVERY 6 HOURS
Qty: 0 | Refills: 0 | Status: DISCONTINUED | OUTPATIENT
Start: 2017-11-08 | End: 2017-12-01

## 2017-11-08 RX ORDER — HYDROMORPHONE HYDROCHLORIDE 2 MG/ML
30 INJECTION INTRAMUSCULAR; INTRAVENOUS; SUBCUTANEOUS
Qty: 0 | Refills: 0 | Status: DISCONTINUED | OUTPATIENT
Start: 2017-11-08 | End: 2017-11-10

## 2017-11-08 RX ORDER — ACETAMINOPHEN 500 MG
1000 TABLET ORAL ONCE
Qty: 0 | Refills: 0 | Status: COMPLETED | OUTPATIENT
Start: 2017-11-09 | End: 2017-11-08

## 2017-11-08 RX ADMIN — Medication 1000 MILLIGRAM(S): at 08:44

## 2017-11-08 RX ADMIN — PANTOPRAZOLE SODIUM 40 MILLIGRAM(S): 20 TABLET, DELAYED RELEASE ORAL at 11:37

## 2017-11-08 RX ADMIN — FENTANYL CITRATE 1 PATCH: 50 INJECTION INTRAVENOUS at 13:38

## 2017-11-08 RX ADMIN — Medication 25 MILLIGRAM(S): at 01:09

## 2017-11-08 RX ADMIN — ENOXAPARIN SODIUM 40 MILLIGRAM(S): 100 INJECTION SUBCUTANEOUS at 11:37

## 2017-11-08 RX ADMIN — Medication 1000 MILLIGRAM(S): at 11:39

## 2017-11-08 RX ADMIN — HYDROMORPHONE HYDROCHLORIDE 30 MILLILITER(S): 2 INJECTION INTRAMUSCULAR; INTRAVENOUS; SUBCUTANEOUS at 20:54

## 2017-11-08 RX ADMIN — FENTANYL CITRATE 1 PATCH: 50 INJECTION INTRAVENOUS at 04:59

## 2017-11-08 RX ADMIN — HYDROMORPHONE HYDROCHLORIDE 1 MILLIGRAM(S): 2 INJECTION INTRAMUSCULAR; INTRAVENOUS; SUBCUTANEOUS at 07:03

## 2017-11-08 RX ADMIN — ONDANSETRON 4 MILLIGRAM(S): 8 TABLET, FILM COATED ORAL at 20:50

## 2017-11-08 RX ADMIN — Medication 15 MILLIGRAM(S): at 01:15

## 2017-11-08 RX ADMIN — Medication 15 MILLIGRAM(S): at 07:35

## 2017-11-08 RX ADMIN — Medication 400 MILLIGRAM(S): at 11:38

## 2017-11-08 RX ADMIN — HYDROMORPHONE HYDROCHLORIDE 0.5 MILLIGRAM(S): 2 INJECTION INTRAMUSCULAR; INTRAVENOUS; SUBCUTANEOUS at 20:51

## 2017-11-08 RX ADMIN — DEXTROSE MONOHYDRATE, SODIUM CHLORIDE, AND POTASSIUM CHLORIDE 125 MILLILITER(S): 50; .745; 4.5 INJECTION, SOLUTION INTRAVENOUS at 20:54

## 2017-11-08 RX ADMIN — HYDROMORPHONE HYDROCHLORIDE 1 MILLIGRAM(S): 2 INJECTION INTRAMUSCULAR; INTRAVENOUS; SUBCUTANEOUS at 01:07

## 2017-11-08 RX ADMIN — HYDROMORPHONE HYDROCHLORIDE 30 MILLILITER(S): 2 INJECTION INTRAMUSCULAR; INTRAVENOUS; SUBCUTANEOUS at 00:24

## 2017-11-08 RX ADMIN — Medication 200 MILLIGRAM(S): at 05:11

## 2017-11-08 RX ADMIN — Medication 400 MILLIGRAM(S): at 06:41

## 2017-11-08 RX ADMIN — Medication 400 MILLIGRAM(S): at 23:45

## 2017-11-08 RX ADMIN — HYDROMORPHONE HYDROCHLORIDE 30 MILLILITER(S): 2 INJECTION INTRAMUSCULAR; INTRAVENOUS; SUBCUTANEOUS at 15:27

## 2017-11-08 RX ADMIN — HYDROMORPHONE HYDROCHLORIDE 30 MILLILITER(S): 2 INJECTION INTRAMUSCULAR; INTRAVENOUS; SUBCUTANEOUS at 07:07

## 2017-11-08 RX ADMIN — Medication 15 MILLIGRAM(S): at 00:57

## 2017-11-08 RX ADMIN — Medication 15 MILLIGRAM(S): at 07:05

## 2017-11-08 NOTE — CHART NOTE - NSCHARTNOTEFT_GEN_A_CORE
called via RN who states patient in excruciating pain, currently receiving multi pain medications: PCA, IV tylenol 1000 mg q6, fentanyl patch 25 mg q72 hours, today toradol 15mg q 6 x 5 doses was added.  Patient crying in pain, no position makes it better or worse, denies nausea/vomiting.  Patient seen and examined at bedside with Dr. Osullivan, PGY-4 and Dr. Jean-Baptiste, PGY-5.      Vital Signs Last 24 Hrs  T(C): 37.2 (07 Nov 2017 22:03), Max: 37.4 (07 Nov 2017 04:41)  T(F): 99 (07 Nov 2017 22:03), Max: 99.3 (07 Nov 2017 04:41)  HR: 84 (07 Nov 2017 22:03) (75 - 99)  BP: 102/67 (07 Nov 2017 22:03) (102/67 - 126/80)  BP(mean): --  RR: 18 (07 Nov 2017 22:03) (16 - 18)  SpO2: 93% (07 Nov 2017 22:03) (92% - 95%)    Gen: A& O x3  Abd: distended, + TTP diffusely, guarding, rebound, +peritoneal signs    I&O's Detail    06 Nov 2017 07:01  -  07 Nov 2017 07:00  --------------------------------------------------------  IN:    dextrose 5% + sodium chloride 0.45% with potassium chloride 20 mEq/L: 2400 mL    Solution: 400 mL    Solution: 400 mL    TPN (Total Parenteral Nutrition): 1320 mL  Total IN: 4520 mL    OUT:    Voided: 2500 mL  Total OUT: 2500 mL    Total NET: 2020 mL      07 Nov 2017 07:01  -  08 Nov 2017 01:00  --------------------------------------------------------  IN:    dextrose 5% + sodium chloride 0.45% with potassium chloride 20 mEq/L: 1500 mL    Solution: 200 mL    Solution: 100 mL    TPN (Total Parenteral Nutrition): 660 mL  Total IN: 2460 mL    OUT:    Voided: 2150 mL  Total OUT: 2150 mL    Total NET: 310 mL      A/P: 57F nec pancreatitis p/w ?peritonitis  - STAT labs  - STAT CXR  - 1x dose toradol 15mg called via RN who states patient in excruciating pain, currently receiving multi pain medications: PCA, IV tylenol 1000 mg q6, fentanyl patch 25 mg q72 hours, today toradol 15mg q 6 x 5 doses was added.  Patient crying in pain, no position makes it better or worse, denies nausea/vomiting.  Patient seen and examined at bedside with Dr. Osullivan, PGY-4 and Dr. Jean-Baptiste, PGY-5.  Repeat CT A/P prelim read revealed:     Vital Signs Last 24 Hrs  T(C): 37.2 (07 Nov 2017 22:03), Max: 37.4 (07 Nov 2017 04:41)  T(F): 99 (07 Nov 2017 22:03), Max: 99.3 (07 Nov 2017 04:41)  HR: 84 (07 Nov 2017 22:03) (75 - 99)  BP: 102/67 (07 Nov 2017 22:03) (102/67 - 126/80)  BP(mean): --  RR: 18 (07 Nov 2017 22:03) (16 - 18)  SpO2: 93% (07 Nov 2017 22:03) (92% - 95%)    Gen: A& O x3  Abd: distended, + TTP diffusely, guarding, rebound, +peritoneal signs    I&O's Detail    06 Nov 2017 07:01  -  07 Nov 2017 07:00  --------------------------------------------------------  IN:    dextrose 5% + sodium chloride 0.45% with potassium chloride 20 mEq/L: 2400 mL    Solution: 400 mL    Solution: 400 mL    TPN (Total Parenteral Nutrition): 1320 mL  Total IN: 4520 mL    OUT:    Voided: 2500 mL  Total OUT: 2500 mL    Total NET: 2020 mL      07 Nov 2017 07:01  -  08 Nov 2017 01:00  --------------------------------------------------------  IN:    dextrose 5% + sodium chloride 0.45% with potassium chloride 20 mEq/L: 1500 mL    Solution: 200 mL    Solution: 100 mL    TPN (Total Parenteral Nutrition): 660 mL  Total IN: 2460 mL    OUT:    Voided: 2150 mL  Total OUT: 2150 mL    Total NET: 310 mL      A/P: 57F nec pancreatitis p/w ?peritonitis  - STAT labs  - STAT CXR  - 1x dose toradol 15mg S: called via RN who states patient in excruciating pain, currently receiving multi pain medications: PCA, IV tylenol 1000 mg q6, fentanyl patch 25 mg q72 hours, today toradol 15mg q 6 x 5 doses was added.  Patient crying in pain, no position makes it better or worse, denies nausea/vomiting.  Patient seen and examined at bedside with Dr. Osullivan, PGY-4 and Dr. Jean-Baptiste, PGY-5.  Repeat CT A/P from today (11/7) prelim read revealed: Worsening acute necrotic collections related to necrotising pancreatitis.  Largest collection measures 12.0 x 8.5 x 9.9 cm, larger than on prior study. Persistent lack of opacification of the splenic vein at the portosplenic confluence.      O:  Vital Signs Last 24 Hrs  T(C): 37.2 (07 Nov 2017 22:03), Max: 37.4 (07 Nov 2017 04:41)  T(F): 99 (07 Nov 2017 22:03), Max: 99.3 (07 Nov 2017 04:41)  HR: 84 (07 Nov 2017 22:03) (75 - 99)  BP: 102/67 (07 Nov 2017 22:03) (102/67 - 126/80)  BP(mean): --  RR: 18 (07 Nov 2017 22:03) (16 - 18)  SpO2: 93% (07 Nov 2017 22:03) (92% - 95%)    Gen: A& O x3  Abd: distended, + TTP diffusely, guarding, rebound    I&O's Detail    06 Nov 2017 07:01  -  07 Nov 2017 07:00  --------------------------------------------------------  IN:    dextrose 5% + sodium chloride 0.45% with potassium chloride 20 mEq/L: 2400 mL    Solution: 400 mL    Solution: 400 mL    TPN (Total Parenteral Nutrition): 1320 mL  Total IN: 4520 mL    OUT:    Voided: 2500 mL  Total OUT: 2500 mL    Total NET: 2020 mL      07 Nov 2017 07:01  -  08 Nov 2017 01:00  --------------------------------------------------------  IN:    dextrose 5% + sodium chloride 0.45% with potassium chloride 20 mEq/L: 1500 mL    Solution: 200 mL    Solution: 100 mL    TPN (Total Parenteral Nutrition): 660 mL  Total IN: 2460 mL    OUT:    Voided: 2150 mL  Total OUT: 2150 mL    Total NET: 310 mL      A/P: 57F nec pancreatitis p/w ?peritonitis  - STAT labs- cbc, bmp, mg, p, lactate, t&S, coags  - STAT CXR  - increase fentanyl patch from 25 to 50 q72hrs  - 1x dose toradol 15mg  - D/w Dr. Jean-Baptiste, PGY-5 and Dr. Charles, Attending S: called via RN who states patient in excruciating pain, currently receiving multi pain medications: PCA, IV tylenol 1000 mg q6, fentanyl patch 25 mg q72 hours, today toradol 15mg q 6 x 5 doses was added.  Patient crying in pain, no position makes it better or worse, denies nausea/vomiting.  Patient seen and examined at bedside with Dr. Osullivan, PGY-4 and Dr. Jean-Baptiste, PGY-5.  Repeat CT A/P from today (11/7) prelim read revealed: Worsening acute necrotic collections related to necrotising pancreatitis.  Largest collection measures 12.0 x 8.5 x 9.9 cm, larger than on prior study. Persistent lack of opacification of the splenic vein at the portosplenic confluence.      O:  Vital Signs Last 24 Hrs  T(C): 37.2 (07 Nov 2017 22:03), Max: 37.4 (07 Nov 2017 04:41)  T(F): 99 (07 Nov 2017 22:03), Max: 99.3 (07 Nov 2017 04:41)  HR: 84 (07 Nov 2017 22:03) (75 - 99)  BP: 102/67 (07 Nov 2017 22:03) (102/67 - 126/80)  BP(mean): --  RR: 18 (07 Nov 2017 22:03) (16 - 18)  SpO2: 93% (07 Nov 2017 22:03) (92% - 95%)    Gen: A& O x3  Abd: distended, + TTP diffusely, guarding, rebound    I&O's Detail    06 Nov 2017 07:01  -  07 Nov 2017 07:00  --------------------------------------------------------  IN:    dextrose 5% + sodium chloride 0.45% with potassium chloride 20 mEq/L: 2400 mL    Solution: 400 mL    Solution: 400 mL    TPN (Total Parenteral Nutrition): 1320 mL  Total IN: 4520 mL    OUT:    Voided: 2500 mL  Total OUT: 2500 mL    Total NET: 2020 mL      07 Nov 2017 07:01  -  08 Nov 2017 01:00  --------------------------------------------------------  IN:    dextrose 5% + sodium chloride 0.45% with potassium chloride 20 mEq/L: 1500 mL    Solution: 200 mL    Solution: 100 mL    TPN (Total Parenteral Nutrition): 660 mL  Total IN: 2460 mL    OUT:    Voided: 2150 mL  Total OUT: 2150 mL    Total NET: 310 mL    Labs:  CBC Full  -  ( 08 Nov 2017 01:20 )  WBC Count : 10.7 K/uL  Hemoglobin : 8.0 g/dL  Hematocrit : 24.1 %  Platelet Count - Automated : 931 K/uL  Mean Cell Volume : 89.6 fl  Mean Cell Hemoglobin : 29.8 pg  Mean Cell Hemoglobin Concentration : 33.3 gm/dL  Auto Neutrophil # : x  Auto Lymphocyte # : x  Auto Monocyte # : x  Auto Eosinophil # : x  Auto Basophil # : x  Auto Neutrophil % : x  Auto Lymphocyte % : x  Auto Monocyte % : x  Auto Eosinophil % : x  Auto Basophil % : x    Lactate, Blood: 1.0 mmol/L (11-08 @ 01:20)            A/P: 57F nec pancreatitis p/w ?peritonitis  - STAT labs- cbc, bmp, mg, p, lactate, t&S, coags  - STAT CXR  - increase fentanyl patch from 25 to 50 q72hrs  - 1x dose toradol 15mg  - D/w Dr. Jean-Baptiste, PGY-5 and Dr. Charles, Attending S: called via RN who states patient in excruciating pain, currently receiving multi pain medications: PCA, IV tylenol 1000 mg q6, fentanyl patch 25 mg q72 hours, today toradol 15mg q 6 x 5 doses was added.  Patient crying in pain, no position makes it better or worse, denies nausea/vomiting.  Patient seen and examined at bedside with Dr. Osullivan, PGY-4 and Dr. Jean-Baptiste, PGY-5.  Repeat CT A/P from today (11/7) prelim read revealed: Worsening acute necrotic collections related to necrotising pancreatitis.  Largest collection measures 12.0 x 8.5 x 9.9 cm, larger than on prior study. Persistent lack of opacification of the splenic vein at the portosplenic confluence.      O:  Vital Signs Last 24 Hrs  T(C): 37.2 (07 Nov 2017 22:03), Max: 37.4 (07 Nov 2017 04:41)  T(F): 99 (07 Nov 2017 22:03), Max: 99.3 (07 Nov 2017 04:41)  HR: 84 (07 Nov 2017 22:03) (75 - 99)  BP: 102/67 (07 Nov 2017 22:03) (102/67 - 126/80)  BP(mean): --  RR: 18 (07 Nov 2017 22:03) (16 - 18)  SpO2: 93% (07 Nov 2017 22:03) (92% - 95%)    Gen: A& O x3  Abd: distended, + TTP diffusely, guarding, rebound    I&O's Detail    06 Nov 2017 07:01  -  07 Nov 2017 07:00  --------------------------------------------------------  IN:    dextrose 5% + sodium chloride 0.45% with potassium chloride 20 mEq/L: 2400 mL    Solution: 400 mL    Solution: 400 mL    TPN (Total Parenteral Nutrition): 1320 mL  Total IN: 4520 mL    OUT:    Voided: 2500 mL  Total OUT: 2500 mL    Total NET: 2020 mL      07 Nov 2017 07:01  -  08 Nov 2017 01:00  --------------------------------------------------------  IN:    dextrose 5% + sodium chloride 0.45% with potassium chloride 20 mEq/L: 1500 mL    Solution: 200 mL    Solution: 100 mL    TPN (Total Parenteral Nutrition): 660 mL  Total IN: 2460 mL    OUT:    Voided: 2150 mL  Total OUT: 2150 mL    Total NET: 310 mL    Labs:  CBC Full  -  ( 08 Nov 2017 01:20 )  WBC Count : 10.7 K/uL  Hemoglobin : 8.0 g/dL  Hematocrit : 24.1 %  Platelet Count - Automated : 931 K/uL  Mean Cell Volume : 89.6 fl  Mean Cell Hemoglobin : 29.8 pg  Mean Cell Hemoglobin Concentration : 33.3 gm/dL  Auto Neutrophil # : x  Auto Lymphocyte # : x  Auto Monocyte # : x  Auto Eosinophil # : x  Auto Basophil # : x  Auto Neutrophil % : x  Auto Lymphocyte % : x  Auto Monocyte % : x  Auto Eosinophil % : x  Auto Basophil % : x    Lactate, Blood: 1.0 mmol/L (11-08 @ 01:20)    11-08    135  |  98  |  8   ----------------------------<  116<H>  4.4   |  25  |  0.36<L>    Ca    8.2<L>      08 Nov 2017 01:20  Phos  3.1     11-08  Mg     1.9     11-08    TPro  6.4  /  Alb  2.5<L>  /  TBili  <0.2  /  DBili  x   /  AST  17  /  ALT  <4<L>  /  AlkPhos  115  11-07    PT/INR - ( 08 Nov 2017 01:20 )   PT: 14.0 sec;   INR: 1.29 ratio         PTT - ( 08 Nov 2017 01:20 )  PTT:44.5 sec    CXR:     A/P: 57F nec pancreatitis p/w ?peritonitis  - STAT labs- cbc, bmp, mg, p, lactate, t&S, coags - wnl  - STAT CXR  - increase fentanyl patch from 25 to 50 q72hrs  - 1x dose toradol 15mg  - D/w Dr. Jean-Baptiste, PGY-5 and Dr. Charles, Attending S: called via RN who states patient in excruciating pain, currently receiving multi pain medications: PCA, IV tylenol 1000 mg q6, fentanyl patch 25 mg q72 hours, today toradol 15mg q 6 x 5 doses was added.  Patient crying in pain, no position makes it better or worse, denies nausea/vomiting.  Patient seen and examined at bedside with Dr. Osullivan, PGY-4 and Dr. Jean-Baptiste, PGY-5.  Repeat CT A/P from today (11/7) prelim read revealed: Worsening acute necrotic collections related to necrotising pancreatitis.  Largest collection measures 12.0 x 8.5 x 9.9 cm, larger than on prior study. Persistent lack of opacification of the splenic vein at the portosplenic confluence.      O:  Vital Signs Last 24 Hrs  T(C): 37.2 (07 Nov 2017 22:03), Max: 37.4 (07 Nov 2017 04:41)  T(F): 99 (07 Nov 2017 22:03), Max: 99.3 (07 Nov 2017 04:41)  HR: 84 (07 Nov 2017 22:03) (75 - 99)  BP: 102/67 (07 Nov 2017 22:03) (102/67 - 126/80)  BP(mean): --  RR: 18 (07 Nov 2017 22:03) (16 - 18)  SpO2: 93% (07 Nov 2017 22:03) (92% - 95%)    Gen: A& O x3  Abd: distended, + TTP diffusely, guarding, rebound    I&O's Detail    06 Nov 2017 07:01  -  07 Nov 2017 07:00  --------------------------------------------------------  IN:    dextrose 5% + sodium chloride 0.45% with potassium chloride 20 mEq/L: 2400 mL    Solution: 400 mL    Solution: 400 mL    TPN (Total Parenteral Nutrition): 1320 mL  Total IN: 4520 mL    OUT:    Voided: 2500 mL  Total OUT: 2500 mL    Total NET: 2020 mL      07 Nov 2017 07:01  -  08 Nov 2017 01:00  --------------------------------------------------------  IN:    dextrose 5% + sodium chloride 0.45% with potassium chloride 20 mEq/L: 1500 mL    Solution: 200 mL    Solution: 100 mL    TPN (Total Parenteral Nutrition): 660 mL  Total IN: 2460 mL    OUT:    Voided: 2150 mL  Total OUT: 2150 mL    Total NET: 310 mL    Labs:  CBC Full  -  ( 08 Nov 2017 01:20 )  WBC Count : 10.7 K/uL  Hemoglobin : 8.0 g/dL  Hematocrit : 24.1 %  Platelet Count - Automated : 931 K/uL  Mean Cell Volume : 89.6 fl  Mean Cell Hemoglobin : 29.8 pg  Mean Cell Hemoglobin Concentration : 33.3 gm/dL  Auto Neutrophil # : x  Auto Lymphocyte # : x  Auto Monocyte # : x  Auto Eosinophil # : x  Auto Basophil # : x  Auto Neutrophil % : x  Auto Lymphocyte % : x  Auto Monocyte % : x  Auto Eosinophil % : x  Auto Basophil % : x    Lactate, Blood: 1.0 mmol/L (11-08 @ 01:20)    11-08    135  |  98  |  8   ----------------------------<  116<H>  4.4   |  25  |  0.36<L>    Ca    8.2<L>      08 Nov 2017 01:20  Phos  3.1     11-08  Mg     1.9     11-08    TPro  6.4  /  Alb  2.5<L>  /  TBili  <0.2  /  DBili  x   /  AST  17  /  ALT  <4<L>  /  AlkPhos  115  11-07    PT/INR - ( 08 Nov 2017 01:20 )   PT: 14.0 sec;   INR: 1.29 ratio         PTT - ( 08 Nov 2017 01:20 )  PTT:44.5 sec      A/P: 57F nec pancreatitis p/w ?peritonitis  - STAT labs- cbc, bmp, mg, p, lactate, t&S, coags  - STAT CXR  - increase fentanyl patch from 25 to 50 q72hrs  - 1x dose toradol 15mg  - D/w Dr. Jean-Baptiste, PGY-5 and Dr. Charles, Attending

## 2017-11-08 NOTE — PROGRESS NOTE ADULT - SUBJECTIVE AND OBJECTIVE BOX
SUBJECTIVE:  Significantly increased abdominal pain overnight.   One episode of vomiting and persistent nausea.     OBJECTIVE:     ** VITAL SIGNS / I&O's **    T(C): 37.1 (11-08-17 @ 14:00), Max: 37.8 (11-08-17 @ 05:59)  T(F): 98.7 (11-08-17 @ 14:00), Max: 100 (11-08-17 @ 05:59)  HR: 80 (11-08-17 @ 14:00) (75 - 105)  BP: 123/80 (11-08-17 @ 14:00) (102/67 - 125/79)  RR: 18 (11-08-17 @ 14:00) (18 - 18)  SpO2: 97% (11-08-17 @ 14:00) (93% - 97%)      07 Nov 2017 07:01  -  08 Nov 2017 07:00  --------------------------------------------------------  IN:    dextrose 5% + sodium chloride 0.45% with potassium chloride 20 mEq/L: 2600 mL    Solution: 400 mL    Solution: 200 mL    TPN (Total Parenteral Nutrition): 1320 mL  Total IN: 4520 mL    OUT:    Voided: 2750 mL  Total OUT: 2750 mL    Total NET: 1770 mL      08 Nov 2017 07:01  -  08 Nov 2017 15:04  --------------------------------------------------------  IN:  Total IN: 0 mL    OUT:    Voided: 400 mL  Total OUT: 400 mL    Total NET: -400 mL          ** PHYSICAL EXAM **    -- CONSTITUTIONAL: AOx3. Moderate distress.    -- CARDIOVASCULAR: normotensive, regular rate   -- RESPIRATORY: breathing comfortably   -- ABDOMEN: firmoid, mildly distended, tender epigastric and LUQ    ** LABS **                 8.0    10.7   )----------(  931       ( 08 Nov 2017 01:20 )               24.1      135    |  98     |  8      ----------------------------<  116        ( 08 Nov 2017 01:20 )  4.4     |  25     |  0.36     Ca    8.2        ( 08 Nov 2017 01:20 )  Phos  3.1       ( 08 Nov 2017 01:20 )  Mg     1.9       ( 08 Nov 2017 01:20 )      PT/INR -  14.0 sec / 1.29 ratio   ( 08 Nov 2017 01:20 )       PTT -  44.5 sec   ( 08 Nov 2017 01:20 )  CAPILLARY BLOOD GLUCOSE  151 (07 Nov 2017 17:48)    CT Abd/Pelvis (11/7): Increase in size of large walled off pancreatic collection with compression of the stomach. Splenic vein thrombosis with gastric varices.

## 2017-11-08 NOTE — PROGRESS NOTE ADULT - SUBJECTIVE AND OBJECTIVE BOX
Day # 4 of PN  Pt remains NPO w/nausea but no vomiting  11-07 @ 07:01  -  11-08 @ 07:00  --------------------------------------------------------  IN:    dextrose 5% + sodium chloride 0.45% with potassium chloride 20 mEq/L: 2600 mL    Solution: 400 mL    Solution: 200 mL    TPN (Total Parenteral Nutrition): 1320 mL  Total IN: 4520 mL    OUT:    Voided: 2750 mL  Total OUT: 2750 mL    Total NET: 1770 mL        T(C): 37.8 (11-08-17 @ 05:59), Max: 37.8 (11-08-17 @ 05:59)  HR: 105 (11-08-17 @ 05:59) (75 - 105)  BP: 125/79 (11-08-17 @ 05:59) (102/67 - 125/79)  RR: 18 (11-08-17 @ 05:59) (18 - 18)  SpO2: 94% (11-08-17 @ 05:59) (92% - 95%)  Wt(kg): --    Labs   11-08    135  |  98  |  8   ----------------------------<  116<H>  4.4   |  25  |  0.36<L>    Ca    8.2<L>      08 Nov 2017 01:20  Phos  3.1     11-08  Mg     1.9     11-08    TPro  6.4  /  Alb  2.5<L>  /  TBili  <0.2  /  DBili  x   /  AST  17  /  ALT  <4<L>  /  AlkPhos  115  11-07      LIVER FUNCTIONS - ( 07 Nov 2017 09:35 )  Alb: 2.5 g/dL / Pro: 6.4 g/dL / ALK PHOS: 115 U/L / ALT: <4 U/L / AST: 17 U/L / GGT: x           CAPILLARY BLOOD GLUCOSE  151 (07 Nov 2017 17:48)      POCT Blood Glucose.: 151 mg/dL (07 Nov 2017 17:47)  POCT Blood Glucose.: 136 mg/dL (07 Nov 2017 12:16)    I&O's Detail    07 Nov 2017 07:01  -  08 Nov 2017 07:00  --------------------------------------------------------  IN:    dextrose 5% + sodium chloride 0.45% with potassium chloride 20 mEq/L: 2600 mL    Solution: 400 mL    Solution: 200 mL    TPN (Total Parenteral Nutrition): 1320 mL  Total IN: 4520 mL    OUT:    Voided: 2750 mL  Total OUT: 2750 mL    Total NET: 1770 mL      08 Nov 2017 07:01  -  08 Nov 2017 09:38  --------------------------------------------------------  IN:  Total IN: 0 mL    OUT:    Voided: 400 mL  Total OUT: 400 mL    Total NET: -400 mL

## 2017-11-08 NOTE — PROGRESS NOTE ADULT - ATTENDING COMMENTS
Seen on 11/8 with fellow. Plan for procedure to drain infected collection. Reviewed CT and there are several good windows. Wall appears well defined and there is likely significant necrotic debris within the cavity therefore large caliber stents will be required- perhaps multiple.

## 2017-11-08 NOTE — PROGRESS NOTE ADULT - SUBJECTIVE AND OBJECTIVE BOX
Pre-Endoscopy Evaluation      Referring Physician:  Dr. 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      PAST MEDICAL & SURGICAL HISTORY:  Kidney stone  Hypertension  Kidney stones  History of appendectomy      PMH of Gastroparesis [ ]  Gastric Surgery [ ]  Gastric Outlet Obstruction [ ]    Allergies:    No Known Allergies    Intolerances:    Latex allergy: [ ] yes [x] no    Medications:MEDICATIONS  (STANDING):  acetaminophen  IVPB. 1000 milliGRAM(s) IV Intermittent once  ciprofloxacin   IVPB      dextrose 5% + sodium chloride 0.45% with potassium chloride 20 mEq/L 1000 milliLiter(s) (125 mL/Hr) IV Continuous <Continuous>  enoxaparin Injectable 40 milliGRAM(s) SubCutaneous daily  fentaNYL   Patch  50 MICROgram(s)/Hr 1 Patch Transdermal every 72 hours  HYDROmorphone  Injectable 1 milliGRAM(s) IV Push once  HYDROmorphone PCA (1 mG/mL) 30 milliLiter(s) PCA Continuous PCA Continuous  ondansetron Injectable 4 milliGRAM(s) IV Push once  pantoprazole  Injectable 40 milliGRAM(s) IV Push daily    MEDICATIONS  (PRN):  diphenhydrAMINE   Injectable 25 milliGRAM(s) IV Push every 6 hours PRN Rash and/or Itching  HYDROmorphone PCA (1 mG/mL) Rescue Clinician Bolus 1 milliGRAM(s) IV Push every 15 minutes PRN for Pain Scale GREATER THAN 6      Smoking: [ ] yes  [x] no    AICD/PPM: [ ] yes   [x] no    Pertinent lab data:                        8.0    10.7  )-----------( 931      ( 08 Nov 2017 01:20 )             24.1     11-08    135  |  98  |  8   ----------------------------<  116<H>  4.4   |  25  |  0.36<L>    Ca    8.2<L>      08 Nov 2017 01:20  Phos  3.1     11-08  Mg     1.9     11-08    TPro  6.4  /  Alb  2.5<L>  /  TBili  <0.2  /  DBili  x   /  AST  17  /  ALT  <4<L>  /  AlkPhos  115  11-07    PT/INR - ( 08 Nov 2017 01:20 )   PT: 14.0 sec;   INR: 1.29 ratio      PTT - ( 08 Nov 2017 01:20 )  PTT:44.5 sec        Physical Examination:     Daily   Vital Signs Last 24 Hrs  T(C): 36.7 (08 Nov 2017 09:46), Max: 37.8 (08 Nov 2017 05:59)  T(F): 98.1 (08 Nov 2017 09:46), Max: 100 (08 Nov 2017 05:59)  HR: 88 (08 Nov 2017 09:46) (75 - 105)  BP: 113/73 (08 Nov 2017 09:46) (102/67 - 125/79)  BP(mean): --  RR: 18 (08 Nov 2017 09:46) (18 - 18)  SpO2: 94% (08 Nov 2017 09:46) (92% - 95%)      Constitutional: NAD     Neck:  No JVD    Respiratory: CTAB/L    Cardiovascular: S1 and S2    Gastrointestinal:     Extremities: No peripheral edema    Neurological: A/O x 3    : No Lara    Skin: No rashes    Comments:    ASA Class: I [ ]  II [ ]  III [ ]  IV [x]    The patient is a suitable candidate for the planned procedure unless box checked [ ]  No, explain:

## 2017-11-08 NOTE — PROGRESS NOTE ADULT - ASSESSMENT
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    Impression:  1) Infected pancreatic necrosis  2) Nausea/vomiting    Plan:  - plan for EUS with possible cystgastrostomy today  - drainage may alleviate patient's pain, nausea/vomiting by relieving pressure caused by collection   - risk of cyst perforation/leakage explained to patient and the possible need for surgery if endoscopic drainage and debridement are not successful  - patient understands risks and benefits and all questions were answered  - she would like to proceed with EUS and endoscopic drainage at this time  - IV abx  - IV fluids with LR  - analgesia per primary team

## 2017-11-08 NOTE — PROGRESS NOTE ADULT - SUBJECTIVE AND OBJECTIVE BOX
Day 22 of Anesthesia Pain Management Service    SUBJECTIVE: Patient is currently resting quietly    Pain Scale Score:	[X] Refer to charted pain scores    THERAPY:    [ ] IV PCA Morphine		[ ] 5 mg/mL	[ ] 1 mg/mL  [X] IV PCA Hydromorphone	[ ] 5 mg/mL	[X] 1 mg/mL  [ ] IV PCA Fentanyl		[ ] 50 micrograms/mL    Demand dose: 0.5 mg     Lockout: 6 minutes   Continuous Rate: 0 mg/hr  4 Hour Limit: 11 mg    MEDICATIONS  (STANDING):  acetaminophen  IVPB. 1000 milliGRAM(s) IV Intermittent once  acetaminophen  IVPB. 1000 milliGRAM(s) IV Intermittent once  ciprofloxacin   IVPB      dextrose 5% + sodium chloride 0.45% with potassium chloride 20 mEq/L 1000 milliLiter(s) (125 mL/Hr) IV Continuous <Continuous>  enoxaparin Injectable 40 milliGRAM(s) SubCutaneous daily  fentaNYL   Patch  50 MICROgram(s)/Hr 1 Patch Transdermal every 72 hours  HYDROmorphone  Injectable 1 milliGRAM(s) IV Push once  HYDROmorphone PCA (1 mG/mL) 30 milliLiter(s) PCA Continuous PCA Continuous  ketorolac   Injectable 15 milliGRAM(s) IV Push every 6 hours  ondansetron Injectable 4 milliGRAM(s) IV Push once  pantoprazole  Injectable 40 milliGRAM(s) IV Push daily    MEDICATIONS  (PRN):  diphenhydrAMINE   Injectable 25 milliGRAM(s) IV Push every 6 hours PRN Rash and/or Itching  HYDROmorphone PCA (1 mG/mL) Rescue Clinician Bolus 1 milliGRAM(s) IV Push every 15 minutes PRN for Pain Scale GREATER THAN 6      OBJECTIVE:    Sedation Score:	[  ] Alert	[ ] Drowsy 	[ ] Arousable	[X ] Asleep	[ ] Unresponsive    Side Effects:	[X ] None	[ ] Nausea	[ ] Vomiting	[ ] Pruritus  		[ ] Other:    Vital Signs Last 24 Hrs  T(C): 37.8 (08 Nov 2017 05:59), Max: 37.8 (08 Nov 2017 05:59)  T(F): 100 (08 Nov 2017 05:59), Max: 100 (08 Nov 2017 05:59)  HR: 105 (08 Nov 2017 05:59) (75 - 105)  BP: 125/79 (08 Nov 2017 05:59) (102/67 - 125/79)  BP(mean): --  RR: 18 (08 Nov 2017 05:59) (16 - 18)  SpO2: 94% (08 Nov 2017 05:59) (92% - 95%)    ASSESSMENT/ PLAN    Therapy to  be:               [X] Continued   [ ] Discontinued   [ ] Changed to PRN Analgesics    Documentation and Verification of current medications:   [X] Done	[ ] Not done, not eligible    Comments:

## 2017-11-08 NOTE — PROGRESS NOTE ADULT - ASSESSMENT
ASSESSMENT: Elba Coleman is a 57 y.o. woman with history of nephrolithiasis, HTN, and appendectomy admitted on 10/16 for gallstone pancreatitis c/b necrotizing pancreatitis and ascending cholangitis s/p ERCP and stent placement (10/21) and FNA of perihepatic fluid (10/31), which grew E. faecalis. She was not able to tolerate tube feeding and is receiving TPN. Significantly increased pain this morning. This corresponds with the increase in size of her pancreatic collection seen on CT scan from 11/7.    PLAN:   - Discuss endoscopic drainage of pancreatic collection and cystgastrostomy with GI  - DVT ppx: Lovenox   - malnutrition: continue with TPN at 55 cc/hr today  - Pain control: PCA, fentanyl patch, Tylenol IV, Toradol ASSESSMENT: Elba Coleman is a 57 y.o. woman with history of nephrolithiasis, HTN, and appendectomy admitted on 10/16 for gallstone pancreatitis c/b necrotizing pancreatitis and ascending cholangitis s/p ERCP and stent placement (10/21) and FNA of perihepatic fluid (10/31), which grew E. faecalis. She was not able to tolerate tube feeding and is receiving TPN. Significantly increased pain this morning. This corresponds with the increase in size of her pancreatic collection seen on CT scan from 11/7.    PLAN:   - Discuss endoscopic drainage of pancreatic collection and cystgastrostomy with GI  - DVT ppx: Lovenox   - malnutrition: continue with TPN at 55 cc/hr today  - Pain control: PCA, fentanyl patch, Tylenol IV, Toradol (holding for EUS)

## 2017-11-08 NOTE — PROGRESS NOTE ADULT - ATTENDING COMMENTS
Pt seen and examined today at 11am, agree with above. Pt having significantly more pain today as well as some bilious emesis. I personally reviewed pt's CT scan, which demonstrates enlarging multiple peripancreatic fluid collections, the largest of which abuts the posterior wall of the stomach and is likely the cause of most of the patient's worsening symptoms. I spoke with Dr.Calvin Calles this morning regarding endoscopic cystgastrostomy to drain the infected pseudocyst and relieve the mass effect on the stomach. Continue NPO, antibiotics, pain medication.

## 2017-11-08 NOTE — PROGRESS NOTE ADULT - SUBJECTIVE AND OBJECTIVE BOX
SUBJECTIVE:  - Pt with acute worsening of her epigastric pain overnight  - She had temperature overnight to 100.0  - She is on ciprofloxacin for infected pancreatic necrosis  - (+) nausea but no vomiting   - She is on TPN  - She went for CT yesterday showing large necrotic fluid collection      OBJECTIVE:    MEDICATIONS  (STANDING):  acetaminophen  IVPB. 1000 milliGRAM(s) IV Intermittent once  acetaminophen  IVPB. 1000 milliGRAM(s) IV Intermittent once  ciprofloxacin   IVPB      dextrose 5% + sodium chloride 0.45% with potassium chloride 20 mEq/L 1000 milliLiter(s) (125 mL/Hr) IV Continuous <Continuous>  enoxaparin Injectable 40 milliGRAM(s) SubCutaneous daily  fentaNYL   Patch  50 MICROgram(s)/Hr 1 Patch Transdermal every 72 hours  HYDROmorphone  Injectable 1 milliGRAM(s) IV Push once  HYDROmorphone PCA (1 mG/mL) 30 milliLiter(s) PCA Continuous PCA Continuous  ondansetron Injectable 4 milliGRAM(s) IV Push once  pantoprazole  Injectable 40 milliGRAM(s) IV Push daily      Vital Signs Last 24 Hrs  T(C): 36.7 (08 Nov 2017 09:46), Max: 37.8 (08 Nov 2017 05:59)  T(F): 98.1 (08 Nov 2017 09:46), Max: 100 (08 Nov 2017 05:59)  HR: 88 (08 Nov 2017 09:46) (75 - 105)  BP: 113/73 (08 Nov 2017 09:46) (102/67 - 125/79)  BP(mean): --  RR: 18 (08 Nov 2017 09:46) (18 - 18)  SpO2: 94% (08 Nov 2017 09:46) (92% - 95%)    PHYSICAL EXAM:  Constitutional: no acute distress; NGT in place  Eyes: no icterus  Neck: no masses, no LAD  Respiratory: normal inspiratory effort; no wheezing or crackles  Cardiovascular: RRR, normal S1/S2, no murmurs/rubs/gallops  Gastrointestinal: soft, slightly distended, diffusely tender  Extremities: no LE edema  Neurological: AAOx3, no asterixis  Skin: no rashes, bruises, petechiae    LABS:                                              8.0    10.7  )-----------( 931      ( 08 Nov 2017 01:20 )             24.1     11-08    135  |  98  |  8   ----------------------------<  116<H>  4.4   |  25  |  0.36<L>    Ca    8.2<L>      08 Nov 2017 01:20  Phos  3.1     11-08  Mg     1.9     11-08    TPro  6.4  /  Alb  2.5<L>  /  TBili  <0.2  /  DBili  x   /  AST  17  /  ALT  <4<L>  /  AlkPhos  115  11-07        < from: CT Abdomen and Pelvis w/ IV Cont (11.07.17 @ 18:38) >  IMPRESSION: Increase in size of large walled off pancreatic collection   with compression of the stomach. Splenic vein thrombosis with gastric   varices.    < end of copied text >

## 2017-11-09 LAB
ALBUMIN SERPL ELPH-MCNC: 2.1 G/DL — LOW (ref 3.3–5)
ALP SERPL-CCNC: 290 U/L — HIGH (ref 40–120)
ALT FLD-CCNC: 12 U/L — SIGNIFICANT CHANGE UP (ref 10–45)
AMYLASE P1 CFR SERPL: 78 U/L — SIGNIFICANT CHANGE UP (ref 25–125)
ANION GAP SERPL CALC-SCNC: 14 MMOL/L — SIGNIFICANT CHANGE UP (ref 5–17)
ANISOCYTOSIS BLD QL: SLIGHT — SIGNIFICANT CHANGE UP
AST SERPL-CCNC: 40 U/L — SIGNIFICANT CHANGE UP (ref 10–40)
BASOPHILS # BLD AUTO: 0 K/UL — SIGNIFICANT CHANGE UP (ref 0–0.2)
BASOPHILS NFR BLD AUTO: 0 % — SIGNIFICANT CHANGE UP (ref 0–2)
BILIRUB SERPL-MCNC: <0.2 MG/DL — SIGNIFICANT CHANGE UP (ref 0.2–1.2)
BLASTS # FLD: 0 % — SIGNIFICANT CHANGE UP (ref 0–0)
BUN SERPL-MCNC: 7 MG/DL — SIGNIFICANT CHANGE UP (ref 7–23)
CALCIUM SERPL-MCNC: 8.3 MG/DL — LOW (ref 8.4–10.5)
CHLORIDE SERPL-SCNC: 101 MMOL/L — SIGNIFICANT CHANGE UP (ref 96–108)
CO2 SERPL-SCNC: 23 MMOL/L — SIGNIFICANT CHANGE UP (ref 22–31)
CREAT SERPL-MCNC: 0.31 MG/DL — LOW (ref 0.5–1.3)
EOSINOPHIL # BLD AUTO: 0.22 K/UL — SIGNIFICANT CHANGE UP (ref 0–0.5)
EOSINOPHIL NFR BLD AUTO: 1.8 % — SIGNIFICANT CHANGE UP (ref 0–6)
GIANT PLATELETS BLD QL SMEAR: PRESENT — SIGNIFICANT CHANGE UP
GLUCOSE SERPL-MCNC: 134 MG/DL — HIGH (ref 70–99)
HCT VFR BLD CALC: 23.9 % — LOW (ref 34.5–45)
HGB BLD-MCNC: 7.7 G/DL — LOW (ref 11.5–15.5)
HYPOCHROMIA BLD QL: SLIGHT — SIGNIFICANT CHANGE UP
LIDOCAIN IGE QN: 56 U/L — SIGNIFICANT CHANGE UP (ref 7–60)
LYMPHOCYTES # BLD AUTO: 1.07 K/UL — SIGNIFICANT CHANGE UP (ref 1–3.3)
LYMPHOCYTES # BLD AUTO: 8.8 % — LOW (ref 13–44)
LYMPHOCYTES # SPEC AUTO: 0 % — SIGNIFICANT CHANGE UP (ref 0–0)
MACROCYTES BLD QL: SLIGHT — SIGNIFICANT CHANGE UP
MANUAL SMEAR VERIFICATION: SIGNIFICANT CHANGE UP
MCHC RBC-ENTMCNC: 27.4 PG — SIGNIFICANT CHANGE UP (ref 27–34)
MCHC RBC-ENTMCNC: 32.2 GM/DL — SIGNIFICANT CHANGE UP (ref 32–36)
MCV RBC AUTO: 85.1 FL — SIGNIFICANT CHANGE UP (ref 80–100)
METAMYELOCYTES # FLD: 0.9 % — HIGH (ref 0–0)
MONOCYTES # BLD AUTO: 0.32 K/UL — SIGNIFICANT CHANGE UP (ref 0–0.9)
MONOCYTES NFR BLD AUTO: 2.6 % — SIGNIFICANT CHANGE UP (ref 2–14)
MYELOCYTES NFR BLD: 0 % — SIGNIFICANT CHANGE UP (ref 0–0)
NEUTROPHILS # BLD AUTO: 10.44 K/UL — HIGH (ref 1.8–7.4)
NEUTROPHILS NFR BLD AUTO: 86 % — HIGH (ref 43–77)
NEUTS BAND # BLD: 0 % — SIGNIFICANT CHANGE UP (ref 0–8)
PLAT MORPH BLD: NORMAL — SIGNIFICANT CHANGE UP
PLATELET # BLD AUTO: 905 K/UL — HIGH (ref 150–400)
POIKILOCYTOSIS BLD QL AUTO: SLIGHT — SIGNIFICANT CHANGE UP
POLYCHROMASIA BLD QL SMEAR: SLIGHT — SIGNIFICANT CHANGE UP
POTASSIUM SERPL-MCNC: 4.5 MMOL/L — SIGNIFICANT CHANGE UP (ref 3.5–5.3)
POTASSIUM SERPL-SCNC: 4.5 MMOL/L — SIGNIFICANT CHANGE UP (ref 3.5–5.3)
PROMYELOCYTES # FLD: 0 % — SIGNIFICANT CHANGE UP (ref 0–0)
PROT SERPL-MCNC: 6.7 G/DL — SIGNIFICANT CHANGE UP (ref 6–8.3)
RBC # BLD: 2.81 M/UL — LOW (ref 3.8–5.2)
RBC # FLD: 15.1 % — HIGH (ref 10.3–14.5)
RBC BLD AUTO: ABNORMAL
SODIUM SERPL-SCNC: 138 MMOL/L — SIGNIFICANT CHANGE UP (ref 135–145)
TARGETS BLD QL SMEAR: SLIGHT — SIGNIFICANT CHANGE UP
WBC # BLD: 12.14 K/UL — HIGH (ref 3.8–10.5)
WBC # FLD AUTO: 12.14 K/UL — HIGH (ref 3.8–10.5)

## 2017-11-09 RX ORDER — KETOROLAC TROMETHAMINE 30 MG/ML
15 SYRINGE (ML) INJECTION ONCE
Qty: 0 | Refills: 0 | Status: DISCONTINUED | OUTPATIENT
Start: 2017-11-09 | End: 2017-11-09

## 2017-11-09 RX ORDER — ACETAMINOPHEN 500 MG
1000 TABLET ORAL ONCE
Qty: 0 | Refills: 0 | Status: COMPLETED | OUTPATIENT
Start: 2017-11-09 | End: 2017-11-09

## 2017-11-09 RX ADMIN — Medication 15 MILLIGRAM(S): at 20:51

## 2017-11-09 RX ADMIN — Medication 400 MILLIGRAM(S): at 12:33

## 2017-11-09 RX ADMIN — HYDROMORPHONE HYDROCHLORIDE 1 MILLIGRAM(S): 2 INJECTION INTRAMUSCULAR; INTRAVENOUS; SUBCUTANEOUS at 21:22

## 2017-11-09 RX ADMIN — Medication 400 MILLIGRAM(S): at 06:05

## 2017-11-09 RX ADMIN — Medication 1000 MILLIGRAM(S): at 06:35

## 2017-11-09 RX ADMIN — HYDROMORPHONE HYDROCHLORIDE 0.5 MILLIGRAM(S): 2 INJECTION INTRAMUSCULAR; INTRAVENOUS; SUBCUTANEOUS at 19:24

## 2017-11-09 RX ADMIN — Medication 1000 MILLIGRAM(S): at 19:41

## 2017-11-09 RX ADMIN — Medication 200 MILLIGRAM(S): at 16:43

## 2017-11-09 RX ADMIN — ONDANSETRON 4 MILLIGRAM(S): 8 TABLET, FILM COATED ORAL at 18:38

## 2017-11-09 RX ADMIN — HYDROMORPHONE HYDROCHLORIDE 30 MILLILITER(S): 2 INJECTION INTRAMUSCULAR; INTRAVENOUS; SUBCUTANEOUS at 07:11

## 2017-11-09 RX ADMIN — PANTOPRAZOLE SODIUM 40 MILLIGRAM(S): 20 TABLET, DELAYED RELEASE ORAL at 10:18

## 2017-11-09 RX ADMIN — HYDROMORPHONE HYDROCHLORIDE 0.5 MILLIGRAM(S): 2 INJECTION INTRAMUSCULAR; INTRAVENOUS; SUBCUTANEOUS at 20:16

## 2017-11-09 RX ADMIN — HYDROMORPHONE HYDROCHLORIDE 30 MILLILITER(S): 2 INJECTION INTRAMUSCULAR; INTRAVENOUS; SUBCUTANEOUS at 19:35

## 2017-11-09 RX ADMIN — Medication 1000 MILLIGRAM(S): at 12:35

## 2017-11-09 RX ADMIN — Medication 200 MILLIGRAM(S): at 05:02

## 2017-11-09 RX ADMIN — Medication 400 MILLIGRAM(S): at 19:09

## 2017-11-09 RX ADMIN — ENOXAPARIN SODIUM 40 MILLIGRAM(S): 100 INJECTION SUBCUTANEOUS at 10:18

## 2017-11-09 RX ADMIN — HYDROMORPHONE HYDROCHLORIDE 1 MILLIGRAM(S): 2 INJECTION INTRAMUSCULAR; INTRAVENOUS; SUBCUTANEOUS at 20:51

## 2017-11-09 RX ADMIN — ONDANSETRON 4 MILLIGRAM(S): 8 TABLET, FILM COATED ORAL at 04:17

## 2017-11-09 RX ADMIN — HYDROMORPHONE HYDROCHLORIDE 30 MILLILITER(S): 2 INJECTION INTRAMUSCULAR; INTRAVENOUS; SUBCUTANEOUS at 19:25

## 2017-11-09 RX ADMIN — HYDROMORPHONE HYDROCHLORIDE 30 MILLILITER(S): 2 INJECTION INTRAMUSCULAR; INTRAVENOUS; SUBCUTANEOUS at 17:32

## 2017-11-09 RX ADMIN — Medication 15 MILLIGRAM(S): at 21:54

## 2017-11-09 RX ADMIN — Medication 1000 MILLIGRAM(S): at 00:30

## 2017-11-09 NOTE — PROGRESS NOTE ADULT - ATTENDING COMMENTS
Pt seen and examined today at 11am, agree with above. Pt feeling better today after endoscopic cystgastrostomy with drainage of infected pseudocyst. No N/V. Continue abx, IVF, TPN, pain control with PCA.

## 2017-11-09 NOTE — PROGRESS NOTE ADULT - SUBJECTIVE AND OBJECTIVE BOX
SUBJECTIVE:  - Pt underwent EUS with successful drainage of pancreatic necrosis fluid collection  - Patient feels better today  - She still has abd pain with nausea but no vomiting      OBJECTIVE:    MEDICATIONS  (STANDING):  ciprofloxacin   IVPB      ciprofloxacin   IVPB 400 milliGRAM(s) IV Intermittent every 12 hours  dextrose 5% + sodium chloride 0.45% with potassium chloride 20 mEq/L 1000 milliLiter(s) (125 mL/Hr) IV Continuous <Continuous>  enoxaparin Injectable 40 milliGRAM(s) SubCutaneous daily  fentaNYL   Patch  50 MICROgram(s)/Hr 1 Patch Transdermal every 72 hours  HYDROmorphone  Injectable 1 milliGRAM(s) IV Push once  HYDROmorphone PCA (1 mG/mL) 30 milliLiter(s) PCA Continuous PCA Continuous  pantoprazole  Injectable 40 milliGRAM(s) IV Push daily        Vital Signs Last 24 Hrs  T(C): 36.6 (09 Nov 2017 12:43), Max: 37.4 (08 Nov 2017 20:29)  T(F): 97.9 (09 Nov 2017 12:43), Max: 99.3 (08 Nov 2017 20:29)  HR: 88 (09 Nov 2017 12:43) (75 - 106)  BP: 121/76 (09 Nov 2017 12:43) (106/65 - 127/84)  BP(mean): --  RR: 16 (09 Nov 2017 12:43) (16 - 18)  SpO2: 96% (09 Nov 2017 12:43) (94% - 97%)    PHYSICAL EXAM:  Constitutional: no acute distress; NGT in place  Eyes: no icterus  Neck: no masses, no LAD  Respiratory: normal inspiratory effort; no wheezing or crackles  Cardiovascular: RRR, normal S1/S2, no murmurs/rubs/gallops  Gastrointestinal: soft, slightly distended, less tender  Extremities: no LE edema  Neurological: AAOx3, no asterixis  Skin: no rashes, bruises, petechiae    LABS:                                              7.7    12.14 )-----------( 905      ( 09 Nov 2017 07:29 )             23.9     11-09    138  |  101  |  7   ----------------------------<  134<H>  4.5   |  23  |  0.31<L>    Ca    8.3<L>      09 Nov 2017 07:28  Phos  3.1     11-08  Mg     1.9     11-08    TPro  6.7  /  Alb  2.1<L>  /  TBili  <0.2  /  DBili  x   /  AST  40  /  ALT  12  /  AlkPhos  290<H>  11-09        < from: CT Abdomen and Pelvis w/ IV Cont (11.07.17 @ 18:38) >  IMPRESSION: Increase in size of large walled off pancreatic collection   with compression of the stomach. Splenic vein thrombosis with gastric   varices.    < end of copied text >

## 2017-11-09 NOTE — PROGRESS NOTE ADULT - SUBJECTIVE AND OBJECTIVE BOX
Pain Management Attending Addendum    SUBJECTIVE: Patient comfortable    Therapy:	  [x ] IV PCA	   [ ] Epidural           [ ] s/p Spinal Opoid              [ ] Postpartum infusion	  [ ] Patient controlled regional anesthesia (PCRA)    [ ] prn Analgesics    OBJECTIVE:   [x ] No new signs     [ ] Other:    Side Effects:  [x ] None			[ ] Other:    Assessment of Catheter Site:		[ ] Intact		[ ] Other:    ASSESSMENT/PLAN  [x ] Continue current therapy    [ ] Therapy changed to:    [ ] IV PCA       [ ] Epidural     [ ] prn Analgesics     [ ] post partum infusion    Comments:

## 2017-11-09 NOTE — PROGRESS NOTE ADULT - SUBJECTIVE AND OBJECTIVE BOX
Day # 5 of PN  Pt remains NPO  11-08 @ 07:01  -  11-09 @ 07:00  --------------------------------------------------------  IN:    dextrose 5% + sodium chloride 0.45% with potassium chloride 20 mEq/L: 2375 mL    Solution: 300 mL    Solution: 200 mL    TPN (Total Parenteral Nutrition): 1320 mL  Total IN: 4195 mL    OUT:    Emesis: 225 mL    Voided: 800 mL  Total OUT: 1025 mL    Total NET: 3170 mL        T(C): 37.1 (11-09-17 @ 06:12), Max: 37.4 (11-08-17 @ 20:29)  HR: 80 (11-09-17 @ 06:12) (80 - 106)  BP: 106/65 (11-09-17 @ 06:12) (106/65 - 127/84)  RR: 18 (11-09-17 @ 06:12) (18 - 18)  SpO2: 96% (11-09-17 @ 06:12) (94% - 97%)  Wt(kg): --    Labs   11-08    135  |  98  |  8   ----------------------------<  116<H>  4.4   |  25  |  0.36<L>    Ca    8.2<L>      08 Nov 2017 01:20  Phos  3.1     11-08  Mg     1.9     11-08    TPro  6.4  /  Alb  2.5<L>  /  TBili  <0.2  /  DBili  x   /  AST  17  /  ALT  <4<L>  /  AlkPhos  115  11-07      LIVER FUNCTIONS - ( 07 Nov 2017 09:35 )  Alb: 2.5 g/dL / Pro: 6.4 g/dL / ALK PHOS: 115 U/L / ALT: <4 U/L / AST: 17 U/L / GGT: x           CAPILLARY BLOOD GLUCOSE        I&O's Detail    08 Nov 2017 07:01  -  09 Nov 2017 07:00  --------------------------------------------------------  IN:    dextrose 5% + sodium chloride 0.45% with potassium chloride 20 mEq/L: 2375 mL    Solution: 300 mL    Solution: 200 mL    TPN (Total Parenteral Nutrition): 1320 mL  Total IN: 4195 mL    OUT:    Emesis: 225 mL    Voided: 800 mL  Total OUT: 1025 mL    Total NET: 3170 mL

## 2017-11-09 NOTE — PROGRESS NOTE ADULT - SUBJECTIVE AND OBJECTIVE BOX
Day 23 of Anesthesia Pain Management Service    SUBJECTIVE: Patient is doing well with IV PCA    Pain Scale Score:	[X] Refer to charted pain scores    THERAPY:    [ ] IV PCA Morphine		[ ] 5 mg/mL	[ ] 1 mg/mL  [X] IV PCA Hydromorphone	[ ] 5 mg/mL	[X] 1 mg/mL  [ ] IV PCA Fentanyl		[ ] 50 micrograms/mL    Demand dose: 0.25 mg     Lockout: 6 minutes   Continuous Rate: 0 mg/hr  4 Hour Limit: 4 mg    MEDICATIONS  (STANDING):  ciprofloxacin   IVPB      ciprofloxacin   IVPB 400 milliGRAM(s) IV Intermittent every 12 hours  dextrose 5% + sodium chloride 0.45% with potassium chloride 20 mEq/L 1000 milliLiter(s) (125 mL/Hr) IV Continuous <Continuous>  enoxaparin Injectable 40 milliGRAM(s) SubCutaneous daily  fentaNYL   Patch  50 MICROgram(s)/Hr 1 Patch Transdermal every 72 hours  HYDROmorphone  Injectable 1 milliGRAM(s) IV Push once  HYDROmorphone PCA (1 mG/mL) 30 milliLiter(s) PCA Continuous PCA Continuous  pantoprazole  Injectable 40 milliGRAM(s) IV Push daily    MEDICATIONS  (PRN):  diphenhydrAMINE   Injectable 25 milliGRAM(s) IV Push every 6 hours PRN Rash and/or Itching  HYDROmorphone PCA (1 mG/mL) Rescue Clinician Bolus 0.5 milliGRAM(s) IV Push every 15 minutes PRN for Pain Scale GREATER THAN 6  naloxone Injectable 0.1 milliGRAM(s) IV Push every 3 minutes PRN For ANY of the following changes in patient status:  A. RR LESS THAN 10 breaths per minute, B. Oxygen saturation LESS THAN 90%, C. Sedation score of 6  ondansetron Injectable 4 milliGRAM(s) IV Push every 6 hours PRN Nausea      OBJECTIVE:    Sedation Score:	[ X] Alert	[ ] Drowsy 	[ ] Arousable	[ ] Asleep	[ ] Unresponsive    Side Effects:	[X ] None	[ ] Nausea	[ ] Vomiting	[ ] Pruritus  		[ ] Other:    Vital Signs Last 24 Hrs  T(C): 36.7 (09 Nov 2017 09:31), Max: 37.4 (08 Nov 2017 20:29)  T(F): 98 (09 Nov 2017 09:31), Max: 99.3 (08 Nov 2017 20:29)  HR: 75 (09 Nov 2017 09:31) (75 - 106)  BP: 115/72 (09 Nov 2017 09:31) (106/65 - 127/84)  BP(mean): --  RR: 16 (09 Nov 2017 09:31) (16 - 18)  SpO2: 95% (09 Nov 2017 09:31) (94% - 97%)    ASSESSMENT/ PLAN    Therapy to  be:               [X] Continued   [ ] Discontinued   [ ] Changed to PRN Analgesics    Documentation and Verification of current medications:   [X] Done	[ ] Not done, not eligible    Comments:

## 2017-11-09 NOTE — PROGRESS NOTE ADULT - SUBJECTIVE AND OBJECTIVE BOX
ACS surgery Progress Note     Subjective: Feeling better since endoscopy/ drainage, pain greatly improved, nausea improved, vomited overnight but none this morning     PE: NAD AAOx3  abd soft decreased distention, minimal epigastric tenderness- greatly decreased     Vital Signs Last 24 Hrs  T(C): 36.7 (09 Nov 2017 09:31), Max: 37.4 (08 Nov 2017 20:29)  T(F): 98 (09 Nov 2017 09:31), Max: 99.3 (08 Nov 2017 20:29)  HR: 75 (09 Nov 2017 09:31) (75 - 106)  BP: 115/72 (09 Nov 2017 09:31) (106/65 - 127/84)  BP(mean): --  RR: 16 (09 Nov 2017 09:31) (16 - 18)  SpO2: 95% (09 Nov 2017 09:31) (94% - 97%)    I&O's Detail    08 Nov 2017 07:01  -  09 Nov 2017 07:00  --------------------------------------------------------  IN:    dextrose 5% + sodium chloride 0.45% with potassium chloride 20 mEq/L: 2375 mL    Solution: 300 mL    Solution: 200 mL    TPN (Total Parenteral Nutrition): 1320 mL  Total IN: 4195 mL    OUT:    Emesis: 225 mL    Voided: 800 mL  Total OUT: 1025 mL    Total NET: 3170 mL          Daily     Daily     MEDICATIONS  (STANDING):  ciprofloxacin   IVPB      ciprofloxacin   IVPB 400 milliGRAM(s) IV Intermittent every 12 hours  dextrose 5% + sodium chloride 0.45% with potassium chloride 20 mEq/L 1000 milliLiter(s) (125 mL/Hr) IV Continuous <Continuous>  enoxaparin Injectable 40 milliGRAM(s) SubCutaneous daily  fentaNYL   Patch  50 MICROgram(s)/Hr 1 Patch Transdermal every 72 hours  HYDROmorphone  Injectable 1 milliGRAM(s) IV Push once  HYDROmorphone PCA (1 mG/mL) 30 milliLiter(s) PCA Continuous PCA Continuous  pantoprazole  Injectable 40 milliGRAM(s) IV Push daily    MEDICATIONS  (PRN):  diphenhydrAMINE   Injectable 25 milliGRAM(s) IV Push every 6 hours PRN Rash and/or Itching  HYDROmorphone PCA (1 mG/mL) Rescue Clinician Bolus 0.5 milliGRAM(s) IV Push every 15 minutes PRN for Pain Scale GREATER THAN 6  naloxone Injectable 0.1 milliGRAM(s) IV Push every 3 minutes PRN For ANY of the following changes in patient status:  A. RR LESS THAN 10 breaths per minute, B. Oxygen saturation LESS THAN 90%, C. Sedation score of 6  ondansetron Injectable 4 milliGRAM(s) IV Push every 6 hours PRN Nausea      LABS:                        7.7    12.14 )-----------( 905      ( 09 Nov 2017 07:29 )             23.9     11-09    138  |  101  |  7   ----------------------------<  134<H>  4.5   |  23  |  0.31<L>    Ca    8.3<L>      09 Nov 2017 07:28  Phos  3.1     11-08  Mg     1.9     11-08    TPro  6.7  /  Alb  2.1<L>  /  TBili  <0.2  /  DBili  x   /  AST  40  /  ALT  12  /  AlkPhos  290<H>  11-09    PT/INR - ( 08 Nov 2017 01:20 )   PT: 14.0 sec;   INR: 1.29 ratio         PTT - ( 08 Nov 2017 01:20 )  PTT:44.5 sec      RADIOLOGY & ADDITIONAL STUDIES:

## 2017-11-09 NOTE — PROGRESS NOTE ADULT - ASSESSMENT
57 y.o. woman with history of nephrolithiasis, HTN, and appendectomy admitted on 10/16 for gallstone pancreatitis c/b necrotizing pancreatitis and ascending cholangitis s/p ERCP and stent placement (10/21) and FNA of perihepatic fluid (10/31), s/p endoscopic cystgastrostomy placement of 2 metal stents 11/8  -improved symptoms post procedure   -will monitor nausea/vomiting continue TPN until resolves   -OOB ambulate     Dottie LAU   ACS Sx [id number]

## 2017-11-09 NOTE — PROGRESS NOTE ADULT - SUBJECTIVE AND OBJECTIVE BOX
POST-PROCEDURENOTE  STATUS POST: EUS cystgastrostomy     SUBJECTIVE:   Patient was seen and examined this morning. There was no acute event post procedure. She had an episode of bilious vomiting during the procedure, and another episode when she was transferred back to . She is resting comfortably in bed. Pain is well controlled. She does not report pain, fever, n/v, chest pain, or shortness of breath.     Vital Signs Last 24 Hrs  T(C): 37.1 (09 Nov 2017 06:12), Max: 37.4 (08 Nov 2017 20:29)  T(F): 98.8 (09 Nov 2017 06:12), Max: 99.3 (08 Nov 2017 20:29)  HR: 80 (09 Nov 2017 06:12) (80 - 106)  BP: 106/65 (09 Nov 2017 06:12) (106/65 - 127/84)  BP(mean): --  RR: 18 (09 Nov 2017 06:12) (18 - 18)  SpO2: 96% (09 Nov 2017 06:12) (94% - 97%)  I&O's Summary    08 Nov 2017 07:01  -  09 Nov 2017 07:00  --------------------------------------------------------  IN: 4195 mL / OUT: 1025 mL / NET: 3170 mL      I&O's Detail    08 Nov 2017 07:01  -  09 Nov 2017 07:00  --------------------------------------------------------  IN:    dextrose 5% + sodium chloride 0.45% with potassium chloride 20 mEq/L: 2375 mL    Solution: 300 mL    Solution: 200 mL    TPN (Total Parenteral Nutrition): 1320 mL  Total IN: 4195 mL    OUT:    Emesis: 225 mL    Voided: 800 mL  Total OUT: 1025 mL    Total NET: 3170 mL          MEDICATIONS  (STANDING):  ciprofloxacin   IVPB      dextrose 5% + sodium chloride 0.45% with potassium chloride 20 mEq/L 1000 milliLiter(s) (125 mL/Hr) IV Continuous <Continuous>  enoxaparin Injectable 40 milliGRAM(s) SubCutaneous daily  fentaNYL   Patch  50 MICROgram(s)/Hr 1 Patch Transdermal every 72 hours  HYDROmorphone  Injectable 1 milliGRAM(s) IV Push once  HYDROmorphone PCA (1 mG/mL) 30 milliLiter(s) PCA Continuous PCA Continuous  pantoprazole  Injectable 40 milliGRAM(s) IV Push daily    MEDICATIONS  (PRN):  diphenhydrAMINE   Injectable 25 milliGRAM(s) IV Push every 6 hours PRN Rash and/or Itching  HYDROmorphone PCA (1 mG/mL) Rescue Clinician Bolus 0.5 milliGRAM(s) IV Push every 15 minutes PRN for Pain Scale GREATER THAN 6  naloxone Injectable 0.1 milliGRAM(s) IV Push every 3 minutes PRN For ANY of the following changes in patient status:  A. RR LESS THAN 10 breaths per minute, B. Oxygen saturation LESS THAN 90%, C. Sedation score of 6  ondansetron Injectable 4 milliGRAM(s) IV Push every 6 hours PRN Nausea      LABS:                        8.0    10.7  )-----------( 931      ( 08 Nov 2017 01:20 )             24.1     11-08    135  |  98  |  8   ----------------------------<  116<H>  4.4   |  25  |  0.36<L>    Ca    8.2<L>      08 Nov 2017 01:20  Phos  3.1     11-08  Mg     1.9     11-08    TPro  6.4  /  Alb  2.5<L>  /  TBili  <0.2  /  DBili  x   /  AST  17  /  ALT  <4<L>  /  AlkPhos  115  11-07    PT/INR - ( 08 Nov 2017 01:20 )   PT: 14.0 sec;   INR: 1.29 ratio         PTT - ( 08 Nov 2017 01:20 )  PTT:44.5 sec      RADIOLOGY & ADDITIONAL STUDIES:    EUS:   - Extrinsic compression of the stomach.   - Walled off necrosis cavity containing fluid and debris. Two AXIOS lumen apposing metal stents were successfully placed in different portions of the cavity and copious purulent material drained into the stomach.      PHYSICAL EXAM:  Gen: Well-nourished, well-developed, A&O x3, resting in bed in no acute distress  CV: RRR  Resp: Patent airways, unlabored   Abdomen: Soft, moderate diffuse epigastric tenderness, mildly distended (improved compare to pre-procedure)  Extremities: All 4 extremities warm and well perfused, no edema       A/P: 57y Female s/p EUS cystgastrostomy     - Pain control: Fentanyl Patch, PCA  - Diet: NPO except med  - DVT ppx: Lovenox  - Abx: continue with Ciprofloxacin   - Monitor GI and  function

## 2017-11-09 NOTE — PROGRESS NOTE ADULT - ASSESSMENT
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    Impression:  1) Infected pancreatic necrosis s/p endoscopic drainage    Plan:  - IV abx  - IV fluids with LR  - analgesia per primary team  - CT scan in 1 week to assess stents and necrosis collection

## 2017-11-10 LAB
ALBUMIN SERPL ELPH-MCNC: 2.5 G/DL — LOW (ref 3.3–5)
ALP SERPL-CCNC: 422 U/L — HIGH (ref 40–120)
ALT FLD-CCNC: 36 U/L — SIGNIFICANT CHANGE UP (ref 10–45)
ANION GAP SERPL CALC-SCNC: 14 MMOL/L — SIGNIFICANT CHANGE UP (ref 5–17)
APPEARANCE UR: CLEAR — SIGNIFICANT CHANGE UP
AST SERPL-CCNC: 80 U/L — HIGH (ref 10–40)
BASOPHILS # BLD AUTO: 0.08 K/UL — SIGNIFICANT CHANGE UP (ref 0–0.2)
BASOPHILS NFR BLD AUTO: 0.6 % — SIGNIFICANT CHANGE UP (ref 0–2)
BILIRUB SERPL-MCNC: <0.2 MG/DL — SIGNIFICANT CHANGE UP (ref 0.2–1.2)
BILIRUB UR-MCNC: NEGATIVE — SIGNIFICANT CHANGE UP
BUN SERPL-MCNC: 7 MG/DL — SIGNIFICANT CHANGE UP (ref 7–23)
CALCIUM SERPL-MCNC: 8.5 MG/DL — SIGNIFICANT CHANGE UP (ref 8.4–10.5)
CHLORIDE SERPL-SCNC: 99 MMOL/L — SIGNIFICANT CHANGE UP (ref 96–108)
CO2 SERPL-SCNC: 24 MMOL/L — SIGNIFICANT CHANGE UP (ref 22–31)
COLOR SPEC: YELLOW — SIGNIFICANT CHANGE UP
CREAT SERPL-MCNC: 0.27 MG/DL — LOW (ref 0.5–1.3)
DIFF PNL FLD: NEGATIVE — SIGNIFICANT CHANGE UP
EOSINOPHIL # BLD AUTO: 0.43 K/UL — SIGNIFICANT CHANGE UP (ref 0–0.5)
EOSINOPHIL NFR BLD AUTO: 3 % — SIGNIFICANT CHANGE UP (ref 0–6)
GLUCOSE SERPL-MCNC: 102 MG/DL — HIGH (ref 70–99)
GLUCOSE UR QL: NEGATIVE MG/DL — SIGNIFICANT CHANGE UP
HCT VFR BLD CALC: 23.4 % — LOW (ref 34.5–45)
HGB BLD-MCNC: 7.6 G/DL — LOW (ref 11.5–15.5)
IMM GRANULOCYTES NFR BLD AUTO: 0.8 % — SIGNIFICANT CHANGE UP (ref 0–1.5)
KETONES UR-MCNC: NEGATIVE — SIGNIFICANT CHANGE UP
LEUKOCYTE ESTERASE UR-ACNC: NEGATIVE — SIGNIFICANT CHANGE UP
LYMPHOCYTES # BLD AUTO: 1.99 K/UL — SIGNIFICANT CHANGE UP (ref 1–3.3)
LYMPHOCYTES # BLD AUTO: 13.7 % — SIGNIFICANT CHANGE UP (ref 13–44)
MCHC RBC-ENTMCNC: 27.6 PG — SIGNIFICANT CHANGE UP (ref 27–34)
MCHC RBC-ENTMCNC: 32.5 GM/DL — SIGNIFICANT CHANGE UP (ref 32–36)
MCV RBC AUTO: 85.1 FL — SIGNIFICANT CHANGE UP (ref 80–100)
MONOCYTES # BLD AUTO: 1.05 K/UL — HIGH (ref 0–0.9)
MONOCYTES NFR BLD AUTO: 7.2 % — SIGNIFICANT CHANGE UP (ref 2–14)
NEUTROPHILS # BLD AUTO: 10.86 K/UL — HIGH (ref 1.8–7.4)
NEUTROPHILS NFR BLD AUTO: 74.7 % — SIGNIFICANT CHANGE UP (ref 43–77)
NITRITE UR-MCNC: NEGATIVE — SIGNIFICANT CHANGE UP
PH UR: 8 — SIGNIFICANT CHANGE UP (ref 5–8)
PLATELET # BLD AUTO: 913 K/UL — HIGH (ref 150–400)
POTASSIUM SERPL-MCNC: 4.5 MMOL/L — SIGNIFICANT CHANGE UP (ref 3.5–5.3)
POTASSIUM SERPL-SCNC: 4.5 MMOL/L — SIGNIFICANT CHANGE UP (ref 3.5–5.3)
PROT SERPL-MCNC: 6.8 G/DL — SIGNIFICANT CHANGE UP (ref 6–8.3)
PROT UR-MCNC: NEGATIVE MG/DL — SIGNIFICANT CHANGE UP
RBC # BLD: 2.75 M/UL — LOW (ref 3.8–5.2)
RBC # FLD: 15.1 % — HIGH (ref 10.3–14.5)
SODIUM SERPL-SCNC: 137 MMOL/L — SIGNIFICANT CHANGE UP (ref 135–145)
SP GR SPEC: 1.01 — SIGNIFICANT CHANGE UP (ref 1.01–1.02)
UROBILINOGEN FLD QL: NEGATIVE MG/DL — SIGNIFICANT CHANGE UP
WBC # BLD: 14.52 K/UL — HIGH (ref 3.8–10.5)
WBC # FLD AUTO: 14.52 K/UL — HIGH (ref 3.8–10.5)

## 2017-11-10 PROCEDURE — 99232 SBSQ HOSP IP/OBS MODERATE 35: CPT | Mod: GC

## 2017-11-10 RX ORDER — ACETAMINOPHEN 500 MG
1000 TABLET ORAL ONCE
Qty: 0 | Refills: 0 | Status: COMPLETED | OUTPATIENT
Start: 2017-11-11 | End: 2017-11-11

## 2017-11-10 RX ORDER — DIPHENHYDRAMINE HCL 50 MG
25 CAPSULE ORAL ONCE
Qty: 0 | Refills: 0 | Status: COMPLETED | OUTPATIENT
Start: 2017-11-10 | End: 2017-11-10

## 2017-11-10 RX ORDER — ACETAMINOPHEN 500 MG
1000 TABLET ORAL ONCE
Qty: 0 | Refills: 0 | Status: COMPLETED | OUTPATIENT
Start: 2017-11-10 | End: 2017-11-10

## 2017-11-10 RX ORDER — ACETAMINOPHEN 500 MG
1000 TABLET ORAL ONCE
Qty: 0 | Refills: 0 | Status: DISCONTINUED | OUTPATIENT
Start: 2017-11-10 | End: 2017-11-10

## 2017-11-10 RX ORDER — HYDROMORPHONE HYDROCHLORIDE 2 MG/ML
30 INJECTION INTRAMUSCULAR; INTRAVENOUS; SUBCUTANEOUS
Qty: 0 | Refills: 0 | Status: DISCONTINUED | OUTPATIENT
Start: 2017-11-10 | End: 2017-11-16

## 2017-11-10 RX ADMIN — HYDROMORPHONE HYDROCHLORIDE 0.5 MILLIGRAM(S): 2 INJECTION INTRAMUSCULAR; INTRAVENOUS; SUBCUTANEOUS at 05:01

## 2017-11-10 RX ADMIN — HYDROMORPHONE HYDROCHLORIDE 30 MILLILITER(S): 2 INJECTION INTRAMUSCULAR; INTRAVENOUS; SUBCUTANEOUS at 19:33

## 2017-11-10 RX ADMIN — Medication 25 MILLIGRAM(S): at 03:15

## 2017-11-10 RX ADMIN — Medication 1000 MILLIGRAM(S): at 15:14

## 2017-11-10 RX ADMIN — ENOXAPARIN SODIUM 40 MILLIGRAM(S): 100 INJECTION SUBCUTANEOUS at 12:16

## 2017-11-10 RX ADMIN — HYDROMORPHONE HYDROCHLORIDE 0.5 MILLIGRAM(S): 2 INJECTION INTRAMUSCULAR; INTRAVENOUS; SUBCUTANEOUS at 21:49

## 2017-11-10 RX ADMIN — Medication 400 MILLIGRAM(S): at 20:33

## 2017-11-10 RX ADMIN — HYDROMORPHONE HYDROCHLORIDE 0.5 MILLIGRAM(S): 2 INJECTION INTRAMUSCULAR; INTRAVENOUS; SUBCUTANEOUS at 10:32

## 2017-11-10 RX ADMIN — Medication 200 MILLIGRAM(S): at 17:11

## 2017-11-10 RX ADMIN — HYDROMORPHONE HYDROCHLORIDE 0.5 MILLIGRAM(S): 2 INJECTION INTRAMUSCULAR; INTRAVENOUS; SUBCUTANEOUS at 02:13

## 2017-11-10 RX ADMIN — Medication 25 MILLIGRAM(S): at 22:53

## 2017-11-10 RX ADMIN — Medication 400 MILLIGRAM(S): at 14:44

## 2017-11-10 RX ADMIN — PANTOPRAZOLE SODIUM 40 MILLIGRAM(S): 20 TABLET, DELAYED RELEASE ORAL at 12:16

## 2017-11-10 RX ADMIN — Medication 25 MILLIGRAM(S): at 11:14

## 2017-11-10 RX ADMIN — Medication 200 MILLIGRAM(S): at 05:00

## 2017-11-10 RX ADMIN — HYDROMORPHONE HYDROCHLORIDE 30 MILLILITER(S): 2 INJECTION INTRAMUSCULAR; INTRAVENOUS; SUBCUTANEOUS at 07:11

## 2017-11-10 RX ADMIN — Medication 25 MILLIGRAM(S): at 17:08

## 2017-11-10 RX ADMIN — Medication 1000 MILLIGRAM(S): at 21:03

## 2017-11-10 RX ADMIN — HYDROMORPHONE HYDROCHLORIDE 30 MILLILITER(S): 2 INJECTION INTRAMUSCULAR; INTRAVENOUS; SUBCUTANEOUS at 11:19

## 2017-11-10 NOTE — PROGRESS NOTE ADULT - SUBJECTIVE AND OBJECTIVE BOX
Pain Management Attending Addendum    SUBJECTIVE:    Therapy:	  [ ] IV PCA	   [x ] Epidural           [ ] s/p Spinal Opoid              [ ] Postpartum infusion	  [ ] Patient controlled regional anesthesia (PCRA)    [ ] prn Analgesics    OBJECTIVE:   [x ] No new signs     [ ] Other:    Side Effects:  [ x] None			[ ] Other:    Assessment of Catheter Site:		[x ] Intact		[ ] Other:    ASSESSMENT/PLAN  [x ] Continue current therapy    [ ] Therapy changed to:    [ ] IV PCA       [ ] Epidural     [ ] prn Analgesics     [ ] post partum infusion    Comments:

## 2017-11-10 NOTE — PROGRESS NOTE ADULT - SUBJECTIVE AND OBJECTIVE BOX
Surgery Trauma Team Progress Note    SUBJECTIVE:   Patient was seen and examined this morning. There was no acute event overnight. She has been in moderate pain last night after the acute pain team decreased her dose of PCA. Acute pain team was consulted at night and her PCA dose was adjusted. She was also nausous and received Zofran. This morning she is resting comfortably in bed. Pain is better controlled. She does not report fever, n/v, chest pain, or shortness of breath. She is on TPN. She has burning sensation with urination.     OBJECTIVE:   T(C): 37.2 (11-10-17 @ 09:00), Max: 37.4 (11-10-17 @ 05:01)  HR: 94 (11-10-17 @ 09:00) (71 - 94)  BP: 115/69 (11-10-17 @ 09:00) (115/69 - 124/79)  RR: 18 (11-10-17 @ 09:00) (16 - 18)  SpO2: 94% (11-10-17 @ 09:00) (94% - 99%)  Wt(kg): --  CAPILLARY BLOOD GLUCOSE        I&O's Detail    09 Nov 2017 07:01  -  10 Nov 2017 07:00  --------------------------------------------------------  IN:    dextrose 5% + sodium chloride 0.45% with potassium chloride 20 mEq/L: 2500 mL    Solution: 100 mL    Solution: 400 mL    TPN (Total Parenteral Nutrition): 880 mL  Total IN: 3880 mL    OUT:    Voided: 400 mL  Total OUT: 400 mL    Total NET: 3480 mL      10 Nov 2017 07:01  -  10 Nov 2017 12:20  --------------------------------------------------------  IN:  Total IN: 0 mL    OUT:    Voided: 300 mL  Total OUT: 300 mL    Total NET: -300 mL        PHYSICAL EXAM:  Gen: Well-nourished, well-developed, A&O x3, resting in bed in no acute distress with PICC line  CV: RRR  Resp: Patent airways, unlabored   Abdomen: Soft, diffuse epigastric tender, mildly distended (improving)  Extremities: All 4 extremities warm and well perfused, no edema

## 2017-11-10 NOTE — PROGRESS NOTE ADULT - ASSESSMENT
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 Patient still with abd pain and leukocytosis likely due to pancreatic necrosis, but she feels better since endoscopic drainage.     Impression:  1) Infected pancreatic necrosis s/p endoscopic drainage    Plan:  - IV abx  - IV fluids with LR  - analgesia per primary team and pain management  - CT scan in 1 week to assess stents and necrosis collection

## 2017-11-10 NOTE — PROGRESS NOTE ADULT - ATTENDING COMMENTS
Agree with above.  Would also recommend discontinuing proton pump inhibitor (Protonix) to allow acid to help liquify the necrosis via the transgastric stent.

## 2017-11-10 NOTE — PROGRESS NOTE ADULT - ATTENDING COMMENTS
Pt continues to have less epigastric pain after endoscopic cystgastrostomy, but is still requiring PCA for pain control. No N/V. D/c Protonix tomorrow AM per GI. Continue TPN for protein malnutrition. Encourage ambulation.

## 2017-11-10 NOTE — PROGRESS NOTE ADULT - SUBJECTIVE AND OBJECTIVE BOX
SUBJECTIVE:  - still with abd pain and nausea, no vomitng  - Patient feels ok today  - she is on abx with cipro  - she is on TPN      OBJECTIVE:    MEDICATIONS  (STANDING):  ciprofloxacin   IVPB      ciprofloxacin   IVPB 400 milliGRAM(s) IV Intermittent every 12 hours  dextrose 5% + sodium chloride 0.45% with potassium chloride 20 mEq/L 1000 milliLiter(s) (125 mL/Hr) IV Continuous <Continuous>  enoxaparin Injectable 40 milliGRAM(s) SubCutaneous daily  fentaNYL   Patch  50 MICROgram(s)/Hr 1 Patch Transdermal every 72 hours  HYDROmorphone PCA (1 mG/mL) 30 milliLiter(s) PCA Continuous PCA Continuous  pantoprazole  Injectable 40 milliGRAM(s) IV Push daily    MEDICATIONS  (PRN):  diphenhydrAMINE   Injectable 25 milliGRAM(s) IV Push every 6 hours PRN Rash and/or Itching  HYDROmorphone PCA (1 mG/mL) Rescue Clinician Bolus 0.5 milliGRAM(s) IV Push every 15 minutes PRN for Pain Scale GREATER THAN 6  naloxone Injectable 0.1 milliGRAM(s) IV Push every 3 minutes PRN For ANY of the following changes in patient status:  A. RR LESS THAN 10 breaths per minute, B. Oxygen saturation LESS THAN 90%, C. Sedation score of 6  ondansetron Injectable 4 milliGRAM(s) IV Push every 6 hours PRN Nausea          Vital Signs Last 24 Hrs  T(C): 37.2 (10 Nov 2017 09:00), Max: 37.4 (10 Nov 2017 05:01)  T(F): 99 (10 Nov 2017 09:00), Max: 99.3 (10 Nov 2017 05:01)  HR: 94 (10 Nov 2017 09:00) (71 - 94)  BP: 115/69 (10 Nov 2017 09:00) (115/69 - 124/79)  BP(mean): --  RR: 18 (10 Nov 2017 09:00) (18 - 18)  SpO2: 94% (10 Nov 2017 09:00) (94% - 99%)    PHYSICAL EXAM:  Constitutional: no acute distress; NGT in place  Eyes: no icterus  Neck: no masses, no LAD  Respiratory: normal inspiratory effort; no wheezing or crackles  Cardiovascular: RRR, normal S1/S2, no murmurs/rubs/gallops  Gastrointestinal: soft, slightly distended, less tender  Extremities: no LE edema  Neurological: AAOx3, no asterixis  Skin: no rashes, bruises, petechiae    LABS:                                                         7.6    14.52 )-----------( 913      ( 10 Nov 2017 08:29 )             23.4     11-10    137  |  99  |  7   ----------------------------<  102<H>  4.5   |  24  |  0.27<L>    Ca    8.5      10 Nov 2017 08:31    TPro  6.8  /  Alb  2.5<L>  /  TBili  <0.2  /  DBili  x   /  AST  80<H>  /  ALT  36  /  AlkPhos  422<H>  11-10 SUBJECTIVE:  - still with abd pain and nausea, no vomitng  - Patient feels better today than prior to the procedure  - she is on abx with cipro  - she is on TPN      OBJECTIVE:    MEDICATIONS  (STANDING):  ciprofloxacin   IVPB      ciprofloxacin   IVPB 400 milliGRAM(s) IV Intermittent every 12 hours  dextrose 5% + sodium chloride 0.45% with potassium chloride 20 mEq/L 1000 milliLiter(s) (125 mL/Hr) IV Continuous <Continuous>  enoxaparin Injectable 40 milliGRAM(s) SubCutaneous daily  fentaNYL   Patch  50 MICROgram(s)/Hr 1 Patch Transdermal every 72 hours  HYDROmorphone PCA (1 mG/mL) 30 milliLiter(s) PCA Continuous PCA Continuous  pantoprazole  Injectable 40 milliGRAM(s) IV Push daily    MEDICATIONS  (PRN):  diphenhydrAMINE   Injectable 25 milliGRAM(s) IV Push every 6 hours PRN Rash and/or Itching  HYDROmorphone PCA (1 mG/mL) Rescue Clinician Bolus 0.5 milliGRAM(s) IV Push every 15 minutes PRN for Pain Scale GREATER THAN 6  naloxone Injectable 0.1 milliGRAM(s) IV Push every 3 minutes PRN For ANY of the following changes in patient status:  A. RR LESS THAN 10 breaths per minute, B. Oxygen saturation LESS THAN 90%, C. Sedation score of 6  ondansetron Injectable 4 milliGRAM(s) IV Push every 6 hours PRN Nausea          Vital Signs Last 24 Hrs  T(C): 37.2 (10 Nov 2017 09:00), Max: 37.4 (10 Nov 2017 05:01)  T(F): 99 (10 Nov 2017 09:00), Max: 99.3 (10 Nov 2017 05:01)  HR: 94 (10 Nov 2017 09:00) (71 - 94)  BP: 115/69 (10 Nov 2017 09:00) (115/69 - 124/79)  BP(mean): --  RR: 18 (10 Nov 2017 09:00) (18 - 18)  SpO2: 94% (10 Nov 2017 09:00) (94% - 99%)    PHYSICAL EXAM:  Constitutional: no acute distress  Eyes: no icterus  Neck: no masses, no LAD  Respiratory: normal inspiratory effort; no wheezing or crackles  Cardiovascular: RRR, normal S1/S2, no murmurs/rubs/gallops  Gastrointestinal: soft, slightly distended, tender throughout but improved from prior  Extremities: no LE edema  Neurological: AAOx3, no asterixis  Skin: no rashes, bruises, petechiae    LABS:                                                         7.6    14.52 )-----------( 913      ( 10 Nov 2017 08:29 )             23.4     11-10    137  |  99  |  7   ----------------------------<  102<H>  4.5   |  24  |  0.27<L>    Ca    8.5      10 Nov 2017 08:31    TPro  6.8  /  Alb  2.5<L>  /  TBili  <0.2  /  DBili  x   /  AST  80<H>  /  ALT  36  /  AlkPhos  422<H>  11-10

## 2017-11-10 NOTE — PROGRESS NOTE ADULT - ASSESSMENT
ASSESSMENT: Elba Coleman is a 57 y.o. woman with history of nephrolithiasis, HTN, and appendectomy admitted on 10/16 for gallstone pancreatitis c/b necrotizing pancreatitis and ascending cholangitis s/p ERCP and stent placement (10/21) and FNA of perihepatic fluid (10/31), which grew E. faecalis. She is s/p cystgastrostomy with 2 axiom stents placement by GI on 11/8. She was not able to tolerate tube feeding and now is receiving TPN. Significantly increased pain last night, but her pain is better after PCA was adjusted. lab is significant for leukocytosis ( 12.1 -> 14.5)    PLAN:   - DVT ppx: Lovenox   - Leukocytosis: sent UA will f/u with result, also likely 2/2 necrotizing pancreatitis. Abx: continue with cipro therapeutic   - malnutrition: continue with TPN at 55 cc/hr today  - Pain control: PCA, fentanyl patch, Tylenol IV,

## 2017-11-10 NOTE — PROGRESS NOTE ADULT - SUBJECTIVE AND OBJECTIVE BOX
Day 24 of Anesthesia Pain Management Service    SUBJECTIVE: Patient is resting comfortably    Pain Scale Score:	[X] Refer to charted pain scores    THERAPY:    [ ] IV PCA Morphine		[ ] 5 mg/mL	[ ] 1 mg/mL  [X] IV PCA Hydromorphone	[ ] 5 mg/mL	[X] 1 mg/mL  [ ] IV PCA Fentanyl		[ ] 50 micrograms/mL    Demand dose: 0.5 mg     Lockout: 6 minutes   Continuous Rate: 0 mg/hr  4 Hour Limit: 6 mg    MEDICATIONS  (STANDING):  ciprofloxacin   IVPB      ciprofloxacin   IVPB 400 milliGRAM(s) IV Intermittent every 12 hours  dextrose 5% + sodium chloride 0.45% with potassium chloride 20 mEq/L 1000 milliLiter(s) (125 mL/Hr) IV Continuous <Continuous>  enoxaparin Injectable 40 milliGRAM(s) SubCutaneous daily  fentaNYL   Patch  50 MICROgram(s)/Hr 1 Patch Transdermal every 72 hours  HYDROmorphone PCA (1 mG/mL) 30 milliLiter(s) PCA Continuous PCA Continuous  pantoprazole  Injectable 40 milliGRAM(s) IV Push daily    MEDICATIONS  (PRN):  diphenhydrAMINE   Injectable 25 milliGRAM(s) IV Push every 6 hours PRN Rash and/or Itching  HYDROmorphone PCA (1 mG/mL) Rescue Clinician Bolus 0.5 milliGRAM(s) IV Push every 15 minutes PRN for Pain Scale GREATER THAN 6  naloxone Injectable 0.1 milliGRAM(s) IV Push every 3 minutes PRN For ANY of the following changes in patient status:  A. RR LESS THAN 10 breaths per minute, B. Oxygen saturation LESS THAN 90%, C. Sedation score of 6  ondansetron Injectable 4 milliGRAM(s) IV Push every 6 hours PRN Nausea      OBJECTIVE:    Sedation Score:	[  ] Alert	[ ] Drowsy 	[ ] Arousable	[ X] Asleep	[ ] Unresponsive    Side Effects:	[X ] None	[ ] Nausea	[ ] Vomiting	[ ] Pruritus  		[ ] Other:    Vital Signs Last 24 Hrs  T(C): 37.4 (10 Nov 2017 05:01), Max: 37.4 (10 Nov 2017 05:01)  T(F): 99.3 (10 Nov 2017 05:01), Max: 99.3 (10 Nov 2017 05:01)  HR: 71 (10 Nov 2017 05:01) (71 - 88)  BP: 124/79 (10 Nov 2017 05:01) (115/72 - 124/79)  BP(mean): --  RR: 18 (10 Nov 2017 05:01) (16 - 18)  SpO2: 99% (10 Nov 2017 05:01) (94% - 99%)    ASSESSMENT/ PLAN    Therapy to  be:               [X] Continued   [ ] Discontinued   [ ] Changed to PRN Analgesics    Documentation and Verification of current medications:   [X] Done	[ ] Not done, not eligible    Comments:  Reporting abdominal pain earlier this morning requiring IVP Dilaudid. Reaching four hour limit. Increased limit to 9mg. Continue PCA.

## 2017-11-10 NOTE — CHART NOTE - NSCHARTNOTEFT_GEN_A_CORE
Pt seen for malnutrition follow up and initiation of TPN, as per department protocol.    Hospital Course: Pt is a 58 yo woman with history of nephrolithiasis, HTN, and appendectomy admitted on 10/16 for gallstone pancreatitis c/b necrotizing pancreatitis and ascending cholangitis S/P ERCP and stent placement (10/21) and FNA of perihepatic fluid (10/31), which grew E. faecalis. Pt now S/P endoscopic drainage of pancreatic necrosis, cystgastrostomy placement of 2 metal stents 11/8.      Pt admitted 10/16, received intermittent po diet of low fat clears and enteral feeds of Jevity 1.2 with poor tolerance (per notes, tiger tube was in duodenum during periods of poor enteral tolerance). Pt was NPO since 10/27; TPN ordered 11/5. Per Nutrition Support MD, PN will continue until GI access can be obtained and pt is tolerating enteral diet.    Pt noted with 2 episodes of emesis on 11/9; pt reports even ice chips are triggering N+V.    Source: Patient [X ]    Family [X ]     other [ X]; medical record, Unit RD    Diet : NPO except medications; TPN Day #6    Per chart, last noted BM on 10/29.      Enteral /Parenteral Nutrition: TPN infusing at 55ml/hr (84Gm amino acids, 192Gm dextrose, 57ml 20%lipids) to [id number] (16.6 pollo/Kg, 1.26 Gm protein/Kg per dosing wt 66.5Kg); with 10cc MVI 9+5, 3cc MTE-5.     Weight: 69.5Kg (10/26), 66.5Kg (10/17); weight change likely reflects fluid shifts  Edema: none noted    Pertinent Medications: MEDICATIONS  (STANDING):  ciprofloxacin   IVPB      ciprofloxacin   IVPB 400 milliGRAM(s) IV Intermittent every 12 hours  dextrose 5% + sodium chloride 0.45% with potassium chloride 20 mEq/L 1000 milliLiter(s) (125 mL/Hr) IV Continuous <Continuous>  enoxaparin Injectable 40 milliGRAM(s) SubCutaneous daily  fentaNYL   Patch  50 MICROgram(s)/Hr 1 Patch Transdermal every 72 hours  HYDROmorphone PCA (1 mG/mL) 30 milliLiter(s) PCA Continuous PCA Continuous  pantoprazole  Injectable 40 milliGRAM(s) IV Push daily    MEDICATIONS  (PRN):  diphenhydrAMINE   Injectable 25 milliGRAM(s) IV Push every 6 hours PRN Rash and/or Itching  HYDROmorphone PCA (1 mG/mL) Rescue Clinician Bolus 0.5 milliGRAM(s) IV Push every 15 minutes PRN for Pain Scale GREATER THAN 6  naloxone Injectable 0.1 milliGRAM(s) IV Push every 3 minutes PRN For ANY of the following changes in patient status:  A. RR LESS THAN 10 breaths per minute, B. Oxygen saturation LESS THAN 90%, C. Sedation score of 6  ondansetron Injectable 4 milliGRAM(s) IV Push every 6 hours PRN Nausea    Pertinent Labs:  11-10 Na137 mmol/L Glu 102 mg/dL<H> K+ 4.5 mmol/L Cr  0.27 mg/dL<L> BUN 7 mg/dL Phos n/a   Alb 2.5 g/dL<L>     Skin: no pressure injuries    Estimated Needs:   [ X] no change since previous assessment  [ ] recalculated:       Previous Nutrition Diagnosis:     [X ] Malnutrition; severe    Nutrition Diagnosis is [X ] ongoing; being addressed with PN    New Nutrition Diagnosis: [ X] not applicable     Interventions:     Recommend:  1) PN per MD Guallpa  2) Pending need for EN and tiger tube replacement and placement confirmation in the jejunum, recommend Vital 1.2, initiate at 10m, increase as tolerated to goal rate 50ml/hr x 24 hours to provide 1200 ml formula, 1440cal/day, 90 Gm protein/day, 973ml free water; meets 22cal/Kg and 1.4Gm/Kg protein per dosing wt 66.5Kg).   3) Monitor weight, lab values, skin, po intake and GI tolerance    Monitoring and Evaluation:   Follow up per protocol  RD to remain available for further nutritional interventions as indicated.   Anabela Molina, MS RD [facility], #[phone].

## 2017-11-11 LAB
ALBUMIN SERPL ELPH-MCNC: 2.6 G/DL — LOW (ref 3.3–5)
ALP SERPL-CCNC: 510 U/L — HIGH (ref 40–120)
ALT FLD-CCNC: 60 U/L — HIGH (ref 10–45)
AMYLASE P1 CFR SERPL: 63 U/L — SIGNIFICANT CHANGE UP (ref 25–125)
ANION GAP SERPL CALC-SCNC: 15 MMOL/L — SIGNIFICANT CHANGE UP (ref 5–17)
AST SERPL-CCNC: 71 U/L — HIGH (ref 10–40)
BASOPHILS # BLD AUTO: 0.11 K/UL — SIGNIFICANT CHANGE UP (ref 0–0.2)
BASOPHILS NFR BLD AUTO: 0.9 % — SIGNIFICANT CHANGE UP (ref 0–2)
BILIRUB SERPL-MCNC: 0.2 MG/DL — SIGNIFICANT CHANGE UP (ref 0.2–1.2)
BUN SERPL-MCNC: 8 MG/DL — SIGNIFICANT CHANGE UP (ref 7–23)
CALCIUM SERPL-MCNC: 8.4 MG/DL — SIGNIFICANT CHANGE UP (ref 8.4–10.5)
CHLORIDE SERPL-SCNC: 96 MMOL/L — SIGNIFICANT CHANGE UP (ref 96–108)
CO2 SERPL-SCNC: 23 MMOL/L — SIGNIFICANT CHANGE UP (ref 22–31)
CREAT SERPL-MCNC: 0.33 MG/DL — LOW (ref 0.5–1.3)
EOSINOPHIL # BLD AUTO: 0.71 K/UL — HIGH (ref 0–0.5)
EOSINOPHIL NFR BLD AUTO: 5.8 % — SIGNIFICANT CHANGE UP (ref 0–6)
GLUCOSE SERPL-MCNC: 113 MG/DL — HIGH (ref 70–99)
HCT VFR BLD CALC: 23.4 % — LOW (ref 34.5–45)
HGB BLD-MCNC: 7.6 G/DL — LOW (ref 11.5–15.5)
IMM GRANULOCYTES NFR BLD AUTO: 1.5 % — SIGNIFICANT CHANGE UP (ref 0–1.5)
LIDOCAIN IGE QN: 61 U/L — HIGH (ref 7–60)
LYMPHOCYTES # BLD AUTO: 1.69 K/UL — SIGNIFICANT CHANGE UP (ref 1–3.3)
LYMPHOCYTES # BLD AUTO: 13.7 % — SIGNIFICANT CHANGE UP (ref 13–44)
MCHC RBC-ENTMCNC: 27 PG — SIGNIFICANT CHANGE UP (ref 27–34)
MCHC RBC-ENTMCNC: 32.5 GM/DL — SIGNIFICANT CHANGE UP (ref 32–36)
MCV RBC AUTO: 83.3 FL — SIGNIFICANT CHANGE UP (ref 80–100)
MONOCYTES # BLD AUTO: 1.23 K/UL — HIGH (ref 0–0.9)
MONOCYTES NFR BLD AUTO: 10 % — SIGNIFICANT CHANGE UP (ref 2–14)
NEUTROPHILS # BLD AUTO: 8.41 K/UL — HIGH (ref 1.8–7.4)
NEUTROPHILS NFR BLD AUTO: 68.1 % — SIGNIFICANT CHANGE UP (ref 43–77)
PLATELET # BLD AUTO: 799 K/UL — HIGH (ref 150–400)
POTASSIUM SERPL-MCNC: 4.4 MMOL/L — SIGNIFICANT CHANGE UP (ref 3.5–5.3)
POTASSIUM SERPL-SCNC: 4.4 MMOL/L — SIGNIFICANT CHANGE UP (ref 3.5–5.3)
PROT SERPL-MCNC: 6.8 G/DL — SIGNIFICANT CHANGE UP (ref 6–8.3)
RBC # BLD: 2.81 M/UL — LOW (ref 3.8–5.2)
RBC # FLD: 15.1 % — HIGH (ref 10.3–14.5)
SODIUM SERPL-SCNC: 134 MMOL/L — LOW (ref 135–145)
WBC # BLD: 12.33 K/UL — HIGH (ref 3.8–10.5)
WBC # FLD AUTO: 12.33 K/UL — HIGH (ref 3.8–10.5)

## 2017-11-11 RX ORDER — KETOROLAC TROMETHAMINE 30 MG/ML
30 SYRINGE (ML) INJECTION EVERY 6 HOURS
Qty: 0 | Refills: 0 | Status: DISCONTINUED | OUTPATIENT
Start: 2017-11-11 | End: 2017-11-12

## 2017-11-11 RX ORDER — KETOROLAC TROMETHAMINE 30 MG/ML
30 SYRINGE (ML) INJECTION ONCE
Qty: 0 | Refills: 0 | Status: DISCONTINUED | OUTPATIENT
Start: 2017-11-11 | End: 2017-11-11

## 2017-11-11 RX ORDER — ACETAMINOPHEN 500 MG
1000 TABLET ORAL ONCE
Qty: 0 | Refills: 0 | Status: COMPLETED | OUTPATIENT
Start: 2017-11-11 | End: 2017-11-11

## 2017-11-11 RX ORDER — ACETAMINOPHEN 500 MG
1000 TABLET ORAL ONCE
Qty: 0 | Refills: 0 | Status: COMPLETED | OUTPATIENT
Start: 2017-11-12 | End: 2017-11-12

## 2017-11-11 RX ADMIN — HYDROMORPHONE HYDROCHLORIDE 30 MILLILITER(S): 2 INJECTION INTRAMUSCULAR; INTRAVENOUS; SUBCUTANEOUS at 03:30

## 2017-11-11 RX ADMIN — Medication 400 MILLIGRAM(S): at 01:45

## 2017-11-11 RX ADMIN — Medication 30 MILLIGRAM(S): at 18:53

## 2017-11-11 RX ADMIN — HYDROMORPHONE HYDROCHLORIDE 0.5 MILLIGRAM(S): 2 INJECTION INTRAMUSCULAR; INTRAVENOUS; SUBCUTANEOUS at 08:00

## 2017-11-11 RX ADMIN — HYDROMORPHONE HYDROCHLORIDE 30 MILLILITER(S): 2 INJECTION INTRAMUSCULAR; INTRAVENOUS; SUBCUTANEOUS at 07:21

## 2017-11-11 RX ADMIN — Medication 30 MILLIGRAM(S): at 18:36

## 2017-11-11 RX ADMIN — Medication 30 MILLIGRAM(S): at 12:17

## 2017-11-11 RX ADMIN — Medication 1000 MILLIGRAM(S): at 07:25

## 2017-11-11 RX ADMIN — HYDROMORPHONE HYDROCHLORIDE 30 MILLILITER(S): 2 INJECTION INTRAMUSCULAR; INTRAVENOUS; SUBCUTANEOUS at 21:34

## 2017-11-11 RX ADMIN — ENOXAPARIN SODIUM 40 MILLIGRAM(S): 100 INJECTION SUBCUTANEOUS at 11:47

## 2017-11-11 RX ADMIN — Medication 25 MILLIGRAM(S): at 18:37

## 2017-11-11 RX ADMIN — Medication 1000 MILLIGRAM(S): at 23:10

## 2017-11-11 RX ADMIN — Medication 200 MILLIGRAM(S): at 05:35

## 2017-11-11 RX ADMIN — Medication 25 MILLIGRAM(S): at 05:34

## 2017-11-11 RX ADMIN — Medication 400 MILLIGRAM(S): at 16:57

## 2017-11-11 RX ADMIN — Medication 200 MILLIGRAM(S): at 16:56

## 2017-11-11 RX ADMIN — HYDROMORPHONE HYDROCHLORIDE 0.5 MILLIGRAM(S): 2 INJECTION INTRAMUSCULAR; INTRAVENOUS; SUBCUTANEOUS at 21:36

## 2017-11-11 RX ADMIN — FENTANYL CITRATE 1 PATCH: 50 INJECTION INTRAVENOUS at 05:35

## 2017-11-11 RX ADMIN — DEXTROSE MONOHYDRATE, SODIUM CHLORIDE, AND POTASSIUM CHLORIDE 125 MILLILITER(S): 50; .745; 4.5 INJECTION, SOLUTION INTRAVENOUS at 11:47

## 2017-11-11 RX ADMIN — Medication 30 MILLIGRAM(S): at 11:47

## 2017-11-11 RX ADMIN — HYDROMORPHONE HYDROCHLORIDE 30 MILLILITER(S): 2 INJECTION INTRAMUSCULAR; INTRAVENOUS; SUBCUTANEOUS at 19:20

## 2017-11-11 RX ADMIN — Medication 400 MILLIGRAM(S): at 22:52

## 2017-11-11 RX ADMIN — Medication 1000 MILLIGRAM(S): at 17:27

## 2017-11-11 RX ADMIN — Medication 25 MILLIGRAM(S): at 11:46

## 2017-11-11 RX ADMIN — Medication 400 MILLIGRAM(S): at 08:01

## 2017-11-11 RX ADMIN — HYDROMORPHONE HYDROCHLORIDE 0.5 MILLIGRAM(S): 2 INJECTION INTRAMUSCULAR; INTRAVENOUS; SUBCUTANEOUS at 16:41

## 2017-11-11 NOTE — PROGRESS NOTE ADULT - SUBJECTIVE AND OBJECTIVE BOX
Day _2__ of Anesthesia Pain Management Service    SUBJECTIVE:    Pain Scale Score	At rest: ___ 	With Activity: ___ 	[x ] Refer to charted pain scores    THERAPY:    [ ] IV PCA Morphine		[ ] 5 mg/mL	[ ] 1 mg/mL  [x ] IV PCA Hydromorphone	[ ] 5 mg/mL	[x ] 1 mg/mL  [ ] IV PCA Fentanyl		[ ] 50 micrograms/mL    Demand dose 0.5    lockout 6   (minutes) Continuous Rate 0      MEDICATIONS  (STANDING):  acetaminophen  IVPB. 1000 milliGRAM(s) IV Intermittent once  acetaminophen  IVPB. 1000 milliGRAM(s) IV Intermittent once  ciprofloxacin   IVPB      ciprofloxacin   IVPB 400 milliGRAM(s) IV Intermittent every 12 hours  dextrose 5% + sodium chloride 0.45% with potassium chloride 20 mEq/L 1000 milliLiter(s) (125 mL/Hr) IV Continuous <Continuous>  enoxaparin Injectable 40 milliGRAM(s) SubCutaneous daily  fentaNYL   Patch  50 MICROgram(s)/Hr 1 Patch Transdermal every 72 hours  HYDROmorphone PCA (1 mG/mL) 30 milliLiter(s) PCA Continuous PCA Continuous    MEDICATIONS  (PRN):  diphenhydrAMINE   Injectable 25 milliGRAM(s) IV Push every 6 hours PRN Rash and/or Itching  HYDROmorphone PCA (1 mG/mL) Rescue Clinician Bolus 0.5 milliGRAM(s) IV Push every 15 minutes PRN for Pain Scale GREATER THAN 6  ketorolac   Injectable 30 milliGRAM(s) IV Push once PRN Breakthrough pain  naloxone Injectable 0.1 milliGRAM(s) IV Push every 3 minutes PRN For ANY of the following changes in patient status:  A. RR LESS THAN 10 breaths per minute, B. Oxygen saturation LESS THAN 90%, C. Sedation score of 6  ondansetron Injectable 4 milliGRAM(s) IV Push every 6 hours PRN Nausea      OBJECTIVE:    Sedation Score:	[x ] Alert	[ ] Drowsy 	[ ] Arousable	[ ] Asleep	[ ] Unresponsive    Side Effects:	[x ] None	[ ] Nausea	[ ] Vomiting	[ ] Pruritus  		[ ] Other:    Vital Signs Last 24 Hrs  T(C): 37.5 (11 Nov 2017 09:34), Max: 37.5 (11 Nov 2017 09:34)  T(F): 99.5 (11 Nov 2017 09:34), Max: 99.5 (11 Nov 2017 09:34)  HR: 83 (11 Nov 2017 09:34) (72 - 85)  BP: 117/71 (11 Nov 2017 09:34) (108/71 - 128/77)  BP(mean): --  RR: 18 (11 Nov 2017 09:34) (18 - 18)  SpO2: 94% (11 Nov 2017 09:34) (94% - 97%)    ASSESSMENT/ PLAN    Therapy to  be:	[x ] Continue   [ ] Discontinued   [ ] Change to prn Analgesics    Documentation and Verification of current medications:   [X] Done	[ ] Not done, not eligible    Comments:

## 2017-11-11 NOTE — PROGRESS NOTE ADULT - ATTENDING COMMENTS
Pt seen and examined on 11/11 at 10am, agree with above. Pt continues to have moderate epigastric pain associated with known necrotizing pancreatitis, better after endoscopic cystgastrostomy, no N/V. On cipro for infected pancreatic necrosis. Will repeat CT a/p on 8/17 (1 week after endoscopic procedure) to reassess collections.

## 2017-11-11 NOTE — PROGRESS NOTE ADULT - ASSESSMENT
ASSESSMENT: Symptomatic improvement. Abdomen remains tender, but less tender than previous days.    PLAN:  - Pain management with PCA, IV Tylenol, and other medications for breakthrough pain   - NPO  - TPN  - Ciprofloxacin

## 2017-11-11 NOTE — PROGRESS NOTE ADULT - SUBJECTIVE AND OBJECTIVE BOX
SUBJECTIVE:  Doing well.   No overnight events.     OBJECTIVE:     ** VITAL SIGNS / I&O's **    T(C): 37 (11-11-17 @ 22:30), Max: 37.5 (11-11-17 @ 09:34)  T(F): 98.6 (11-11-17 @ 22:30), Max: 99.5 (11-11-17 @ 09:34)  HR: 82 (11-11-17 @ 22:30) (75 - 84)  BP: 120/71 (11-11-17 @ 22:30) (109/70 - 128/77)  RR: 18 (11-11-17 @ 22:30) (18 - 18)  SpO2: 97% (11-11-17 @ 22:30) (94% - 98%)      10 Nov 2017 07:01  -  11 Nov 2017 07:00  --------------------------------------------------------  IN:    dextrose 5% + sodium chloride 0.45% with potassium chloride 20 mEq/L: 3000 mL    Solution: 200 mL    Solution: 200 mL    TPN (Total Parenteral Nutrition): 1320 mL  Total IN: 4720 mL    OUT:    Voided: 1450 mL  Total OUT: 1450 mL    Total NET: 3270 mL      11 Nov 2017 07:01  -  11 Nov 2017 23:53  --------------------------------------------------------  IN:    dextrose 5% + sodium chloride 0.45% with potassium chloride 20 mEq/L: 1500 mL    Solution: 200 mL    Solution: 100 mL    TPN (Total Parenteral Nutrition): 660 mL  Total IN: 2460 mL    OUT:    Voided: 1400 mL  Total OUT: 1400 mL    Total NET: 1060 mL          ** PHYSICAL EXAM **    -- CONSTITUTIONAL: AOx3. NAD.   -- HEENT:  -- NECK:   -- CARDIOVASCULAR: normotensive, regular rate   -- RESPIRATORY: breathing comfortably   -- CHEST:  -- ABDOMEN:   -- EXTREMITIES:   -- VASCULAR:   -- NEUROLOGICAL:     ** LABS **                 7.6    12.33  )----------(  799       ( 11 Nov 2017 08:39 )               23.4      134    |  96     |  8      ----------------------------<  113        ( 11 Nov 2017 08:37 )  4.4     |  23     |  0.33     Ca    8.4        ( 11 Nov 2017 08:37 )    TPro  6.8    /  Alb  2.6    /  TBili  0.2    /  DBili  x      /  AST  71     /  ALT  60     /  AlkPhos  510    ( 11 Nov 2017 08:37 )        CAPILLARY BLOOD GLUCOSE SUBJECTIVE:  Doing well.   No overnight events.   Improved pain. No nausea.    OBJECTIVE:     ** VITAL SIGNS / I&O's **    T(C): 37 (11-11-17 @ 22:30), Max: 37.5 (11-11-17 @ 09:34)  T(F): 98.6 (11-11-17 @ 22:30), Max: 99.5 (11-11-17 @ 09:34)  HR: 82 (11-11-17 @ 22:30) (75 - 84)  BP: 120/71 (11-11-17 @ 22:30) (109/70 - 128/77)  RR: 18 (11-11-17 @ 22:30) (18 - 18)  SpO2: 97% (11-11-17 @ 22:30) (94% - 98%)      10 Nov 2017 07:01  -  11 Nov 2017 07:00  --------------------------------------------------------  IN:    dextrose 5% + sodium chloride 0.45% with potassium chloride 20 mEq/L: 3000 mL    Solution: 200 mL    Solution: 200 mL    TPN (Total Parenteral Nutrition): 1320 mL  Total IN: 4720 mL    OUT:    Voided: 1450 mL  Total OUT: 1450 mL    Total NET: 3270 mL      11 Nov 2017 07:01  -  11 Nov 2017 23:53  --------------------------------------------------------  IN:    dextrose 5% + sodium chloride 0.45% with potassium chloride 20 mEq/L: 1500 mL    Solution: 200 mL    Solution: 100 mL    TPN (Total Parenteral Nutrition): 660 mL  Total IN: 2460 mL    OUT:    Voided: 1400 mL  Total OUT: 1400 mL    Total NET: 1060 mL          ** PHYSICAL EXAM **    -- CONSTITUTIONAL: AOx3. NAD.   -- CARDIOVASCULAR: normotensive, regular rate   -- RESPIRATORY: breathing comfortably   -- ABDOMEN: soft, minimally distended, upper abdominal tenderness without rebound or guarding         ** LABS **                 7.6    12.33  )----------(  799       ( 11 Nov 2017 08:39 )               23.4      134    |  96     |  8      ----------------------------<  113        ( 11 Nov 2017 08:37 )  4.4     |  23     |  0.33     Ca    8.4        ( 11 Nov 2017 08:37 )    TPro  6.8    /  Alb  2.6    /  TBili  0.2    /  DBili  x      /  AST  71     /  ALT  60     /  AlkPhos  510    ( 11 Nov 2017 08:37 )        CAPILLARY BLOOD GLUCOSE

## 2017-11-12 LAB
ALBUMIN SERPL ELPH-MCNC: 2.5 G/DL — LOW (ref 3.3–5)
ALP SERPL-CCNC: 566 U/L — HIGH (ref 40–120)
ALT FLD-CCNC: 88 U/L — HIGH (ref 10–45)
AMYLASE P1 CFR SERPL: 56 U/L — SIGNIFICANT CHANGE UP (ref 25–125)
ANION GAP SERPL CALC-SCNC: 13 MMOL/L — SIGNIFICANT CHANGE UP (ref 5–17)
APTT BLD: 35.4 SEC — SIGNIFICANT CHANGE UP (ref 27.5–37.4)
AST SERPL-CCNC: 85 U/L — HIGH (ref 10–40)
BILIRUB SERPL-MCNC: 0.2 MG/DL — SIGNIFICANT CHANGE UP (ref 0.2–1.2)
BUN SERPL-MCNC: 14 MG/DL — SIGNIFICANT CHANGE UP (ref 7–23)
CALCIUM SERPL-MCNC: 9.4 MG/DL — SIGNIFICANT CHANGE UP (ref 8.4–10.5)
CHLORIDE SERPL-SCNC: 98 MMOL/L — SIGNIFICANT CHANGE UP (ref 96–108)
CO2 SERPL-SCNC: 24 MMOL/L — SIGNIFICANT CHANGE UP (ref 22–31)
CREAT SERPL-MCNC: 0.37 MG/DL — LOW (ref 0.5–1.3)
GLUCOSE SERPL-MCNC: 109 MG/DL — HIGH (ref 70–99)
HCT VFR BLD CALC: 23.5 % — LOW (ref 34.5–45)
HGB BLD-MCNC: 7.6 G/DL — LOW (ref 11.5–15.5)
INR BLD: 1.06 RATIO — SIGNIFICANT CHANGE UP (ref 0.88–1.16)
LIDOCAIN IGE QN: 54 U/L — SIGNIFICANT CHANGE UP (ref 7–60)
MAGNESIUM SERPL-MCNC: 2 MG/DL — SIGNIFICANT CHANGE UP (ref 1.6–2.6)
MCHC RBC-ENTMCNC: 27.2 PG — SIGNIFICANT CHANGE UP (ref 27–34)
MCHC RBC-ENTMCNC: 32.3 GM/DL — SIGNIFICANT CHANGE UP (ref 32–36)
MCV RBC AUTO: 84.2 FL — SIGNIFICANT CHANGE UP (ref 80–100)
PHOSPHATE SERPL-MCNC: 5.3 MG/DL — HIGH (ref 2.5–4.5)
PLATELET # BLD AUTO: 768 K/UL — HIGH (ref 150–400)
POTASSIUM SERPL-MCNC: 4.6 MMOL/L — SIGNIFICANT CHANGE UP (ref 3.5–5.3)
POTASSIUM SERPL-SCNC: 4.6 MMOL/L — SIGNIFICANT CHANGE UP (ref 3.5–5.3)
PROT SERPL-MCNC: 6.9 G/DL — SIGNIFICANT CHANGE UP (ref 6–8.3)
PROTHROM AB SERPL-ACNC: 12 SEC — SIGNIFICANT CHANGE UP (ref 10–13.1)
RBC # BLD: 2.79 M/UL — LOW (ref 3.8–5.2)
RBC # FLD: 15.3 % — HIGH (ref 10.3–14.5)
SODIUM SERPL-SCNC: 135 MMOL/L — SIGNIFICANT CHANGE UP (ref 135–145)
WBC # BLD: 10.22 K/UL — SIGNIFICANT CHANGE UP (ref 3.8–10.5)
WBC # FLD AUTO: 10.22 K/UL — SIGNIFICANT CHANGE UP (ref 3.8–10.5)

## 2017-11-12 PROCEDURE — 99231 SBSQ HOSP IP/OBS SF/LOW 25: CPT | Mod: GC

## 2017-11-12 RX ORDER — DIPHENHYDRAMINE HCL 50 MG
25 CAPSULE ORAL ONCE
Qty: 0 | Refills: 0 | Status: COMPLETED | OUTPATIENT
Start: 2017-11-12 | End: 2017-11-12

## 2017-11-12 RX ORDER — DIPHENHYDRAMINE HCL 50 MG
25 CAPSULE ORAL ONCE
Qty: 0 | Refills: 0 | Status: DISCONTINUED | OUTPATIENT
Start: 2017-11-12 | End: 2017-11-19

## 2017-11-12 RX ORDER — ACETAMINOPHEN 500 MG
1000 TABLET ORAL ONCE
Qty: 0 | Refills: 0 | Status: COMPLETED | OUTPATIENT
Start: 2017-11-13 | End: 2017-11-13

## 2017-11-12 RX ORDER — ACETAMINOPHEN 500 MG
1000 TABLET ORAL ONCE
Qty: 0 | Refills: 0 | Status: COMPLETED | OUTPATIENT
Start: 2017-11-13 | End: 2017-11-12

## 2017-11-12 RX ORDER — SODIUM CHLORIDE 9 MG/ML
1000 INJECTION, SOLUTION INTRAVENOUS
Qty: 0 | Refills: 0 | Status: DISCONTINUED | OUTPATIENT
Start: 2017-11-12 | End: 2017-11-28

## 2017-11-12 RX ORDER — ACETAMINOPHEN 500 MG
1000 TABLET ORAL ONCE
Qty: 0 | Refills: 0 | Status: COMPLETED | OUTPATIENT
Start: 2017-11-12 | End: 2017-11-12

## 2017-11-12 RX ADMIN — Medication 25 MILLIGRAM(S): at 07:00

## 2017-11-12 RX ADMIN — Medication 30 MILLIGRAM(S): at 07:35

## 2017-11-12 RX ADMIN — Medication 400 MILLIGRAM(S): at 17:13

## 2017-11-12 RX ADMIN — HYDROMORPHONE HYDROCHLORIDE 0.5 MILLIGRAM(S): 2 INJECTION INTRAMUSCULAR; INTRAVENOUS; SUBCUTANEOUS at 11:38

## 2017-11-12 RX ADMIN — HYDROMORPHONE HYDROCHLORIDE 30 MILLILITER(S): 2 INJECTION INTRAMUSCULAR; INTRAVENOUS; SUBCUTANEOUS at 06:59

## 2017-11-12 RX ADMIN — Medication 200 MILLIGRAM(S): at 17:14

## 2017-11-12 RX ADMIN — Medication 30 MILLIGRAM(S): at 12:53

## 2017-11-12 RX ADMIN — ENOXAPARIN SODIUM 40 MILLIGRAM(S): 100 INJECTION SUBCUTANEOUS at 11:30

## 2017-11-12 RX ADMIN — Medication 200 MILLIGRAM(S): at 04:41

## 2017-11-12 RX ADMIN — HYDROMORPHONE HYDROCHLORIDE 30 MILLILITER(S): 2 INJECTION INTRAMUSCULAR; INTRAVENOUS; SUBCUTANEOUS at 19:22

## 2017-11-12 RX ADMIN — Medication 400 MILLIGRAM(S): at 04:39

## 2017-11-12 RX ADMIN — Medication 1000 MILLIGRAM(S): at 14:30

## 2017-11-12 RX ADMIN — HYDROMORPHONE HYDROCHLORIDE 0.5 MILLIGRAM(S): 2 INJECTION INTRAMUSCULAR; INTRAVENOUS; SUBCUTANEOUS at 19:48

## 2017-11-12 RX ADMIN — HYDROMORPHONE HYDROCHLORIDE 0.5 MILLIGRAM(S): 2 INJECTION INTRAMUSCULAR; INTRAVENOUS; SUBCUTANEOUS at 23:25

## 2017-11-12 RX ADMIN — Medication 400 MILLIGRAM(S): at 10:28

## 2017-11-12 RX ADMIN — Medication 30 MILLIGRAM(S): at 01:10

## 2017-11-12 RX ADMIN — Medication 1000 MILLIGRAM(S): at 04:53

## 2017-11-12 RX ADMIN — Medication 30 MILLIGRAM(S): at 00:49

## 2017-11-12 RX ADMIN — Medication 25 MILLIGRAM(S): at 16:05

## 2017-11-12 RX ADMIN — Medication 1000 MILLIGRAM(S): at 23:00

## 2017-11-12 RX ADMIN — Medication 400 MILLIGRAM(S): at 14:00

## 2017-11-12 RX ADMIN — Medication 30 MILLIGRAM(S): at 07:05

## 2017-11-12 RX ADMIN — HYDROMORPHONE HYDROCHLORIDE 30 MILLILITER(S): 2 INJECTION INTRAMUSCULAR; INTRAVENOUS; SUBCUTANEOUS at 11:13

## 2017-11-12 RX ADMIN — HYDROMORPHONE HYDROCHLORIDE 0.5 MILLIGRAM(S): 2 INJECTION INTRAMUSCULAR; INTRAVENOUS; SUBCUTANEOUS at 16:03

## 2017-11-12 RX ADMIN — Medication 25 MILLIGRAM(S): at 22:21

## 2017-11-12 RX ADMIN — Medication 1000 MILLIGRAM(S): at 17:43

## 2017-11-12 RX ADMIN — Medication 25 MILLIGRAM(S): at 00:49

## 2017-11-12 RX ADMIN — Medication 30 MILLIGRAM(S): at 12:23

## 2017-11-12 RX ADMIN — SODIUM CHLORIDE 50 MILLILITER(S): 9 INJECTION, SOLUTION INTRAVENOUS at 16:39

## 2017-11-12 RX ADMIN — Medication 1000 MILLIGRAM(S): at 10:58

## 2017-11-12 RX ADMIN — Medication 400 MILLIGRAM(S): at 22:39

## 2017-11-12 RX ADMIN — HYDROMORPHONE HYDROCHLORIDE 0.5 MILLIGRAM(S): 2 INJECTION INTRAMUSCULAR; INTRAVENOUS; SUBCUTANEOUS at 09:02

## 2017-11-12 RX ADMIN — Medication 25 MILLIGRAM(S): at 10:28

## 2017-11-12 NOTE — PROVIDER CONTACT NOTE (OTHER) - ASSESSMENT
pt verbalized complaints of pain. vitals have been stable. pt received 3 boluses from PCA pump during the shit, IV tylenol, and IVP toradol. hot packs applied. pt verbalized complaints of pain. vitals have been stable. pt received 3 boluses from PCA pump during the shift, IV tylenol, and IVP toradol given. hot packs applied.

## 2017-11-12 NOTE — PROGRESS NOTE ADULT - SUBJECTIVE AND OBJECTIVE BOX
Chief Complaint:  Patient is a 57y old  Female who presents with a chief complaint of Abdominal pain (16 Oct 2017 09:50)      Interval Events:     Allergies:  No Known Allergies      Home Medications:    Hospital Medications:  acetaminophen  IVPB. 1000 milliGRAM(s) IV Intermittent once  acetaminophen  IVPB. 1000 milliGRAM(s) IV Intermittent once  ciprofloxacin   IVPB      ciprofloxacin   IVPB 400 milliGRAM(s) IV Intermittent every 12 hours  dextrose 5% + sodium chloride 0.45% with potassium chloride 20 mEq/L 1000 milliLiter(s) IV Continuous <Continuous>  diphenhydrAMINE   Injectable 25 milliGRAM(s) IV Push every 6 hours PRN  enoxaparin Injectable 40 milliGRAM(s) SubCutaneous daily  fentaNYL   Patch  50 MICROgram(s)/Hr 1 Patch Transdermal every 72 hours  HYDROmorphone PCA (1 mG/mL) 30 milliLiter(s) PCA Continuous PCA Continuous  HYDROmorphone PCA (1 mG/mL) Rescue Clinician Bolus 0.5 milliGRAM(s) IV Push every 15 minutes PRN  ketorolac   Injectable 30 milliGRAM(s) IV Push every 6 hours PRN  naloxone Injectable 0.1 milliGRAM(s) IV Push every 3 minutes PRN  ondansetron Injectable 4 milliGRAM(s) IV Push every 6 hours PRN      PMHX/PSHX:  Kidney stone  Hypertension  No pertinent past medical history  Kidney stones  History of appendectomy      Family history:  No pertinent family history in first degree relatives      ROS:     General:  No wt loss, fevers, chills, night sweats, fatigue,   Eyes:  Good vision, no reported pain  ENT:  No sore throat, pain, runny nose, dysphagia  CV:  No pain, palpitations, hypo/hypertension  Resp:  No dyspnea, cough, tachypnea, wheezing  GI:  No pain, No nausea, No vomiting, No diarrhea, No constipation, No weight loss, No fever, No pruritis, No rectal bleeding, No tarry stools, No dysphagia,  :  No pain, bleeding, incontinence, nocturia  Muscle:  No pain, weakness  Neuro:  No weakness, tingling, memory problems  Psych:  No fatigue, insomnia, mood problems, depression  Endocrine:  No polyuria, polydipsia, cold/heat intolerance  Heme:  No petechiae, ecchymosis, easy bruisability  Skin:  No rash, tattoos, scars, edema      PHYSICAL EXAM:   Vital Signs:  Vital Signs Last 24 Hrs  T(C): 37 (2017 04:43), Max: 37.5 (2017 09:34)  T(F): 98.6 (2017 04:43), Max: 99.5 (2017 09:34)  HR: 80 (2017 04:43) (75 - 83)  BP: 110/72 (2017 04:43) (109/70 - 120/77)  BP(mean): --  RR: 18 (2017 04:43) (18 - 18)  SpO2: 96% (2017 04:43) (94% - 98%)  Daily     Daily     PHYSICAL EXAM:  Constitutional: no acute distress  Eyes: no icterus  Neck: no masses, no LAD  Respiratory: normal inspiratory effort; no wheezing or crackles  Cardiovascular: RRR, normal S1/S2, no murmurs/rubs/gallops  Gastrointestinal: soft, slightly distended, tender throughout but improved from prior  Extremities: no LE edema  Neurological: AAOx3, no asterixis  Skin: no rashes, bruises, petechiae    LABS:                        7.6    12.33 )-----------( 799      ( 2017 08:39 )             23.4         134<L>  |  96  |  8   ----------------------------<  113<H>  4.4   |  23  |  0.33<L>    Ca    8.4      2017 08:37    TPro  6.8  /  Alb  2.6<L>  /  TBili  0.2  /  DBili  x   /  AST  71<H>  /  ALT  60<H>  /  AlkPhos  510<H>      LIVER FUNCTIONS - ( 2017 08:37 )  Alb: 2.6 g/dL / Pro: 6.8 g/dL / ALK PHOS: 510 U/L / ALT: 60 U/L / AST: 71 U/L / GGT: x             Urinalysis Basic - ( 10 Nov 2017 12:55 )    Color: Yellow / Appearance: Clear / S.010 / pH: x  Gluc: x / Ketone: Negative  / Bili: Negative / Urobili: Negative mg/dL   Blood: x / Protein: Negative mg/dL / Nitrite: Negative   Leuk Esterase: Negative / RBC: x / WBC x   Sq Epi: x / Non Sq Epi: x / Bacteria: x      Amylase Serum63      Lipase serum61       Ammonia--      Imaging: Chief Complaint:  Patient is a 57y old  Female who presents with a chief complaint of Abdominal pain (16 Oct 2017 09:50)      Interval Events:   No events overnight. Patient reports improved abdominal pain    Allergies:  No Known Allergies      Home Medications:    Hospital Medications:  acetaminophen  IVPB. 1000 milliGRAM(s) IV Intermittent once  acetaminophen  IVPB. 1000 milliGRAM(s) IV Intermittent once  ciprofloxacin   IVPB      ciprofloxacin   IVPB 400 milliGRAM(s) IV Intermittent every 12 hours  dextrose 5% + sodium chloride 0.45% with potassium chloride 20 mEq/L 1000 milliLiter(s) IV Continuous <Continuous>  diphenhydrAMINE   Injectable 25 milliGRAM(s) IV Push every 6 hours PRN  enoxaparin Injectable 40 milliGRAM(s) SubCutaneous daily  fentaNYL   Patch  50 MICROgram(s)/Hr 1 Patch Transdermal every 72 hours  HYDROmorphone PCA (1 mG/mL) 30 milliLiter(s) PCA Continuous PCA Continuous  HYDROmorphone PCA (1 mG/mL) Rescue Clinician Bolus 0.5 milliGRAM(s) IV Push every 15 minutes PRN  ketorolac   Injectable 30 milliGRAM(s) IV Push every 6 hours PRN  naloxone Injectable 0.1 milliGRAM(s) IV Push every 3 minutes PRN  ondansetron Injectable 4 milliGRAM(s) IV Push every 6 hours PRN      PMHX/PSHX:  Kidney stone  Hypertension  No pertinent past medical history  Kidney stones  History of appendectomy      Family history:  No pertinent family history in first degree relatives      ROS:     General:  No wt loss, fevers, chills, night sweats, fatigue,   Eyes:  Good vision, no reported pain  ENT:  No sore throat, pain, runny nose, dysphagia  CV:  No pain, palpitations, hypo/hypertension  Resp:  No dyspnea, cough, tachypnea, wheezing  GI:  No pain, No nausea, No vomiting, No diarrhea, No constipation, No weight loss, No fever, No pruritis, No rectal bleeding, No tarry stools, No dysphagia,  :  No pain, bleeding, incontinence, nocturia  Muscle:  No pain, weakness  Neuro:  No weakness, tingling, memory problems  Psych:  No fatigue, insomnia, mood problems, depression  Endocrine:  No polyuria, polydipsia, cold/heat intolerance  Heme:  No petechiae, ecchymosis, easy bruisability  Skin:  No rash, tattoos, scars, edema      PHYSICAL EXAM:   Vital Signs:  Vital Signs Last 24 Hrs  T(C): 37 (2017 04:43), Max: 37.5 (2017 09:34)  T(F): 98.6 (2017 04:43), Max: 99.5 (2017 09:34)  HR: 80 (2017 04:43) (75 - 83)  BP: 110/72 (2017 04:43) (109/70 - 120/77)  BP(mean): --  RR: 18 (2017 04:43) (18 - 18)  SpO2: 96% (2017 04:43) (94% - 98%)  Daily     Daily     PHYSICAL EXAM:  Constitutional: no acute distress  Eyes: no icterus  Neck: no masses, no LAD  Respiratory: normal inspiratory effort; no wheezing or crackles  Cardiovascular: RRR, normal S1/S2, no murmurs/rubs/gallops  Gastrointestinal: soft, nd, tender throughout but improved from prior  Extremities: no LE edema  Neurological: AAOx3, no asterixis  Skin: no rashes, bruises, petechiae    LABS:                        7.6    12.33 )-----------( 799      ( 2017 08:39 )             23.4     11    134<L>  |  96  |  8   ----------------------------<  113<H>  4.4   |  23  |  0.33<L>    Ca    8.4      2017 08:37    TPro  6.8  /  Alb  2.6<L>  /  TBili  0.2  /  DBili  x   /  AST  71<H>  /  ALT  60<H>  /  AlkPhos  510<H>  11    LIVER FUNCTIONS - ( 2017 08:37 )  Alb: 2.6 g/dL / Pro: 6.8 g/dL / ALK PHOS: 510 U/L / ALT: 60 U/L / AST: 71 U/L / GGT: x             Urinalysis Basic - ( 10 Nov 2017 12:55 )    Color: Yellow / Appearance: Clear / S.010 / pH: x  Gluc: x / Ketone: Negative  / Bili: Negative / Urobili: Negative mg/dL   Blood: x / Protein: Negative mg/dL / Nitrite: Negative   Leuk Esterase: Negative / RBC: x / WBC x   Sq Epi: x / Non Sq Epi: x / Bacteria: x      Amylase Serum63      Lipase serum61       Ammonia--      Imaging:

## 2017-11-12 NOTE — PROGRESS NOTE ADULT - ATTENDING COMMENTS
Pt seen and examined today at noon, agree with above. Pt notes that her epigastric pain associated with known necrotizing gallstone pancreatitis is improved after endoscopic cystgastrostomy, but she is still requiring dilaudid PCA for pain control. No N/V. Will add sips of clears. Off Protonix per GI. Repeat CT a/p on 8/15 to reassess peripancreatic collections.

## 2017-11-12 NOTE — PROGRESS NOTE ADULT - ASSESSMENT
Ms. Coleman is a 57 y.o. woman with history of nephrolithiasis, HTN, and appendectomy admitted on 10/16 for gallstone pancreatitis c/b necrotizing pancreatitis and ascending cholangitis s/p ERCP and stent placement (10/21) and FNA of perihepatic fluid (10/31), which grew E. faecalis. She is s/p cystgastrostomy with 2 axiom stents placement by GI on 11/8. She was not able to tolerate tube feeding and now is receiving TPN. Pain is significantly improved    PLAN:   - DVT ppx: Lovenox   - Leukocytosis:  likely 2/2 necrotizing pancreatitis. Abx: continue with cipro therapeutic   - malnutrition: continue with TPN at 55 cc/hr today. She is also on sips/Ice chips  - Pain control: PCA, fentanyl patch, Tylenol IV,   - Per GI, CT scan in 1 week to assess stents and necrosis collection

## 2017-11-12 NOTE — PROGRESS NOTE ADULT - ASSESSMENT
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 Patient still with abd pain and leukocytosis likely due to pancreatic necrosis, but she feels better since endoscopic drainage.     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 Patient still with abd pain and leukocytosis likely due to pancreatic necrosis, but she feels better since endoscopic drainage.     Impression:  1) Infected pancreatic necrosis s/p endoscopic drainage    Plan:  -please obtain CTAP tomorrow  -can advance diet to clears today as tolerated  - IV abx  - IV fluids with LR  - analgesia per primary team and pain management

## 2017-11-12 NOTE — PROGRESS NOTE ADULT - SUBJECTIVE AND OBJECTIVE BOX
Pt continues NPO on PN  11-11 @ 07:01  -  11-12 @ 07:00  --------------------------------------------------------  IN:    dextrose 5% + sodium chloride 0.45% with potassium chloride 20 mEq/L: 3000 mL    Solution: 200 mL    Solution: 100 mL    Solution: 400 mL    TPN (Total Parenteral Nutrition): 1320 mL  Total IN: 5020 mL    OUT:    Voided: 1400 mL  Total OUT: 1400 mL    Total NET: 3620 mL        T(C): 37.2 (11-12-17 @ 09:08), Max: 37.3 (11-11-17 @ 14:23)  HR: 81 (11-12-17 @ 09:08) (75 - 82)  BP: 112/72 (11-12-17 @ 09:08) (109/70 - 120/77)  RR: 18 (11-12-17 @ 09:08) (18 - 18)  SpO2: 97% (11-12-17 @ 09:08) (95% - 98%)  Wt(kg): --    Labs   11-12    135  |  98  |  14  ----------------------------<  109<H>  4.6   |  24  |  0.37<L>    Ca    9.4      12 Nov 2017 08:55  Phos  5.3     11-12  Mg     2.0     11-12    TPro  6.9  /  Alb  2.5<L>  /  TBili  0.2  /  DBili  x   /  AST  85<H>  /  ALT  88<H>  /  AlkPhos  566<H>  11-12      LIVER FUNCTIONS - ( 12 Nov 2017 08:55 )  Alb: 2.5 g/dL / Pro: 6.9 g/dL / ALK PHOS: 566 U/L / ALT: 88 U/L / AST: 85 U/L / GGT: x           CAPILLARY BLOOD GLUCOSE        I&O's Detail    11 Nov 2017 07:01  -  12 Nov 2017 07:00  --------------------------------------------------------  IN:    dextrose 5% + sodium chloride 0.45% with potassium chloride 20 mEq/L: 3000 mL    Solution: 200 mL    Solution: 100 mL    Solution: 400 mL    TPN (Total Parenteral Nutrition): 1320 mL  Total IN: 5020 mL    OUT:    Voided: 1400 mL  Total OUT: 1400 mL    Total NET: 3620 mL      12 Nov 2017 07:01  -  12 Nov 2017 10:56  --------------------------------------------------------  IN:  Total IN: 0 mL    OUT:  Total OUT: 0 mL    Total NET: 0 mL

## 2017-11-12 NOTE — PROGRESS NOTE ADULT - SUBJECTIVE AND OBJECTIVE BOX
Surgery Trauma Team Progress Note    STATUS POST:      POST OPERATIVE DAY #        SUBJECTIVE:       OBJECTIVE:   T(C): 37 (11-12-17 @ 04:43), Max: 37.5 (11-11-17 @ 09:34)  HR: 80 (11-12-17 @ 04:43) (75 - 83)  BP: 110/72 (11-12-17 @ 04:43) (109/70 - 120/77)  RR: 18 (11-12-17 @ 04:43) (18 - 18)  SpO2: 96% (11-12-17 @ 04:43) (94% - 98%)  Wt(kg): --  CAPILLARY BLOOD GLUCOSE        I&O's Detail    11 Nov 2017 07:01  -  12 Nov 2017 07:00  --------------------------------------------------------  IN:    dextrose 5% + sodium chloride 0.45% with potassium chloride 20 mEq/L: 3000 mL    Solution: 200 mL    Solution: 100 mL    Solution: 400 mL    TPN (Total Parenteral Nutrition): 1320 mL  Total IN: 5020 mL    OUT:    Voided: 1400 mL  Total OUT: 1400 mL    Total NET: 3620 mL      PHYSICAL EXAM:  Gen: Well-nourished, well-developed, A&O x3, resting in bed in no acute distress, with PICC line  CV: RRR  Resp: Patent airways, unlabored   Abdomen: Soft, mildly tender on epigastrium, nondistended  Extremities: All 4 extremities warm and well perfused, no edema

## 2017-11-12 NOTE — PROGRESS NOTE ADULT - SUBJECTIVE AND OBJECTIVE BOX
Day 25 of Anesthesia Pain Management Service    SUBJECTIVE: Patient is doing well with IV PCA    Pain Scale Score:	[X] Refer to charted pain scores    THERAPY:    [ ] IV PCA Morphine		[ ] 5 mg/mL	[ ] 1 mg/mL  [X] IV PCA Hydromorphone	[ ] 5 mg/mL	[X] 1 mg/mL  [ ] IV PCA Fentanyl		[ ] 50 micrograms/mL    Demand dose: 0.5 mg     Lockout: 6 minutes   Continuous Rate: 0 mg/hr  4 Hour Limit: 6 mg    MEDICATIONS  (STANDING):  acetaminophen  IVPB. 1000 milliGRAM(s) IV Intermittent once  acetaminophen  IVPB. 1000 milliGRAM(s) IV Intermittent once  ciprofloxacin   IVPB      ciprofloxacin   IVPB 400 milliGRAM(s) IV Intermittent every 12 hours  dextrose 5% + sodium chloride 0.45% with potassium chloride 20 mEq/L 1000 milliLiter(s) (125 mL/Hr) IV Continuous <Continuous>  enoxaparin Injectable 40 milliGRAM(s) SubCutaneous daily  fentaNYL   Patch  50 MICROgram(s)/Hr 1 Patch Transdermal every 72 hours  HYDROmorphone PCA (1 mG/mL) 30 milliLiter(s) PCA Continuous PCA Continuous    MEDICATIONS  (PRN):  diphenhydrAMINE   Injectable 25 milliGRAM(s) IV Push every 6 hours PRN Rash and/or Itching  HYDROmorphone PCA (1 mG/mL) Rescue Clinician Bolus 0.5 milliGRAM(s) IV Push every 15 minutes PRN for Pain Scale GREATER THAN 6  ketorolac   Injectable 30 milliGRAM(s) IV Push every 6 hours PRN Breakthrough pain  naloxone Injectable 0.1 milliGRAM(s) IV Push every 3 minutes PRN For ANY of the following changes in patient status:  A. RR LESS THAN 10 breaths per minute, B. Oxygen saturation LESS THAN 90%, C. Sedation score of 6  ondansetron Injectable 4 milliGRAM(s) IV Push every 6 hours PRN Nausea      OBJECTIVE:    Sedation Score:	[ X] Alert	[ ] Drowsy 	[ ] Arousable	[ ] Asleep	[ ] Unresponsive    Side Effects:	[X ] None	[ ] Nausea	[ ] Vomiting	[ ] Pruritus  		[ ] Other:    Vital Signs Last 24 Hrs  T(C): 37.2 (12 Nov 2017 09:08), Max: 37.3 (11 Nov 2017 14:23)  T(F): 98.9 (12 Nov 2017 09:08), Max: 99.1 (11 Nov 2017 14:23)  HR: 81 (12 Nov 2017 09:08) (75 - 82)  BP: 112/72 (12 Nov 2017 09:08) (109/70 - 120/77)  BP(mean): --  RR: 18 (12 Nov 2017 09:08) (18 - 18)  SpO2: 97% (12 Nov 2017 09:08) (95% - 98%)    ASSESSMENT/ PLAN    Therapy to  be:               [X] Continued   [ ] Discontinued   [ ] Changed to PRN Analgesics    Documentation and Verification of current medications:   [X] Done	[ ] Not done, not eligible    Comments:

## 2017-11-13 LAB
ALBUMIN SERPL ELPH-MCNC: 2.9 G/DL — LOW (ref 3.3–5)
ALP SERPL-CCNC: 613 U/L — HIGH (ref 40–120)
ALT FLD-CCNC: 69 U/L — HIGH (ref 10–45)
ANION GAP SERPL CALC-SCNC: 15 MMOL/L — SIGNIFICANT CHANGE UP (ref 5–17)
AST SERPL-CCNC: 54 U/L — HIGH (ref 10–40)
BILIRUB SERPL-MCNC: 0.3 MG/DL — SIGNIFICANT CHANGE UP (ref 0.2–1.2)
BUN SERPL-MCNC: 15 MG/DL — SIGNIFICANT CHANGE UP (ref 7–23)
CALCIUM SERPL-MCNC: 9.3 MG/DL — SIGNIFICANT CHANGE UP (ref 8.4–10.5)
CHLORIDE SERPL-SCNC: 94 MMOL/L — LOW (ref 96–108)
CO2 SERPL-SCNC: 24 MMOL/L — SIGNIFICANT CHANGE UP (ref 22–31)
CREAT SERPL-MCNC: 0.46 MG/DL — LOW (ref 0.5–1.3)
GLUCOSE SERPL-MCNC: 107 MG/DL — HIGH (ref 70–99)
HCT VFR BLD CALC: 24.9 % — LOW (ref 34.5–45)
HGB BLD-MCNC: 8 G/DL — LOW (ref 11.5–15.5)
MAGNESIUM SERPL-MCNC: 2 MG/DL — SIGNIFICANT CHANGE UP (ref 1.6–2.6)
MCHC RBC-ENTMCNC: 26.9 PG — LOW (ref 27–34)
MCHC RBC-ENTMCNC: 32.1 GM/DL — SIGNIFICANT CHANGE UP (ref 32–36)
MCV RBC AUTO: 83.8 FL — SIGNIFICANT CHANGE UP (ref 80–100)
PHOSPHATE SERPL-MCNC: 4.4 MG/DL — SIGNIFICANT CHANGE UP (ref 2.5–4.5)
PLATELET # BLD AUTO: 743 K/UL — HIGH (ref 150–400)
POTASSIUM SERPL-MCNC: 4.1 MMOL/L — SIGNIFICANT CHANGE UP (ref 3.5–5.3)
POTASSIUM SERPL-SCNC: 4.1 MMOL/L — SIGNIFICANT CHANGE UP (ref 3.5–5.3)
PROT SERPL-MCNC: 7.3 G/DL — SIGNIFICANT CHANGE UP (ref 6–8.3)
RBC # BLD: 2.97 M/UL — LOW (ref 3.8–5.2)
RBC # FLD: 15.3 % — HIGH (ref 10.3–14.5)
SODIUM SERPL-SCNC: 133 MMOL/L — LOW (ref 135–145)
WBC # BLD: 12.73 K/UL — HIGH (ref 3.8–10.5)
WBC # FLD AUTO: 12.73 K/UL — HIGH (ref 3.8–10.5)

## 2017-11-13 PROCEDURE — 99232 SBSQ HOSP IP/OBS MODERATE 35: CPT | Mod: GC

## 2017-11-13 RX ORDER — ACETAMINOPHEN 500 MG
1000 TABLET ORAL ONCE
Qty: 0 | Refills: 0 | Status: COMPLETED | OUTPATIENT
Start: 2017-11-14 | End: 2017-11-14

## 2017-11-13 RX ORDER — ACETAMINOPHEN 500 MG
1000 TABLET ORAL ONCE
Qty: 0 | Refills: 0 | Status: COMPLETED | OUTPATIENT
Start: 2017-11-13 | End: 2017-11-13

## 2017-11-13 RX ADMIN — ENOXAPARIN SODIUM 40 MILLIGRAM(S): 100 INJECTION SUBCUTANEOUS at 11:39

## 2017-11-13 RX ADMIN — Medication 200 MILLIGRAM(S): at 04:52

## 2017-11-13 RX ADMIN — HYDROMORPHONE HYDROCHLORIDE 0.5 MILLIGRAM(S): 2 INJECTION INTRAMUSCULAR; INTRAVENOUS; SUBCUTANEOUS at 13:42

## 2017-11-13 RX ADMIN — HYDROMORPHONE HYDROCHLORIDE 30 MILLILITER(S): 2 INJECTION INTRAMUSCULAR; INTRAVENOUS; SUBCUTANEOUS at 19:06

## 2017-11-13 RX ADMIN — Medication 1000 MILLIGRAM(S): at 12:09

## 2017-11-13 RX ADMIN — Medication 400 MILLIGRAM(S): at 04:40

## 2017-11-13 RX ADMIN — HYDROMORPHONE HYDROCHLORIDE 0.5 MILLIGRAM(S): 2 INJECTION INTRAMUSCULAR; INTRAVENOUS; SUBCUTANEOUS at 21:22

## 2017-11-13 RX ADMIN — Medication 1000 MILLIGRAM(S): at 22:23

## 2017-11-13 RX ADMIN — HYDROMORPHONE HYDROCHLORIDE 30 MILLILITER(S): 2 INJECTION INTRAMUSCULAR; INTRAVENOUS; SUBCUTANEOUS at 21:00

## 2017-11-13 RX ADMIN — Medication 25 MILLIGRAM(S): at 18:52

## 2017-11-13 RX ADMIN — SODIUM CHLORIDE 50 MILLILITER(S): 9 INJECTION, SOLUTION INTRAVENOUS at 15:09

## 2017-11-13 RX ADMIN — Medication 400 MILLIGRAM(S): at 21:53

## 2017-11-13 RX ADMIN — HYDROMORPHONE HYDROCHLORIDE 30 MILLILITER(S): 2 INJECTION INTRAMUSCULAR; INTRAVENOUS; SUBCUTANEOUS at 07:03

## 2017-11-13 RX ADMIN — Medication 400 MILLIGRAM(S): at 16:41

## 2017-11-13 RX ADMIN — Medication 400 MILLIGRAM(S): at 11:39

## 2017-11-13 RX ADMIN — Medication 1000 MILLIGRAM(S): at 05:42

## 2017-11-13 RX ADMIN — Medication 200 MILLIGRAM(S): at 17:17

## 2017-11-13 RX ADMIN — HYDROMORPHONE HYDROCHLORIDE 0.5 MILLIGRAM(S): 2 INJECTION INTRAMUSCULAR; INTRAVENOUS; SUBCUTANEOUS at 19:38

## 2017-11-13 RX ADMIN — Medication 1000 MILLIGRAM(S): at 16:56

## 2017-11-13 RX ADMIN — HYDROMORPHONE HYDROCHLORIDE 30 MILLILITER(S): 2 INJECTION INTRAMUSCULAR; INTRAVENOUS; SUBCUTANEOUS at 02:43

## 2017-11-13 RX ADMIN — Medication 25 MILLIGRAM(S): at 13:12

## 2017-11-13 RX ADMIN — HYDROMORPHONE HYDROCHLORIDE 0.5 MILLIGRAM(S): 2 INJECTION INTRAMUSCULAR; INTRAVENOUS; SUBCUTANEOUS at 15:09

## 2017-11-13 NOTE — PROGRESS NOTE ADULT - ASSESSMENT
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 Patient still with abd pain and leukocytosis likely due to pancreatic necrosis, but she feels better since endoscopic drainage.     Impression:  1) Infected pancreatic necrosis s/p endoscopic drainage  2) fever - may be related to pancreatic necrosis vs other nosocomial infection - line related to TPN vs C diff    Plan:  - f/u cultures  - IV abx  - IV fluids with LR  - analgesia per primary team and pain management  - CT scan this week to assess stents and necrosis collection

## 2017-11-13 NOTE — PROGRESS NOTE ADULT - ATTENDING COMMENTS
As above.  Abd pain improving.  Obtain CT scan abd/pelvis with contrast 11/14/17 or 11/15/17 for surveillance of walled off necrosis.  Favor advancing diet to clear liquids.

## 2017-11-13 NOTE — PROGRESS NOTE ADULT - ASSESSMENT
Ms. Coleman is a 57 y.o. woman with history of nephrolithiasis, HTN, and appendectomy admitted on 10/16 for gallstone pancreatitis c/b necrotizing pancreatitis and ascending cholangitis s/p ERCP and stent placement (10/21) and FNA of perihepatic fluid (10/31), which grew E. faecalis. She is s/p cystgastrostomy with 2 axiom stents placement by GI on 11/8. She was not able to tolerate tube feeding and now is receiving TPN. Pain is significantly improved, she was ambulating well on the floor. On 11/12 her diet was advanced to NPO with sips of clears. Her lab is significant for leukocytosis (10.2 ->12.7) and elevated Alk phosph and liver enzymes.    PLAN:   - DVT ppx: Lovenox   - Leukocytosis:  likely 2/2 necrotizing pancreatitis. Abx: continue with cipro therapeutic   - malnutrition: continue with TPN at 55 cc/hr today. She is also on sips of clears  - Pain control: PCA, fentanyl patch, Tylenol IV,   - Per GI, CT scan in one week (11/15) to assess stents and necrosis collection. If she spikes fever, we can scan her earlier  - Elevated Alk Phosph: will border RUQ US

## 2017-11-13 NOTE — PROGRESS NOTE ADULT - SUBJECTIVE AND OBJECTIVE BOX
SUBJECTIVE:  - still with abd pain and nausea, no vomitng  - Patient feels better today than prior to the procedure  - she is on abx with cipro  - she is on TPN  - she had fever to 100.9 overnight      OBJECTIVE:    MEDICATIONS  (STANDING):  acetaminophen  IVPB. 1000 milliGRAM(s) IV Intermittent once  ciprofloxacin   IVPB      ciprofloxacin   IVPB 400 milliGRAM(s) IV Intermittent every 12 hours  diphenhydrAMINE   Injectable 25 milliGRAM(s) IV Push once  enoxaparin Injectable 40 milliGRAM(s) SubCutaneous daily  fentaNYL   Patch  50 MICROgram(s)/Hr 1 Patch Transdermal every 72 hours  HYDROmorphone PCA (1 mG/mL) 30 milliLiter(s) PCA Continuous PCA Continuous  sodium chloride 0.45%. 1000 milliLiter(s) (50 mL/Hr) IV Continuous <Continuous>    MEDICATIONS  (PRN):  diphenhydrAMINE   Injectable 25 milliGRAM(s) IV Push every 6 hours PRN Rash and/or Itching  HYDROmorphone PCA (1 mG/mL) Rescue Clinician Bolus 0.5 milliGRAM(s) IV Push every 15 minutes PRN for Pain Scale GREATER THAN 6  naloxone Injectable 0.1 milliGRAM(s) IV Push every 3 minutes PRN For ANY of the following changes in patient status:  A. RR LESS THAN 10 breaths per minute, B. Oxygen saturation LESS THAN 90%, C. Sedation score of 6  ondansetron Injectable 4 milliGRAM(s) IV Push every 6 hours PRN Nausea    Vital Signs Last 24 Hrs  T(C): 37.3 (13 Nov 2017 05:51), Max: 38.3 (12 Nov 2017 22:11)  T(F): 99.1 (13 Nov 2017 05:51), Max: 100.9 (12 Nov 2017 22:11)  HR: 93 (13 Nov 2017 05:51) (81 - 93)  BP: 109/70 (13 Nov 2017 05:51) (105/68 - 124/82)  BP(mean): --  RR: 18 (13 Nov 2017 05:51) (16 - 18)  SpO2: 97% (13 Nov 2017 05:51) (96% - 98%)    PHYSICAL EXAM:  Constitutional: no acute distress  Eyes: no icterus  Neck: no masses, no LAD  Respiratory: normal inspiratory effort; no wheezing or crackles  Cardiovascular: RRR, normal S1/S2, no murmurs/rubs/gallops  Gastrointestinal: soft, slightly distended, tender throughout but improved from prior  Extremities: no LE edema  Neurological: AAOx3, no asterixis  Skin: no rashes, bruises, petechiae    LABS:                                              8.0    12.73 )-----------( 743      ( 13 Nov 2017 07:35 )             24.9     11-12    135  |  98  |  14  ----------------------------<  109<H>  4.6   |  24  |  0.37<L>    Ca    9.4      12 Nov 2017 08:55  Phos  5.3     11-12  Mg     2.0     11-12    TPro  6.9  /  Alb  2.5<L>  /  TBili  0.2  /  DBili  x   /  AST  85<H>  /  ALT  88<H>  /  AlkPhos  566<H>  11-12

## 2017-11-13 NOTE — CHART NOTE - NSCHARTNOTEFT_GEN_A_CORE
Pt seen for malnutrition follow up and initiation of TPN, as per department protocol.    Hospital Course: Pt is a 56 yo woman with history of nephrolithiasis, HTN, and appendectomy admitted on 10/16 for gallstone pancreatitis c/b necrotizing pancreatitis and ascending cholangitis S/P ERCP and stent placement (10/21) and FNA of perihepatic fluid (10/31), which grew E. faecalis. Pt now S/P endoscopic drainage of pancreatic necrosis, cystgastrostomy placement of 2 metal stents 11/8.     Per chart, plan for CT scan this week to assess stents and necrosis collections. Pt noted with leukocytosis, elevated Alk Phosph and liver enzymes.    Pt admitted 10/16, received intermittent po diet of low fat clears and enteral feeds of Jevity 1.2 with poor tolerance (per notes, tiger tube was in duodenum during periods of poor enteral tolerance). Pt was NPO since 10/27; TPN ordered 11/5. Per Nutrition Support MD, PN will continue until GI access can be obtained and pt is tolerating enteral diet.    Pt now tolerating ice chips and small sips of apple juice (permitted per Provider to RN order).     Source: Patient [ ]    Family [ ]     other [ X]; medical record, RN; pt asleep at time of visit    Diet : NPO with ice chips; Provider to RN: patient may have some sips of apple juice    Per chart, last noted BM on 10/29.      Enteral /Parenteral Nutrition: TPN infusing at 55ml/hr (84Gm amino acids, 192Gm dextrose, 57ml 20%lipids) to [id number] (16.6 pollo/Kg, 1.26 Gm protein/Kg per dosing wt 66.5Kg); with 10cc MVI 9+5, 3cc MTE-5.     Weight: 69.5Kg (10/26), 66.5Kg (10/17); weight change likely reflects fluid shifts; no recent weight  Edema: none noted    Pertinent Medications: MEDICATIONS  (STANDING):  ciprofloxacin   IVPB      ciprofloxacin   IVPB 400 milliGRAM(s) IV Intermittent every 12 hours  diphenhydrAMINE   Injectable 25 milliGRAM(s) IV Push once  enoxaparin Injectable 40 milliGRAM(s) SubCutaneous daily  fentaNYL   Patch  50 MICROgram(s)/Hr 1 Patch Transdermal every 72 hours  HYDROmorphone PCA (1 mG/mL) 30 milliLiter(s) PCA Continuous PCA Continuous  sodium chloride 0.45%. 1000 milliLiter(s) (50 mL/Hr) IV Continuous <Continuous>    MEDICATIONS  (PRN):  diphenhydrAMINE   Injectable 25 milliGRAM(s) IV Push every 6 hours PRN Rash and/or Itching  HYDROmorphone PCA (1 mG/mL) Rescue Clinician Bolus 0.5 milliGRAM(s) IV Push every 15 minutes PRN for Pain Scale GREATER THAN 6  naloxone Injectable 0.1 milliGRAM(s) IV Push every 3 minutes PRN For ANY of the following changes in patient status:  A. RR LESS THAN 10 breaths per minute, B. Oxygen saturation LESS THAN 90%, C. Sedation score of 6  ondansetron Injectable 4 milliGRAM(s) IV Push every 6 hours PRN Nausea    Pertinent Labs:  11-13 Na133 mmol/L<L> Glu 107 mg/dL<H> K+ 4.1 mmol/L Cr  0.46 mg/dL<L> BUN 15 mg/dL Phos 4.4 mg/dL Alb 2.9 g/dL<L>    Skin: no pressure injuries    Estimated Needs:   [ X] no change since previous assessment  [ ] recalculated:       Previous Nutrition Diagnosis:     [X ] Malnutrition; severe    Nutrition Diagnosis is [X ] ongoing; being addressed with PN    New Nutrition Diagnosis: [ X] not applicable     Interventions:     Recommend:  1) PN per MD Guallpa  2) Pending ongoing tolerance to ice and juice sips, recommend trial clear liquid diet  3) Pending need for EN (and tiger tube replacement and placement confirmation in the jejunum), recommend Vital 1.2, initiate at 10m, increase as tolerated to goal rate 50ml/hr x 24 hours to provide 1200 ml formula, 1440cal/day, 90 Gm protein/day, 973ml free water; meets 22cal/Kg and 1.4Gm/Kg protein per dosing wt 66.5Kg).   4) Monitor weight, lab values, skin, po intake and GI tolerance    Monitoring and Evaluation:   Follow up per protocol  RD to remain available for further nutritional interventions as indicated.   Anabela Molina, MS RD [facility], #[phone].

## 2017-11-13 NOTE — PROGRESS NOTE ADULT - SUBJECTIVE AND OBJECTIVE BOX
Day  26 of Anesthesia Pain Management Service    SUBJECTIVE: Still with abdominal pain and  nausea.    Pain Scale Score:	[X] Refer to charted pain scores    THERAPY:    [ ] IV PCA Morphine		[ ] 5 mg/mL	[ ] 1 mg/mL  [X] IV PCA Hydromorphone	[ ] 5 mg/mL	[X] 1 mg/mL  [ ] IV PCA Fentanyl		[ ] 50 micrograms/mL    Demand dose: 0.5 mg     Lockout: 6 minutes   Continuous Rate: 0 mg/hr  4 Hour Limit: 9 mg    MEDICATIONS  (STANDING):  acetaminophen  IVPB. 1000 milliGRAM(s) IV Intermittent once  ciprofloxacin   IVPB      ciprofloxacin   IVPB 400 milliGRAM(s) IV Intermittent every 12 hours  diphenhydrAMINE   Injectable 25 milliGRAM(s) IV Push once  enoxaparin Injectable 40 milliGRAM(s) SubCutaneous daily  fentaNYL   Patch  50 MICROgram(s)/Hr 1 Patch Transdermal every 72 hours  HYDROmorphone PCA (1 mG/mL) 30 milliLiter(s) PCA Continuous PCA Continuous  sodium chloride 0.45%. 1000 milliLiter(s) (50 mL/Hr) IV Continuous <Continuous>    MEDICATIONS  (PRN):  diphenhydrAMINE   Injectable 25 milliGRAM(s) IV Push every 6 hours PRN Rash and/or Itching  HYDROmorphone PCA (1 mG/mL) Rescue Clinician Bolus 0.5 milliGRAM(s) IV Push every 15 minutes PRN for Pain Scale GREATER THAN 6  naloxone Injectable 0.1 milliGRAM(s) IV Push every 3 minutes PRN For ANY of the following changes in patient status:  A. RR LESS THAN 10 breaths per minute, B. Oxygen saturation LESS THAN 90%, C. Sedation score of 6  ondansetron Injectable 4 milliGRAM(s) IV Push every 6 hours PRN Nausea      OBJECTIVE:    Sedation Score:	[  ] Alert	[ ] Drowsy 	[ ] Arousable	[X ] Asleep	[ ] Unresponsive    Side Effects:	[   ] None	[X ] Nausea	[ ] Vomiting	[ ] Pruritus  		[ ] Other:    Vital Signs Last 24 Hrs  T(C): 37.3 (13 Nov 2017 05:51), Max: 38.3 (12 Nov 2017 22:11)  T(F): 99.1 (13 Nov 2017 05:51), Max: 100.9 (12 Nov 2017 22:11)  HR: 93 (13 Nov 2017 05:51) (82 - 93)  BP: 109/70 (13 Nov 2017 05:51) (105/68 - 124/82)  BP(mean): --  RR: 18 (13 Nov 2017 05:51) (16 - 18)  SpO2: 97% (13 Nov 2017 05:51) (96% - 98%)    ASSESSMENT/ PLAN    Therapy to  be:               [X] Continued   [ ] Discontinued   [ ] Changed to PRN Analgesics    Documentation and Verification of current medications:   [X] Done	[ ] Not done, not eligible    Comments: As per GI, for repeat CTScan this week. Continue PCA and Duragesics 50mcg/hr. NPO on TPN.

## 2017-11-14 LAB
ALBUMIN SERPL ELPH-MCNC: 2.6 G/DL — LOW (ref 3.3–5)
ALP SERPL-CCNC: 494 U/L — HIGH (ref 40–120)
ALT FLD-CCNC: 53 U/L — HIGH (ref 10–45)
ANION GAP SERPL CALC-SCNC: 13 MMOL/L — SIGNIFICANT CHANGE UP (ref 5–17)
AST SERPL-CCNC: 38 U/L — SIGNIFICANT CHANGE UP (ref 10–40)
BASOPHILS # BLD AUTO: 0.05 K/UL — SIGNIFICANT CHANGE UP (ref 0–0.2)
BASOPHILS NFR BLD AUTO: 0.5 % — SIGNIFICANT CHANGE UP (ref 0–2)
BILIRUB SERPL-MCNC: 0.2 MG/DL — SIGNIFICANT CHANGE UP (ref 0.2–1.2)
BUN SERPL-MCNC: 16 MG/DL — SIGNIFICANT CHANGE UP (ref 7–23)
CALCIUM SERPL-MCNC: 8.5 MG/DL — SIGNIFICANT CHANGE UP (ref 8.4–10.5)
CHLORIDE SERPL-SCNC: 96 MMOL/L — SIGNIFICANT CHANGE UP (ref 96–108)
CO2 SERPL-SCNC: 25 MMOL/L — SIGNIFICANT CHANGE UP (ref 22–31)
CREAT SERPL-MCNC: 0.38 MG/DL — LOW (ref 0.5–1.3)
EOSINOPHIL # BLD AUTO: 0.58 K/UL — HIGH (ref 0–0.5)
EOSINOPHIL NFR BLD AUTO: 5.6 — SIGNIFICANT CHANGE UP
GLUCOSE SERPL-MCNC: 97 MG/DL — SIGNIFICANT CHANGE UP (ref 70–99)
HCT VFR BLD CALC: 23.8 % — LOW (ref 34.5–45)
HGB BLD-MCNC: 7.7 G/DL — LOW (ref 11.5–15.5)
IMM GRANULOCYTES NFR BLD AUTO: 0.7 % — SIGNIFICANT CHANGE UP (ref 0–1.5)
LYMPHOCYTES # BLD AUTO: 1.7 K/UL — SIGNIFICANT CHANGE UP (ref 1–3.3)
LYMPHOCYTES # BLD AUTO: 16.5 % — SIGNIFICANT CHANGE UP (ref 13–44)
MAGNESIUM SERPL-MCNC: 1.8 MG/DL — SIGNIFICANT CHANGE UP (ref 1.6–2.6)
MCHC RBC-ENTMCNC: 27.1 PG — SIGNIFICANT CHANGE UP (ref 27–34)
MCHC RBC-ENTMCNC: 32.4 GM/DL — SIGNIFICANT CHANGE UP (ref 32–36)
MCV RBC AUTO: 83.8 FL — SIGNIFICANT CHANGE UP (ref 80–100)
MONOCYTES # BLD AUTO: 1.11 K/UL — HIGH (ref 0–0.9)
MONOCYTES NFR BLD AUTO: 10.8 % — SIGNIFICANT CHANGE UP (ref 2–14)
NEUTROPHILS # BLD AUTO: 6.81 K/UL — SIGNIFICANT CHANGE UP (ref 1.8–7.4)
NEUTROPHILS NFR BLD AUTO: 65.9 % — SIGNIFICANT CHANGE UP (ref 43–77)
PHOSPHATE SERPL-MCNC: 4.1 MG/DL — SIGNIFICANT CHANGE UP (ref 2.5–4.5)
PLATELET # BLD AUTO: 617 K/UL — HIGH (ref 150–400)
POTASSIUM SERPL-MCNC: 4.6 MMOL/L — SIGNIFICANT CHANGE UP (ref 3.5–5.3)
POTASSIUM SERPL-SCNC: 4.6 MMOL/L — SIGNIFICANT CHANGE UP (ref 3.5–5.3)
PROT SERPL-MCNC: 7 G/DL — SIGNIFICANT CHANGE UP (ref 6–8.3)
RBC # BLD: 2.84 M/UL — LOW (ref 3.8–5.2)
RBC # FLD: 14.9 % — HIGH (ref 10.3–14.5)
SODIUM SERPL-SCNC: 134 MMOL/L — LOW (ref 135–145)
WBC # BLD: 10.32 K/UL — SIGNIFICANT CHANGE UP (ref 3.8–10.5)
WBC # FLD AUTO: 10.32 K/UL — SIGNIFICANT CHANGE UP (ref 3.8–10.5)

## 2017-11-14 PROCEDURE — 99232 SBSQ HOSP IP/OBS MODERATE 35: CPT | Mod: GC

## 2017-11-14 PROCEDURE — 99232 SBSQ HOSP IP/OBS MODERATE 35: CPT

## 2017-11-14 PROCEDURE — 76705 ECHO EXAM OF ABDOMEN: CPT | Mod: 26,RT

## 2017-11-14 RX ORDER — DIPHENHYDRAMINE HCL 50 MG
25 CAPSULE ORAL EVERY 4 HOURS
Qty: 0 | Refills: 0 | Status: DISCONTINUED | OUTPATIENT
Start: 2017-11-14 | End: 2017-12-01

## 2017-11-14 RX ORDER — ACETAMINOPHEN 500 MG
1000 TABLET ORAL ONCE
Qty: 0 | Refills: 0 | Status: COMPLETED | OUTPATIENT
Start: 2017-11-15 | End: 2017-11-15

## 2017-11-14 RX ORDER — ACETAMINOPHEN 500 MG
1000 TABLET ORAL ONCE
Qty: 0 | Refills: 0 | Status: COMPLETED | OUTPATIENT
Start: 2017-11-14 | End: 2017-11-14

## 2017-11-14 RX ORDER — HYDROMORPHONE HYDROCHLORIDE 2 MG/ML
1 INJECTION INTRAMUSCULAR; INTRAVENOUS; SUBCUTANEOUS
Qty: 0 | Refills: 0 | Status: DISCONTINUED | OUTPATIENT
Start: 2017-11-14 | End: 2017-11-21

## 2017-11-14 RX ADMIN — HYDROMORPHONE HYDROCHLORIDE 1 MILLIGRAM(S): 2 INJECTION INTRAMUSCULAR; INTRAVENOUS; SUBCUTANEOUS at 13:06

## 2017-11-14 RX ADMIN — HYDROMORPHONE HYDROCHLORIDE 30 MILLILITER(S): 2 INJECTION INTRAMUSCULAR; INTRAVENOUS; SUBCUTANEOUS at 22:05

## 2017-11-14 RX ADMIN — HYDROMORPHONE HYDROCHLORIDE 30 MILLILITER(S): 2 INJECTION INTRAMUSCULAR; INTRAVENOUS; SUBCUTANEOUS at 08:08

## 2017-11-14 RX ADMIN — Medication 25 MILLIGRAM(S): at 23:17

## 2017-11-14 RX ADMIN — Medication 25 MILLIGRAM(S): at 11:14

## 2017-11-14 RX ADMIN — HYDROMORPHONE HYDROCHLORIDE 0.5 MILLIGRAM(S): 2 INJECTION INTRAMUSCULAR; INTRAVENOUS; SUBCUTANEOUS at 08:07

## 2017-11-14 RX ADMIN — HYDROMORPHONE HYDROCHLORIDE 30 MILLILITER(S): 2 INJECTION INTRAMUSCULAR; INTRAVENOUS; SUBCUTANEOUS at 07:14

## 2017-11-14 RX ADMIN — HYDROMORPHONE HYDROCHLORIDE 30 MILLILITER(S): 2 INJECTION INTRAMUSCULAR; INTRAVENOUS; SUBCUTANEOUS at 19:04

## 2017-11-14 RX ADMIN — Medication 1000 MILLIGRAM(S): at 11:44

## 2017-11-14 RX ADMIN — ENOXAPARIN SODIUM 40 MILLIGRAM(S): 100 INJECTION SUBCUTANEOUS at 11:14

## 2017-11-14 RX ADMIN — Medication 400 MILLIGRAM(S): at 17:20

## 2017-11-14 RX ADMIN — Medication 25 MILLIGRAM(S): at 20:06

## 2017-11-14 RX ADMIN — Medication 1000 MILLIGRAM(S): at 17:50

## 2017-11-14 RX ADMIN — HYDROMORPHONE HYDROCHLORIDE 0.5 MILLIGRAM(S): 2 INJECTION INTRAMUSCULAR; INTRAVENOUS; SUBCUTANEOUS at 02:18

## 2017-11-14 RX ADMIN — Medication 200 MILLIGRAM(S): at 05:34

## 2017-11-14 RX ADMIN — HYDROMORPHONE HYDROCHLORIDE 1 MILLIGRAM(S): 2 INJECTION INTRAMUSCULAR; INTRAVENOUS; SUBCUTANEOUS at 16:04

## 2017-11-14 RX ADMIN — Medication 1000 MILLIGRAM(S): at 04:36

## 2017-11-14 RX ADMIN — FENTANYL CITRATE 1 PATCH: 50 INJECTION INTRAVENOUS at 05:35

## 2017-11-14 RX ADMIN — Medication 25 MILLIGRAM(S): at 05:57

## 2017-11-14 RX ADMIN — Medication 200 MILLIGRAM(S): at 18:03

## 2017-11-14 RX ADMIN — FENTANYL CITRATE 1 PATCH: 50 INJECTION INTRAVENOUS at 04:06

## 2017-11-14 RX ADMIN — Medication 400 MILLIGRAM(S): at 04:06

## 2017-11-14 RX ADMIN — Medication 1000 MILLIGRAM(S): at 23:37

## 2017-11-14 RX ADMIN — Medication 400 MILLIGRAM(S): at 23:07

## 2017-11-14 RX ADMIN — Medication 25 MILLIGRAM(S): at 00:08

## 2017-11-14 RX ADMIN — Medication 400 MILLIGRAM(S): at 11:14

## 2017-11-14 RX ADMIN — HYDROMORPHONE HYDROCHLORIDE 1 MILLIGRAM(S): 2 INJECTION INTRAMUSCULAR; INTRAVENOUS; SUBCUTANEOUS at 22:12

## 2017-11-14 RX ADMIN — HYDROMORPHONE HYDROCHLORIDE 1 MILLIGRAM(S): 2 INJECTION INTRAMUSCULAR; INTRAVENOUS; SUBCUTANEOUS at 19:55

## 2017-11-14 RX ADMIN — HYDROMORPHONE HYDROCHLORIDE 1 MILLIGRAM(S): 2 INJECTION INTRAMUSCULAR; INTRAVENOUS; SUBCUTANEOUS at 22:00

## 2017-11-14 RX ADMIN — Medication 25 MILLIGRAM(S): at 16:04

## 2017-11-14 NOTE — PROGRESS NOTE ADULT - ATTENDING COMMENTS
Seen on 11/14 with fellow at bedside.  Agree that CT will be necessary to ensure good placement of stents and there should be a reduction in the cavity that's notable.

## 2017-11-14 NOTE — PROGRESS NOTE ADULT - ASSESSMENT
Ms. Coleman is a 57 y.o. woman with history of nephrolithiasis, HTN, and appendectomy admitted on 10/16 for gallstone pancreatitis c/b necrotizing pancreatitis and ascending cholangitis s/p ERCP and stent placement (10/21) and FNA of perihepatic fluid (10/31), which grew E. faecalis. She is s/p cystgastrostomy with 2 axiom stents placement by GI on 11/8. She was not able to tolerate tube feeding and now is receiving TPN. Pain is significantly improved, she was ambulating well on the floor. On 11/12 her diet was advanced to NPO with sips of clears. Her lab is significant for leukocytosis (10.2 ->12.7) and elevated Alk phosph and liver enzymes. 11/14 LFTs down trending and diet advanced to CLD.     PLAN:   - DVT ppx: Lovenox   - C/W IV Cipro for culture (enterococcus) and FU ID  - malnutrition: continue with TPN at 55 cc/hr today.   - Trial of clears   - Pain control: PCA, fentanyl patch, Tylenol IV  - CT scan tomorrow to assess stents and necrosis collection.  - F/U RUQ ABD US

## 2017-11-14 NOTE — PROGRESS NOTE ADULT - ATTENDING COMMENTS
Patient seen and examined.  Tolerating clears well  No complaints  Pain well controlled  No signs of uncontrolled sepsis.    - Will advance to full liquids  - Repeat CT tomorrow to assure maximal drainage of infected pseudocyst.  - May be able to wean TPN if taking enough calories / protein from full liquid diet.

## 2017-11-14 NOTE — PROGRESS NOTE ADULT - SUBJECTIVE AND OBJECTIVE BOX
DX: Gallstone pancreatitis, s/p cystogastrostomy for necrosis and pseudocyst, c/b splenic vein thrombosis, s/p FNA of perihepatic fluid (enterococcus)    Vital Signs Last 24 Hrs  T(C): 37.1 (14 Nov 2017 09:22), Max: 37.1 (13 Nov 2017 14:16)  T(F): 98.8 (14 Nov 2017 09:22), Max: 98.8 (14 Nov 2017 09:22)  HR: 88 (14 Nov 2017 09:22) (73 - 88)  BP: 108/71 (14 Nov 2017 09:22) (99/62 - 110/72)  BP(mean): --  RR: 18 (14 Nov 2017 09:22) (18 - 18)  SpO2: 93% (14 Nov 2017 09:22) (93% - 95%)    I&O's Summary    13 Nov 2017 07:01  -  14 Nov 2017 07:00  --------------------------------------------------------  IN: 3270 mL / OUT: 500 mL / NET: 2770 mL        SUBJECTIVE: Pt seen and examined. C/O ABD pain. Denies N/V. Tolerating sips.       Physical Exam:  General Appearance: Appears well, NAD  Abdomen: Soft, RUQ tenderness, ND    LABS:                        7.7    10.32 )-----------( 617      ( 14 Nov 2017 08:22 )             23.8     11-14    134<L>  |  96  |  16  ----------------------------<  97  4.6   |  25  |  0.38<L>    Ca    8.5      14 Nov 2017 08:20  Phos  4.1     11-14  Mg     1.8     11-14    TPro  7.0  /  Alb  2.6<L>  /  TBili  0.2  /  DBili  x   /  AST  38  /  ALT  53<H>  /  AlkPhos  494<H>  11-14            Lilli Alfred PA-C  p#[phone]

## 2017-11-14 NOTE — PROGRESS NOTE ADULT - ASSESSMENT
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 Patient still with abd pain and leukocytosis likely due to pancreatic necrosis, but she feels better since endoscopic drainage.     Impression:  1) Infected pancreatic necrosis s/p endoscopic drainage  2) fever - may be related to pancreatic necrosis vs other nosocomial infection     Plan:  - f/u cultures  - IV abx  - IV fluids with LR  - analgesia per primary team and pain management  - CT abd/plvs to assess stents and necrosis collection

## 2017-11-14 NOTE — PROGRESS NOTE ADULT - SUBJECTIVE AND OBJECTIVE BOX
Pain Management Attending Addendum    SUBJECTIVE: Comfortable, no complaints    Therapy:	  [X ] IV PCA	   [ ] Epidural           [ ] s/p Spinal Opoid              [ ] Postpartum infusion	  [ ] Patient controlled regional anesthesia (PCRA)    [ ] prn Analgesics    OBJECTIVE:   [X ] No new signs     [ ] Other:    Side Effects:  [X ] None			[ ] Other:    Assessment of Catheter Site:		[ ] Intact		[ ] Other:    ASSESSMENT/PLAN  [X ] Continue current therapy    [ ] Therapy changed to:    [ ] IV PCA       [ ] Epidural     [ ] prn Analgesics     [ ] post partum infusion    Comments:

## 2017-11-14 NOTE — PROGRESS NOTE ADULT - SUBJECTIVE AND OBJECTIVE BOX
Pt remains on PN  diet advanced to sips  11-13 @ 07:01  -  11-14 @ 07:00  --------------------------------------------------------  IN:    sodium chloride 0.45%.: 1150 mL    Solution: 400 mL    Solution: 400 mL    TPN (Total Parenteral Nutrition): 1320 mL  Total IN: 3270 mL    OUT:    Voided: 500 mL  Total OUT: 500 mL    Total NET: 2770 mL        T(C): 36.7 (11-14-17 @ 05:34), Max: 37.1 (11-13-17 @ 14:16)  HR: 80 (11-14-17 @ 05:34) (73 - 86)  BP: 100/64 (11-14-17 @ 05:34) (99/62 - 110/72)  RR: 18 (11-14-17 @ 05:34) (18 - 18)  SpO2: 94% (11-14-17 @ 05:34) (94% - 95%)  Wt(kg): --    Labs   11-13    133<L>  |  94<L>  |  15  ----------------------------<  107<H>  4.1   |  24  |  0.46<L>    Ca    9.3      13 Nov 2017 07:35  Phos  4.4     11-13  Mg     2.0     11-13    TPro  7.3  /  Alb  2.9<L>  /  TBili  0.3  /  DBili  x   /  AST  54<H>  /  ALT  69<H>  /  AlkPhos  613<H>  11-13      LIVER FUNCTIONS - ( 13 Nov 2017 07:35 )  Alb: 2.9 g/dL / Pro: 7.3 g/dL / ALK PHOS: 613 U/L / ALT: 69 U/L / AST: 54 U/L / GGT: x           CAPILLARY BLOOD GLUCOSE        I&O's Detail    13 Nov 2017 07:01  -  14 Nov 2017 07:00  --------------------------------------------------------  IN:    sodium chloride 0.45%.: 1150 mL    Solution: 400 mL    Solution: 400 mL    TPN (Total Parenteral Nutrition): 1320 mL  Total IN: 3270 mL    OUT:    Voided: 500 mL  Total OUT: 500 mL    Total NET: 2770 mL

## 2017-11-14 NOTE — PROGRESS NOTE ADULT - SUBJECTIVE AND OBJECTIVE BOX
SUBJECTIVE:  - still with abd pain and nausea, no vomitng  - Patient feels better today than prior to the procedure  - she is on abx with cipro  - she is on TPN  - no fevers overnight      OBJECTIVE:    MEDICATIONS  (STANDING):  acetaminophen  IVPB. 1000 milliGRAM(s) IV Intermittent once  ciprofloxacin   IVPB      ciprofloxacin   IVPB 400 milliGRAM(s) IV Intermittent every 12 hours  diphenhydrAMINE   Injectable 25 milliGRAM(s) IV Push once  enoxaparin Injectable 40 milliGRAM(s) SubCutaneous daily  fentaNYL   Patch  50 MICROgram(s)/Hr 1 Patch Transdermal every 72 hours  HYDROmorphone PCA (1 mG/mL) 30 milliLiter(s) PCA Continuous PCA Continuous  sodium chloride 0.45%. 1000 milliLiter(s) (50 mL/Hr) IV Continuous <Continuous>    MEDICATIONS  (PRN):  diphenhydrAMINE   Injectable 25 milliGRAM(s) IV Push every 6 hours PRN Rash and/or Itching  HYDROmorphone PCA (1 mG/mL) Rescue Clinician Bolus 0.5 milliGRAM(s) IV Push every 15 minutes PRN for Pain Scale GREATER THAN 6  naloxone Injectable 0.1 milliGRAM(s) IV Push every 3 minutes PRN For ANY of the following changes in patient status:  A. RR LESS THAN 10 breaths per minute, B. Oxygen saturation LESS THAN 90%, C. Sedation score of 6  ondansetron Injectable 4 milliGRAM(s) IV Push every 6 hours PRN Nausea    Vital Signs Last 24 Hrs  Vital Signs Last 24 Hrs  T(C): 36.7 (14 Nov 2017 05:34), Max: 37.1 (13 Nov 2017 14:16)  T(F): 98.1 (14 Nov 2017 05:34), Max: 98.7 (13 Nov 2017 14:16)  HR: 80 (14 Nov 2017 05:34) (73 - 86)  BP: 100/64 (14 Nov 2017 05:34) (99/62 - 110/72)  BP(mean): --  RR: 18 (14 Nov 2017 05:34) (18 - 18)  SpO2: 94% (14 Nov 2017 05:34) (94% - 95%)    PHYSICAL EXAM:  Constitutional: no acute distress  Eyes: no icterus  Neck: no masses, no LAD  Respiratory: normal inspiratory effort; no wheezing or crackles  Cardiovascular: RRR, normal S1/S2, no murmurs/rubs/gallops  Gastrointestinal: soft, slightly distended, slightly tender throughout but overall much improved  Extremities: no LE edema  Neurological: AAOx3, no asterixis  Skin: no rashes, bruises, petechiae    LABS:                                   7.7    10.32 )-----------( 617      ( 14 Nov 2017 08:22 )             23.8     11-14    134<L>  |  96  |  16  ----------------------------<  97  4.6   |  25  |  0.38<L>    Ca    8.5      14 Nov 2017 08:20  Phos  4.1     11-14  Mg     1.8     11-14    TPro  7.0  /  Alb  2.6<L>  /  TBili  0.2  /  DBili  x   /  AST  38  /  ALT  53<H>  /  AlkPhos  494<H>  11-14 SUBJECTIVE:  - still with abd pain and nausea, no vomitng  - Patient signficantly better today than prior to the procedure and she is starting to eat  - she is on abx with cipro  - she is on TPN  - no fevers overnight      OBJECTIVE:    MEDICATIONS  (STANDING):  acetaminophen  IVPB. 1000 milliGRAM(s) IV Intermittent once  ciprofloxacin   IVPB      ciprofloxacin   IVPB 400 milliGRAM(s) IV Intermittent every 12 hours  diphenhydrAMINE   Injectable 25 milliGRAM(s) IV Push once  enoxaparin Injectable 40 milliGRAM(s) SubCutaneous daily  fentaNYL   Patch  50 MICROgram(s)/Hr 1 Patch Transdermal every 72 hours  HYDROmorphone PCA (1 mG/mL) 30 milliLiter(s) PCA Continuous PCA Continuous  sodium chloride 0.45%. 1000 milliLiter(s) (50 mL/Hr) IV Continuous <Continuous>    MEDICATIONS  (PRN):  diphenhydrAMINE   Injectable 25 milliGRAM(s) IV Push every 6 hours PRN Rash and/or Itching  HYDROmorphone PCA (1 mG/mL) Rescue Clinician Bolus 0.5 milliGRAM(s) IV Push every 15 minutes PRN for Pain Scale GREATER THAN 6  naloxone Injectable 0.1 milliGRAM(s) IV Push every 3 minutes PRN For ANY of the following changes in patient status:  A. RR LESS THAN 10 breaths per minute, B. Oxygen saturation LESS THAN 90%, C. Sedation score of 6  ondansetron Injectable 4 milliGRAM(s) IV Push every 6 hours PRN Nausea    Vital Signs Last 24 Hrs  Vital Signs Last 24 Hrs  T(C): 36.7 (14 Nov 2017 05:34), Max: 37.1 (13 Nov 2017 14:16)  T(F): 98.1 (14 Nov 2017 05:34), Max: 98.7 (13 Nov 2017 14:16)  HR: 80 (14 Nov 2017 05:34) (73 - 86)  BP: 100/64 (14 Nov 2017 05:34) (99/62 - 110/72)  BP(mean): --  RR: 18 (14 Nov 2017 05:34) (18 - 18)  SpO2: 94% (14 Nov 2017 05:34) (94% - 95%)    PHYSICAL EXAM:  Constitutional: no acute distress  Eyes: no icterus  Neck: no masses, no LAD  Respiratory: normal inspiratory effort; no wheezing or crackles  Cardiovascular: RRR, normal S1/S2, no murmurs/rubs/gallops  Gastrointestinal: soft, slightly distended, slightly tender throughout but overall much improved  Extremities: no LE edema  Neurological: AAOx3, no asterixis  Skin: no rashes, bruises, petechiae    LABS:                                   7.7    10.32 )-----------( 617      ( 14 Nov 2017 08:22 )             23.8     11-14    134<L>  |  96  |  16  ----------------------------<  97  4.6   |  25  |  0.38<L>    Ca    8.5      14 Nov 2017 08:20  Phos  4.1     11-14  Mg     1.8     11-14    TPro  7.0  /  Alb  2.6<L>  /  TBili  0.2  /  DBili  x   /  AST  38  /  ALT  53<H>  /  AlkPhos  494<H>  11-14

## 2017-11-14 NOTE — PROGRESS NOTE ADULT - SUBJECTIVE AND OBJECTIVE BOX
Day 27\6 of Anesthesia Pain Management Service    SUBJECTIVE: Patient is doing well with IV PCA    Pain Scale Score:	[X] Refer to charted pain scores    THERAPY:    [ ] IV PCA Morphine		[ ] 5 mg/mL	[ ] 1 mg/mL  [X] IV PCA Hydromorphone	[ ] 5 mg/mL	[X] 1 mg/mL  [ ] IV PCA Fentanyl		[ ] 50 micrograms/mL    Demand dose: 0.5 mg     Lockout: 6 minutes   Continuous Rate: 0 mg/hr  4 Hour Limit: 9 mg    MEDICATIONS  (STANDING):  acetaminophen  IVPB. 1000 milliGRAM(s) IV Intermittent once  acetaminophen  IVPB. 1000 milliGRAM(s) IV Intermittent once  ciprofloxacin   IVPB      ciprofloxacin   IVPB 400 milliGRAM(s) IV Intermittent every 12 hours  diphenhydrAMINE   Injectable 25 milliGRAM(s) IV Push once  enoxaparin Injectable 40 milliGRAM(s) SubCutaneous daily  HYDROmorphone PCA (1 mG/mL) 30 milliLiter(s) PCA Continuous PCA Continuous  sodium chloride 0.45%. 1000 milliLiter(s) (50 mL/Hr) IV Continuous <Continuous>    MEDICATIONS  (PRN):  diphenhydrAMINE   Injectable 25 milliGRAM(s) IV Push every 6 hours PRN Rash and/or Itching  HYDROmorphone PCA (1 mG/mL) Rescue Clinician Bolus 1 milliGRAM(s) IV Push every 15 minutes PRN for Pain Scale GREATER THAN 6  naloxone Injectable 0.1 milliGRAM(s) IV Push every 3 minutes PRN For ANY of the following changes in patient status:  A. RR LESS THAN 10 breaths per minute, B. Oxygen saturation LESS THAN 90%, C. Sedation score of 6  ondansetron Injectable 4 milliGRAM(s) IV Push every 6 hours PRN Nausea      OBJECTIVE:    Sedation Score:	[ X] Alert	[ ] Drowsy 	[ ] Arousable	[ ] Asleep	[ ] Unresponsive    Side Effects:	[X ] None	[ ] Nausea	[ ] Vomiting	[ ] Pruritus  		[ ] Other:    Vital Signs Last 24 Hrs  T(C): 36.7 (14 Nov 2017 05:34), Max: 37.1 (13 Nov 2017 14:16)  T(F): 98.1 (14 Nov 2017 05:34), Max: 98.7 (13 Nov 2017 14:16)  HR: 80 (14 Nov 2017 05:34) (73 - 86)  BP: 100/64 (14 Nov 2017 05:34) (99/62 - 110/72)  BP(mean): --  RR: 18 (14 Nov 2017 05:34) (18 - 18)  SpO2: 94% (14 Nov 2017 05:34) (94% - 95%)    ASSESSMENT/ PLAN    Therapy to  be:               [X] Continued   [ ] Discontinued   [ ] Changed to PRN Analgesics    Documentation and Verification of current medications:   [X] Done	[ ] Not done, not eligible    Comments: Using 1-6x/hr. Reporting some abdominal pain this am. Patient states she is going for drainage and repeat CTScan this week.  Continue PCA

## 2017-11-15 LAB
ALBUMIN SERPL ELPH-MCNC: 3 G/DL — LOW (ref 3.3–5)
ALP SERPL-CCNC: 531 U/L — HIGH (ref 40–120)
ALT FLD-CCNC: 38 U/L — SIGNIFICANT CHANGE UP (ref 10–45)
ANION GAP SERPL CALC-SCNC: 17 MMOL/L — SIGNIFICANT CHANGE UP (ref 5–17)
AST SERPL-CCNC: 41 U/L — HIGH (ref 10–40)
BILIRUB SERPL-MCNC: 0.2 MG/DL — SIGNIFICANT CHANGE UP (ref 0.2–1.2)
BUN SERPL-MCNC: 15 MG/DL — SIGNIFICANT CHANGE UP (ref 7–23)
CALCIUM SERPL-MCNC: 9.2 MG/DL — SIGNIFICANT CHANGE UP (ref 8.4–10.5)
CHLORIDE SERPL-SCNC: 97 MMOL/L — SIGNIFICANT CHANGE UP (ref 96–108)
CO2 SERPL-SCNC: 21 MMOL/L — LOW (ref 22–31)
CREAT SERPL-MCNC: 0.46 MG/DL — LOW (ref 0.5–1.3)
GLUCOSE SERPL-MCNC: 69 MG/DL — LOW (ref 70–99)
HCT VFR BLD CALC: 24.3 % — LOW (ref 34.5–45)
HGB BLD-MCNC: 7.7 G/DL — LOW (ref 11.5–15.5)
MAGNESIUM SERPL-MCNC: 1.9 MG/DL — SIGNIFICANT CHANGE UP (ref 1.6–2.6)
MCHC RBC-ENTMCNC: 26.6 PG — LOW (ref 27–34)
MCHC RBC-ENTMCNC: 31.7 GM/DL — LOW (ref 32–36)
MCV RBC AUTO: 83.8 FL — SIGNIFICANT CHANGE UP (ref 80–100)
PHOSPHATE SERPL-MCNC: 4.3 MG/DL — SIGNIFICANT CHANGE UP (ref 2.5–4.5)
PLATELET # BLD AUTO: 616 K/UL — HIGH (ref 150–400)
POTASSIUM SERPL-MCNC: 4.3 MMOL/L — SIGNIFICANT CHANGE UP (ref 3.5–5.3)
POTASSIUM SERPL-SCNC: 4.3 MMOL/L — SIGNIFICANT CHANGE UP (ref 3.5–5.3)
PROT SERPL-MCNC: 7.1 G/DL — SIGNIFICANT CHANGE UP (ref 6–8.3)
RBC # BLD: 2.9 M/UL — LOW (ref 3.8–5.2)
RBC # FLD: 15.3 % — HIGH (ref 10.3–14.5)
SODIUM SERPL-SCNC: 135 MMOL/L — SIGNIFICANT CHANGE UP (ref 135–145)
WBC # BLD: 9.44 K/UL — SIGNIFICANT CHANGE UP (ref 3.8–10.5)
WBC # FLD AUTO: 9.44 K/UL — SIGNIFICANT CHANGE UP (ref 3.8–10.5)

## 2017-11-15 PROCEDURE — 99233 SBSQ HOSP IP/OBS HIGH 50: CPT

## 2017-11-15 PROCEDURE — 74177 CT ABD & PELVIS W/CONTRAST: CPT | Mod: 26

## 2017-11-15 RX ORDER — ACETAMINOPHEN 500 MG
1000 TABLET ORAL ONCE
Qty: 0 | Refills: 0 | Status: COMPLETED | OUTPATIENT
Start: 2017-11-15 | End: 2017-11-15

## 2017-11-15 RX ORDER — DIPHENHYDRAMINE HCL 50 MG
25 CAPSULE ORAL AT BEDTIME
Qty: 0 | Refills: 0 | Status: DISCONTINUED | OUTPATIENT
Start: 2017-11-15 | End: 2017-11-19

## 2017-11-15 RX ORDER — ACETAMINOPHEN 500 MG
1000 TABLET ORAL ONCE
Qty: 0 | Refills: 0 | Status: COMPLETED | OUTPATIENT
Start: 2017-11-16 | End: 2017-11-16

## 2017-11-15 RX ADMIN — HYDROMORPHONE HYDROCHLORIDE 30 MILLILITER(S): 2 INJECTION INTRAMUSCULAR; INTRAVENOUS; SUBCUTANEOUS at 07:04

## 2017-11-15 RX ADMIN — ENOXAPARIN SODIUM 40 MILLIGRAM(S): 100 INJECTION SUBCUTANEOUS at 12:54

## 2017-11-15 RX ADMIN — Medication 200 MILLIGRAM(S): at 05:38

## 2017-11-15 RX ADMIN — HYDROMORPHONE HYDROCHLORIDE 30 MILLILITER(S): 2 INJECTION INTRAMUSCULAR; INTRAVENOUS; SUBCUTANEOUS at 18:06

## 2017-11-15 RX ADMIN — Medication 400 MILLIGRAM(S): at 11:20

## 2017-11-15 RX ADMIN — Medication 25 MILLIGRAM(S): at 07:05

## 2017-11-15 RX ADMIN — Medication 1000 MILLIGRAM(S): at 23:16

## 2017-11-15 RX ADMIN — Medication 400 MILLIGRAM(S): at 17:02

## 2017-11-15 RX ADMIN — HYDROMORPHONE HYDROCHLORIDE 30 MILLILITER(S): 2 INJECTION INTRAMUSCULAR; INTRAVENOUS; SUBCUTANEOUS at 19:29

## 2017-11-15 RX ADMIN — Medication 1000 MILLIGRAM(S): at 17:32

## 2017-11-15 RX ADMIN — Medication 400 MILLIGRAM(S): at 05:09

## 2017-11-15 RX ADMIN — Medication 25 MILLIGRAM(S): at 17:03

## 2017-11-15 RX ADMIN — HYDROMORPHONE HYDROCHLORIDE 1 MILLIGRAM(S): 2 INJECTION INTRAMUSCULAR; INTRAVENOUS; SUBCUTANEOUS at 10:42

## 2017-11-15 RX ADMIN — Medication 400 MILLIGRAM(S): at 22:56

## 2017-11-15 RX ADMIN — Medication 1000 MILLIGRAM(S): at 11:35

## 2017-11-15 RX ADMIN — Medication 1000 MILLIGRAM(S): at 05:39

## 2017-11-15 RX ADMIN — Medication 25 MILLIGRAM(S): at 12:54

## 2017-11-15 RX ADMIN — Medication 25 MILLIGRAM(S): at 22:56

## 2017-11-15 RX ADMIN — Medication 200 MILLIGRAM(S): at 17:02

## 2017-11-15 NOTE — PROGRESS NOTE ADULT - SUBJECTIVE AND OBJECTIVE BOX
Day 28\7 of Anesthesia Pain Management Service    SUBJECTIVE: Patient is doing well with IV PCA    Pain Scale Score:	[X] Refer to charted pain scores    THERAPY:    [ ] IV PCA Morphine		[ ] 5 mg/mL	[ ] 1 mg/mL  [X] IV PCA Hydromorphone	[ ] 5 mg/mL	[X] 1 mg/mL  [ ] IV PCA Fentanyl		[ ] 50 micrograms/mL    Demand dose: 0.5 mg     Lockout: 6 minutes   Continuous Rate: 0 mg/hr  4 Hour Limit: 9 mg    MEDICATIONS  (STANDING):  acetaminophen  IVPB. 1000 milliGRAM(s) IV Intermittent once  acetaminophen  IVPB. 1000 milliGRAM(s) IV Intermittent once  ciprofloxacin   IVPB      ciprofloxacin   IVPB 400 milliGRAM(s) IV Intermittent every 12 hours  diphenhydrAMINE   Injectable 25 milliGRAM(s) IV Push once  enoxaparin Injectable 40 milliGRAM(s) SubCutaneous daily  HYDROmorphone PCA (1 mG/mL) 30 milliLiter(s) PCA Continuous PCA Continuous  sodium chloride 0.45%. 1000 milliLiter(s) (50 mL/Hr) IV Continuous <Continuous>    MEDICATIONS  (PRN):  diphenhydrAMINE   Injectable 25 milliGRAM(s) IV Push at bedtime PRN Insomnia  diphenhydrAMINE   Injectable 25 milliGRAM(s) IV Push every 4 hours PRN Rash and/or Itching  HYDROmorphone PCA (1 mG/mL) Rescue Clinician Bolus 1 milliGRAM(s) IV Push every 15 minutes PRN for Pain Scale GREATER THAN 6  naloxone Injectable 0.1 milliGRAM(s) IV Push every 3 minutes PRN For ANY of the following changes in patient status:  A. RR LESS THAN 10 breaths per minute, B. Oxygen saturation LESS THAN 90%, C. Sedation score of 6  ondansetron Injectable 4 milliGRAM(s) IV Push every 6 hours PRN Nausea      OBJECTIVE:    Sedation Score:	[ X] Alert	[ ] Drowsy 	[ ] Arousable	[ ] Asleep	[ ] Unresponsive    Side Effects:	[X ] None	[ ] Nausea	[ ] Vomiting	[ ] Pruritus  		[ ] Other:    Vital Signs Last 24 Hrs  T(C): 36.9 (15 Nov 2017 09:09), Max: 38.6 (15 Nov 2017 04:54)  T(F): 98.5 (15 Nov 2017 09:09), Max: 101.5 (15 Nov 2017 04:54)  HR: 81 (15 Nov 2017 04:54) (76 - 86)  BP: 121/80 (15 Nov 2017 04:54) (105/69 - 121/80)  BP(mean): --  RR: 18 (15 Nov 2017 04:54) (18 - 18)  SpO2: 93% (15 Nov 2017 04:54) (93% - 96%)    ASSESSMENT/ PLAN    Therapy to  be:               [X] Continued   [ ] Discontinued   [ ] Changed to PRN Analgesics    Documentation and Verification of current medications:   [X] Done	[ ] Not done, not eligible    Comments:

## 2017-11-15 NOTE — PROGRESS NOTE ADULT - ASSESSMENT
57 y.o. F with history of nephrolithiasis, HTN, and appendectomy, admitted (10/16) for gallstone pancreatitis c/b necrotizing pancreatitis and ascending cholangitis s/p ERCP and stent placement (10/21) and FNA of perihepatic fluid (10/31), which grew E. faecalis. She is s/p cystgastrostomy with 2 axiom stents placement by GI on 11/8. She was not able to tolerate tube feeding and now is receiving TPN and oral intake. Improving on IV ABX.     PLAN:   - DVT ppx: Lovenox   - C/W IV Cipro for enterococcus  - malnutrition: continue with TPN  - C/W fulls  - Pain control: PCA, fentanyl patch, Tylenol IV  - CT scan today to assess stents and necrosis

## 2017-11-15 NOTE — PROGRESS NOTE ADULT - SUBJECTIVE AND OBJECTIVE BOX
PN continues  PN infusion at   11-14 @ 07:01  -  11-15 @ 07:00  --------------------------------------------------------  IN:    sodium chloride 0.45%.: 1200 mL    Solution: 400 mL    Solution: 400 mL    TPN (Total Parenteral Nutrition): 1320 mL  Total IN: 3320 mL    OUT:    Voided: 1700 mL  Total OUT: 1700 mL    Total NET: 1620 mL        T(C): 36.9 (11-15-17 @ 09:09), Max: 38.6 (11-15-17 @ 04:54)  HR: 81 (11-15-17 @ 04:54) (76 - 86)  BP: 121/80 (11-15-17 @ 04:54) (105/69 - 121/80)  RR: 18 (11-15-17 @ 04:54) (18 - 18)  SpO2: 93% (11-15-17 @ 04:54) (93% - 96%)  Wt(kg): --    Labs   11-14    134<L>  |  96  |  16  ----------------------------<  97  4.6   |  25  |  0.38<L>    Ca    8.5      14 Nov 2017 08:20  Phos  4.1     11-14  Mg     1.8     11-14    TPro  7.0  /  Alb  2.6<L>  /  TBili  0.2  /  DBili  x   /  AST  38  /  ALT  53<H>  /  AlkPhos  494<H>  11-14      LIVER FUNCTIONS - ( 14 Nov 2017 08:20 )  Alb: 2.6 g/dL / Pro: 7.0 g/dL / ALK PHOS: 494 U/L / ALT: 53 U/L / AST: 38 U/L / GGT: x           CAPILLARY BLOOD GLUCOSE        I&O's Detail    14 Nov 2017 07:01  -  15 Nov 2017 07:00  --------------------------------------------------------  IN:    sodium chloride 0.45%.: 1200 mL    Solution: 400 mL    Solution: 400 mL    TPN (Total Parenteral Nutrition): 1320 mL  Total IN: 3320 mL    OUT:    Voided: 1700 mL  Total OUT: 1700 mL    Total NET: 1620 mL

## 2017-11-15 NOTE — PROGRESS NOTE ADULT - SUBJECTIVE AND OBJECTIVE BOX
DX: Gallstone pancreatitis, s/p cystogastrostomy for necrosis and pseudocyst, c/b splenic vein thrombosis, s/p FNA of perihepatic fluid (enterococcus)      Vital Signs Last 24 Hrs  T(C): 38.6 (15 Nov 2017 04:54), Max: 38.6 (15 Nov 2017 04:54)  T(F): 101.5 (15 Nov 2017 04:54), Max: 101.5 (15 Nov 2017 04:54)  HR: 81 (15 Nov 2017 04:54) (76 - 88)  BP: 121/80 (15 Nov 2017 04:54) (105/69 - 121/80)  BP(mean): --  RR: 18 (15 Nov 2017 04:54) (18 - 18)  SpO2: 93% (15 Nov 2017 04:54) (93% - 96%)    I&O's Summary    14 Nov 2017 07:01  -  15 Nov 2017 07:00  --------------------------------------------------------  IN: 3320 mL / OUT: 1700 mL / NET: 1620 mL        SUBJECTIVE: Pt seen and examined. Denies N/V and pain. No fevers. Tolerating fulls and ambulating well. RUQ US yesterday showed cholelithiasis.     Physical Exam:  General Appearance: Appears well, NAD  Abdomen: Soft, NT, ND  Extremities: Grossly symmetric, SCD's in place     LABS:                        7.7    10.32 )-----------( 617      ( 14 Nov 2017 08:22 )             23.8     11-14    134<L>  |  96  |  16  ----------------------------<  97  4.6   |  25  |  0.38<L>    Ca    8.5      14 Nov 2017 08:20  Phos  4.1     11-14  Mg     1.8     11-14    TPro  7.0  /  Alb  2.6<L>  /  TBili  0.2  /  DBili  x   /  AST  38  /  ALT  53<H>  /  AlkPhos  494<H>  11-14          Lilli Alfred PA-C  p#[id number]

## 2017-11-16 LAB
ALBUMIN SERPL ELPH-MCNC: 2.9 G/DL — LOW (ref 3.3–5)
ALP SERPL-CCNC: 497 U/L — HIGH (ref 40–120)
ALT FLD-CCNC: 40 U/L — SIGNIFICANT CHANGE UP (ref 10–45)
AMYLASE P1 CFR SERPL: 44 U/L — SIGNIFICANT CHANGE UP (ref 25–125)
ANION GAP SERPL CALC-SCNC: 14 MMOL/L — SIGNIFICANT CHANGE UP (ref 5–17)
APPEARANCE UR: CLEAR — SIGNIFICANT CHANGE UP
AST SERPL-CCNC: 40 U/L — SIGNIFICANT CHANGE UP (ref 10–40)
BACTERIA # UR AUTO: NEGATIVE — SIGNIFICANT CHANGE UP
BILIRUB SERPL-MCNC: 0.2 MG/DL — SIGNIFICANT CHANGE UP (ref 0.2–1.2)
BILIRUB UR-MCNC: NEGATIVE — SIGNIFICANT CHANGE UP
BUN SERPL-MCNC: 14 MG/DL — SIGNIFICANT CHANGE UP (ref 7–23)
CALCIUM SERPL-MCNC: 8.7 MG/DL — SIGNIFICANT CHANGE UP (ref 8.4–10.5)
CHLORIDE SERPL-SCNC: 94 MMOL/L — LOW (ref 96–108)
CO2 SERPL-SCNC: 24 MMOL/L — SIGNIFICANT CHANGE UP (ref 22–31)
COLOR SPEC: YELLOW — SIGNIFICANT CHANGE UP
CREAT SERPL-MCNC: 0.38 MG/DL — LOW (ref 0.5–1.3)
DIFF PNL FLD: NEGATIVE — SIGNIFICANT CHANGE UP
EPI CELLS # UR: 1 /HPF — SIGNIFICANT CHANGE UP (ref 0–5)
GLUCOSE SERPL-MCNC: 122 MG/DL — HIGH (ref 70–99)
GLUCOSE UR QL: NEGATIVE MG/DL — SIGNIFICANT CHANGE UP
HCT VFR BLD CALC: 23.4 % — LOW (ref 34.5–45)
HGB BLD-MCNC: 7.6 G/DL — LOW (ref 11.5–15.5)
KETONES UR-MCNC: NEGATIVE — SIGNIFICANT CHANGE UP
LEUKOCYTE ESTERASE UR-ACNC: NEGATIVE — SIGNIFICANT CHANGE UP
LIDOCAIN IGE QN: 45 U/L — SIGNIFICANT CHANGE UP (ref 7–60)
MAGNESIUM SERPL-MCNC: 2 MG/DL — SIGNIFICANT CHANGE UP (ref 1.6–2.6)
MCHC RBC-ENTMCNC: 26.8 PG — LOW (ref 27–34)
MCHC RBC-ENTMCNC: 32.5 GM/DL — SIGNIFICANT CHANGE UP (ref 32–36)
MCV RBC AUTO: 82.4 FL — SIGNIFICANT CHANGE UP (ref 80–100)
NITRITE UR-MCNC: NEGATIVE — SIGNIFICANT CHANGE UP
PH UR: 7.5 — SIGNIFICANT CHANGE UP (ref 5–8)
PHOSPHATE SERPL-MCNC: 4.3 MG/DL — SIGNIFICANT CHANGE UP (ref 2.5–4.5)
PLATELET # BLD AUTO: 620 K/UL — HIGH (ref 150–400)
POTASSIUM SERPL-MCNC: 4.3 MMOL/L — SIGNIFICANT CHANGE UP (ref 3.5–5.3)
POTASSIUM SERPL-SCNC: 4.3 MMOL/L — SIGNIFICANT CHANGE UP (ref 3.5–5.3)
PROT SERPL-MCNC: 7.5 G/DL — SIGNIFICANT CHANGE UP (ref 6–8.3)
PROT UR-MCNC: ABNORMAL MG/DL
RBC # BLD: 2.84 M/UL — LOW (ref 3.8–5.2)
RBC # FLD: 15.3 % — HIGH (ref 10.3–14.5)
RBC CASTS # UR COMP ASSIST: 1 /HPF — SIGNIFICANT CHANGE UP (ref 0–4)
SODIUM SERPL-SCNC: 132 MMOL/L — LOW (ref 135–145)
SP GR SPEC: 1.02 — SIGNIFICANT CHANGE UP (ref 1.01–1.02)
UROBILINOGEN FLD QL: 1 MG/DL — SIGNIFICANT CHANGE UP
WBC # BLD: 9.1 K/UL — SIGNIFICANT CHANGE UP (ref 3.8–10.5)
WBC # FLD AUTO: 9.1 K/UL — SIGNIFICANT CHANGE UP (ref 3.8–10.5)
WBC UR QL: 0 /HPF — SIGNIFICANT CHANGE UP (ref 0–5)

## 2017-11-16 PROCEDURE — 99232 SBSQ HOSP IP/OBS MODERATE 35: CPT

## 2017-11-16 PROCEDURE — 71010: CPT | Mod: 26

## 2017-11-16 PROCEDURE — 99232 SBSQ HOSP IP/OBS MODERATE 35: CPT | Mod: GC

## 2017-11-16 RX ORDER — ACETAMINOPHEN 500 MG
1000 TABLET ORAL ONCE
Qty: 0 | Refills: 0 | Status: COMPLETED | OUTPATIENT
Start: 2017-11-17 | End: 2017-11-16

## 2017-11-16 RX ORDER — ACETAMINOPHEN 500 MG
1000 TABLET ORAL ONCE
Qty: 0 | Refills: 0 | Status: COMPLETED | OUTPATIENT
Start: 2017-11-16 | End: 2017-11-16

## 2017-11-16 RX ORDER — IMIPENEM AND CILASTATIN 250; 250 MG/100ML; MG/100ML
500 INJECTION, POWDER, FOR SOLUTION INTRAVENOUS ONCE
Qty: 0 | Refills: 0 | Status: COMPLETED | OUTPATIENT
Start: 2017-11-16 | End: 2017-11-16

## 2017-11-16 RX ORDER — IMIPENEM AND CILASTATIN 250; 250 MG/100ML; MG/100ML
500 INJECTION, POWDER, FOR SOLUTION INTRAVENOUS EVERY 6 HOURS
Qty: 0 | Refills: 0 | Status: DISCONTINUED | OUTPATIENT
Start: 2017-11-17 | End: 2017-11-24

## 2017-11-16 RX ORDER — ACETAMINOPHEN 500 MG
1000 TABLET ORAL ONCE
Qty: 0 | Refills: 0 | Status: COMPLETED | OUTPATIENT
Start: 2017-11-17 | End: 2017-11-17

## 2017-11-16 RX ORDER — HYDROMORPHONE HYDROCHLORIDE 2 MG/ML
30 INJECTION INTRAMUSCULAR; INTRAVENOUS; SUBCUTANEOUS
Qty: 0 | Refills: 0 | Status: DISCONTINUED | OUTPATIENT
Start: 2017-11-16 | End: 2017-11-23

## 2017-11-16 RX ORDER — IMIPENEM AND CILASTATIN 250; 250 MG/100ML; MG/100ML
INJECTION, POWDER, FOR SOLUTION INTRAVENOUS
Qty: 0 | Refills: 0 | Status: DISCONTINUED | OUTPATIENT
Start: 2017-11-16 | End: 2017-11-24

## 2017-11-16 RX ADMIN — HYDROMORPHONE HYDROCHLORIDE 1 MILLIGRAM(S): 2 INJECTION INTRAMUSCULAR; INTRAVENOUS; SUBCUTANEOUS at 10:52

## 2017-11-16 RX ADMIN — ENOXAPARIN SODIUM 40 MILLIGRAM(S): 100 INJECTION SUBCUTANEOUS at 13:29

## 2017-11-16 RX ADMIN — IMIPENEM AND CILASTATIN 100 MILLIGRAM(S): 250; 250 INJECTION, POWDER, FOR SOLUTION INTRAVENOUS at 18:00

## 2017-11-16 RX ADMIN — Medication 200 MILLIGRAM(S): at 06:02

## 2017-11-16 RX ADMIN — HYDROMORPHONE HYDROCHLORIDE 30 MILLILITER(S): 2 INJECTION INTRAMUSCULAR; INTRAVENOUS; SUBCUTANEOUS at 07:21

## 2017-11-16 RX ADMIN — Medication 1000 MILLIGRAM(S): at 23:05

## 2017-11-16 RX ADMIN — Medication 400 MILLIGRAM(S): at 11:36

## 2017-11-16 RX ADMIN — HYDROMORPHONE HYDROCHLORIDE 1 MILLIGRAM(S): 2 INJECTION INTRAMUSCULAR; INTRAVENOUS; SUBCUTANEOUS at 16:53

## 2017-11-16 RX ADMIN — HYDROMORPHONE HYDROCHLORIDE 30 MILLILITER(S): 2 INJECTION INTRAMUSCULAR; INTRAVENOUS; SUBCUTANEOUS at 19:15

## 2017-11-16 RX ADMIN — Medication 25 MILLIGRAM(S): at 10:53

## 2017-11-16 RX ADMIN — Medication 25 MILLIGRAM(S): at 04:51

## 2017-11-16 RX ADMIN — Medication 1000 MILLIGRAM(S): at 18:26

## 2017-11-16 RX ADMIN — Medication 400 MILLIGRAM(S): at 22:45

## 2017-11-16 RX ADMIN — Medication 25 MILLIGRAM(S): at 18:38

## 2017-11-16 RX ADMIN — Medication 400 MILLIGRAM(S): at 17:56

## 2017-11-16 RX ADMIN — HYDROMORPHONE HYDROCHLORIDE 30 MILLILITER(S): 2 INJECTION INTRAMUSCULAR; INTRAVENOUS; SUBCUTANEOUS at 13:33

## 2017-11-16 RX ADMIN — HYDROMORPHONE HYDROCHLORIDE 1 MILLIGRAM(S): 2 INJECTION INTRAMUSCULAR; INTRAVENOUS; SUBCUTANEOUS at 03:02

## 2017-11-16 RX ADMIN — Medication 400 MILLIGRAM(S): at 04:51

## 2017-11-16 RX ADMIN — Medication 25 MILLIGRAM(S): at 14:47

## 2017-11-16 RX ADMIN — Medication 25 MILLIGRAM(S): at 22:38

## 2017-11-16 RX ADMIN — Medication 1000 MILLIGRAM(S): at 05:11

## 2017-11-16 NOTE — PROGRESS NOTE ADULT - ASSESSMENT
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 Patient still with abd pain likely due to pancreatic necrosis, but she she has fevers now -  may be related to pancreatic necrosis vs nosocomial infection.    Impression:  1) Infected pancreatic necrosis s/p endoscopic drainage  2) fever - may be related to pancreatic necrosis vs nosocomial infection     Plan:  - please check BCX and pursue fever w/u  - IV abx  - IV fluids with LR  - analgesia per primary team and pain management  - would consider d/c TPN and enteral feeding to decrease risk of candidemia  - will discuss necrosis debridement with advanced endoscopy attendings further

## 2017-11-16 NOTE — PROVIDER CONTACT NOTE (OTHER) - ASSESSMENT
Pt frequently complains of pain, no significant increase in pain at this time, all other VSS. Pt was given IV Tylenol as ordered at 0450 prior to temp being taken.

## 2017-11-16 NOTE — PROGRESS NOTE ADULT - SUBJECTIVE AND OBJECTIVE BOX
Pt remains on PN  Oral intake is nutritionally inadequate   11-15 @ 07:01  -  11-16 @ 07:00  --------------------------------------------------------  IN:    Oral Fluid: 360 mL    sodium chloride 0.45%.: 1200 mL    Solution: 300 mL    Solution: 400 mL    TPN (Total Parenteral Nutrition): 1320 mL  Total IN: 3580 mL    OUT:    Voided: 1600 mL  Total OUT: 1600 mL    Total NET: 1980 mL        T(C): 37.2 (11-16-17 @ 07:58), Max: 38.6 (11-16-17 @ 05:18)  HR: 102 (11-16-17 @ 05:18) (73 - 102)  BP: 113/71 (11-16-17 @ 05:18) (103/66 - 119/79)  RR: 18 (11-16-17 @ 05:18) (18 - 18)  SpO2: 93% (11-16-17 @ 05:18) (93% - 96%)  Wt(kg): --    Labs   11-15    135  |  97  |  15  ----------------------------<  69<L>  4.3   |  21<L>  |  0.46<L>    Ca    9.2      15 Nov 2017 08:53  Phos  4.3     11-15  Mg     1.9     11-15    TPro  7.1  /  Alb  3.0<L>  /  TBili  0.2  /  DBili  x   /  AST  41<H>  /  ALT  38  /  AlkPhos  531<H>  11-15      LIVER FUNCTIONS - ( 15 Nov 2017 08:53 )  Alb: 3.0 g/dL / Pro: 7.1 g/dL / ALK PHOS: 531 U/L / ALT: 38 U/L / AST: 41 U/L / GGT: x           CAPILLARY BLOOD GLUCOSE        I&O's Detail    15 Nov 2017 07:01  -  16 Nov 2017 07:00  --------------------------------------------------------  IN:    Oral Fluid: 360 mL    sodium chloride 0.45%.: 1200 mL    Solution: 300 mL    Solution: 400 mL    TPN (Total Parenteral Nutrition): 1320 mL  Total IN: 3580 mL    OUT:    Voided: 1600 mL  Total OUT: 1600 mL    Total NET: 1980 mL

## 2017-11-16 NOTE — PROGRESS NOTE ADULT - SUBJECTIVE AND OBJECTIVE BOX
Day 29 of Anesthesia Pain Management Service    SUBJECTIVE: Patient is doing well with IV PCA    Pain Scale Score:	[X] Refer to charted pain scores    THERAPY:    [ ] IV PCA Morphine		[ ] 5 mg/mL	[ ] 1 mg/mL  [X] IV PCA Hydromorphone	[ ] 5 mg/mL	[X] 1 mg/mL  [ ] IV PCA Fentanyl		[ ] 50 micrograms/mL    Demand dose: 0.5 mg     Lockout: 6 minutes   Continuous Rate: 0 mg/hr  4 Hour Limit: 9 mg    MEDICATIONS  (STANDING):  ciprofloxacin   IVPB      ciprofloxacin   IVPB 400 milliGRAM(s) IV Intermittent every 12 hours  diphenhydrAMINE   Injectable 25 milliGRAM(s) IV Push once  enoxaparin Injectable 40 milliGRAM(s) SubCutaneous daily  HYDROmorphone PCA (1 mG/mL) 30 milliLiter(s) PCA Continuous PCA Continuous  sodium chloride 0.45%. 1000 milliLiter(s) (50 mL/Hr) IV Continuous <Continuous>    MEDICATIONS  (PRN):  diphenhydrAMINE   Injectable 25 milliGRAM(s) IV Push at bedtime PRN Insomnia  diphenhydrAMINE   Injectable 25 milliGRAM(s) IV Push every 4 hours PRN Rash and/or Itching  HYDROmorphone PCA (1 mG/mL) Rescue Clinician Bolus 1 milliGRAM(s) IV Push every 15 minutes PRN for Pain Scale GREATER THAN 6  naloxone Injectable 0.1 milliGRAM(s) IV Push every 3 minutes PRN For ANY of the following changes in patient status:  A. RR LESS THAN 10 breaths per minute, B. Oxygen saturation LESS THAN 90%, C. Sedation score of 6  ondansetron Injectable 4 milliGRAM(s) IV Push every 6 hours PRN Nausea      OBJECTIVE:    Sedation Score:	[ X] Alert	[ ] Drowsy 	[ ] Arousable	[ ] Asleep	[ ] Unresponsive    Side Effects:	[X ] None	[ ] Nausea	[ ] Vomiting	[ ] Pruritus  		[ ] Other:    Vital Signs Last 24 Hrs  T(C): 38.1 (16 Nov 2017 10:34), Max: 38.6 (16 Nov 2017 05:18)  T(F): 100.6 (16 Nov 2017 10:34), Max: 101.5 (16 Nov 2017 05:18)  HR: 98 (16 Nov 2017 10:34) (73 - 102)  BP: 106/72 (16 Nov 2017 10:34) (103/66 - 119/79)  BP(mean): --  RR: 18 (16 Nov 2017 10:34) (18 - 18)  SpO2: 94% (16 Nov 2017 10:34) (93% - 96%)    ASSESSMENT/ PLAN    Therapy to  be:               [X] Continued   [ ] Discontinued   [ ] Changed to PRN Analgesics    Documentation and Verification of current medications:   [X] Done	[ ] Not done, not eligible    Comments:

## 2017-11-16 NOTE — PROGRESS NOTE ADULT - ATTENDING COMMENTS
As above    Impression:  Infected pancreatic necrosis s/p endoscopic drainage with cystgastrostomy x 2 stents  Initial improvement after endoscopic procedure, but now having fevers, although hemodynamically stable.  CT scan demonstrates stents in position and significant decrease in size of walled off necrosis.  Patient has been on TPN and is on ciprofloxacin.    Recommendations:  Fever workup.  Broaden antibiotic coverage to imipenem, as this antibiotic may have better pancreas penetration.  Consider discontinuing TPN/PICC line and observing.

## 2017-11-16 NOTE — PROGRESS NOTE ADULT - SUBJECTIVE AND OBJECTIVE BOX
SUBJECTIVE:  - patient had fever overnight to 101.5  - still with abd pain and nausea, no vomitng  - she denies diarrhea, cough, dysuria or open cuts/wounds  - she had CT scan yesterday  - she is on abx with cipro  - she is on TPN    OBJECTIVE:    MEDICATIONS  (STANDING):  ciprofloxacin   IVPB      ciprofloxacin   IVPB 400 milliGRAM(s) IV Intermittent every 12 hours  diphenhydrAMINE   Injectable 25 milliGRAM(s) IV Push once  enoxaparin Injectable 40 milliGRAM(s) SubCutaneous daily  HYDROmorphone PCA (1 mG/mL) 30 milliLiter(s) PCA Continuous PCA Continuous  sodium chloride 0.45%. 1000 milliLiter(s) (50 mL/Hr) IV Continuous <Continuous>    MEDICATIONS  (PRN):  diphenhydrAMINE   Injectable 25 milliGRAM(s) IV Push at bedtime PRN Insomnia  diphenhydrAMINE   Injectable 25 milliGRAM(s) IV Push every 4 hours PRN Rash and/or Itching  HYDROmorphone PCA (1 mG/mL) Rescue Clinician Bolus 1 milliGRAM(s) IV Push every 15 minutes PRN for Pain Scale GREATER THAN 6  naloxone Injectable 0.1 milliGRAM(s) IV Push every 3 minutes PRN For ANY of the following changes in patient status:  A. RR LESS THAN 10 breaths per minute, B. Oxygen saturation LESS THAN 90%, C. Sedation score of 6  ondansetron Injectable 4 milliGRAM(s) IV Push every 6 hours PRN Nausea    Vital Signs Last 24 Hrs  T(C): 38.1 (16 Nov 2017 10:34), Max: 38.6 (16 Nov 2017 05:18)  T(F): 100.6 (16 Nov 2017 10:34), Max: 101.5 (16 Nov 2017 05:18)  HR: 98 (16 Nov 2017 10:34) (73 - 102)  BP: 106/72 (16 Nov 2017 10:34) (103/66 - 119/79)  BP(mean): --  RR: 18 (16 Nov 2017 10:34) (18 - 18)  SpO2: 94% (16 Nov 2017 10:34) (93% - 96%)    PHYSICAL EXAM:  Constitutional: no acute distress  Eyes: no icterus  Neck: no masses, no LAD  Respiratory: normal inspiratory effort; no wheezing or crackles  Cardiovascular: RRR, normal S1/S2, no murmurs/rubs/gallops  Gastrointestinal: soft, slightly distended, slightly tender throughout  Extremities: no LE edema  Neurological: AAOx3, no asterixis  Skin: no rashes, bruises, petechiae    LABS:                                              7.6    9.10  )-----------( 620      ( 16 Nov 2017 07:56 )             23.4     11-16    132<L>  |  94<L>  |  14  ----------------------------<  122<H>  4.3   |  24  |  0.38<L>    Ca    8.7      16 Nov 2017 08:08  Phos  4.3     11-16  Mg     2.0     11-16    TPro  7.5  /  Alb  2.9<L>  /  TBili  0.2  /  DBili  x   /  AST  40  /  ALT  40  /  AlkPhos  497<H>  11-16

## 2017-11-16 NOTE — PROGRESS NOTE ADULT - SUBJECTIVE AND OBJECTIVE BOX
Patient seen and examined  Did not eat or drink today (other than water)  Now with acute epigastric pain  no emesis  + Fevers today and yesterday   Vitals stable otherwise  abd - + epigastric tenderness, soft, non-distended    WBC= WNL  LFTs = LFT except alk phos = 497 (trending down slightly)    - Will make NPO  - Will check amylase / lipase  - Will check U/A and blood cultures  - If continues to have fevers with out obvious source may need to remove PICC line.  - Care discussed with patient and family

## 2017-11-16 NOTE — PROGRESS NOTE ADULT - SUBJECTIVE AND OBJECTIVE BOX
Pain Management Attending Addendum    SUBJECTIVE: Patient sleeping, comfortable    Therapy:	  [x ] IV PCA	   [ ] Epidural           [ ] s/p Spinal Opoid              [ ] Postpartum infusion	  [ ] Patient controlled regional anesthesia (PCRA)    [ ] prn Analgesics    OBJECTIVE:   [x ] No new signs     [ ] Other:    Side Effects:  [x ] None			[ ] Other:    Assessment of Catheter Site:		[ ] Intact		[ ] Other:    ASSESSMENT/PLAN  [ x] Continue current therapy    [ ] Therapy changed to:    [ ] IV PCA       [ ] Epidural     [ ] prn Analgesics     [ ] post partum infusion    Comments:

## 2017-11-17 LAB
ANION GAP SERPL CALC-SCNC: 16 MMOL/L — SIGNIFICANT CHANGE UP (ref 5–17)
BUN SERPL-MCNC: 12 MG/DL — SIGNIFICANT CHANGE UP (ref 7–23)
CALCIUM SERPL-MCNC: 8.8 MG/DL — SIGNIFICANT CHANGE UP (ref 8.4–10.5)
CHLORIDE SERPL-SCNC: 95 MMOL/L — LOW (ref 96–108)
CO2 SERPL-SCNC: 23 MMOL/L — SIGNIFICANT CHANGE UP (ref 22–31)
CREAT SERPL-MCNC: 0.4 MG/DL — LOW (ref 0.5–1.3)
GLUCOSE SERPL-MCNC: 105 MG/DL — HIGH (ref 70–99)
HCT VFR BLD CALC: 23.2 % — LOW (ref 34.5–45)
HGB BLD-MCNC: 7.6 G/DL — LOW (ref 11.5–15.5)
LIDOCAIN IGE QN: 43 U/L — SIGNIFICANT CHANGE UP (ref 7–60)
MAGNESIUM SERPL-MCNC: 1.9 MG/DL — SIGNIFICANT CHANGE UP (ref 1.6–2.6)
MCHC RBC-ENTMCNC: 27 PG — SIGNIFICANT CHANGE UP (ref 27–34)
MCHC RBC-ENTMCNC: 32.8 GM/DL — SIGNIFICANT CHANGE UP (ref 32–36)
MCV RBC AUTO: 82.6 FL — SIGNIFICANT CHANGE UP (ref 80–100)
PHOSPHATE SERPL-MCNC: 4.3 MG/DL — SIGNIFICANT CHANGE UP (ref 2.5–4.5)
PLATELET # BLD AUTO: 633 K/UL — HIGH (ref 150–400)
POTASSIUM SERPL-MCNC: 4.4 MMOL/L — SIGNIFICANT CHANGE UP (ref 3.5–5.3)
POTASSIUM SERPL-SCNC: 4.4 MMOL/L — SIGNIFICANT CHANGE UP (ref 3.5–5.3)
RBC # BLD: 2.81 M/UL — LOW (ref 3.8–5.2)
RBC # FLD: 15 % — HIGH (ref 10.3–14.5)
SODIUM SERPL-SCNC: 134 MMOL/L — LOW (ref 135–145)
WBC # BLD: 10.49 K/UL — SIGNIFICANT CHANGE UP (ref 3.8–10.5)
WBC # FLD AUTO: 10.49 K/UL — SIGNIFICANT CHANGE UP (ref 3.8–10.5)

## 2017-11-17 PROCEDURE — 99232 SBSQ HOSP IP/OBS MODERATE 35: CPT

## 2017-11-17 PROCEDURE — 71010: CPT | Mod: 26

## 2017-11-17 RX ORDER — FENTANYL CITRATE 50 UG/ML
1 INJECTION INTRAVENOUS
Qty: 0 | Refills: 0 | Status: DISCONTINUED | OUTPATIENT
Start: 2017-11-17 | End: 2017-11-20

## 2017-11-17 RX ORDER — ACETAMINOPHEN 500 MG
1000 TABLET ORAL ONCE
Qty: 0 | Refills: 0 | Status: COMPLETED | OUTPATIENT
Start: 2017-11-17 | End: 2017-11-17

## 2017-11-17 RX ORDER — ACETAMINOPHEN 500 MG
1000 TABLET ORAL ONCE
Qty: 0 | Refills: 0 | Status: COMPLETED | OUTPATIENT
Start: 2017-11-18 | End: 2017-11-18

## 2017-11-17 RX ADMIN — HYDROMORPHONE HYDROCHLORIDE 1 MILLIGRAM(S): 2 INJECTION INTRAMUSCULAR; INTRAVENOUS; SUBCUTANEOUS at 17:12

## 2017-11-17 RX ADMIN — Medication 1000 MILLIGRAM(S): at 14:02

## 2017-11-17 RX ADMIN — Medication 1000 MILLIGRAM(S): at 05:28

## 2017-11-17 RX ADMIN — HYDROMORPHONE HYDROCHLORIDE 30 MILLILITER(S): 2 INJECTION INTRAMUSCULAR; INTRAVENOUS; SUBCUTANEOUS at 07:21

## 2017-11-17 RX ADMIN — Medication 1000 MILLIGRAM(S): at 21:30

## 2017-11-17 RX ADMIN — Medication 400 MILLIGRAM(S): at 20:04

## 2017-11-17 RX ADMIN — HYDROMORPHONE HYDROCHLORIDE 30 MILLILITER(S): 2 INJECTION INTRAMUSCULAR; INTRAVENOUS; SUBCUTANEOUS at 06:16

## 2017-11-17 RX ADMIN — Medication 25 MILLIGRAM(S): at 14:51

## 2017-11-17 RX ADMIN — FENTANYL CITRATE 1 PATCH: 50 INJECTION INTRAVENOUS at 18:28

## 2017-11-17 RX ADMIN — IMIPENEM AND CILASTATIN 100 MILLIGRAM(S): 250; 250 INJECTION, POWDER, FOR SOLUTION INTRAVENOUS at 17:45

## 2017-11-17 RX ADMIN — HYDROMORPHONE HYDROCHLORIDE 30 MILLILITER(S): 2 INJECTION INTRAMUSCULAR; INTRAVENOUS; SUBCUTANEOUS at 19:10

## 2017-11-17 RX ADMIN — Medication 400 MILLIGRAM(S): at 05:08

## 2017-11-17 RX ADMIN — Medication 25 MILLIGRAM(S): at 02:43

## 2017-11-17 RX ADMIN — IMIPENEM AND CILASTATIN 100 MILLIGRAM(S): 250; 250 INJECTION, POWDER, FOR SOLUTION INTRAVENOUS at 11:41

## 2017-11-17 RX ADMIN — Medication 400 MILLIGRAM(S): at 13:32

## 2017-11-17 RX ADMIN — IMIPENEM AND CILASTATIN 100 MILLIGRAM(S): 250; 250 INJECTION, POWDER, FOR SOLUTION INTRAVENOUS at 23:24

## 2017-11-17 RX ADMIN — HYDROMORPHONE HYDROCHLORIDE 1 MILLIGRAM(S): 2 INJECTION INTRAMUSCULAR; INTRAVENOUS; SUBCUTANEOUS at 01:56

## 2017-11-17 RX ADMIN — ENOXAPARIN SODIUM 40 MILLIGRAM(S): 100 INJECTION SUBCUTANEOUS at 11:40

## 2017-11-17 RX ADMIN — HYDROMORPHONE HYDROCHLORIDE 30 MILLILITER(S): 2 INJECTION INTRAMUSCULAR; INTRAVENOUS; SUBCUTANEOUS at 21:07

## 2017-11-17 RX ADMIN — IMIPENEM AND CILASTATIN 100 MILLIGRAM(S): 250; 250 INJECTION, POWDER, FOR SOLUTION INTRAVENOUS at 00:01

## 2017-11-17 RX ADMIN — HYDROMORPHONE HYDROCHLORIDE 1 MILLIGRAM(S): 2 INJECTION INTRAMUSCULAR; INTRAVENOUS; SUBCUTANEOUS at 09:37

## 2017-11-17 RX ADMIN — HYDROMORPHONE HYDROCHLORIDE 1 MILLIGRAM(S): 2 INJECTION INTRAMUSCULAR; INTRAVENOUS; SUBCUTANEOUS at 13:31

## 2017-11-17 RX ADMIN — IMIPENEM AND CILASTATIN 100 MILLIGRAM(S): 250; 250 INJECTION, POWDER, FOR SOLUTION INTRAVENOUS at 06:11

## 2017-11-17 RX ADMIN — Medication 25 MILLIGRAM(S): at 11:41

## 2017-11-17 RX ADMIN — FENTANYL CITRATE 1 PATCH: 50 INJECTION INTRAVENOUS at 04:15

## 2017-11-17 RX ADMIN — Medication 25 MILLIGRAM(S): at 20:03

## 2017-11-17 NOTE — PROGRESS NOTE ADULT - ASSESSMENT
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 Patient still with abd pain likely due to pancreatic necrosis, but she she has fevers now -  may be related to pancreatic necrosis vs nosocomial infection.    Impression:  1) Infected pancreatic necrosis s/p endoscopic drainage  2) fever - may be related to pancreatic necrosis vs nosocomial infection     Plan:  - f/u BCX and fever w/u  - IV abx with imipenem  - IV fluids with LR  - analgesia per primary team and pain management  - would consider d/c TPN and enteral feeding to decrease risk of candidemia  - if current fever and abd pain do not resolve/improve with medical management will consider necrosis debridement

## 2017-11-17 NOTE — PROGRESS NOTE ADULT - ATTENDING COMMENTS
Change to liquid diet  Hold PPI  Cont imipenim  Plan for necrosectomy if continues to have fever over the weekend

## 2017-11-17 NOTE — PROGRESS NOTE ADULT - SUBJECTIVE AND OBJECTIVE BOX
Day 30 of Anesthesia Pain Management Service    SUBJECTIVE: Patient is doing well with IV PCA    Pain Scale Score:	[X] Refer to charted pain scores    THERAPY:    [ ] IV PCA Morphine		[ ] 5 mg/mL	[ ] 1 mg/mL  [X] IV PCA Hydromorphone	[ ] 5 mg/mL	[X] 1 mg/mL  [ ] IV PCA Fentanyl		[ ] 50 micrograms/mL    Demand dose: 0.5 mg     Lockout: 6 minutes   Continuous Rate: 0 mg/hr  4 Hour Limit: 9 mg    MEDICATIONS  (STANDING):  diphenhydrAMINE   Injectable 25 milliGRAM(s) IV Push once  enoxaparin Injectable 40 milliGRAM(s) SubCutaneous daily  HYDROmorphone PCA (1 mG/mL) 30 milliLiter(s) PCA Continuous PCA Continuous  imipenem/cilastatin  IVPB      imipenem/cilastatin  IVPB 500 milliGRAM(s) IV Intermittent every 6 hours  sodium chloride 0.45%. 1000 milliLiter(s) (50 mL/Hr) IV Continuous <Continuous>    MEDICATIONS  (PRN):  diphenhydrAMINE   Injectable 25 milliGRAM(s) IV Push at bedtime PRN Insomnia  diphenhydrAMINE   Injectable 25 milliGRAM(s) IV Push every 4 hours PRN Rash and/or Itching  HYDROmorphone PCA (1 mG/mL) Rescue Clinician Bolus 1 milliGRAM(s) IV Push every 15 minutes PRN for Pain Scale GREATER THAN 6  naloxone Injectable 0.1 milliGRAM(s) IV Push every 3 minutes PRN For ANY of the following changes in patient status:  A. RR LESS THAN 10 breaths per minute, B. Oxygen saturation LESS THAN 90%, C. Sedation score of 6  ondansetron Injectable 4 milliGRAM(s) IV Push every 6 hours PRN Nausea      OBJECTIVE:    Sedation Score:	[ ] Alert	[ ] Drowsy 	[ ] Arousable	[X ] Asleep	[ ] Unresponsive    Side Effects:	[X ] None	[ ] Nausea	[ ] Vomiting	[ ] Pruritus  		[ ] Other:    Vital Signs Last 24 Hrs  T(C): 36.9 (17 Nov 2017 10:00), Max: 38.5 (17 Nov 2017 06:22)  T(F): 98.4 (17 Nov 2017 10:00), Max: 101.3 (17 Nov 2017 06:22)  HR: 90 (17 Nov 2017 10:00) (68 - 102)  BP: 110/70 (17 Nov 2017 10:00) (100/68 - 113/72)  BP(mean): --  RR: 18 (17 Nov 2017 10:00) (18 - 18)  SpO2: 94% (17 Nov 2017 10:00) (94% - 97%)    ASSESSMENT/ PLAN    Therapy to  be:               [X] Continued   [ ] Discontinued   [ ] Changed to PRN Analgesics    Documentation and Verification of current medications:   [X] Done	[ ] Not done, not eligible    Comments: Resting comfortably

## 2017-11-17 NOTE — PROGRESS NOTE ADULT - SUBJECTIVE AND OBJECTIVE BOX
SUBJECTIVE:  - patient had fever overnight to 101  - still with abd pain and nausea, no vomitng  - abx changed to imipenem  - she is on TPN    OBJECTIVE:    MEDICATIONS  (STANDING):  diphenhydrAMINE   Injectable 25 milliGRAM(s) IV Push once  enoxaparin Injectable 40 milliGRAM(s) SubCutaneous daily  HYDROmorphone PCA (1 mG/mL) 30 milliLiter(s) PCA Continuous PCA Continuous  imipenem/cilastatin  IVPB      imipenem/cilastatin  IVPB 500 milliGRAM(s) IV Intermittent every 6 hours  sodium chloride 0.45%. 1000 milliLiter(s) (50 mL/Hr) IV Continuous <Continuous>    MEDICATIONS  (PRN):  diphenhydrAMINE   Injectable 25 milliGRAM(s) IV Push at bedtime PRN Insomnia  diphenhydrAMINE   Injectable 25 milliGRAM(s) IV Push every 4 hours PRN Rash and/or Itching  HYDROmorphone PCA (1 mG/mL) Rescue Clinician Bolus 1 milliGRAM(s) IV Push every 15 minutes PRN for Pain Scale GREATER THAN 6  naloxone Injectable 0.1 milliGRAM(s) IV Push every 3 minutes PRN For ANY of the following changes in patient status:  A. RR LESS THAN 10 breaths per minute, B. Oxygen saturation LESS THAN 90%, C. Sedation score of 6  ondansetron Injectable 4 milliGRAM(s) IV Push every 6 hours PRN Nausea      Vital Signs Last 24 Hrs  T(C): 37.7 (17 Nov 2017 06:55), Max: 38.5 (17 Nov 2017 06:22)  T(F): 99.9 (17 Nov 2017 06:55), Max: 101.3 (17 Nov 2017 06:22)  HR: 96 (17 Nov 2017 06:55) (68 - 102)  BP: 113/72 (17 Nov 2017 06:22) (100/68 - 113/72)  BP(mean): --  RR: 18 (17 Nov 2017 06:22) (18 - 18)  SpO2: 94% (17 Nov 2017 06:22) (94% - 97%)    PHYSICAL EXAM:  Constitutional: no acute distress  Eyes: no icterus  Neck: no masses, no LAD  Respiratory: normal inspiratory effort; no wheezing or crackles  Cardiovascular: RRR, normal S1/S2, no murmurs/rubs/gallops  Gastrointestinal: soft, non-distended, tender throughout, no gaurding/rigidity  Extremities: no LE edema  Neurological: AAOx3, no asterixis  Skin: no rashes, bruises, petechiae    LABS:                                                 7.6    9.10  )-----------( 620      ( 16 Nov 2017 07:56 )             23.4     11-16    132<L>  |  94<L>  |  14  ----------------------------<  122<H>  4.3   |  24  |  0.38<L>    Ca    8.7      16 Nov 2017 08:08  Phos  4.3     11-16  Mg     2.0     11-16    TPro  7.5  /  Alb  2.9<L>  /  TBili  0.2  /  DBili  x   /  AST  40  /  ALT  40  /  AlkPhos  497<H>  11-16

## 2017-11-17 NOTE — PROGRESS NOTE ADULT - ATTENDING COMMENTS
Patient seen and examined.  C/O increasing abdominal pain today  Vitals stable  afebile  abd - tender in upper abdomen, but no peritoneal signs  WBC=WNL, bicarb=WNL    - Continue IV antibiotics and NPO  - Care D/W Dr. Calles from GI

## 2017-11-17 NOTE — PROGRESS NOTE ADULT - SUBJECTIVE AND OBJECTIVE BOX
Pt remains on PN due to nausea and inability to tolerate TF  11-16 @ 07:01  -  11-17 @ 07:00  --------------------------------------------------------  IN:    sodium chloride 0.45%.: 1200 mL    Solution: 300 mL    Solution: 200 mL    TPN (Total Parenteral Nutrition): 1320 mL  Total IN: 3020 mL    OUT:    Voided: 300 mL  Total OUT: 300 mL    Total NET: 2720 mL        T(C): 37.7 (11-17-17 @ 06:55), Max: 38.5 (11-17-17 @ 06:22)  HR: 96 (11-17-17 @ 06:55) (68 - 102)  BP: 113/72 (11-17-17 @ 06:22) (100/68 - 113/72)  RR: 18 (11-17-17 @ 06:22) (18 - 18)  SpO2: 94% (11-17-17 @ 06:22) (94% - 97%)  Wt(kg): --    Labs   11-17    134<L>  |  95<L>  |  12  ----------------------------<  105<H>  4.4   |  23  |  0.40<L>    Ca    8.8      17 Nov 2017 08:13  Phos  4.3     11-17  Mg     1.9     11-17    TPro  7.5  /  Alb  2.9<L>  /  TBili  0.2  /  DBili  x   /  AST  40  /  ALT  40  /  AlkPhos  497<H>  11-16      LIVER FUNCTIONS - ( 16 Nov 2017 08:08 )  Alb: 2.9 g/dL / Pro: 7.5 g/dL / ALK PHOS: 497 U/L / ALT: 40 U/L / AST: 40 U/L / GGT: x           CAPILLARY BLOOD GLUCOSE        I&O's Detail    16 Nov 2017 07:01  -  17 Nov 2017 07:00  --------------------------------------------------------  IN:    sodium chloride 0.45%.: 1200 mL    Solution: 300 mL    Solution: 200 mL    TPN (Total Parenteral Nutrition): 1320 mL  Total IN: 3020 mL    OUT:    Voided: 300 mL  Total OUT: 300 mL    Total NET: 2720 mL

## 2017-11-17 NOTE — CHART NOTE - NSCHARTNOTEFT_GEN_A_CORE
Pt seen for malnutrition and TPN follow up, as per department protocol.    Hospital Course: Pt is a 56 yo woman with history of nephrolithiasis, HTN, and appendectomy admitted on 10/16 for gallstone pancreatitis c/b necrotizing pancreatitis and ascending cholangitis S/P ERCP and stent placement (10/21) and FNA of perihepatic fluid (10/31), which grew E. faecalis. Pt now S/P endoscopic drainage of pancreatic necrosis, cystgastrostomy placement of 2 metal stents 11/8.     Nutrition history: Pt admitted 10/16, received intermittent po diet of low fat clears and enteral feeds of Jevity 1.2 with poor tolerance (per notes, tiger tube was in duodenum during periods of poor enteral tolerance). Pt was NPO since 10/27; TPN ordered 11/5.  PN continues due to patient's nausea and inability to tolerate EN. Lipid calories increased in 11/15 PN order.    Pt noted with fever overnight, abdominal pain, and nausea with no emesis. Pt has stopped intake of water and juice due to increased abdominal pain today.    Source: Patient [X ]    Family [ ]     other [ X]; medical record    Diet : NPO with ice chips; Provider to RN: patient may have some sips of apple juice    Per chart, +BM1/15 and 11/17     Enteral /Parenteral Nutrition: TPN infusing at 55ml/hr (84Gm amino acids, 192Gm dextrose, 71ml 20%lipids) to [id number] (17 pollo/Kg, 1.26 Gm protein/Kg per dosing wt 66.5Kg); with 10cc MVI 9+5, 3cc MTE-5.     Weight: 69.5Kg (10/26), 66.5Kg (10/17); weight change likely reflects fluid shifts; no recent weight  Edema: none noted    Pertinent Medications: MEDICATIONS  (STANDING):  diphenhydrAMINE   Injectable 25 milliGRAM(s) IV Push once  enoxaparin Injectable 40 milliGRAM(s) SubCutaneous daily  HYDROmorphone PCA (1 mG/mL) 30 milliLiter(s) PCA Continuous PCA Continuous  imipenem/cilastatin  IVPB      imipenem/cilastatin  IVPB 500 milliGRAM(s) IV Intermittent every 6 hours  sodium chloride 0.45%. 1000 milliLiter(s) (50 mL/Hr) IV Continuous <Continuous>    MEDICATIONS  (PRN):  diphenhydrAMINE   Injectable 25 milliGRAM(s) IV Push at bedtime PRN Insomnia  diphenhydrAMINE   Injectable 25 milliGRAM(s) IV Push every 4 hours PRN Rash and/or Itching  HYDROmorphone PCA (1 mG/mL) Rescue Clinician Bolus 1 milliGRAM(s) IV Push every 15 minutes PRN for Pain Scale GREATER THAN 6  naloxone Injectable 0.1 milliGRAM(s) IV Push every 3 minutes PRN For ANY of the following changes in patient status:  A. RR LESS THAN 10 breaths per minute, B. Oxygen saturation LESS THAN 90%, C. Sedation score of 6  ondansetron Injectable 4 milliGRAM(s) IV Push every 6 hours PRN Nausea    Pertinent Labs:  11-17 Na134 mmol/L<L> Glu 105 mg/dL<H> K+ 4.4 mmol/L Cr  0.40 mg/dL<L> BUN 12 mg/dL Phos 4.3 mg/dL    Skin: no pressure injuries    Estimated Needs:   [ X] no change since previous assessment  [ ] recalculated:       Previous Nutrition Diagnosis:     [X ] Malnutrition; severe    Nutrition Diagnosis is [X ] ongoing; being addressed with PN    New Nutrition Diagnosis: [ X] not applicable     Interventions:     Recommend:  1) PN per MD Guallpa  2) Resume ice chips and juice sips pending tolerance  3) Pending need for EN (and tiger tube replacement and placement confirmation in the jejunum), recommend Vital 1.2, initiate at 10m, increase as tolerated to goal rate 50ml/hr x 24 hours to provide 1200 ml formula, 1440cal/day, 90 Gm protein/day, 973ml free water; meets 22cal/Kg and 1.4Gm/Kg protein per dosing wt 66.5Kg).   4) Monitor weight, lab values, skin, po intake and GI tolerance    Monitoring and Evaluation:   Follow up per protocol  RD to remain available for further nutritional interventions as indicated.   Anabela Molina, MS RD [facility], #[phone].

## 2017-11-17 NOTE — PROGRESS NOTE ADULT - ASSESSMENT
57 y.o. F with history of nephrolithiasis, HTN, and appendectomy, admitted (10/16) for gallstone pancreatitis c/b necrotizing pancreatitis and ascending cholangitis s/p ERCP and stent placement (10/21) and FNA of perihepatic fluid (10/31), which grew E. faecalis. She is s/p cystgastrostomy with 2 axiom stents placement by GI on 11/8. She was not able to tolerate tube feeding and now is receiving TPN and oral intake. Improving on IV ABX.     - NPO/ IVF  - PCA for pain control  - f/u BCx, Ucx  - F/U with JUDI Kidd-C  [phone]

## 2017-11-17 NOTE — PROGRESS NOTE ADULT - ASSESSMENT
Pt remains unable to tolerate adequate enteral nutrition  N.B.  it is the presence of the central line not PN which places the patient at increased risk for fungemia.  Since the patient has been shown by trial to be unable to tolerate enteral nutrition via TF if PN is discontinued then are are no realistic options for nutritional support

## 2017-11-17 NOTE — PROGRESS NOTE ADULT - SUBJECTIVE AND OBJECTIVE BOX
GENERAL SURGERY DAILY PROGRESS NOTE:     Subjective:  Febrile overnight Tm 38.4. Pt c/o worsening abdominal pain overnight, controlled with PCA.   + Nausea, denies emesis  No chest pain, sob, or palpitations      Objective:    MEDICATIONS  (STANDING):  diphenhydrAMINE   Injectable 25 milliGRAM(s) IV Push once  enoxaparin Injectable 40 milliGRAM(s) SubCutaneous daily  HYDROmorphone PCA (1 mG/mL) 30 milliLiter(s) PCA Continuous PCA Continuous  imipenem/cilastatin  IVPB      imipenem/cilastatin  IVPB 500 milliGRAM(s) IV Intermittent every 6 hours  sodium chloride 0.45%. 1000 milliLiter(s) (50 mL/Hr) IV Continuous <Continuous>    MEDICATIONS  (PRN):  diphenhydrAMINE   Injectable 25 milliGRAM(s) IV Push at bedtime PRN Insomnia  diphenhydrAMINE   Injectable 25 milliGRAM(s) IV Push every 4 hours PRN Rash and/or Itching  HYDROmorphone PCA (1 mG/mL) Rescue Clinician Bolus 1 milliGRAM(s) IV Push every 15 minutes PRN for Pain Scale GREATER THAN 6  naloxone Injectable 0.1 milliGRAM(s) IV Push every 3 minutes PRN For ANY of the following changes in patient status:  A. RR LESS THAN 10 breaths per minute, B. Oxygen saturation LESS THAN 90%, C. Sedation score of 6  ondansetron Injectable 4 milliGRAM(s) IV Push every 6 hours PRN Nausea      Vital Signs Last 24 Hrs  T(C): 36.9 (2017 10:00), Max: 38.5 (2017 06:22)  T(F): 98.4 (2017 10:00), Max: 101.3 (2017 06:22)  HR: 90 (2017 10:00) (68 - 102)  BP: 110/70 (2017 10:00) (100/68 - 113/72)  BP(mean): --  RR: 18 (2017 10:00) (18 - 18)  SpO2: 94% (2017 10:00) (94% - 97%)    I&O's Detail    2017 07:01  -  2017 07:00  --------------------------------------------------------  IN:    sodium chloride 0.45%.: 1200 mL    Solution: 300 mL    Solution: 200 mL    TPN (Total Parenteral Nutrition): 1320 mL  Total IN: 3020 mL    OUT:    Voided: 300 mL  Total OUT: 300 mL    Total NET: 2720 mL      2017 07:01  -  2017 12:40  --------------------------------------------------------  IN:  Total IN: 0 mL    OUT:    Voided: 400 mL  Total OUT: 400 mL    Total NET: -400 mL            LABS:                        7.6    10.49 )-----------( 633      ( 2017 08:24 )             23.2     -    134<L>  |  95<L>  |  12  ----------------------------<  105<H>  4.4   |  23  |  0.40<L>    Ca    8.8      2017 08:13  Phos  4.3       Mg     1.9         TPro  7.5  /  Alb  2.9<L>  /  TBili  0.2  /  DBili  x   /  AST  40  /  ALT  40  /  AlkPhos  497<H>        Urinalysis Basic - ( 2017 16:14 )    Color: Yellow / Appearance: Clear / S.020 / pH: x  Gluc: x / Ketone: Negative  / Bili: Negative / Urobili: 1.0 mg/dL   Blood: x / Protein: Trace mg/dL / Nitrite: Negative   Leuk Esterase: Negative / RBC: 1 /HPF / WBC 0 /HPF   Sq Epi: x / Non Sq Epi: 1 /HPF / Bacteria: Negative        Physical Exam:   Gen: NAD, A&Ox3  Abd: softly distended, epigastric tenderness  no rebound/ guarding

## 2017-11-18 LAB
-  AMPICILLIN/SULBACTAM: SIGNIFICANT CHANGE UP
-  CEFAZOLIN: SIGNIFICANT CHANGE UP
-  CIPROFLOXACIN: SIGNIFICANT CHANGE UP
-  DAPTOMYCIN: SIGNIFICANT CHANGE UP
-  GENTAMICIN: SIGNIFICANT CHANGE UP
-  LEVOFLOXACIN: SIGNIFICANT CHANGE UP
-  LINEZOLID: SIGNIFICANT CHANGE UP
-  NITROFURANTOIN: SIGNIFICANT CHANGE UP
-  OXACILLIN: SIGNIFICANT CHANGE UP
-  PENICILLIN: SIGNIFICANT CHANGE UP
-  RIFAMPIN: SIGNIFICANT CHANGE UP
-  TETRACYCLINE: SIGNIFICANT CHANGE UP
-  TRIMETHOPRIM/SULFAMETHOXAZOLE: SIGNIFICANT CHANGE UP
-  VANCOMYCIN: SIGNIFICANT CHANGE UP
ALBUMIN SERPL ELPH-MCNC: 2.6 G/DL — LOW (ref 3.3–5)
ALP SERPL-CCNC: 412 U/L — HIGH (ref 40–120)
ALT FLD-CCNC: 30 U/L — SIGNIFICANT CHANGE UP (ref 10–45)
ANION GAP SERPL CALC-SCNC: 12 MMOL/L — SIGNIFICANT CHANGE UP (ref 5–17)
AST SERPL-CCNC: 32 U/L — SIGNIFICANT CHANGE UP (ref 10–40)
BILIRUB DIRECT SERPL-MCNC: 0.1 MG/DL — SIGNIFICANT CHANGE UP (ref 0–0.2)
BILIRUB INDIRECT FLD-MCNC: SIGNIFICANT CHANGE UP (ref 0.2–1)
BILIRUB SERPL-MCNC: 0.2 MG/DL — SIGNIFICANT CHANGE UP (ref 0.2–1.2)
BUN SERPL-MCNC: 13 MG/DL — SIGNIFICANT CHANGE UP (ref 7–23)
CALCIUM SERPL-MCNC: 8.7 MG/DL — SIGNIFICANT CHANGE UP (ref 8.4–10.5)
CHLORIDE SERPL-SCNC: 100 MMOL/L — SIGNIFICANT CHANGE UP (ref 96–108)
CO2 SERPL-SCNC: 24 MMOL/L — SIGNIFICANT CHANGE UP (ref 22–31)
CREAT SERPL-MCNC: 0.33 MG/DL — LOW (ref 0.5–1.3)
CULTURE RESULTS: SIGNIFICANT CHANGE UP
GLUCOSE SERPL-MCNC: 107 MG/DL — HIGH (ref 70–99)
HCT VFR BLD CALC: 21.3 % — LOW (ref 34.5–45)
HCT VFR BLD CALC: 22.1 % — LOW (ref 34.5–45)
HGB BLD-MCNC: 7 G/DL — CRITICAL LOW (ref 11.5–15.5)
HGB BLD-MCNC: 7.2 G/DL — LOW (ref 11.5–15.5)
MAGNESIUM SERPL-MCNC: 1.9 MG/DL — SIGNIFICANT CHANGE UP (ref 1.6–2.6)
MCHC RBC-ENTMCNC: 26.9 PG — LOW (ref 27–34)
MCHC RBC-ENTMCNC: 28 PG — SIGNIFICANT CHANGE UP (ref 27–34)
MCHC RBC-ENTMCNC: 32.8 GM/DL — SIGNIFICANT CHANGE UP (ref 32–36)
MCHC RBC-ENTMCNC: 32.9 GM/DL — SIGNIFICANT CHANGE UP (ref 32–36)
MCV RBC AUTO: 81.9 FL — SIGNIFICANT CHANGE UP (ref 80–100)
MCV RBC AUTO: 85.4 FL — SIGNIFICANT CHANGE UP (ref 80–100)
METHOD TYPE: SIGNIFICANT CHANGE UP
ORGANISM # SPEC MICROSCOPIC CNT: SIGNIFICANT CHANGE UP
ORGANISM # SPEC MICROSCOPIC CNT: SIGNIFICANT CHANGE UP
PHOSPHATE SERPL-MCNC: 4.5 MG/DL — SIGNIFICANT CHANGE UP (ref 2.5–4.5)
PLATELET # BLD AUTO: 589 K/UL — HIGH (ref 150–400)
PLATELET # BLD AUTO: 598 K/UL — HIGH (ref 150–400)
POTASSIUM SERPL-MCNC: 4.6 MMOL/L — SIGNIFICANT CHANGE UP (ref 3.5–5.3)
POTASSIUM SERPL-SCNC: 4.6 MMOL/L — SIGNIFICANT CHANGE UP (ref 3.5–5.3)
PROT SERPL-MCNC: 7 G/DL — SIGNIFICANT CHANGE UP (ref 6–8.3)
RBC # BLD: 2.58 M/UL — LOW (ref 3.8–5.2)
RBC # BLD: 2.6 M/UL — LOW (ref 3.8–5.2)
RBC # FLD: 14.3 % — SIGNIFICANT CHANGE UP (ref 10.3–14.5)
RBC # FLD: 15.4 % — HIGH (ref 10.3–14.5)
SODIUM SERPL-SCNC: 136 MMOL/L — SIGNIFICANT CHANGE UP (ref 135–145)
SPECIMEN SOURCE: SIGNIFICANT CHANGE UP
WBC # BLD: 8.22 K/UL — SIGNIFICANT CHANGE UP (ref 3.8–10.5)
WBC # BLD: 9.4 K/UL — SIGNIFICANT CHANGE UP (ref 3.8–10.5)
WBC # FLD AUTO: 8.22 K/UL — SIGNIFICANT CHANGE UP (ref 3.8–10.5)
WBC # FLD AUTO: 9.4 K/UL — SIGNIFICANT CHANGE UP (ref 3.8–10.5)

## 2017-11-18 PROCEDURE — 99232 SBSQ HOSP IP/OBS MODERATE 35: CPT

## 2017-11-18 PROCEDURE — 99232 SBSQ HOSP IP/OBS MODERATE 35: CPT | Mod: GC

## 2017-11-18 RX ORDER — LANOLIN ALCOHOL/MO/W.PET/CERES
3 CREAM (GRAM) TOPICAL AT BEDTIME
Qty: 0 | Refills: 0 | Status: DISCONTINUED | OUTPATIENT
Start: 2017-11-18 | End: 2017-12-01

## 2017-11-18 RX ORDER — ACETAMINOPHEN 500 MG
1000 TABLET ORAL ONCE
Qty: 0 | Refills: 0 | Status: COMPLETED | OUTPATIENT
Start: 2017-11-18 | End: 2017-11-18

## 2017-11-18 RX ORDER — ACETAMINOPHEN 500 MG
1000 TABLET ORAL ONCE
Qty: 0 | Refills: 0 | Status: COMPLETED | OUTPATIENT
Start: 2017-11-19 | End: 2017-11-19

## 2017-11-18 RX ADMIN — Medication 1000 MILLIGRAM(S): at 08:43

## 2017-11-18 RX ADMIN — Medication 400 MILLIGRAM(S): at 15:37

## 2017-11-18 RX ADMIN — ENOXAPARIN SODIUM 40 MILLIGRAM(S): 100 INJECTION SUBCUTANEOUS at 11:18

## 2017-11-18 RX ADMIN — Medication 25 MILLIGRAM(S): at 08:53

## 2017-11-18 RX ADMIN — HYDROMORPHONE HYDROCHLORIDE 30 MILLILITER(S): 2 INJECTION INTRAMUSCULAR; INTRAVENOUS; SUBCUTANEOUS at 18:50

## 2017-11-18 RX ADMIN — Medication 1000 MILLIGRAM(S): at 15:42

## 2017-11-18 RX ADMIN — IMIPENEM AND CILASTATIN 100 MILLIGRAM(S): 250; 250 INJECTION, POWDER, FOR SOLUTION INTRAVENOUS at 17:04

## 2017-11-18 RX ADMIN — HYDROMORPHONE HYDROCHLORIDE 30 MILLILITER(S): 2 INJECTION INTRAMUSCULAR; INTRAVENOUS; SUBCUTANEOUS at 06:54

## 2017-11-18 RX ADMIN — Medication 400 MILLIGRAM(S): at 21:09

## 2017-11-18 RX ADMIN — Medication 25 MILLIGRAM(S): at 17:05

## 2017-11-18 RX ADMIN — HYDROMORPHONE HYDROCHLORIDE 1 MILLIGRAM(S): 2 INJECTION INTRAMUSCULAR; INTRAVENOUS; SUBCUTANEOUS at 14:47

## 2017-11-18 RX ADMIN — Medication 1000 MILLIGRAM(S): at 21:30

## 2017-11-18 RX ADMIN — HYDROMORPHONE HYDROCHLORIDE 1 MILLIGRAM(S): 2 INJECTION INTRAMUSCULAR; INTRAVENOUS; SUBCUTANEOUS at 23:26

## 2017-11-18 RX ADMIN — IMIPENEM AND CILASTATIN 100 MILLIGRAM(S): 250; 250 INJECTION, POWDER, FOR SOLUTION INTRAVENOUS at 23:27

## 2017-11-18 RX ADMIN — HYDROMORPHONE HYDROCHLORIDE 30 MILLILITER(S): 2 INJECTION INTRAMUSCULAR; INTRAVENOUS; SUBCUTANEOUS at 12:41

## 2017-11-18 RX ADMIN — Medication 3 MILLIGRAM(S): at 21:08

## 2017-11-18 RX ADMIN — IMIPENEM AND CILASTATIN 100 MILLIGRAM(S): 250; 250 INJECTION, POWDER, FOR SOLUTION INTRAVENOUS at 04:59

## 2017-11-18 RX ADMIN — IMIPENEM AND CILASTATIN 100 MILLIGRAM(S): 250; 250 INJECTION, POWDER, FOR SOLUTION INTRAVENOUS at 11:18

## 2017-11-18 RX ADMIN — Medication 400 MILLIGRAM(S): at 00:55

## 2017-11-18 RX ADMIN — Medication 25 MILLIGRAM(S): at 00:54

## 2017-11-18 RX ADMIN — Medication 25 MILLIGRAM(S): at 04:59

## 2017-11-18 RX ADMIN — Medication 25 MILLIGRAM(S): at 21:08

## 2017-11-18 RX ADMIN — HYDROMORPHONE HYDROCHLORIDE 1 MILLIGRAM(S): 2 INJECTION INTRAMUSCULAR; INTRAVENOUS; SUBCUTANEOUS at 18:51

## 2017-11-18 RX ADMIN — Medication 400 MILLIGRAM(S): at 08:28

## 2017-11-18 RX ADMIN — Medication 25 MILLIGRAM(S): at 13:04

## 2017-11-18 NOTE — PROGRESS NOTE ADULT - ASSESSMENT
ASSESSMENT: Vitally stable and afebrile for the past 24 hours. Pain improving after deescalating the patient's diet to NPO with sips of water.     PLAN:  - Imipenem   - NPO with sips of water  - Pain management  - Encourage ambulation  - Follow up with GI

## 2017-11-18 NOTE — PROGRESS NOTE ADULT - SUBJECTIVE AND OBJECTIVE BOX
SUBJECTIVE:  Pain again overnight, but improved from yesterday.  Denies any nausea or vomiting.     OBJECTIVE:     ** VITAL SIGNS / I&O's **    T(C): 37.3 (11-18-17 @ 06:20), Max: 37.3 (11-18-17 @ 01:01)  T(F): 99.1 (11-18-17 @ 06:20), Max: 99.2 (11-18-17 @ 01:01)  HR: 86 (11-18-17 @ 06:20) (86 - 93)  BP: 103/67 (11-18-17 @ 06:20) (100/65 - 112/71)  RR: 18 (11-18-17 @ 06:20) (18 - 18)  SpO2: 95% (11-18-17 @ 06:20) (94% - 95%)      17 Nov 2017 07:01  -  18 Nov 2017 07:00  --------------------------------------------------------  IN:    Oral Fluid: 240 mL    sodium chloride 0.45%.: 1200 mL    Solution: 400 mL    Solution: 300 mL    TPN (Total Parenteral Nutrition): 1320 mL  Total IN: 3460 mL    OUT:    Voided: 1650 mL  Total OUT: 1650 mL    Total NET: 1810 mL          ** PHYSICAL EXAM **    -- CONSTITUTIONAL: AOx3. NAD.   -- CARDIOVASCULAR: normotensive, regular rate   -- RESPIRATORY: breathing comfortably   -- ABDOMEN: soft, nondistended, minimal epigastric tenderness     ** LABS **           TPro  7.2    /  Alb  2.9    /  TBili  0.2    /  DBili  0.1    /  AST  37     /  ALT  39     /  AlkPhos  504    ( 17 Nov 2017 19:22 )        CAPILLARY BLOOD GLUCOSE

## 2017-11-18 NOTE — PROGRESS NOTE ADULT - SUBJECTIVE AND OBJECTIVE BOX
Day _31/9 of Anesthesia Pain Management Service    SUBJECTIVE:    Pain Scale Score	At rest: ___ 	With Activity: ___ 	[ x] Refer to charted pain scores    THERAPY:    [ ] IV PCA Morphine		[ ] 5 mg/mL	[ ] 1 mg/mL  [x ] IV PCA Hydromorphone	[ ] 5 mg/mL	[x ] 1 mg/mL  [ ] IV PCA Fentanyl		[ ] 50 micrograms/mL    Demand dose 0.5mg    lockout  6  (minutes) 0Continuous Rate          MEDICATIONS  (STANDING):  diphenhydrAMINE   Injectable 25 milliGRAM(s) IV Push once  enoxaparin Injectable 40 milliGRAM(s) SubCutaneous daily  fentaNYL   Patch  25 MICROgram(s)/Hr. 1 Patch Transdermal every 72 hours  HYDROmorphone PCA (1 mG/mL) 30 milliLiter(s) PCA Continuous PCA Continuous  imipenem/cilastatin  IVPB      imipenem/cilastatin  IVPB 500 milliGRAM(s) IV Intermittent every 6 hours  sodium chloride 0.45%. 1000 milliLiter(s) (50 mL/Hr) IV Continuous <Continuous>    MEDICATIONS  (PRN):  diphenhydrAMINE   Injectable 25 milliGRAM(s) IV Push at bedtime PRN Insomnia  diphenhydrAMINE   Injectable 25 milliGRAM(s) IV Push every 4 hours PRN Rash and/or Itching  HYDROmorphone PCA (1 mG/mL) Rescue Clinician Bolus 1 milliGRAM(s) IV Push every 15 minutes PRN for Pain Scale GREATER THAN 6  naloxone Injectable 0.1 milliGRAM(s) IV Push every 3 minutes PRN For ANY of the following changes in patient status:  A. RR LESS THAN 10 breaths per minute, B. Oxygen saturation LESS THAN 90%, C. Sedation score of 6  ondansetron Injectable 4 milliGRAM(s) IV Push every 6 hours PRN Nausea      OBJECTIVE:    Sedation Score:	[x ] Alert	[ ] Drowsy 	[ ] Arousable	[ ] Asleep	[ ] Unresponsive    Side Effects:	[ x] None	[ ] Nausea	[ ] Vomiting	[ ] Pruritus  		[ ] Other:    Vital Signs Last 24 Hrs  T(C): 37.2 (18 Nov 2017 09:33), Max: 37.3 (18 Nov 2017 01:01)  T(F): 99 (18 Nov 2017 09:33), Max: 99.2 (18 Nov 2017 01:01)  HR: 90 (18 Nov 2017 09:33) (86 - 93)  BP: 105/68 (18 Nov 2017 09:33) (100/65 - 112/71)  BP(mean): --  RR: 18 (18 Nov 2017 09:33) (18 - 18)  SpO2: 93% (18 Nov 2017 09:33) (93% - 95%)    ASSESSMENT/ PLAN    Therapy to  be:	[ x] Continue   [ ] Discontinued   [ ] Change to prn Analgesics    Documentation and Verification of current medications:   [X] Done	[ ] Not done, not elligible    Comments: I was physically present for the key portionjs of the evaluation and management service provided. I agree with the above history, physical, and plan which I have reviewed and edited where appropriate

## 2017-11-18 NOTE — PROGRESS NOTE ADULT - ATTENDING COMMENTS
Patient seen and examined  States that pain is much improved  Afebrile x 24 hours now  vitals stable  abd- tender in RUQ and LUQ, but improved from yesterday.  WBC remains WNL  Hematocrit = 21 this AM, 22 when repeated  + acute blood loss anemia, but doubt active ongoing bleeding    - Continue TPN / IV imipenem / NPO  - GI follow up appreciated  - Pancreatitis hopefully improving

## 2017-11-18 NOTE — PROGRESS NOTE ADULT - ASSESSMENT
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 Patient still with abd pain likely due to pancreatic necrosis, but she she has fevers now -  may be related to pancreatic necrosis vs nosocomial infection.    Impression:  1) Infected pancreatic necrosis s/p endoscopic drainage  2) fever - may be related to pancreatic necrosis vs nosocomial infection. Fever w/u negative. No fever overnight.  3) low stool output, multifactorial (Opiods, panc necrosis)    Plan:  - f/u BCX and fever w/u  - IV abx with imipenem  - IV fluids with LR  - analgesia per primary team and pain management  - will consider necrosis debridement in the coming week  - would add miralax as decreased stool output may be contributing to the patient's pain. 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 Patient still with abd pain likely due to pancreatic necrosis, but she she has fevers now -  may be related to pancreatic necrosis vs nosocomial infection.    Impression:  1) Infected pancreatic necrosis s/p endoscopic drainage  2) fever - may be related to pancreatic necrosis vs nosocomial infection. Fever w/u negative. No fever overnight.  3) low stool output, multifactorial (Opiods, panc necrosis)    Plan:  - f/u BCX and fever w/u  - IV abx with imipenem  - IV fluids with LR  - analgesia per primary team and pain management  - will consider necrosis debridement in the coming week

## 2017-11-18 NOTE — PROGRESS NOTE ADULT - SUBJECTIVE AND OBJECTIVE BOX
PN infusion at 55 ccs/hr  11-17 @ 07:01  -  11-18 @ 07:00  --------------------------------------------------------  IN:    Oral Fluid: 240 mL    sodium chloride 0.45%.: 1200 mL    Solution: 400 mL    Solution: 300 mL    TPN (Total Parenteral Nutrition): 1320 mL  Total IN: 3460 mL    OUT:    Voided: 1650 mL  Total OUT: 1650 mL    Total NET: 1810 mL        T(C): 37.3 (11-18-17 @ 06:20), Max: 37.3 (11-18-17 @ 01:01)  HR: 86 (11-18-17 @ 06:20) (86 - 93)  BP: 103/67 (11-18-17 @ 06:20) (100/65 - 112/71)  RR: 18 (11-18-17 @ 06:20) (18 - 18)  SpO2: 95% (11-18-17 @ 06:20) (94% - 95%)  Wt(kg): --    Labs   11-17    134<L>  |  95<L>  |  12  ----------------------------<  105<H>  4.4   |  23  |  0.40<L>    Ca    8.8      17 Nov 2017 08:13  Phos  4.3     11-17  Mg     1.9     11-17    TPro  7.2  /  Alb  2.9<L>  /  TBili  0.2  /  DBili  0.1  /  AST  37  /  ALT  39  /  AlkPhos  504<H>  11-17      LIVER FUNCTIONS - ( 17 Nov 2017 19:22 )  Alb: 2.9 g/dL / Pro: 7.2 g/dL / ALK PHOS: 504 U/L / ALT: 39 U/L / AST: 37 U/L / GGT: x           CAPILLARY BLOOD GLUCOSE        I&O's Detail    17 Nov 2017 07:01  -  18 Nov 2017 07:00  --------------------------------------------------------  IN:    Oral Fluid: 240 mL    sodium chloride 0.45%.: 1200 mL    Solution: 400 mL    Solution: 300 mL    TPN (Total Parenteral Nutrition): 1320 mL  Total IN: 3460 mL    OUT:    Voided: 1650 mL  Total OUT: 1650 mL    Total NET: 1810 mL

## 2017-11-18 NOTE — PROGRESS NOTE ADULT - SUBJECTIVE AND OBJECTIVE BOX
Patient is a 57y old  Female who presents with a chief complaint of Abdominal pain (16 Oct 2017 09:50)      SUBJECTIVE / OVERNIGHT EVENTS:  Continued severe intermittent abdominal pain. No appetite. Last BM 1 hard stool 2 d ago.  MEDICATIONS  (STANDING):  diphenhydrAMINE   Injectable 25 milliGRAM(s) IV Push once  enoxaparin Injectable 40 milliGRAM(s) SubCutaneous daily  fentaNYL   Patch  25 MICROgram(s)/Hr. 1 Patch Transdermal every 72 hours  HYDROmorphone PCA (1 mG/mL) 30 milliLiter(s) PCA Continuous PCA Continuous  imipenem/cilastatin  IVPB      imipenem/cilastatin  IVPB 500 milliGRAM(s) IV Intermittent every 6 hours  sodium chloride 0.45%. 1000 milliLiter(s) (50 mL/Hr) IV Continuous <Continuous>    MEDICATIONS  (PRN):  diphenhydrAMINE   Injectable 25 milliGRAM(s) IV Push at bedtime PRN Insomnia  diphenhydrAMINE   Injectable 25 milliGRAM(s) IV Push every 4 hours PRN Rash and/or Itching  HYDROmorphone PCA (1 mG/mL) Rescue Clinician Bolus 1 milliGRAM(s) IV Push every 15 minutes PRN for Pain Scale GREATER THAN 6  naloxone Injectable 0.1 milliGRAM(s) IV Push every 3 minutes PRN For ANY of the following changes in patient status:  A. RR LESS THAN 10 breaths per minute, B. Oxygen saturation LESS THAN 90%, C. Sedation score of 6  ondansetron Injectable 4 milliGRAM(s) IV Push every 6 hours PRN Nausea        CAPILLARY BLOOD GLUCOSE        I&O's Summary    2017 07:01  -  2017 07:00  --------------------------------------------------------  IN: 3460 mL / OUT: 1650 mL / NET: 1810 mL        ROS:  General: no fevers chills  Neuro: No headache  MSK: No back pain  Cardiac: No chest pain, palpitations  Pulmonary: No SOB or cough  GI: As per HPI  : No dysuria or hesitancy  Skin: No rash or pruritis      PHYSICAL EXAM:  GENERAL: NAD, well-developed  HEAD:  Atraumatic, Normocephalic  EYES: EOMI, PERRLA, conjunctiva and sclera anicteric  NECK: Supple, No JVD  CHEST/LUNG: Clear to auscultation bilaterally; No wheeze  HEART: Regular rate and rhythm; No murmurs, rubs, or gallops  ABDOMEN: Soft, diffuse exquisite tenderness, Nondistended; Bowel sounds present, no hepatosplenomegaly, no rebound or guarding  EXTREMITIES:  2+ Peripheral Pulses, No clubbing, cyanosis, or edema  PSYCH: AAOx3  NEUROLOGY: non-focal, no asterixis  SKIN: No rashes or lesions, no palmar erythema or Dupuytren's contracture, no gynecomastia    LABS:                        7.6    10.49 )-----------( 633      ( 2017 08:24 )             23.2     -    134<L>  |  95<L>  |  12  ----------------------------<  105<H>  4.4   |  23  |  0.40<L>    Ca    8.8      2017 08:13  Phos  4.3     -  Mg     1.9         TPro  7.2  /  Alb  2.9<L>  /  TBili  0.2  /  DBili  0.1  /  AST  37  /  ALT  39  /  AlkPhos  504<H>      LIVER FUNCTIONS - ( 2017 19:22 )  Alb: 2.9 g/dL / Pro: 7.2 g/dL / ALK PHOS: 504 U/L / ALT: 39 U/L / AST: 37 U/L / GGT: x                 Urinalysis Basic - ( 2017 16:14 )    Color: Yellow / Appearance: Clear / S.020 / pH: x  Gluc: x / Ketone: Negative  / Bili: Negative / Urobili: 1.0 mg/dL   Blood: x / Protein: Trace mg/dL / Nitrite: Negative   Leuk Esterase: Negative / RBC: 1 /HPF / WBC 0 /HPF   Sq Epi: x / Non Sq Epi: 1 /HPF / Bacteria: Negative

## 2017-11-19 LAB
ALBUMIN SERPL ELPH-MCNC: 2.9 G/DL — LOW (ref 3.3–5)
ALP SERPL-CCNC: 408 U/L — HIGH (ref 40–120)
ALT FLD-CCNC: 27 U/L — SIGNIFICANT CHANGE UP (ref 10–45)
AMYLASE P1 CFR SERPL: 42 U/L — SIGNIFICANT CHANGE UP (ref 25–125)
ANION GAP SERPL CALC-SCNC: 14 MMOL/L — SIGNIFICANT CHANGE UP (ref 5–17)
AST SERPL-CCNC: 28 U/L — SIGNIFICANT CHANGE UP (ref 10–40)
BILIRUB SERPL-MCNC: 0.2 MG/DL — SIGNIFICANT CHANGE UP (ref 0.2–1.2)
BLD GP AB SCN SERPL QL: NEGATIVE — SIGNIFICANT CHANGE UP
BUN SERPL-MCNC: 15 MG/DL — SIGNIFICANT CHANGE UP (ref 7–23)
CALCIUM SERPL-MCNC: 9.3 MG/DL — SIGNIFICANT CHANGE UP (ref 8.4–10.5)
CHLORIDE SERPL-SCNC: 97 MMOL/L — SIGNIFICANT CHANGE UP (ref 96–108)
CO2 SERPL-SCNC: 23 MMOL/L — SIGNIFICANT CHANGE UP (ref 22–31)
CREAT SERPL-MCNC: 0.4 MG/DL — LOW (ref 0.5–1.3)
GLUCOSE SERPL-MCNC: 106 MG/DL — HIGH (ref 70–99)
HCT VFR BLD CALC: 21.7 % — LOW (ref 34.5–45)
HCT VFR BLD CALC: 23.9 % — LOW (ref 34.5–45)
HGB BLD-MCNC: 6.9 G/DL — CRITICAL LOW (ref 11.5–15.5)
HGB BLD-MCNC: 7.7 G/DL — LOW (ref 11.5–15.5)
LIDOCAIN IGE QN: 47 U/L — SIGNIFICANT CHANGE UP (ref 7–60)
MAGNESIUM SERPL-MCNC: 1.8 MG/DL — SIGNIFICANT CHANGE UP (ref 1.6–2.6)
MCHC RBC-ENTMCNC: 26.1 PG — LOW (ref 27–34)
MCHC RBC-ENTMCNC: 27.3 PG — SIGNIFICANT CHANGE UP (ref 27–34)
MCHC RBC-ENTMCNC: 31.8 GM/DL — LOW (ref 32–36)
MCHC RBC-ENTMCNC: 32.3 GM/DL — SIGNIFICANT CHANGE UP (ref 32–36)
MCV RBC AUTO: 82.2 FL — SIGNIFICANT CHANGE UP (ref 80–100)
MCV RBC AUTO: 84.5 FL — SIGNIFICANT CHANGE UP (ref 80–100)
PHOSPHATE SERPL-MCNC: 4.9 MG/DL — HIGH (ref 2.5–4.5)
PLATELET # BLD AUTO: 641 K/UL — HIGH (ref 150–400)
PLATELET # BLD AUTO: 686 K/UL — HIGH (ref 150–400)
POTASSIUM SERPL-MCNC: 4.4 MMOL/L — SIGNIFICANT CHANGE UP (ref 3.5–5.3)
POTASSIUM SERPL-SCNC: 4.4 MMOL/L — SIGNIFICANT CHANGE UP (ref 3.5–5.3)
PROT SERPL-MCNC: 6.5 G/DL — SIGNIFICANT CHANGE UP (ref 6–8.3)
RBC # BLD: 2.64 M/UL — LOW (ref 3.8–5.2)
RBC # BLD: 2.83 M/UL — LOW (ref 3.8–5.2)
RBC # FLD: 14.7 % — HIGH (ref 10.3–14.5)
RBC # FLD: 15.3 % — HIGH (ref 10.3–14.5)
RH IG SCN BLD-IMP: POSITIVE — SIGNIFICANT CHANGE UP
SODIUM SERPL-SCNC: 134 MMOL/L — LOW (ref 135–145)
WBC # BLD: 8.1 K/UL — SIGNIFICANT CHANGE UP (ref 3.8–10.5)
WBC # BLD: 9.9 K/UL — SIGNIFICANT CHANGE UP (ref 3.8–10.5)
WBC # FLD AUTO: 8.1 K/UL — SIGNIFICANT CHANGE UP (ref 3.8–10.5)
WBC # FLD AUTO: 9.9 K/UL — SIGNIFICANT CHANGE UP (ref 3.8–10.5)

## 2017-11-19 PROCEDURE — 99232 SBSQ HOSP IP/OBS MODERATE 35: CPT

## 2017-11-19 RX ORDER — ONDANSETRON 8 MG/1
4 TABLET, FILM COATED ORAL ONCE
Qty: 0 | Refills: 0 | Status: DISCONTINUED | OUTPATIENT
Start: 2017-11-19 | End: 2017-11-19

## 2017-11-19 RX ORDER — ACETAMINOPHEN 500 MG
1000 TABLET ORAL ONCE
Qty: 0 | Refills: 0 | Status: COMPLETED | OUTPATIENT
Start: 2017-11-20 | End: 2017-11-20

## 2017-11-19 RX ORDER — ACETAMINOPHEN 500 MG
1000 TABLET ORAL ONCE
Qty: 0 | Refills: 0 | Status: COMPLETED | OUTPATIENT
Start: 2017-11-19 | End: 2017-11-19

## 2017-11-19 RX ADMIN — Medication 25 MILLIGRAM(S): at 11:18

## 2017-11-19 RX ADMIN — Medication 1000 MILLIGRAM(S): at 04:00

## 2017-11-19 RX ADMIN — Medication 3 MILLIGRAM(S): at 22:33

## 2017-11-19 RX ADMIN — Medication 400 MILLIGRAM(S): at 22:55

## 2017-11-19 RX ADMIN — Medication 1000 MILLIGRAM(S): at 23:25

## 2017-11-19 RX ADMIN — HYDROMORPHONE HYDROCHLORIDE 1 MILLIGRAM(S): 2 INJECTION INTRAMUSCULAR; INTRAVENOUS; SUBCUTANEOUS at 14:20

## 2017-11-19 RX ADMIN — Medication 400 MILLIGRAM(S): at 03:24

## 2017-11-19 RX ADMIN — HYDROMORPHONE HYDROCHLORIDE 30 MILLILITER(S): 2 INJECTION INTRAMUSCULAR; INTRAVENOUS; SUBCUTANEOUS at 19:07

## 2017-11-19 RX ADMIN — HYDROMORPHONE HYDROCHLORIDE 1 MILLIGRAM(S): 2 INJECTION INTRAMUSCULAR; INTRAVENOUS; SUBCUTANEOUS at 19:09

## 2017-11-19 RX ADMIN — Medication 1000 MILLIGRAM(S): at 16:53

## 2017-11-19 RX ADMIN — HYDROMORPHONE HYDROCHLORIDE 30 MILLILITER(S): 2 INJECTION INTRAMUSCULAR; INTRAVENOUS; SUBCUTANEOUS at 18:10

## 2017-11-19 RX ADMIN — Medication 25 MILLIGRAM(S): at 20:28

## 2017-11-19 RX ADMIN — Medication 25 MILLIGRAM(S): at 16:21

## 2017-11-19 RX ADMIN — Medication 25 MILLIGRAM(S): at 01:17

## 2017-11-19 RX ADMIN — Medication 400 MILLIGRAM(S): at 16:38

## 2017-11-19 RX ADMIN — Medication 1000 MILLIGRAM(S): at 14:48

## 2017-11-19 RX ADMIN — HYDROMORPHONE HYDROCHLORIDE 30 MILLILITER(S): 2 INJECTION INTRAMUSCULAR; INTRAVENOUS; SUBCUTANEOUS at 00:19

## 2017-11-19 RX ADMIN — Medication 25 MILLIGRAM(S): at 05:22

## 2017-11-19 RX ADMIN — ENOXAPARIN SODIUM 40 MILLIGRAM(S): 100 INJECTION SUBCUTANEOUS at 11:19

## 2017-11-19 RX ADMIN — IMIPENEM AND CILASTATIN 100 MILLIGRAM(S): 250; 250 INJECTION, POWDER, FOR SOLUTION INTRAVENOUS at 11:22

## 2017-11-19 RX ADMIN — Medication 400 MILLIGRAM(S): at 10:07

## 2017-11-19 RX ADMIN — HYDROMORPHONE HYDROCHLORIDE 30 MILLILITER(S): 2 INJECTION INTRAMUSCULAR; INTRAVENOUS; SUBCUTANEOUS at 07:13

## 2017-11-19 RX ADMIN — HYDROMORPHONE HYDROCHLORIDE 1 MILLIGRAM(S): 2 INJECTION INTRAMUSCULAR; INTRAVENOUS; SUBCUTANEOUS at 12:45

## 2017-11-19 RX ADMIN — IMIPENEM AND CILASTATIN 100 MILLIGRAM(S): 250; 250 INJECTION, POWDER, FOR SOLUTION INTRAVENOUS at 04:36

## 2017-11-19 RX ADMIN — IMIPENEM AND CILASTATIN 100 MILLIGRAM(S): 250; 250 INJECTION, POWDER, FOR SOLUTION INTRAVENOUS at 17:07

## 2017-11-19 NOTE — PROGRESS NOTE ADULT - SUBJECTIVE AND OBJECTIVE BOX
PN infusion at 55 ccs/hr  Pt remains NPO  Results of urine culture noted  11-18 @ 07:01  -  11-19 @ 07:00  --------------------------------------------------------  IN:    sodium chloride 0.45%.: 1200 mL    Solution: 300 mL    TPN (Total Parenteral Nutrition): 1320 mL  Total IN: 2820 mL    OUT:    Voided: 1675 mL  Total OUT: 1675 mL    Total NET: 1145 mL        T(C): 36.7 (11-19-17 @ 05:52), Max: 37.2 (11-18-17 @ 09:33)  HR: 74 (11-19-17 @ 05:52) (74 - 90)  BP: 106/72 (11-19-17 @ 05:52) (102/65 - 111/75)  RR: 18 (11-19-17 @ 05:52) (18 - 18)  SpO2: 94% (11-19-17 @ 05:52) (93% - 95%)  Wt(kg): --    Labs   11-18    136  |  100  |  13  ----------------------------<  107<H>  4.6   |  24  |  0.33<L>    Ca    8.7      18 Nov 2017 08:40  Phos  4.5     11-18  Mg     1.9     11-18    TPro  7.0  /  Alb  2.6<L>  /  TBili  0.2  /  DBili  0.1  /  AST  32  /  ALT  30  /  AlkPhos  412<H>  11-18      LIVER FUNCTIONS - ( 18 Nov 2017 08:40 )  Alb: 2.6 g/dL / Pro: 7.0 g/dL / ALK PHOS: 412 U/L / ALT: 30 U/L / AST: 32 U/L / GGT: x           CAPILLARY BLOOD GLUCOSE        I&O's Detail    18 Nov 2017 07:01  -  19 Nov 2017 07:00  --------------------------------------------------------  IN:    sodium chloride 0.45%.: 1200 mL    Solution: 300 mL    TPN (Total Parenteral Nutrition): 1320 mL  Total IN: 2820 mL    OUT:    Voided: 1675 mL  Total OUT: 1675 mL    Total NET: 1145 mL

## 2017-11-19 NOTE — PROGRESS NOTE ADULT - ASSESSMENT
Elba Coleman is a 57 y.o. woman with history of nephrolithiasis, HTN, and appendectomy admitted on 10/16 for gallstone pancreatitis c/b necrotizing pancreatitis and ascending cholangitis s/p ERCP and stent placement (10/21) and FNA of perihepatic fluid (10/31), which grew E. faecalis. She is s/p cystgastrostomy with 2 axiom stents placement by GI on 11/8. She was not able to tolerate tube feeding and now is receiving TPN. Significantly increased pain last night, but her pain is better after PCA was adjusted. lab is significant for leukocytosis ( 12.1 -> 14.5) Elba Coleman is a 57 y.o. woman with history of nephrolithiasis, HTN, and appendectomy admitted on 10/16 for gallstone pancreatitis c/b necrotizing pancreatitis and ascending cholangitis s/p ERCP and stent placement (10/21) and FNA of perihepatic fluid (10/31), which grew E. faecalis. She is s/p cystgastrostomy with 2 axiom stents placement by GI on 11/8. She was not able to tolerate tube feeding and now is receiving TPN. Her diet was advanced to FL on 11/14 but her abdominal pain increased. Her diet was deescalated to NPO + sips and she feels better. Her lab is significant for anemia H/H : 6.9/21.7). She is hemodynamically stable.     - Hemoglobin is below 7. will transfuse a unit of pRBC. will trend the CBC  - Per GI, possible necrosectomy in the coming week  - DVT ppx:  - Diet: NPO with sips of water, continue with TPN  - ABx regimen: imipenem  - Pain control:   - Monitor GI function  - Activity: OOb as tolerated  - Elba Coleman is a 57 y.o. woman with history of nephrolithiasis, HTN, and appendectomy admitted on 10/16 for gallstone pancreatitis c/b necrotizing pancreatitis and ascending cholangitis s/p ERCP and stent placement (10/21) and FNA of perihepatic fluid (10/31), which grew E. faecalis. She is s/p cystgastrostomy with 2 axiom stents placement by GI on 11/8. She was not able to tolerate tube feeding and now is receiving TPN. Her diet was advanced to FL on 11/14 but her abdominal pain increased. Her diet was deescalated to NPO + sips and she feels better. Her lab is significant for anemia H/H : 6.9/21.7). She is hemodynamically stable.     - Hemoglubin level was 6.9 but repeat CBC was 7.7. Fore now hold off transfusion of a unit of pRBC. will trend the CBC  - Per GI, possible necrosectomy in the coming week  - DVT ppx:  - Diet: NPO with sips of water, continue with TPN  - ABx regimen: imipenem  - Pain control:   - Monitor GI function  - Activity: OOb as tolerated  -

## 2017-11-19 NOTE — PROGRESS NOTE ADULT - SUBJECTIVE AND OBJECTIVE BOX
Surgery Trauma Team Progress Note      SUBJECTIVE:   Patient was seen and examined this morning. There was no acute event overnight. She has moderate abdominal pain (not related to drinking). She is still on sips of water and is tolerating it well. She does not report fever, n/v, chest pain, or shortness of breath.     OBJECTIVE:   T(C): 36.7 (11-19-17 @ 05:52), Max: 37.2 (11-18-17 @ 13:51)  HR: 74 (11-19-17 @ 05:52) (74 - 88)  BP: 106/72 (11-19-17 @ 05:52) (102/65 - 111/75)  RR: 18 (11-19-17 @ 05:52) (18 - 18)  SpO2: 94% (11-19-17 @ 05:52) (93% - 95%)  Wt(kg): --  CAPILLARY BLOOD GLUCOSE        I&O's Detail    18 Nov 2017 07:01  -  19 Nov 2017 07:00  --------------------------------------------------------  IN:    sodium chloride 0.45%.: 1200 mL    Solution: 300 mL    TPN (Total Parenteral Nutrition): 1320 mL  Total IN: 2820 mL    OUT:    Voided: 1675 mL  Total OUT: 1675 mL    Total NET: 1145 mL          PHYSICAL EXAM:  Gen: Well-nourished, well-developed, A&O x3, resting in bed in no acute distress  CV: RRR  Resp: Patent airways, unlabored   Abdomen: Soft, nontender, nondistended  Extremities: All 4 extremities warm and well perfused, no edema Surgery Trauma Team Progress Note      SUBJECTIVE:   Patient was seen and examined this morning. There was no acute event overnight. She has moderate abdominal pain (not related to drinking). She is still on sips of water and is tolerating it well. She does not report fever, n/v, chest pain, or shortness of breath.     OBJECTIVE:   T(C): 36.7 (11-19-17 @ 05:52), Max: 37.2 (11-18-17 @ 13:51)  HR: 74 (11-19-17 @ 05:52) (74 - 88)  BP: 106/72 (11-19-17 @ 05:52) (102/65 - 111/75)  RR: 18 (11-19-17 @ 05:52) (18 - 18)  SpO2: 94% (11-19-17 @ 05:52) (93% - 95%)  Wt(kg): --  CAPILLARY BLOOD GLUCOSE        I&O's Detail    18 Nov 2017 07:01  -  19 Nov 2017 07:00  --------------------------------------------------------  IN:    sodium chloride 0.45%.: 1200 mL    Solution: 300 mL    TPN (Total Parenteral Nutrition): 1320 mL  Total IN: 2820 mL    OUT:    Voided: 1675 mL  Total OUT: 1675 mL    Total NET: 1145 mL          PHYSICAL EXAM:  Gen: Well-nourished, well-developed, A&O x3, resting in bed in moderate pain/ distress  CV: RRR  Resp: Patent airways, unlabored   Abdomen: Soft, diffuse tenderness in epigastric region, nondistended  Extremities: All 4 extremities warm and well perfused, no edema

## 2017-11-19 NOTE — PROGRESS NOTE ADULT - ATTENDING COMMENTS
Patient seen and examined  c/o intermittent epigastric pain  afebrile x 48 hours   vitals stable  abdomen - +epigastric tenderness, nondistended, no rebound or guarding  hct=21  WBC=WNL x multiple days  Blood / urine cultures from 11/17 negative to date    - Likely anemia of chronic disease.  With hematocrit slowly trended down over the last few days, doubt active bleeding, but will give one unit PRBC's now that hemoglobin less than 7.0.  - Need for repeat endoscopic necrosectomy as per GI service.  No indications for laparotomy or open pancreatic debridement at this point  - Care discussed with patient and family Patient seen and examined  c/o intermittent epigastric pain  afebrile x 48 hours   vitals stable  abdomen - +epigastric tenderness, nondistended, no rebound or guarding  hct=21  WBC=WNL x multiple days  Blood / urine cultures from 11/17 negative to date    - Likely anemia of chronic disease.  With hematocrit slowly trended down over the last few days, doubt active bleeding, but will give one unit PRBC's now that hemoglobin less than 7.0.  - Need for repeat endoscopic necrosectomy as per GI service.  No indications for laparotomy or open pancreatic debridement at this point  - Care discussed with patient and family    Addendum-  Repeat hemoglobin is 7.7 - will hold off blood product transfusion

## 2017-11-19 NOTE — PROGRESS NOTE ADULT - SUBJECTIVE AND OBJECTIVE BOX
Day _32/10_ of Anesthesia Pain Management Service    SUBJECTIVE:    Pain Scale Score	At rest: ___ 	With Activity: ___ 	[ x] Refer to charted pain scores    THERAPY:    [ ] IV PCA Morphine		[ ] 5 mg/mL	[ ] 1 mg/mL  [x ] IV PCA Hydromorphone	[ ] 5 mg/mL	[x ] 1 mg/mL  [ ] IV PCA Fentanyl		[ ] 50 micrograms/mL    Demand dose 0.2mg    lockout  6  (minutes) 0Continuous Rate          MEDICATIONS  (STANDING):  diphenhydrAMINE   Injectable 25 milliGRAM(s) IV Push once  enoxaparin Injectable 40 milliGRAM(s) SubCutaneous daily  fentaNYL   Patch  25 MICROgram(s)/Hr. 1 Patch Transdermal every 72 hours  HYDROmorphone PCA (1 mG/mL) 30 milliLiter(s) PCA Continuous PCA Continuous  imipenem/cilastatin  IVPB      imipenem/cilastatin  IVPB 500 milliGRAM(s) IV Intermittent every 6 hours  melatonin 3 milliGRAM(s) Oral at bedtime  ondansetron Injectable 4 milliGRAM(s) IV Push once  sodium chloride 0.45%. 1000 milliLiter(s) (50 mL/Hr) IV Continuous <Continuous>    MEDICATIONS  (PRN):  diphenhydrAMINE   Injectable 25 milliGRAM(s) IV Push at bedtime PRN Insomnia  diphenhydrAMINE   Injectable 25 milliGRAM(s) IV Push every 4 hours PRN Rash and/or Itching  HYDROmorphone PCA (1 mG/mL) Rescue Clinician Bolus 1 milliGRAM(s) IV Push every 15 minutes PRN for Pain Scale GREATER THAN 6  naloxone Injectable 0.1 milliGRAM(s) IV Push every 3 minutes PRN For ANY of the following changes in patient status:  A. RR LESS THAN 10 breaths per minute, B. Oxygen saturation LESS THAN 90%, C. Sedation score of 6  ondansetron Injectable 4 milliGRAM(s) IV Push every 6 hours PRN Nausea      OBJECTIVE:    Sedation Score:	[x ] Alert	[ ] Drowsy 	[ ] Arousable	[ ] Asleep	[ ] Unresponsive    Side Effects:	[ x] None	[ ] Nausea	[ ] Vomiting	[ ] Pruritus  		[ ] Other:    Vital Signs Last 24 Hrs  T(C): 37.6 (19 Nov 2017 10:25), Max: 37.6 (19 Nov 2017 10:25)  T(F): 99.7 (19 Nov 2017 10:25), Max: 99.7 (19 Nov 2017 10:25)  HR: 96 (19 Nov 2017 10:25) (74 - 96)  BP: 117/76 (19 Nov 2017 10:25) (102/65 - 117/76)  BP(mean): --  RR: 18 (19 Nov 2017 10:25) (18 - 18)  SpO2: 94% (19 Nov 2017 10:25) (93% - 95%)    ASSESSMENT/ PLAN    Therapy to  be:	[x ] Continue   [ ] Discontinued   [ ] Change to prn Analgesics    Documentation and Verification of current medications:   [X] Done	[ ] Not done, not elligible    Comments: I was physically present for the key portionjs of the evaluation and management service provided. I agree with the above history, physical, and plan which I have reviewed and edited where appropriate

## 2017-11-20 LAB
ALBUMIN SERPL ELPH-MCNC: 2.8 G/DL — LOW (ref 3.3–5)
ALP SERPL-CCNC: 367 U/L — HIGH (ref 40–120)
ALT FLD-CCNC: 22 U/L — SIGNIFICANT CHANGE UP (ref 10–45)
ANION GAP SERPL CALC-SCNC: 16 MMOL/L — SIGNIFICANT CHANGE UP (ref 5–17)
AST SERPL-CCNC: 31 U/L — SIGNIFICANT CHANGE UP (ref 10–40)
BILIRUB SERPL-MCNC: <0.2 MG/DL — SIGNIFICANT CHANGE UP (ref 0.2–1.2)
BUN SERPL-MCNC: 13 MG/DL — SIGNIFICANT CHANGE UP (ref 7–23)
CALCIUM SERPL-MCNC: 9 MG/DL — SIGNIFICANT CHANGE UP (ref 8.4–10.5)
CHLORIDE SERPL-SCNC: 101 MMOL/L — SIGNIFICANT CHANGE UP (ref 96–108)
CO2 SERPL-SCNC: 23 MMOL/L — SIGNIFICANT CHANGE UP (ref 22–31)
CREAT SERPL-MCNC: 0.42 MG/DL — LOW (ref 0.5–1.3)
GLUCOSE SERPL-MCNC: 103 MG/DL — HIGH (ref 70–99)
HCT VFR BLD CALC: 21.4 % — LOW (ref 34.5–45)
HGB BLD-MCNC: 6.8 G/DL — CRITICAL LOW (ref 11.5–15.5)
MAGNESIUM SERPL-MCNC: 1.9 MG/DL — SIGNIFICANT CHANGE UP (ref 1.6–2.6)
MCHC RBC-ENTMCNC: 26.2 PG — LOW (ref 27–34)
MCHC RBC-ENTMCNC: 31.8 GM/DL — LOW (ref 32–36)
MCV RBC AUTO: 82.3 FL — SIGNIFICANT CHANGE UP (ref 80–100)
PHOSPHATE SERPL-MCNC: 4.9 MG/DL — HIGH (ref 2.5–4.5)
PLATELET # BLD AUTO: 673 K/UL — HIGH (ref 150–400)
POTASSIUM SERPL-MCNC: 4.4 MMOL/L — SIGNIFICANT CHANGE UP (ref 3.5–5.3)
POTASSIUM SERPL-SCNC: 4.4 MMOL/L — SIGNIFICANT CHANGE UP (ref 3.5–5.3)
PROT SERPL-MCNC: 6.6 G/DL — SIGNIFICANT CHANGE UP (ref 6–8.3)
RBC # BLD: 2.6 M/UL — LOW (ref 3.8–5.2)
RBC # FLD: 15.1 % — HIGH (ref 10.3–14.5)
SODIUM SERPL-SCNC: 140 MMOL/L — SIGNIFICANT CHANGE UP (ref 135–145)
WBC # BLD: 8.14 K/UL — SIGNIFICANT CHANGE UP (ref 3.8–10.5)
WBC # FLD AUTO: 8.14 K/UL — SIGNIFICANT CHANGE UP (ref 3.8–10.5)

## 2017-11-20 PROCEDURE — 99232 SBSQ HOSP IP/OBS MODERATE 35: CPT

## 2017-11-20 PROCEDURE — 99232 SBSQ HOSP IP/OBS MODERATE 35: CPT | Mod: GC

## 2017-11-20 RX ORDER — ACETAMINOPHEN 500 MG
1000 TABLET ORAL ONCE
Qty: 0 | Refills: 0 | Status: COMPLETED | OUTPATIENT
Start: 2017-11-20 | End: 2017-11-20

## 2017-11-20 RX ORDER — FENTANYL CITRATE 50 UG/ML
1 INJECTION INTRAVENOUS
Qty: 0 | Refills: 0 | Status: DISCONTINUED | OUTPATIENT
Start: 2017-11-20 | End: 2017-11-26

## 2017-11-20 RX ADMIN — HYDROMORPHONE HYDROCHLORIDE 30 MILLILITER(S): 2 INJECTION INTRAMUSCULAR; INTRAVENOUS; SUBCUTANEOUS at 19:12

## 2017-11-20 RX ADMIN — HYDROMORPHONE HYDROCHLORIDE 30 MILLILITER(S): 2 INJECTION INTRAMUSCULAR; INTRAVENOUS; SUBCUTANEOUS at 10:59

## 2017-11-20 RX ADMIN — Medication 400 MILLIGRAM(S): at 12:26

## 2017-11-20 RX ADMIN — Medication 3 MILLIGRAM(S): at 21:42

## 2017-11-20 RX ADMIN — Medication 25 MILLIGRAM(S): at 10:00

## 2017-11-20 RX ADMIN — IMIPENEM AND CILASTATIN 100 MILLIGRAM(S): 250; 250 INJECTION, POWDER, FOR SOLUTION INTRAVENOUS at 00:44

## 2017-11-20 RX ADMIN — HYDROMORPHONE HYDROCHLORIDE 1 MILLIGRAM(S): 2 INJECTION INTRAMUSCULAR; INTRAVENOUS; SUBCUTANEOUS at 16:11

## 2017-11-20 RX ADMIN — Medication 25 MILLIGRAM(S): at 14:06

## 2017-11-20 RX ADMIN — IMIPENEM AND CILASTATIN 100 MILLIGRAM(S): 250; 250 INJECTION, POWDER, FOR SOLUTION INTRAVENOUS at 06:11

## 2017-11-20 RX ADMIN — ENOXAPARIN SODIUM 40 MILLIGRAM(S): 100 INJECTION SUBCUTANEOUS at 11:56

## 2017-11-20 RX ADMIN — Medication 1000 MILLIGRAM(S): at 19:35

## 2017-11-20 RX ADMIN — Medication 25 MILLIGRAM(S): at 22:19

## 2017-11-20 RX ADMIN — Medication 25 MILLIGRAM(S): at 04:44

## 2017-11-20 RX ADMIN — Medication 25 MILLIGRAM(S): at 00:44

## 2017-11-20 RX ADMIN — Medication 25 MILLIGRAM(S): at 18:30

## 2017-11-20 RX ADMIN — Medication 400 MILLIGRAM(S): at 19:21

## 2017-11-20 RX ADMIN — HYDROMORPHONE HYDROCHLORIDE 30 MILLILITER(S): 2 INJECTION INTRAMUSCULAR; INTRAVENOUS; SUBCUTANEOUS at 07:00

## 2017-11-20 RX ADMIN — IMIPENEM AND CILASTATIN 100 MILLIGRAM(S): 250; 250 INJECTION, POWDER, FOR SOLUTION INTRAVENOUS at 11:48

## 2017-11-20 RX ADMIN — Medication 1000 MILLIGRAM(S): at 13:00

## 2017-11-20 RX ADMIN — HYDROMORPHONE HYDROCHLORIDE 1 MILLIGRAM(S): 2 INJECTION INTRAMUSCULAR; INTRAVENOUS; SUBCUTANEOUS at 03:23

## 2017-11-20 RX ADMIN — Medication 1000 MILLIGRAM(S): at 05:14

## 2017-11-20 RX ADMIN — HYDROMORPHONE HYDROCHLORIDE 1 MILLIGRAM(S): 2 INJECTION INTRAMUSCULAR; INTRAVENOUS; SUBCUTANEOUS at 21:44

## 2017-11-20 RX ADMIN — FENTANYL CITRATE 1 PATCH: 50 INJECTION INTRAVENOUS at 19:24

## 2017-11-20 RX ADMIN — IMIPENEM AND CILASTATIN 100 MILLIGRAM(S): 250; 250 INJECTION, POWDER, FOR SOLUTION INTRAVENOUS at 17:50

## 2017-11-20 RX ADMIN — SODIUM CHLORIDE 50 MILLILITER(S): 9 INJECTION, SOLUTION INTRAVENOUS at 18:23

## 2017-11-20 RX ADMIN — HYDROMORPHONE HYDROCHLORIDE 1 MILLIGRAM(S): 2 INJECTION INTRAMUSCULAR; INTRAVENOUS; SUBCUTANEOUS at 10:54

## 2017-11-20 RX ADMIN — Medication 400 MILLIGRAM(S): at 04:44

## 2017-11-20 NOTE — PROVIDER CONTACT NOTE (CRITICAL VALUE NOTIFICATION) - ACTION/TREATMENT ORDERED:
NO ORDERS RECEIVED
Team aware. No interventions at this time.
MD aware. No interventions at this time.
MD aware. Platelets have been continuously elevated. No interventions at this time,
No intervention at this time
Possible IV abx. No interventions at this time
PA aware. No interventions at this time.
PA notified and aware. Will continue to monitor
no action at this time. team to round on pt. Will continue to monitor.
Md made aware. No interventions at this time. Will continue to monitor.
No new interventions at this time.
will continue to monitor
will continue to monitor

## 2017-11-20 NOTE — PROGRESS NOTE ADULT - SUBJECTIVE AND OBJECTIVE BOX
[facility]STRO[facility]OLOG[facility]    Chief Complaint:  Patient is a 57y old  Female who presents with a chief complaint of Abdominal pain (16 Oct 2017 09:50)      Interval Events: Patient afebrile overnight but still has abd pain. Tolerating liquid diet. No bm for 4 days.    Allergies:  No Known Allergies      Hospital Medications:  diphenhydrAMINE   Injectable 25 milliGRAM(s) IV Push every 4 hours PRN  enoxaparin Injectable 40 milliGRAM(s) SubCutaneous daily  fentaNYL   Patch  25 MICROgram(s)/Hr. 1 Patch Transdermal every 72 hours  HYDROmorphone PCA (1 mG/mL) 30 milliLiter(s) PCA Continuous PCA Continuous  HYDROmorphone PCA (1 mG/mL) Rescue Clinician Bolus 1 milliGRAM(s) IV Push every 15 minutes PRN  imipenem/cilastatin  IVPB      imipenem/cilastatin  IVPB 500 milliGRAM(s) IV Intermittent every 6 hours  melatonin 3 milliGRAM(s) Oral at bedtime  naloxone Injectable 0.1 milliGRAM(s) IV Push every 3 minutes PRN  ondansetron Injectable 4 milliGRAM(s) IV Push every 6 hours PRN  sodium chloride 0.45%. 1000 milliLiter(s) IV Continuous <Continuous>      PMHX/PSHX:  Kidney stone  Hypertension  No pertinent past medical history  Kidney stones  History of appendectomy      Family history:  No pertinent family history in first degree relatives      ROS:     General:  No fevers, chills  Eyes:  Good vision  ENT:  No odynophagia, dysphagia  CV:  No pain, palpitations  Resp:  No dyspnea, cough, tachypnea, wheezing  GI:  See HPI  Muscle:  No pain, weakness  Skin:  No rash, edema      PHYSICAL EXAM:     GENERAL:  No distress  HEENT: conjunctivae clear  CHEST:  Full & symmetric excursion, no increased effort  HEART:  Regular rhythm  ABDOMEN:  Soft, diffuse tenderness  EXTREMITIES:  no edema  SKIN:  Dry/warm  NEURO:  Alert    Vital Signs:  Vital Signs Last 24 Hrs  T(C): 36.9 (20 Nov 2017 04:43), Max: 37.6 (19 Nov 2017 10:25)  T(F): 98.4 (20 Nov 2017 04:43), Max: 99.7 (19 Nov 2017 10:25)  HR: 83 (20 Nov 2017 04:43) (74 - 96)  BP: 104/67 (20 Nov 2017 04:43) (102/66 - 117/76)  BP(mean): --  RR: 18 (20 Nov 2017 04:43) (16 - 18)  SpO2: 95% (20 Nov 2017 04:43) (94% - 96%)  Daily     Daily     LABS:                        6.8    8.14  )-----------( 673      ( 20 Nov 2017 06:29 )             21.4     11-20    140  |  101  |  13  ----------------------------<  103<H>  4.4   |  23  |  0.42<L>    Ca    9.0      20 Nov 2017 06:29  Phos  4.9     11-20  Mg     1.9     11-20    TPro  6.6  /  Alb  2.8<L>  /  TBili  <0.2  /  DBili  x   /  AST  31  /  ALT  22  /  AlkPhos  367<H>  11-20    LIVER FUNCTIONS - ( 20 Nov 2017 06:29 )  Alb: 2.8 g/dL / Pro: 6.6 g/dL / ALK PHOS: 367 U/L / ALT: 22 U/L / AST: 31 U/L / GGT: x                   Imaging:

## 2017-11-20 NOTE — PROGRESS NOTE ADULT - SUBJECTIVE AND OBJECTIVE BOX
Pt remains NPO  PN Continues  11-19 @ 07:01  -  11-20 @ 07:00  --------------------------------------------------------  IN:    sodium chloride 0.45%.: 1200 mL    Solution: 200 mL    Solution: 200 mL    TPN (Total Parenteral Nutrition): 1320 mL  Total IN: 2920 mL    OUT:    Voided: 1000 mL  Total OUT: 1000 mL    Total NET: 1920 mL        T(C): 36.9 (11-20-17 @ 04:43), Max: 37.6 (11-19-17 @ 10:25)  HR: 83 (11-20-17 @ 04:43) (74 - 96)  BP: 104/67 (11-20-17 @ 04:43) (102/66 - 117/76)  RR: 18 (11-20-17 @ 04:43) (16 - 18)  SpO2: 95% (11-20-17 @ 04:43) (94% - 96%)  Wt(kg): --    Labs   11-19    134<L>  |  97  |  15  ----------------------------<  106<H>  4.4   |  23  |  0.40<L>    Ca    9.3      19 Nov 2017 08:50  Phos  4.9     11-19  Mg     1.8     11-19    TPro  6.5  /  Alb  2.9<L>  /  TBili  0.2  /  DBili  x   /  AST  28  /  ALT  27  /  AlkPhos  408<H>  11-19      LIVER FUNCTIONS - ( 19 Nov 2017 08:50 )  Alb: 2.9 g/dL / Pro: 6.5 g/dL / ALK PHOS: 408 U/L / ALT: 27 U/L / AST: 28 U/L / GGT: x           CAPILLARY BLOOD GLUCOSE        I&O's Detail    19 Nov 2017 07:01  -  20 Nov 2017 07:00  --------------------------------------------------------  IN:    sodium chloride 0.45%.: 1200 mL    Solution: 200 mL    Solution: 200 mL    TPN (Total Parenteral Nutrition): 1320 mL  Total IN: 2920 mL    OUT:    Voided: 1000 mL  Total OUT: 1000 mL    Total NET: 1920 mL

## 2017-11-20 NOTE — PROGRESS NOTE ADULT - ASSESSMENT
Elba Coleman is a 57 y.o. woman with history of nephrolithiasis, HTN, and appendectomy admitted on 10/16 for gallstone pancreatitis c/b necrotizing pancreatitis and ascending cholangitis s/p ERCP and stent placement (10/21) and FNA of perihepatic fluid (10/31), which grew E. faecalis. She is s/p cystgastrostomy with 2 axiom stents placement by GI on 11/8. She was not able to tolerate tube feeding and now is receiving TPN. Her diet was advanced to FL on 11/14 but her abdominal pain increased. Her diet was deescalated to NPO + sips and she feels better. Her labs are significant for anemia H/H : 6.9/21.7. She is hemodynamically stable.     - Hb  level 7.7 most recently, from 6.9 (considering transfusion of 1 unit of pRBC if worsens); continue to trend CBC.  - Per GI, possible necrosectomy in the coming week  - DVT ppx  - Diet: NPO with sips of water, continue with TPN; monitor GI fn  - ABx regimen: imipenem for pancreatitis  - Pain control  - Activity: OOB as tolerated

## 2017-11-20 NOTE — PROGRESS NOTE ADULT - ATTENDING COMMENTS
As above.    Impression:    Pancreatitis and infected pancreatitic necrosis, s/p endoscopic cystgastrostomy with two lumen apposing stents.  Initially improved, then became febrile and with worsening of abdominal pain, antibiotics changed from ciprofloxacin to imipenem, now afebrile x 3 days and no leukocytosis.  On TPN per surgery team.  Anemic, getting blood transfusion.    Recommendation:    Consider advance diet to clear liquids  Continue imipenem.  No plan for endoscopic debridement, will consider for clinical deterioration.

## 2017-11-20 NOTE — PROVIDER CONTACT NOTE (CRITICAL VALUE NOTIFICATION) - SITUATION
C diff is positive
11/16 urine culture final result [id number] CFU/ ml MRSA
CBC hemoglobin 7.0 hematocrit 21.3. MD aware of CBC results, repeat CBC to be drawn at 1400. WIll continue to monitor patient. no s/s of bleeding and pt asymptomatic
Hgb 6.8 and Hct 21.4
Platelet ct 1,151
Pt admitted with pancreatitis, cholangitis. S/p ERCP.
admitted w/ pancreatic necrosis, fever
body fluid culture positive for moderate enterococcus faecalis

## 2017-11-20 NOTE — PROVIDER CONTACT NOTE (CRITICAL VALUE NOTIFICATION) - BACKGROUND
Admitted with SBO
Admitted with pancreatitis
HTN, Kidney Stone
PT A&O x 4. Pt had a temp of 38.2. at approx 2200.
cholecystitis, pancreatitis

## 2017-11-20 NOTE — PROVIDER CONTACT NOTE (CRITICAL VALUE NOTIFICATION) - RECOMMENDATIONS
MD Hunt made aware
No other intervention required as per PA
No intervention such as transfusion required as per MD

## 2017-11-20 NOTE — PROGRESS NOTE ADULT - ASSESSMENT
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 Patient still with abd pain likely due to pancreatic necrosis    Impression:  1) Infected pancreatic necrosis s/p endoscopic drainage  2) fever - may be related to pancreatic necrosis vs nosocomial infection. Fever w/u negative. No fever overnight.  3) low stool output, multifactorial (Opiods, panc necrosis)  4. 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 Patient still with abd pain likely due to pancreatic necrosis    Impression:  1) Infected pancreatic necrosis s/p endoscopic drainage  2) fever - may be related to infected pancreatic necrosis vs nosocomial infection. Fever w/u negative.  Fever resolved.  3) low stool output, multifactorial (Opioids, panc necrosis)  4. Anemia- no overt GI bleed- may be dilutional     Plan:  - f/u BCX and fever w/u  - IV abx with imipenem  - IV fluids with LR  - analgesia per primary team and pain management  - will consider necrosis debridement if no improvement  - Diet as tolerated

## 2017-11-20 NOTE — PROGRESS NOTE ADULT - SUBJECTIVE AND OBJECTIVE BOX
ATP Progress note    Hospital Day: 35  Subjective:   No acute overnight events.  Patient examined this AM at bedside.  Tolerating sips and chips. No complaints at this time.    Objective:   T(C): 36.9 (11-20-17 @ 04:43), Max: 37.6 (11-19-17 @ 10:25)  HR: 83 (11-20-17 @ 04:43) (74 - 96)  BP: 104/67 (11-20-17 @ 04:43) (102/66 - 117/76)  RR: 18 (11-20-17 @ 04:43) (16 - 18)  SpO2: 95% (11-20-17 @ 04:43) (94% - 96%)                          7.7    9.9   )-----------( 686      ( 19 Nov 2017 11:27 )             23.9       11-19    134<L>  |  97  |  15  ----------------------------<  106<H>  4.4   |  23  |  0.40<L>    Ca    9.3      19 Nov 2017 08:50  Phos  4.9     11-19  Mg     1.8     11-19    TPro  6.5  /  Alb  2.9<L>  /  TBili  0.2  /  DBili  x   /  AST  28  /  ALT  27  /  AlkPhos  408<H>  11-19      I&O's Detail    19 Nov 2017 07:01  -  20 Nov 2017 07:00  --------------------------------------------------------  IN:    sodium chloride 0.45%.: 1200 mL    Solution: 200 mL    Solution: 200 mL    TPN (Total Parenteral Nutrition): 1320 mL  Total IN: 2920 mL    OUT:    Voided: 1000 mL  Total OUT: 1000 mL    Total NET: 1920 mL    Focused Physical Exam:   General: NAD  Respiratory: Nonlabored breathing  Abdomen: Soft, ND, diffuse tenderness in epigastric region ATP Progress note    Hospital Day: 35  Subjective:   No acute overnight events.  Patient examined this AM at bedside.  Tolerating sips and chips, +/-. No complaints at this time.    Objective:   T(C): 36.9 (11-20-17 @ 04:43), Max: 37.6 (11-19-17 @ 10:25)  HR: 83 (11-20-17 @ 04:43) (74 - 96)  BP: 104/67 (11-20-17 @ 04:43) (102/66 - 117/76)  RR: 18 (11-20-17 @ 04:43) (16 - 18)  SpO2: 95% (11-20-17 @ 04:43) (94% - 96%)                          7.7    9.9   )-----------( 686      ( 19 Nov 2017 11:27 )             23.9       11-19    134<L>  |  97  |  15  ----------------------------<  106<H>  4.4   |  23  |  0.40<L>    Ca    9.3      19 Nov 2017 08:50  Phos  4.9     11-19  Mg     1.8     11-19    TPro  6.5  /  Alb  2.9<L>  /  TBili  0.2  /  DBili  x   /  AST  28  /  ALT  27  /  AlkPhos  408<H>  11-19      I&O's Detail    19 Nov 2017 07:01  -  20 Nov 2017 07:00  --------------------------------------------------------  IN:    sodium chloride 0.45%.: 1200 mL    Solution: 200 mL    Solution: 200 mL    TPN (Total Parenteral Nutrition): 1320 mL  Total IN: 2920 mL    OUT:    Voided: 1000 mL  Total OUT: 1000 mL    Total NET: 1920 mL    Focused Physical Exam:   General: NAD  Respiratory: Nonlabored breathing  Abdomen: Soft, ND, diffuse tenderness in epigastric region

## 2017-11-20 NOTE — PROGRESS NOTE ADULT - SUBJECTIVE AND OBJECTIVE BOX
Day 33 of Anesthesia Pain Management Service    SUBJECTIVE: Patient is resting comfortably    Pain Scale Score:	[X] Refer to charted pain scores    THERAPY:    [ ] IV PCA Morphine		[ ] 5 mg/mL	[ ] 1 mg/mL  [X] IV PCA Hydromorphone	[ ] 5 mg/mL	[X] 1 mg/mL  [ ] IV PCA Fentanyl		[ ] 50 micrograms/mL    Demand dose: 0.5 mg     Lockout: 6 minutes   Continuous Rate: 0 mg/hr  4 Hour Limit: 9 mg    MEDICATIONS  (STANDING):  enoxaparin Injectable 40 milliGRAM(s) SubCutaneous daily  fentaNYL   Patch  25 MICROgram(s)/Hr. 1 Patch Transdermal every 72 hours  HYDROmorphone PCA (1 mG/mL) 30 milliLiter(s) PCA Continuous PCA Continuous  imipenem/cilastatin  IVPB      imipenem/cilastatin  IVPB 500 milliGRAM(s) IV Intermittent every 6 hours  melatonin 3 milliGRAM(s) Oral at bedtime  sodium chloride 0.45%. 1000 milliLiter(s) (50 mL/Hr) IV Continuous <Continuous>    MEDICATIONS  (PRN):  diphenhydrAMINE   Injectable 25 milliGRAM(s) IV Push every 4 hours PRN Rash and/or Itching  HYDROmorphone PCA (1 mG/mL) Rescue Clinician Bolus 1 milliGRAM(s) IV Push every 15 minutes PRN for Pain Scale GREATER THAN 6  naloxone Injectable 0.1 milliGRAM(s) IV Push every 3 minutes PRN For ANY of the following changes in patient status:  A. RR LESS THAN 10 breaths per minute, B. Oxygen saturation LESS THAN 90%, C. Sedation score of 6  ondansetron Injectable 4 milliGRAM(s) IV Push every 6 hours PRN Nausea      OBJECTIVE:    Sedation Score:	[ X] Alert	[ ] Drowsy 	[ ] Arousable	[ ] Asleep	[ ] Unresponsive    Side Effects:	[X ] None	[ ] Nausea	[ ] Vomiting	[ ] Pruritus  		[ ] Other:    Vital Signs Last 24 Hrs  T(C): 37.3 (20 Nov 2017 10:47), Max: 37.3 (20 Nov 2017 10:47)  T(F): 99.1 (20 Nov 2017 10:47), Max: 99.1 (20 Nov 2017 10:47)  HR: 83 (20 Nov 2017 10:47) (74 - 83)  BP: 116/75 (20 Nov 2017 10:47) (102/66 - 116/75)  BP(mean): --  RR: 18 (20 Nov 2017 10:47) (16 - 18)  SpO2: 94% (20 Nov 2017 10:47) (94% - 96%)    ASSESSMENT/ PLAN    Therapy to  be:               [X] Continued   [ ] Discontinued   [ ] Changed to PRN Analgesics    Documentation and Verification of current medications:   [X] Done	[ ] Not done, not eligible    Comments: Remains NPO. Continue PCA

## 2017-11-20 NOTE — PROVIDER CONTACT NOTE (CRITICAL VALUE NOTIFICATION) - NAME OF MD/NP/PA/DO NOTIFIED:
JUDI Nava
DR LOPEZ
JUDI Lazo
MD Arboleda
MD Bush
MD Haile
MD Hunt
MD Pool
MD iqbal
PA Crystal Copper
PA Wendy
PA Wendy

## 2017-11-20 NOTE — PROGRESS NOTE ADULT - ATTENDING COMMENTS
Patient seen and examined.  Chart reviewed.  Resident note confirmed.  Pt is a 57 year old female with necrotising pancreatitis.  Pt s/p cyst gastrostomy x2 by gastroenterology.  Over the weekend, she developed fever/leukocytosis and imipenem was restarted.  She remains tender in the RUQ and LUQ.  Hct noted to decrease to 21 and a unit of PRBC was given.  Continue pain control.  Continue ISP.  GI follow up for endoscopic pancreatic debridement.  Continue NPO/TPN.  Monitor I’s and O’s .  Continue supportive care.  Continue DVT proph.  PT/OT evaluations.  Continue supportive care.

## 2017-11-20 NOTE — PROVIDER CONTACT NOTE (CRITICAL VALUE NOTIFICATION) - ASSESSMENT
Pt had multiple diarrhea. pt is on isolation room. contact precaution maintained. Inititated oral vancomycin
Pt is asymptomatic with no chest pain, SOB
Pt resting in bed. Pain controlled by PCA dilaudid and IV tylenol. Family at bedside.
pt stable at this time. pt w/ no c/o SOB, no chest pain. pt h/h trending low.

## 2017-11-20 NOTE — CHART NOTE - NSCHARTNOTEFT_GEN_A_CORE
pt still with poor pain control despite PCA, fentanyl patch   discussed with pain management they recommend increasing fentanyl patch to 50mcg for better basal control

## 2017-11-21 LAB
ALBUMIN SERPL ELPH-MCNC: 3 G/DL — LOW (ref 3.3–5)
ALP SERPL-CCNC: 331 U/L — HIGH (ref 40–120)
ALT FLD-CCNC: 20 U/L — SIGNIFICANT CHANGE UP (ref 10–45)
ANION GAP SERPL CALC-SCNC: 17 MMOL/L — SIGNIFICANT CHANGE UP (ref 5–17)
APTT BLD: 37 SEC — SIGNIFICANT CHANGE UP (ref 27.5–37.4)
AST SERPL-CCNC: 30 U/L — SIGNIFICANT CHANGE UP (ref 10–40)
BILIRUB SERPL-MCNC: 0.2 MG/DL — SIGNIFICANT CHANGE UP (ref 0.2–1.2)
BUN SERPL-MCNC: 12 MG/DL — SIGNIFICANT CHANGE UP (ref 7–23)
CALCIUM SERPL-MCNC: 8.9 MG/DL — SIGNIFICANT CHANGE UP (ref 8.4–10.5)
CHLORIDE SERPL-SCNC: 97 MMOL/L — SIGNIFICANT CHANGE UP (ref 96–108)
CO2 SERPL-SCNC: 24 MMOL/L — SIGNIFICANT CHANGE UP (ref 22–31)
CREAT SERPL-MCNC: 0.37 MG/DL — LOW (ref 0.5–1.3)
CULTURE RESULTS: SIGNIFICANT CHANGE UP
CULTURE RESULTS: SIGNIFICANT CHANGE UP
GLUCOSE SERPL-MCNC: 84 MG/DL — SIGNIFICANT CHANGE UP (ref 70–99)
HCT VFR BLD CALC: 25.8 % — LOW (ref 34.5–45)
HGB BLD-MCNC: 8.3 G/DL — LOW (ref 11.5–15.5)
INR BLD: 1.13 RATIO — SIGNIFICANT CHANGE UP (ref 0.88–1.16)
MCHC RBC-ENTMCNC: 26.4 PG — LOW (ref 27–34)
MCHC RBC-ENTMCNC: 32.2 GM/DL — SIGNIFICANT CHANGE UP (ref 32–36)
MCV RBC AUTO: 82.2 FL — SIGNIFICANT CHANGE UP (ref 80–100)
PLATELET # BLD AUTO: 756 K/UL — HIGH (ref 150–400)
POTASSIUM SERPL-MCNC: 4.5 MMOL/L — SIGNIFICANT CHANGE UP (ref 3.5–5.3)
POTASSIUM SERPL-SCNC: 4.5 MMOL/L — SIGNIFICANT CHANGE UP (ref 3.5–5.3)
PROT SERPL-MCNC: 6.8 G/DL — SIGNIFICANT CHANGE UP (ref 6–8.3)
PROTHROM AB SERPL-ACNC: 12.8 SEC — SIGNIFICANT CHANGE UP (ref 10–13.1)
RBC # BLD: 3.14 M/UL — LOW (ref 3.8–5.2)
RBC # FLD: 15.8 % — HIGH (ref 10.3–14.5)
SODIUM SERPL-SCNC: 138 MMOL/L — SIGNIFICANT CHANGE UP (ref 135–145)
SPECIMEN SOURCE: SIGNIFICANT CHANGE UP
SPECIMEN SOURCE: SIGNIFICANT CHANGE UP
WBC # BLD: 10.47 K/UL — SIGNIFICANT CHANGE UP (ref 3.8–10.5)
WBC # FLD AUTO: 10.47 K/UL — SIGNIFICANT CHANGE UP (ref 3.8–10.5)

## 2017-11-21 PROCEDURE — 99232 SBSQ HOSP IP/OBS MODERATE 35: CPT

## 2017-11-21 RX ORDER — HYDROMORPHONE HYDROCHLORIDE 2 MG/ML
1 INJECTION INTRAMUSCULAR; INTRAVENOUS; SUBCUTANEOUS
Qty: 0 | Refills: 0 | Status: DISCONTINUED | OUTPATIENT
Start: 2017-11-21 | End: 2017-11-28

## 2017-11-21 RX ORDER — ACETAMINOPHEN 500 MG
1000 TABLET ORAL ONCE
Qty: 0 | Refills: 0 | Status: COMPLETED | OUTPATIENT
Start: 2017-11-21 | End: 2017-11-21

## 2017-11-21 RX ORDER — ACETAMINOPHEN 500 MG
1000 TABLET ORAL ONCE
Qty: 0 | Refills: 0 | Status: COMPLETED | OUTPATIENT
Start: 2017-11-21 | End: 2017-11-22

## 2017-11-21 RX ORDER — ACETAMINOPHEN 500 MG
1000 TABLET ORAL ONCE
Qty: 0 | Refills: 0 | Status: COMPLETED | OUTPATIENT
Start: 2017-11-22 | End: 2017-11-22

## 2017-11-21 RX ADMIN — Medication 25 MILLIGRAM(S): at 18:33

## 2017-11-21 RX ADMIN — HYDROMORPHONE HYDROCHLORIDE 1 MILLIGRAM(S): 2 INJECTION INTRAMUSCULAR; INTRAVENOUS; SUBCUTANEOUS at 22:57

## 2017-11-21 RX ADMIN — IMIPENEM AND CILASTATIN 100 MILLIGRAM(S): 250; 250 INJECTION, POWDER, FOR SOLUTION INTRAVENOUS at 12:25

## 2017-11-21 RX ADMIN — Medication 400 MILLIGRAM(S): at 10:43

## 2017-11-21 RX ADMIN — Medication 1000 MILLIGRAM(S): at 11:13

## 2017-11-21 RX ADMIN — Medication 3 MILLIGRAM(S): at 21:32

## 2017-11-21 RX ADMIN — Medication 25 MILLIGRAM(S): at 21:32

## 2017-11-21 RX ADMIN — HYDROMORPHONE HYDROCHLORIDE 30 MILLILITER(S): 2 INJECTION INTRAMUSCULAR; INTRAVENOUS; SUBCUTANEOUS at 19:22

## 2017-11-21 RX ADMIN — IMIPENEM AND CILASTATIN 100 MILLIGRAM(S): 250; 250 INJECTION, POWDER, FOR SOLUTION INTRAVENOUS at 17:03

## 2017-11-21 RX ADMIN — Medication 25 MILLIGRAM(S): at 10:47

## 2017-11-21 RX ADMIN — HYDROMORPHONE HYDROCHLORIDE 30 MILLILITER(S): 2 INJECTION INTRAMUSCULAR; INTRAVENOUS; SUBCUTANEOUS at 02:53

## 2017-11-21 RX ADMIN — IMIPENEM AND CILASTATIN 100 MILLIGRAM(S): 250; 250 INJECTION, POWDER, FOR SOLUTION INTRAVENOUS at 05:44

## 2017-11-21 RX ADMIN — HYDROMORPHONE HYDROCHLORIDE 1 MILLIGRAM(S): 2 INJECTION INTRAMUSCULAR; INTRAVENOUS; SUBCUTANEOUS at 19:22

## 2017-11-21 RX ADMIN — IMIPENEM AND CILASTATIN 100 MILLIGRAM(S): 250; 250 INJECTION, POWDER, FOR SOLUTION INTRAVENOUS at 01:42

## 2017-11-21 RX ADMIN — Medication 25 MILLIGRAM(S): at 02:53

## 2017-11-21 RX ADMIN — Medication 1000 MILLIGRAM(S): at 18:56

## 2017-11-21 RX ADMIN — Medication 400 MILLIGRAM(S): at 18:26

## 2017-11-21 RX ADMIN — HYDROMORPHONE HYDROCHLORIDE 1 MILLIGRAM(S): 2 INJECTION INTRAMUSCULAR; INTRAVENOUS; SUBCUTANEOUS at 12:24

## 2017-11-21 RX ADMIN — Medication 25 MILLIGRAM(S): at 15:00

## 2017-11-21 RX ADMIN — HYDROMORPHONE HYDROCHLORIDE 30 MILLILITER(S): 2 INJECTION INTRAMUSCULAR; INTRAVENOUS; SUBCUTANEOUS at 07:14

## 2017-11-21 RX ADMIN — HYDROMORPHONE HYDROCHLORIDE 1 MILLIGRAM(S): 2 INJECTION INTRAMUSCULAR; INTRAVENOUS; SUBCUTANEOUS at 15:13

## 2017-11-21 RX ADMIN — ENOXAPARIN SODIUM 40 MILLIGRAM(S): 100 INJECTION SUBCUTANEOUS at 12:25

## 2017-11-21 NOTE — PROGRESS NOTE ADULT - ASSESSMENT
Impression:  56 yearold female with Pancreatitis and infected pancreatitic necrosis, s/p endoscopic cystgastrostomy with two lumen apposing stents.  Initially improved, then became febrile and with worsening of abdominal pain, antibiotics changed from ciprofloxacin to imipenem, now afebrile x 3 days and no leukocytosis.  On TPN per surgery team.  Anemic, getting blood transfusion.    Recommendation:    Consider clear liquids  Continue imipenem.  No plan for endoscopic debridement given symptoms improving, will consider for clinical deterioration.

## 2017-11-21 NOTE — PROGRESS NOTE ADULT - ASSESSMENT
57 y.o. woman with history of nephrolithiasis, HTN, and appendectomy admitted on 10/16 for gallstone pancreatitis c/b necrotizing pancreatitis and ascending cholangitis s/p ERCP and stent placement (10/21) and FNA of perihepatic fluid (10/31), which grew E. faecalis. She is s/p cystgastrostomy with 2 axiom stents placement by GI on 11/8. She was not able to tolerate tube feeding and now is receiving TPN.     Plan:  - F/u AM CBC, if stable can advance diet to clears  - Continue TPN, monitor GI function  - ABx regimen: primaxin for pancreatitis  - Pain control  - Activity: OOB as tolerated  - DVT PPx

## 2017-11-21 NOTE — PROGRESS NOTE ADULT - SUBJECTIVE AND OBJECTIVE BOX
[facility]ANCED GASTRO[facility]OLOGY      Chief Complaint:  Patient is a 57y old  Female who presents with a chief complaint of Abdominal pain (16 Oct 2017 09:50)      Interval Events: Patient pain slightly improved and tolerating sips of clears.     Allergies:  No Known Allergies      Hospital Medications:  diphenhydrAMINE   Injectable 25 milliGRAM(s) IV Push every 4 hours PRN  enoxaparin Injectable 40 milliGRAM(s) SubCutaneous daily  fentaNYL   Patch  50 MICROgram(s)/Hr 1 Patch Transdermal every 72 hours  HYDROmorphone PCA (1 mG/mL) 30 milliLiter(s) PCA Continuous PCA Continuous  HYDROmorphone PCA (1 mG/mL) Rescue Clinician Bolus 1 milliGRAM(s) IV Push every 15 minutes PRN  imipenem/cilastatin  IVPB      imipenem/cilastatin  IVPB 500 milliGRAM(s) IV Intermittent every 6 hours  melatonin 3 milliGRAM(s) Oral at bedtime  naloxone Injectable 0.1 milliGRAM(s) IV Push every 3 minutes PRN  ondansetron Injectable 4 milliGRAM(s) IV Push every 6 hours PRN  sodium chloride 0.45%. 1000 milliLiter(s) IV Continuous <Continuous>      PMHX/PSHX:  Kidney stone  Hypertension  No pertinent past medical history  Kidney stones  History of appendectomy      Family history:  No pertinent family history in first degree relatives      ROS:     General:  No fevers, chills  Eyes:  Good vision  ENT:  No odynophagia, dysphagia  CV:  No pain, palpitations  Resp:  No dyspnea, cough, tachypnea, wheezing  GI:  See HPI  Muscle:  No pain, weakness  Skin:  No rash, edema      PHYSICAL EXAM:     GENERAL:  No distress  HEENT: conjunctivae clear  CHEST:  Full & symmetric excursion, no increased effort  HEART:  Regular rhythm  ABDOMEN:  Soft, RUQ tenderness, non-distended  EXTREMITIES:  no edema  SKIN:  Dry/warm  NEURO:  Alert    Vital Signs:  Vital Signs Last 24 Hrs  T(C): 37.1 (21 Nov 2017 06:29), Max: 37.3 (20 Nov 2017 10:47)  T(F): 98.7 (21 Nov 2017 06:29), Max: 99.1 (20 Nov 2017 10:47)  HR: 82 (21 Nov 2017 06:29) (74 - 85)  BP: 108/69 (21 Nov 2017 06:29) (106/70 - 117/78)  BP(mean): --  RR: 18 (21 Nov 2017 06:29) (18 - 18)  SpO2: 94% (21 Nov 2017 06:29) (94% - 95%)  Daily     Daily     LABS:                        6.8    8.14  )-----------( 673      ( 20 Nov 2017 06:29 )             21.4     11-20    140  |  101  |  13  ----------------------------<  103<H>  4.4   |  23  |  0.42<L>    Ca    9.0      20 Nov 2017 06:29  Phos  4.9     11-20  Mg     1.9     11-20    TPro  6.6  /  Alb  2.8<L>  /  TBili  <0.2  /  DBili  x   /  AST  31  /  ALT  22  /  AlkPhos  367<H>  11-20    LIVER FUNCTIONS - ( 20 Nov 2017 06:29 )  Alb: 2.8 g/dL / Pro: 6.6 g/dL / ALK PHOS: 367 U/L / ALT: 22 U/L / AST: 31 U/L / GGT: x                   Imaging:

## 2017-11-21 NOTE — PROGRESS NOTE ADULT - SUBJECTIVE AND OBJECTIVE BOX
Day 34 of Anesthesia Pain Management Service    SUBJECTIVE: Patient is doing well with IV PCA    Pain Scale Score:	[X] Refer to charted pain scores    THERAPY:    [ ] IV PCA Morphine		[ ] 5 mg/mL	[ ] 1 mg/mL  [X] IV PCA Hydromorphone	[ ] 5 mg/mL	[X] 1 mg/mL  [ ] IV PCA Fentanyl		[ ] 50 micrograms/mL    Demand dose: 0.5 mg     Lockout: 6 minutes   Continuous Rate: 0 mg/hr  4 Hour Limit: 6 mg    MEDICATIONS  (STANDING):  enoxaparin Injectable 40 milliGRAM(s) SubCutaneous daily  fentaNYL   Patch  50 MICROgram(s)/Hr 1 Patch Transdermal every 72 hours  HYDROmorphone PCA (1 mG/mL) 30 milliLiter(s) PCA Continuous PCA Continuous  imipenem/cilastatin  IVPB      imipenem/cilastatin  IVPB 500 milliGRAM(s) IV Intermittent every 6 hours  melatonin 3 milliGRAM(s) Oral at bedtime  sodium chloride 0.45%. 1000 milliLiter(s) (50 mL/Hr) IV Continuous <Continuous>    MEDICATIONS  (PRN):  diphenhydrAMINE   Injectable 25 milliGRAM(s) IV Push every 4 hours PRN Rash and/or Itching  HYDROmorphone PCA (1 mG/mL) Rescue Clinician Bolus 1 milliGRAM(s) IV Push every 15 minutes PRN for Pain Scale GREATER THAN 6  naloxone Injectable 0.1 milliGRAM(s) IV Push every 3 minutes PRN For ANY of the following changes in patient status:  A. RR LESS THAN 10 breaths per minute, B. Oxygen saturation LESS THAN 90%, C. Sedation score of 6  ondansetron Injectable 4 milliGRAM(s) IV Push every 6 hours PRN Nausea      OBJECTIVE:    Sedation Score:	[ X] Alert	[ ] Drowsy 	[ ] Arousable	[ ] Asleep	[ ] Unresponsive    Side Effects:	[X ] None	[ ] Nausea	[ ] Vomiting	[ ] Pruritus  		[ ] Other:    Vital Signs Last 24 Hrs  T(C): 37.1 (21 Nov 2017 06:29), Max: 37.3 (20 Nov 2017 10:47)  T(F): 98.7 (21 Nov 2017 06:29), Max: 99.1 (20 Nov 2017 10:47)  HR: 82 (21 Nov 2017 06:29) (74 - 85)  BP: 108/69 (21 Nov 2017 06:29) (106/70 - 117/78)  BP(mean): --  RR: 18 (21 Nov 2017 06:29) (18 - 18)  SpO2: 94% (21 Nov 2017 06:29) (94% - 95%)    ASSESSMENT/ PLAN    Therapy to  be:               [X] Continued   [ ] Discontinued   [ ] Changed to PRN Analgesics    Documentation and Verification of current medications:   [X] Done	[ ] Not done, not eligible    Comments: Remains NPO except medications on TPN. Continue.

## 2017-11-21 NOTE — PROGRESS NOTE ADULT - ATTENDING COMMENTS
Patient seen and examined.  Chart reviewed.  Resident note confirmed.  Pt is a 57 year old female with necrotising pancreatitis.  Pt s/p cyst gastrostomy x2 by gastroenterology.  Over the weekend, she developed fever/leukocytosis and imipenem was restarted.  She remains tender in the RUQ and LUQ.  Hct was stable overnight.  Continue pain control.  Continue ISP.  No endoscopic pancreatic debridement planned by GI at this time.  Start clears.  Continue TPN.  Monitor I’s and O’s .  Continue supportive care.  Continue DVT proph.  PT/OT evaluations.  Continue supportive care.

## 2017-11-21 NOTE — CHART NOTE - NSCHARTNOTEFT_GEN_A_CORE
Pt seen for malnutrition and TPN follow up, as per department protocol.    Hospital Course: Pt is a 58 yo woman with history of nephrolithiasis, HTN, and appendectomy admitted on 10/16 for gallstone pancreatitis c/b necrotizing pancreatitis and ascending cholangitis S/P ERCP and stent placement (10/21) and FNA of perihepatic fluid (10/31), which grew E. faecalis. Pt now S/P endoscopic drainage of pancreatic necrosis, cystgastrostomy placement of 2 metal stents 11/8. Pt is anemic, now S/P blood transfusion; no overt GI bleed apparent, per chart.    Nutrition history: Pt admitted 10/16, received intermittent po diet of low fat clears and enteral feeds of Jevity 1.2 with poor tolerance (per notes, tiger tube was in duodenum during periods of poor enteral tolerance). Pt was NPO since 10/27; TPN ordered 11/5.  PN continues due to patient's nausea and inability to tolerate EN. Lipid calories increased as of 11/15.    Pt reports she is trying to take sips of water but feels immediate abdominal pain.    Source: Patient [X ]    Family [ ]     other [ X]; medical record    Diet : NPO with ice chips    Per chart, last BM; poor stool output may be secondary to opioids and pancreatic necrosis.     Enteral /Parenteral Nutrition: TPN infusing at 55ml/hr (84Gm amino acids, 192Gm dextrose, 71ml 20%lipids) to [id number] (17 pollo/Kg, 1.26 Gm protein/Kg per dosing wt 66.5Kg); with 10cc MVI 9+5, 3cc MTE-5.     Weight: 69.5Kg (10/26), 66.5Kg (10/17); weight change likely reflects fluid shifts; no recent weight  Edema: none noted    Pertinent Medications: MEDICATIONS  (STANDING):  enoxaparin Injectable 40 milliGRAM(s) SubCutaneous daily  fentaNYL   Patch  50 MICROgram(s)/Hr 1 Patch Transdermal every 72 hours  HYDROmorphone PCA (1 mG/mL) 30 milliLiter(s) PCA Continuous PCA Continuous  imipenem/cilastatin  IVPB      imipenem/cilastatin  IVPB 500 milliGRAM(s) IV Intermittent every 6 hours  melatonin 3 milliGRAM(s) Oral at bedtime  sodium chloride 0.45%. 1000 milliLiter(s) (50 mL/Hr) IV Continuous <Continuous>    MEDICATIONS  (PRN):  diphenhydrAMINE   Injectable 25 milliGRAM(s) IV Push every 4 hours PRN Rash and/or Itching  HYDROmorphone PCA (1 mG/mL) Rescue Clinician Bolus 1 milliGRAM(s) IV Push every 15 minutes PRN for Pain Scale GREATER THAN 6  naloxone Injectable 0.1 milliGRAM(s) IV Push every 3 minutes PRN For ANY of the following changes in patient status:  A. RR LESS THAN 10 breaths per minute, B. Oxygen saturation LESS THAN 90%, C. Sedation score of 6  ondansetron Injectable 4 milliGRAM(s) IV Push every 6 hours PRN Nausea    Pertinent Labs (11/20):  Hgb 6.8, phosphorus 4.9, glucose 103    Skin: no pressure injuries    Estimated Needs:   [ X] no change since previous assessment  [ ] recalculated:     Previous Nutrition Diagnosis:     [X ] Malnutrition; severe    Nutrition Diagnosis is [X ] ongoing; being addressed with PN    New Nutrition Diagnosis: [ X] not applicable     Interventions:     Recommend:  1) PN per MD Guallpa  2) Continue ice chips and sips; advance to clear liquid diet as tolerated  3) Pending need for EN (and tiger tube replacement and placement confirmation in the jejunum), recommend Vital 1.2, initiate at 10m, increase as tolerated to goal rate 50ml/hr x 24 hours to provide 1200 ml formula, 1440cal/day, 90 Gm protein/day, 973ml free water; meets 22cal/Kg and 1.4Gm/Kg protein per dosing wt 66.5Kg).   4) Monitor weight, lab values, skin, po intake and GI tolerance    Monitoring and Evaluation:   Follow up per protocol  RD to remain available for further nutritional interventions as indicated.   Anabela Molina, MS RD N [facility], #[phone].      Nutrition Diagnosis is [ ] ongoing  [ ] resolved [ ] not applicable          New Nutrition Diagnosis: [ ] not applicable    [ ] Inadequate Protein Energy Intake [ ]Inadequate Oral Intake [ ] Excessive Energy Intake     [ ] Underweight [ ] Increased Nutrient Needs [ ] Overweight/Obesity     [ ] Altered GI Function [ ] Unintended Weight Loss [ ] Food & Nutrition Related Knowledge Deficit[ ] Limited Adherence to nutrition related recommendations [ ] Malnutrition  [ ] other: Free text       Related to:      As evidenced by:      Interventions:     Recommend    [ ] Change Diet To:    [ ] Nutrition Supplement    [ ] Nutrition Support    [ ] Other:        Monitoring and Evaluation:     [ ] PO intake [ ] Tolerance to diet prescription [ ] weights [ ] follow up per protocol    [ ] other:

## 2017-11-21 NOTE — PROGRESS NOTE ADULT - SUBJECTIVE AND OBJECTIVE BOX
ATP Progress note    Subjective: Patient seen and examined. No acute events overnight. Patient admits to improvement in pain. Has been tolerating sips/chips.       Objective:   T(C): 37.1 (11-21-17 @ 06:29), Max: 37.3 (11-20-17 @ 10:47)  HR: 82 (11-21-17 @ 06:29) (74 - 85)  BP: 108/69 (11-21-17 @ 06:29) (106/70 - 117/78)  RR: 18 (11-21-17 @ 06:29) (18 - 18)  SpO2: 94% (11-21-17 @ 06:29) (94% - 95%)          11-20-17 @ 07:01  -  11-21-17 @ 07:00  --------------------------------------------------------  IN: 2970 mL / OUT: 800 mL / NET: 2170 mL          LABS:                        6.8    8.14  )-----------( 673      ( 20 Nov 2017 06:29 )             21.4     11-20    140  |  101  |  13  ----------------------------<  103<H>  4.4   |  23  |  0.42<L>    Ca    9.0      20 Nov 2017 06:29  Phos  4.9     11-20  Mg     1.9     11-20    TPro  6.6  /  Alb  2.8<L>  /  TBili  <0.2  /  DBili  x   /  AST  31  /  ALT  22  /  AlkPhos  367<H>  11-20        Blood Culture: 11-16 @ 15:11  Organism --  Gram Stain Blood -- Gram Stain --  Specimen Source .Blood PICC/PERC Single Lumen  Culture-Blood --    11-16 @ 14:51  Organism Methicillin resistant Staphylococcus aureus  Gram Stain Blood -- Gram Stain --  Specimen Source .Urine Clean Catch (Midstream)  Culture-Blood --        Focused Physical Exam:   General: NAD  Respiratory: Nonlabored breathing  Abdomen: Soft, ND, moderate tenderness in epigastric region

## 2017-11-22 LAB
ALBUMIN SERPL ELPH-MCNC: 2.9 G/DL — LOW (ref 3.3–5)
ALP SERPL-CCNC: 308 U/L — HIGH (ref 40–120)
ALT FLD-CCNC: 18 U/L — SIGNIFICANT CHANGE UP (ref 10–45)
AMYLASE P1 CFR SERPL: 40 U/L — SIGNIFICANT CHANGE UP (ref 25–125)
ANION GAP SERPL CALC-SCNC: 15 MMOL/L — SIGNIFICANT CHANGE UP (ref 5–17)
AST SERPL-CCNC: 30 U/L — SIGNIFICANT CHANGE UP (ref 10–40)
BILIRUB DIRECT SERPL-MCNC: 0.1 MG/DL — SIGNIFICANT CHANGE UP (ref 0–0.2)
BILIRUB INDIRECT FLD-MCNC: 0.1 MG/DL — LOW (ref 0.2–1)
BILIRUB SERPL-MCNC: 0.2 MG/DL — SIGNIFICANT CHANGE UP (ref 0.2–1.2)
BUN SERPL-MCNC: 13 MG/DL — SIGNIFICANT CHANGE UP (ref 7–23)
CALCIUM SERPL-MCNC: 9.3 MG/DL — SIGNIFICANT CHANGE UP (ref 8.4–10.5)
CHLORIDE SERPL-SCNC: 99 MMOL/L — SIGNIFICANT CHANGE UP (ref 96–108)
CO2 SERPL-SCNC: 24 MMOL/L — SIGNIFICANT CHANGE UP (ref 22–31)
CREAT SERPL-MCNC: 0.48 MG/DL — LOW (ref 0.5–1.3)
GLUCOSE SERPL-MCNC: 102 MG/DL — HIGH (ref 70–99)
HCT VFR BLD CALC: 26.4 % — LOW (ref 34.5–45)
HGB BLD-MCNC: 8.5 G/DL — LOW (ref 11.5–15.5)
LIDOCAIN IGE QN: 45 U/L — SIGNIFICANT CHANGE UP (ref 7–60)
MAGNESIUM SERPL-MCNC: 1.8 MG/DL — SIGNIFICANT CHANGE UP (ref 1.6–2.6)
MCHC RBC-ENTMCNC: 26.6 PG — LOW (ref 27–34)
MCHC RBC-ENTMCNC: 32.2 GM/DL — SIGNIFICANT CHANGE UP (ref 32–36)
MCV RBC AUTO: 82.8 FL — SIGNIFICANT CHANGE UP (ref 80–100)
PHOSPHATE SERPL-MCNC: 5.2 MG/DL — HIGH (ref 2.5–4.5)
PLATELET # BLD AUTO: 746 K/UL — HIGH (ref 150–400)
POTASSIUM SERPL-MCNC: 4.5 MMOL/L — SIGNIFICANT CHANGE UP (ref 3.5–5.3)
POTASSIUM SERPL-SCNC: 4.5 MMOL/L — SIGNIFICANT CHANGE UP (ref 3.5–5.3)
PROT SERPL-MCNC: 7 G/DL — SIGNIFICANT CHANGE UP (ref 6–8.3)
RBC # BLD: 3.19 M/UL — LOW (ref 3.8–5.2)
RBC # FLD: 15.6 % — HIGH (ref 10.3–14.5)
SODIUM SERPL-SCNC: 138 MMOL/L — SIGNIFICANT CHANGE UP (ref 135–145)
WBC # BLD: 10.71 K/UL — HIGH (ref 3.8–10.5)
WBC # FLD AUTO: 10.71 K/UL — HIGH (ref 3.8–10.5)

## 2017-11-22 PROCEDURE — 99232 SBSQ HOSP IP/OBS MODERATE 35: CPT

## 2017-11-22 RX ORDER — ACETAMINOPHEN 500 MG
1000 TABLET ORAL EVERY 6 HOURS
Qty: 0 | Refills: 0 | Status: COMPLETED | OUTPATIENT
Start: 2017-11-22 | End: 2017-11-22

## 2017-11-22 RX ORDER — ACETAMINOPHEN 500 MG
1000 TABLET ORAL ONCE
Qty: 0 | Refills: 0 | Status: COMPLETED | OUTPATIENT
Start: 2017-11-22 | End: 2017-11-22

## 2017-11-22 RX ORDER — ACETAMINOPHEN 500 MG
1000 TABLET ORAL ONCE
Qty: 0 | Refills: 0 | Status: COMPLETED | OUTPATIENT
Start: 2017-11-23 | End: 2017-11-23

## 2017-11-22 RX ADMIN — HYDROMORPHONE HYDROCHLORIDE 1 MILLIGRAM(S): 2 INJECTION INTRAMUSCULAR; INTRAVENOUS; SUBCUTANEOUS at 07:38

## 2017-11-22 RX ADMIN — Medication 25 MILLIGRAM(S): at 01:19

## 2017-11-22 RX ADMIN — HYDROMORPHONE HYDROCHLORIDE 1 MILLIGRAM(S): 2 INJECTION INTRAMUSCULAR; INTRAVENOUS; SUBCUTANEOUS at 14:46

## 2017-11-22 RX ADMIN — Medication 25 MILLIGRAM(S): at 20:02

## 2017-11-22 RX ADMIN — Medication 400 MILLIGRAM(S): at 01:18

## 2017-11-22 RX ADMIN — IMIPENEM AND CILASTATIN 100 MILLIGRAM(S): 250; 250 INJECTION, POWDER, FOR SOLUTION INTRAVENOUS at 12:14

## 2017-11-22 RX ADMIN — HYDROMORPHONE HYDROCHLORIDE 30 MILLILITER(S): 2 INJECTION INTRAMUSCULAR; INTRAVENOUS; SUBCUTANEOUS at 19:02

## 2017-11-22 RX ADMIN — IMIPENEM AND CILASTATIN 100 MILLIGRAM(S): 250; 250 INJECTION, POWDER, FOR SOLUTION INTRAVENOUS at 05:52

## 2017-11-22 RX ADMIN — Medication 400 MILLIGRAM(S): at 05:21

## 2017-11-22 RX ADMIN — HYDROMORPHONE HYDROCHLORIDE 30 MILLILITER(S): 2 INJECTION INTRAMUSCULAR; INTRAVENOUS; SUBCUTANEOUS at 07:22

## 2017-11-22 RX ADMIN — Medication 25 MILLIGRAM(S): at 12:13

## 2017-11-22 RX ADMIN — Medication 1000 MILLIGRAM(S): at 06:00

## 2017-11-22 RX ADMIN — Medication 25 MILLIGRAM(S): at 16:17

## 2017-11-22 RX ADMIN — ENOXAPARIN SODIUM 40 MILLIGRAM(S): 100 INJECTION SUBCUTANEOUS at 12:14

## 2017-11-22 RX ADMIN — Medication 3 MILLIGRAM(S): at 22:17

## 2017-11-22 RX ADMIN — HYDROMORPHONE HYDROCHLORIDE 1 MILLIGRAM(S): 2 INJECTION INTRAMUSCULAR; INTRAVENOUS; SUBCUTANEOUS at 03:40

## 2017-11-22 RX ADMIN — Medication 1000 MILLIGRAM(S): at 18:51

## 2017-11-22 RX ADMIN — Medication 1000 MILLIGRAM(S): at 01:40

## 2017-11-22 RX ADMIN — Medication 25 MILLIGRAM(S): at 08:43

## 2017-11-22 RX ADMIN — IMIPENEM AND CILASTATIN 100 MILLIGRAM(S): 250; 250 INJECTION, POWDER, FOR SOLUTION INTRAVENOUS at 00:16

## 2017-11-22 RX ADMIN — Medication 1000 MILLIGRAM(S): at 12:43

## 2017-11-22 RX ADMIN — Medication 25 MILLIGRAM(S): at 04:45

## 2017-11-22 RX ADMIN — HYDROMORPHONE HYDROCHLORIDE 1 MILLIGRAM(S): 2 INJECTION INTRAMUSCULAR; INTRAVENOUS; SUBCUTANEOUS at 21:38

## 2017-11-22 RX ADMIN — Medication 400 MILLIGRAM(S): at 12:13

## 2017-11-22 RX ADMIN — Medication 400 MILLIGRAM(S): at 18:21

## 2017-11-22 RX ADMIN — IMIPENEM AND CILASTATIN 100 MILLIGRAM(S): 250; 250 INJECTION, POWDER, FOR SOLUTION INTRAVENOUS at 17:24

## 2017-11-22 RX ADMIN — HYDROMORPHONE HYDROCHLORIDE 1 MILLIGRAM(S): 2 INJECTION INTRAMUSCULAR; INTRAVENOUS; SUBCUTANEOUS at 10:09

## 2017-11-22 NOTE — PROGRESS NOTE ADULT - ASSESSMENT
57 y.o. woman with history of nephrolithiasis, HTN, and appendectomy admitted on 10/16 for gallstone pancreatitis c/b necrotizing pancreatitis and ascending cholangitis s/p ERCP and stent placement (10/21) and FNA of perihepatic fluid (10/31), which grew E. faecalis. She is s/p cystgastrostomy with 2 axiom stents placement by GI on 11/8. She was not able to tolerate tube feeding and now is receiving TPN/ clears     continue clears   OOB ambulate   pain control with patch/PCA    ACS Sx [id number]

## 2017-11-22 NOTE — PROGRESS NOTE ADULT - SUBJECTIVE AND OBJECTIVE BOX
[facility] GASTRO[facility]OLOG[facility]      Chief Complaint:  Patient is a 57y old  Female who presents with a chief complaint of Abdominal pain (16 Oct 2017 09:50)      Interval Events: Patient improving. Tolerating diet    Allergies:  No Known Allergies      Hospital Medications:  diphenhydrAMINE   Injectable 25 milliGRAM(s) IV Push every 4 hours PRN  enoxaparin Injectable 40 milliGRAM(s) SubCutaneous daily  fentaNYL   Patch  50 MICROgram(s)/Hr 1 Patch Transdermal every 72 hours  HYDROmorphone PCA (1 mG/mL) 30 milliLiter(s) PCA Continuous PCA Continuous  HYDROmorphone PCA (1 mG/mL) Rescue Clinician Bolus 1 milliGRAM(s) IV Push every 15 minutes PRN  imipenem/cilastatin  IVPB      imipenem/cilastatin  IVPB 500 milliGRAM(s) IV Intermittent every 6 hours  melatonin 3 milliGRAM(s) Oral at bedtime  naloxone Injectable 0.1 milliGRAM(s) IV Push every 3 minutes PRN  ondansetron Injectable 4 milliGRAM(s) IV Push every 6 hours PRN  sodium chloride 0.45%. 1000 milliLiter(s) IV Continuous <Continuous>      PMHX/PSHX:  Kidney stone  Hypertension  No pertinent past medical history  Kidney stones  History of appendectomy      Family history:  No pertinent family history in first degree relatives      ROS:     General:  No fevers, chills  Eyes:  Good vision  ENT:  No odynophagia, dysphagia  CV:  No pain, palpitations  Resp:  No dyspnea, cough, tachypnea, wheezing  GI:  See HPI  Muscle:  No pain, weakness  Skin:  No rash, edema      PHYSICAL EXAM:     GENERAL:  No distress  HEENT: conjunctivae clear  CHEST:  Full & symmetric excursion, no increased effort  HEART:  Regular rhythm  ABDOMEN:  Soft, abd shawn, non-distended  EXTREMITIES:  no edema  SKIN:  Dry/warm  NEURO:  Alert    Vital Signs:  Vital Signs Last 24 Hrs  T(C): 36.9 (22 Nov 2017 06:23), Max: 37.3 (22 Nov 2017 02:03)  T(F): 98.5 (22 Nov 2017 06:23), Max: 99.1 (22 Nov 2017 02:03)  HR: 78 (22 Nov 2017 06:23) (77 - 82)  BP: 106/69 (22 Nov 2017 06:23) (106/69 - 131/86)  BP(mean): --  RR: 18 (22 Nov 2017 06:23) (18 - 18)  SpO2: 98% (22 Nov 2017 06:23) (92% - 98%)  Daily     Daily     LABS:                        8.5    10.71 )-----------( 746      ( 22 Nov 2017 07:41 )             26.4     11-22    138  |  99  |  13  ----------------------------<  102<H>  4.5   |  24  |  0.48<L>    Ca    9.3      22 Nov 2017 07:39  Phos  5.2     11-22  Mg     1.8     11-22    TPro  7.0  /  Alb  2.9<L>  /  TBili  0.2  /  DBili  0.1  /  AST  30  /  ALT  18  /  AlkPhos  308<H>  11-22    LIVER FUNCTIONS - ( 22 Nov 2017 07:39 )  Alb: 2.9 g/dL / Pro: 7.0 g/dL / ALK PHOS: 308 U/L / ALT: 18 U/L / AST: 30 U/L / GGT: x           PT/INR - ( 21 Nov 2017 11:14 )   PT: 12.8 sec;   INR: 1.13 ratio         PTT - ( 21 Nov 2017 11:14 )  PTT:37.0 sec    Amylase Serum40      Lipase serum45       Ammonia--      Imaging:

## 2017-11-22 NOTE — PROGRESS NOTE ADULT - SUBJECTIVE AND OBJECTIVE BOX
ACS Progress Note     Subjective: Feeling better, pain improved, tolerating clears, no nausea or vomiting     PE: NAD  abd soft nontender     Vital Signs Last 24 Hrs  T(C): 36.9 (22 Nov 2017 06:23), Max: 37.3 (21 Nov 2017 10:15)  T(F): 98.5 (22 Nov 2017 06:23), Max: 99.1 (21 Nov 2017 10:15)  HR: 78 (22 Nov 2017 06:23) (77 - 90)  BP: 106/69 (22 Nov 2017 06:23) (106/69 - 131/86)  BP(mean): --  RR: 18 (22 Nov 2017 06:23) (18 - 18)  SpO2: 98% (22 Nov 2017 06:23) (92% - 98%)    I&O's Detail    21 Nov 2017 07:01  -  22 Nov 2017 07:00  --------------------------------------------------------  IN:    Oral Fluid: 480 mL    sodium chloride 0.45%.: 1200 mL    Solution: 200 mL    Solution: 200 mL    TPN (Total Parenteral Nutrition): 1320 mL  Total IN: 3400 mL    OUT:    Voided: 300 mL  Total OUT: 300 mL    Total NET: 3100 mL          Daily     Daily     MEDICATIONS  (STANDING):  enoxaparin Injectable 40 milliGRAM(s) SubCutaneous daily  fentaNYL   Patch  50 MICROgram(s)/Hr 1 Patch Transdermal every 72 hours  HYDROmorphone PCA (1 mG/mL) 30 milliLiter(s) PCA Continuous PCA Continuous  imipenem/cilastatin  IVPB      imipenem/cilastatin  IVPB 500 milliGRAM(s) IV Intermittent every 6 hours  melatonin 3 milliGRAM(s) Oral at bedtime  sodium chloride 0.45%. 1000 milliLiter(s) (50 mL/Hr) IV Continuous <Continuous>    MEDICATIONS  (PRN):  diphenhydrAMINE   Injectable 25 milliGRAM(s) IV Push every 4 hours PRN Rash and/or Itching  HYDROmorphone PCA (1 mG/mL) Rescue Clinician Bolus 1 milliGRAM(s) IV Push every 15 minutes PRN for Pain Scale GREATER THAN 6  naloxone Injectable 0.1 milliGRAM(s) IV Push every 3 minutes PRN For ANY of the following changes in patient status:  A. RR LESS THAN 10 breaths per minute, B. Oxygen saturation LESS THAN 90%, C. Sedation score of 6  ondansetron Injectable 4 milliGRAM(s) IV Push every 6 hours PRN Nausea      LABS:                        8.3    10.47 )-----------( 756      ( 21 Nov 2017 11:14 )             25.8     11-21    138  |  97  |  12  ----------------------------<  84  4.5   |  24  |  0.37<L>    Ca    8.9      21 Nov 2017 11:18    TPro  6.8  /  Alb  3.0<L>  /  TBili  0.2  /  DBili  x   /  AST  30  /  ALT  20  /  AlkPhos  331<H>  11-21    PT/INR - ( 21 Nov 2017 11:14 )   PT: 12.8 sec;   INR: 1.13 ratio         PTT - ( 21 Nov 2017 11:14 )  PTT:37.0 sec      RADIOLOGY & ADDITIONAL STUDIES:

## 2017-11-22 NOTE — PROGRESS NOTE ADULT - ASSESSMENT
Impression:  58 yearold female with Pancreatitis and infected pancreatitic necrosis, s/p endoscopic cystgastrostomy with two lumen apposing stents.  Initially improved, then became febrile and with worsening of abdominal pain, antibiotics changed from ciprofloxacin to imipenem, now afebrile x 3 days and no leukocytosis.  On TPN per surgery team.  Anemic, getting blood transfusion.    Recommendation:    Consider clear liquids  Continue imipenem  No plan for endoscopic debridement given symptoms improving, will consider for clinical deterioration.

## 2017-11-22 NOTE — PROGRESS NOTE ADULT - SUBJECTIVE AND OBJECTIVE BOX
NO OTHER FB OR OCULAR ETIOLOGY FOR PAIN SEEN. Day 35 of Anesthesia Pain Management Service    SUBJECTIVE: Pain comes and goes    Pain Scale Score:	[X] Refer to charted pain scores    THERAPY:    [ ] IV PCA Morphine		[ ] 5 mg/mL	[ ] 1 mg/mL  [X] IV PCA Hydromorphone	[ ] 5 mg/mL	[X] 1 mg/mL  [ ] IV PCA Fentanyl		[ ] 50 micrograms/mL    Demand dose: 0.25mg     Lockout: 6 minutes   Continuous Rate: 0 mg/hr  4 Hour Limit: 9 mg    MEDICATIONS  (STANDING):  enoxaparin Injectable 40 milliGRAM(s) SubCutaneous daily  fentaNYL   Patch  50 MICROgram(s)/Hr 1 Patch Transdermal every 72 hours  HYDROmorphone PCA (1 mG/mL) 30 milliLiter(s) PCA Continuous PCA Continuous  imipenem/cilastatin  IVPB      imipenem/cilastatin  IVPB 500 milliGRAM(s) IV Intermittent every 6 hours  melatonin 3 milliGRAM(s) Oral at bedtime  sodium chloride 0.45%. 1000 milliLiter(s) (50 mL/Hr) IV Continuous <Continuous>    MEDICATIONS  (PRN):  diphenhydrAMINE   Injectable 25 milliGRAM(s) IV Push every 4 hours PRN Rash and/or Itching  HYDROmorphone PCA (1 mG/mL) Rescue Clinician Bolus 1 milliGRAM(s) IV Push every 15 minutes PRN for Pain Scale GREATER THAN 6  naloxone Injectable 0.1 milliGRAM(s) IV Push every 3 minutes PRN For ANY of the following changes in patient status:  A. RR LESS THAN 10 breaths per minute, B. Oxygen saturation LESS THAN 90%, C. Sedation score of 6  ondansetron Injectable 4 milliGRAM(s) IV Push every 6 hours PRN Nausea      OBJECTIVE:    Sedation Score:	[ X] Alert	[ ] Drowsy 	[ ] Arousable	[ ] Asleep	[ ] Unresponsive    Side Effects:	[X ] None	[ ] Nausea	[ ] Vomiting	[ ] Pruritus  		[ ] Other:    Vital Signs Last 24 Hrs  T(C): 36.9 (22 Nov 2017 06:23), Max: 37.3 (21 Nov 2017 10:15)  T(F): 98.5 (22 Nov 2017 06:23), Max: 99.1 (21 Nov 2017 10:15)  HR: 78 (22 Nov 2017 06:23) (77 - 90)  BP: 106/69 (22 Nov 2017 06:23) (106/69 - 131/86)  BP(mean): --  RR: 18 (22 Nov 2017 06:23) (18 - 18)  SpO2: 98% (22 Nov 2017 06:23) (92% - 98%)    ASSESSMENT/ PLAN    Therapy to  be:               [X] Continued   [ ] Discontinued   [ ] Changed to PRN Analgesics    Documentation and Verification of current medications:   [X] Done	[ ] Not done, not eligible    Comments:

## 2017-11-23 LAB
ALBUMIN SERPL ELPH-MCNC: 3 G/DL — LOW (ref 3.3–5)
ALP SERPL-CCNC: 279 U/L — HIGH (ref 40–120)
ALT FLD-CCNC: 20 U/L — SIGNIFICANT CHANGE UP (ref 10–45)
ANION GAP SERPL CALC-SCNC: 12 MMOL/L — SIGNIFICANT CHANGE UP (ref 5–17)
AST SERPL-CCNC: 26 U/L — SIGNIFICANT CHANGE UP (ref 10–40)
BASOPHILS # BLD AUTO: 0.06 K/UL — SIGNIFICANT CHANGE UP (ref 0–0.2)
BASOPHILS NFR BLD AUTO: 0.5 % — SIGNIFICANT CHANGE UP (ref 0–2)
BILIRUB SERPL-MCNC: 0.2 MG/DL — SIGNIFICANT CHANGE UP (ref 0.2–1.2)
BUN SERPL-MCNC: 11 MG/DL — SIGNIFICANT CHANGE UP (ref 7–23)
CALCIUM SERPL-MCNC: 8.9 MG/DL — SIGNIFICANT CHANGE UP (ref 8.4–10.5)
CHLORIDE SERPL-SCNC: 101 MMOL/L — SIGNIFICANT CHANGE UP (ref 96–108)
CO2 SERPL-SCNC: 26 MMOL/L — SIGNIFICANT CHANGE UP (ref 22–31)
CREAT SERPL-MCNC: 0.43 MG/DL — LOW (ref 0.5–1.3)
EOSINOPHIL # BLD AUTO: 0.63 K/UL — HIGH (ref 0–0.5)
EOSINOPHIL NFR BLD AUTO: 5.6 % — SIGNIFICANT CHANGE UP (ref 0–6)
GLUCOSE SERPL-MCNC: 101 MG/DL — HIGH (ref 70–99)
HCT VFR BLD CALC: 26.2 % — LOW (ref 34.5–45)
HGB BLD-MCNC: 8.4 G/DL — LOW (ref 11.5–15.5)
IMM GRANULOCYTES NFR BLD AUTO: 0.5 % — SIGNIFICANT CHANGE UP (ref 0–1.5)
LYMPHOCYTES # BLD AUTO: 2.5 K/UL — SIGNIFICANT CHANGE UP (ref 1–3.3)
LYMPHOCYTES # BLD AUTO: 22.1 % — SIGNIFICANT CHANGE UP (ref 13–44)
MCHC RBC-ENTMCNC: 26.6 PG — LOW (ref 27–34)
MCHC RBC-ENTMCNC: 32.1 GM/DL — SIGNIFICANT CHANGE UP (ref 32–36)
MCV RBC AUTO: 82.9 FL — SIGNIFICANT CHANGE UP (ref 80–100)
MONOCYTES # BLD AUTO: 0.66 K/UL — SIGNIFICANT CHANGE UP (ref 0–0.9)
MONOCYTES NFR BLD AUTO: 5.8 % — SIGNIFICANT CHANGE UP (ref 2–14)
NEUTROPHILS # BLD AUTO: 7.42 K/UL — HIGH (ref 1.8–7.4)
NEUTROPHILS NFR BLD AUTO: 65.5 % — SIGNIFICANT CHANGE UP (ref 43–77)
PLATELET # BLD AUTO: 763 K/UL — HIGH (ref 150–400)
POTASSIUM SERPL-MCNC: 4.4 MMOL/L — SIGNIFICANT CHANGE UP (ref 3.5–5.3)
POTASSIUM SERPL-SCNC: 4.4 MMOL/L — SIGNIFICANT CHANGE UP (ref 3.5–5.3)
PROT SERPL-MCNC: 7.2 G/DL — SIGNIFICANT CHANGE UP (ref 6–8.3)
RBC # BLD: 3.16 M/UL — LOW (ref 3.8–5.2)
RBC # FLD: 15.6 % — HIGH (ref 10.3–14.5)
SODIUM SERPL-SCNC: 139 MMOL/L — SIGNIFICANT CHANGE UP (ref 135–145)
WBC # BLD: 11.33 K/UL — HIGH (ref 3.8–10.5)
WBC # FLD AUTO: 11.33 K/UL — HIGH (ref 3.8–10.5)

## 2017-11-23 PROCEDURE — 99232 SBSQ HOSP IP/OBS MODERATE 35: CPT

## 2017-11-23 RX ORDER — ACETAMINOPHEN 500 MG
1000 TABLET ORAL ONCE
Qty: 0 | Refills: 0 | Status: COMPLETED | OUTPATIENT
Start: 2017-11-23 | End: 2017-11-23

## 2017-11-23 RX ORDER — DOCUSATE SODIUM 100 MG
100 CAPSULE ORAL DAILY
Qty: 0 | Refills: 0 | Status: DISCONTINUED | OUTPATIENT
Start: 2017-11-23 | End: 2017-11-29

## 2017-11-23 RX ORDER — HYDROMORPHONE HYDROCHLORIDE 2 MG/ML
30 INJECTION INTRAMUSCULAR; INTRAVENOUS; SUBCUTANEOUS
Qty: 0 | Refills: 0 | Status: DISCONTINUED | OUTPATIENT
Start: 2017-11-23 | End: 2017-11-28

## 2017-11-23 RX ORDER — SENNA PLUS 8.6 MG/1
2 TABLET ORAL AT BEDTIME
Qty: 0 | Refills: 0 | Status: DISCONTINUED | OUTPATIENT
Start: 2017-11-23 | End: 2017-12-01

## 2017-11-23 RX ORDER — DIPHENHYDRAMINE HCL 50 MG
25 CAPSULE ORAL ONCE
Qty: 0 | Refills: 0 | Status: COMPLETED | OUTPATIENT
Start: 2017-11-23 | End: 2017-11-29

## 2017-11-23 RX ADMIN — Medication 25 MILLIGRAM(S): at 14:06

## 2017-11-23 RX ADMIN — HYDROMORPHONE HYDROCHLORIDE 30 MILLILITER(S): 2 INJECTION INTRAMUSCULAR; INTRAVENOUS; SUBCUTANEOUS at 19:09

## 2017-11-23 RX ADMIN — Medication 25 MILLIGRAM(S): at 10:12

## 2017-11-23 RX ADMIN — HYDROMORPHONE HYDROCHLORIDE 1 MILLIGRAM(S): 2 INJECTION INTRAMUSCULAR; INTRAVENOUS; SUBCUTANEOUS at 22:16

## 2017-11-23 RX ADMIN — HYDROMORPHONE HYDROCHLORIDE 1 MILLIGRAM(S): 2 INJECTION INTRAMUSCULAR; INTRAVENOUS; SUBCUTANEOUS at 17:40

## 2017-11-23 RX ADMIN — Medication 25 MILLIGRAM(S): at 05:43

## 2017-11-23 RX ADMIN — HYDROMORPHONE HYDROCHLORIDE 30 MILLILITER(S): 2 INJECTION INTRAMUSCULAR; INTRAVENOUS; SUBCUTANEOUS at 12:08

## 2017-11-23 RX ADMIN — Medication 100 MILLIGRAM(S): at 12:05

## 2017-11-23 RX ADMIN — Medication 3 MILLIGRAM(S): at 21:28

## 2017-11-23 RX ADMIN — HYDROMORPHONE HYDROCHLORIDE 30 MILLILITER(S): 2 INJECTION INTRAMUSCULAR; INTRAVENOUS; SUBCUTANEOUS at 22:15

## 2017-11-23 RX ADMIN — Medication 1000 MILLIGRAM(S): at 06:10

## 2017-11-23 RX ADMIN — Medication 25 MILLIGRAM(S): at 16:03

## 2017-11-23 RX ADMIN — SODIUM CHLORIDE 50 MILLILITER(S): 9 INJECTION, SOLUTION INTRAVENOUS at 19:52

## 2017-11-23 RX ADMIN — IMIPENEM AND CILASTATIN 100 MILLIGRAM(S): 250; 250 INJECTION, POWDER, FOR SOLUTION INTRAVENOUS at 00:02

## 2017-11-23 RX ADMIN — Medication 400 MILLIGRAM(S): at 05:43

## 2017-11-23 RX ADMIN — FENTANYL CITRATE 1 PATCH: 50 INJECTION INTRAVENOUS at 19:52

## 2017-11-23 RX ADMIN — Medication 400 MILLIGRAM(S): at 11:59

## 2017-11-23 RX ADMIN — Medication 1000 MILLIGRAM(S): at 12:29

## 2017-11-23 RX ADMIN — Medication 1000 MILLIGRAM(S): at 00:32

## 2017-11-23 RX ADMIN — Medication 25 MILLIGRAM(S): at 00:02

## 2017-11-23 RX ADMIN — Medication 1000 MILLIGRAM(S): at 20:21

## 2017-11-23 RX ADMIN — IMIPENEM AND CILASTATIN 100 MILLIGRAM(S): 250; 250 INJECTION, POWDER, FOR SOLUTION INTRAVENOUS at 05:43

## 2017-11-23 RX ADMIN — SENNA PLUS 2 TABLET(S): 8.6 TABLET ORAL at 21:28

## 2017-11-23 RX ADMIN — IMIPENEM AND CILASTATIN 100 MILLIGRAM(S): 250; 250 INJECTION, POWDER, FOR SOLUTION INTRAVENOUS at 11:59

## 2017-11-23 RX ADMIN — HYDROMORPHONE HYDROCHLORIDE 1 MILLIGRAM(S): 2 INJECTION INTRAMUSCULAR; INTRAVENOUS; SUBCUTANEOUS at 07:11

## 2017-11-23 RX ADMIN — ENOXAPARIN SODIUM 40 MILLIGRAM(S): 100 INJECTION SUBCUTANEOUS at 12:00

## 2017-11-23 RX ADMIN — HYDROMORPHONE HYDROCHLORIDE 30 MILLILITER(S): 2 INJECTION INTRAMUSCULAR; INTRAVENOUS; SUBCUTANEOUS at 07:11

## 2017-11-23 RX ADMIN — HYDROMORPHONE HYDROCHLORIDE 1 MILLIGRAM(S): 2 INJECTION INTRAMUSCULAR; INTRAVENOUS; SUBCUTANEOUS at 08:02

## 2017-11-23 RX ADMIN — Medication 25 MILLIGRAM(S): at 20:04

## 2017-11-23 RX ADMIN — IMIPENEM AND CILASTATIN 100 MILLIGRAM(S): 250; 250 INJECTION, POWDER, FOR SOLUTION INTRAVENOUS at 17:39

## 2017-11-23 RX ADMIN — Medication 400 MILLIGRAM(S): at 19:51

## 2017-11-23 RX ADMIN — Medication 400 MILLIGRAM(S): at 00:02

## 2017-11-23 NOTE — PROGRESS NOTE ADULT - ASSESSMENT
57 y.o. woman with history of nephrolithiasis, HTN, and appendectomy admitted on 10/16 for gallstone pancreatitis c/b necrotizing pancreatitis and ascending cholangitis s/p ERCP and stent placement (10/21) and FNA of perihepatic fluid (10/31), which grew E. faecalis. She is s/p cystgastrostomy with 2 axiom stents placement by GI on 11/8. She was not able to tolerate tube feeding and now is receiving TPN/ clears.    Plan:  - Continue clears with TPN  - OOB ambulate   - Pain control with patch/PCA  - GI following  - Discharge planning

## 2017-11-23 NOTE — PROGRESS NOTE ADULT - SUBJECTIVE AND OBJECTIVE BOX
ATP Progress note    Subjective: Patient seen and examined. No acute events overnight. Patient states that her abdominal pain is getting better. Tolerating clear liquids.       Objective:   T(C): 36.3 (11-23-17 @ 09:16), Max: 37.2 (11-22-17 @ 22:00)  HR: 78 (11-23-17 @ 09:16) (77 - 94)  BP: 117/80 (11-23-17 @ 09:16) (108/73 - 131/81)  RR: 18 (11-23-17 @ 09:16) (18 - 18)  SpO2: 96% (11-23-17 @ 09:16) (94% - 98%)          11-22-17 @ 07:01  -  11-23-17 @ 07:00  --------------------------------------------------------  IN: 4120 mL / OUT: 3 mL / NET: 4117 mL    11-23-17 @ 07:01  -  11-23-17 @ 11:49  --------------------------------------------------------  IN: 420 mL / OUT: 0 mL / NET: 420 mL        LABS:                        8.5    10.71 )-----------( 746      ( 22 Nov 2017 07:41 )             26.4     11-23    139  |  101  |  11  ----------------------------<  101<H>  4.4   |  26  |  0.43<L>    Ca    8.9      23 Nov 2017 10:31  Phos  5.2     11-22  Mg     1.8     11-22    TPro  7.2  /  Alb  3.0<L>  /  TBili  0.2  /  DBili  x   /  AST  26  /  ALT  20  /  AlkPhos  279<H>  11-23        Focused Physical Exam:   General: NAD  Respiratory: Nonlabored breathing  Abdomen: Soft, ND, mild tenderness in epigastric region

## 2017-11-23 NOTE — CHART NOTE - NSCHARTNOTEFT_GEN_A_CORE
Day 37 of Anesthesia Pain Management Service    SUBJECTIVE: Patient is doing well with IV PCA, will continue current regimen    Pain Scale Score:	[X] Refer to charted pain scores    THERAPY:    [ ] IV PCA Morphine		[ ] 5 mg/mL	[ ] 1 mg/mL  [X] IV PCA Hydromorphone	[ ] 5 mg/mL	[X] 1 mg/mL  [ ] IV PCA Fentanyl		[ ] 50 micrograms/mL    Demand dose: 0.5 mg     Lockout: 6 minutes   Continuous Rate: 0 mg/hr  4 Hour Limit: 4 mg    MEDICATIONS  (STANDING):  acetaminophen  IVPB. 1000 milliGRAM(s) IV Intermittent once  docusate sodium 100 milliGRAM(s) Oral daily  enoxaparin Injectable 40 milliGRAM(s) SubCutaneous daily  fentaNYL   Patch  50 MICROgram(s)/Hr 1 Patch Transdermal every 72 hours  HYDROmorphone PCA (1 mG/mL) 30 milliLiter(s) PCA Continuous PCA Continuous  imipenem/cilastatin  IVPB      imipenem/cilastatin  IVPB 500 milliGRAM(s) IV Intermittent every 6 hours  melatonin 3 milliGRAM(s) Oral at bedtime  senna 2 Tablet(s) Oral at bedtime  sodium chloride 0.45%. 1000 milliLiter(s) (50 mL/Hr) IV Continuous <Continuous>    MEDICATIONS  (PRN):  diphenhydrAMINE   Injectable 25 milliGRAM(s) IV Push every 4 hours PRN Rash and/or Itching  HYDROmorphone PCA (1 mG/mL) Rescue Clinician Bolus 1 milliGRAM(s) IV Push every 15 minutes PRN for Pain Scale GREATER THAN 6  naloxone Injectable 0.1 milliGRAM(s) IV Push every 3 minutes PRN For ANY of the following changes in patient status:  A. RR LESS THAN 10 breaths per minute, B. Oxygen saturation LESS THAN 90%, C. Sedation score of 6  ondansetron Injectable 4 milliGRAM(s) IV Push every 6 hours PRN Nausea      OBJECTIVE:    Sedation Score:	[ X] Alert	[ ] Drowsy 	[ ] Arousable	[ ] Asleep	[ ] Unresponsive    Side Effects:	[X ] None	[ ] Nausea	[ ] Vomiting	[ ] Pruritus  		[ ] Other:    Vital Signs Last 24 Hrs  T(C): 36.3 (23 Nov 2017 09:16), Max: 37.2 (22 Nov 2017 22:00)  T(F): 97.3 (23 Nov 2017 09:16), Max: 98.9 (22 Nov 2017 22:00)  HR: 78 (23 Nov 2017 09:16) (77 - 94)  BP: 117/80 (23 Nov 2017 09:16) (108/73 - 131/81)  BP(mean): --  RR: 18 (23 Nov 2017 09:16) (18 - 18)  SpO2: 96% (23 Nov 2017 09:16) (94% - 98%)    ASSESSMENT/ PLAN    Therapy to  be:               [X] Continued   [ ] Discontinued   [ ] Changed to PRN Analgesics    Documentation and Verification of current medications:   [X] Done	[ ] Not done, not eligible    Comments:

## 2017-11-23 NOTE — PROGRESS NOTE ADULT - ATTENDING COMMENTS
Patient seen and examined.  Chart reviewed.  Resident note confirmed.  Pt is a 57 year old female with necrotizing pancreatitis.  Pt s/p cyst gastrostomy x2 by gastroenterology.  Over the weekend, she developed fever/leukocytosis and imipenem was restarted.  The tenderness in the RUQ and LUQ is improved and she is tolerating clears.  Hct was stable overnight.  Continue pain control.  Continue ISP.  No endoscopic pancreatic debridement planned by GI at this time. Continue clears.  Continue TPN.  Monitor I’s and O’s .  Continue supportive care.  Continue DVT proph.  PT/OT evaluations.

## 2017-11-24 LAB
ANION GAP SERPL CALC-SCNC: 13 MMOL/L — SIGNIFICANT CHANGE UP (ref 5–17)
BUN SERPL-MCNC: 12 MG/DL — SIGNIFICANT CHANGE UP (ref 7–23)
CALCIUM SERPL-MCNC: 8.4 MG/DL — SIGNIFICANT CHANGE UP (ref 8.4–10.5)
CHLORIDE SERPL-SCNC: 99 MMOL/L — SIGNIFICANT CHANGE UP (ref 96–108)
CO2 SERPL-SCNC: 23 MMOL/L — SIGNIFICANT CHANGE UP (ref 22–31)
CREAT SERPL-MCNC: 0.51 MG/DL — SIGNIFICANT CHANGE UP (ref 0.5–1.3)
GLUCOSE SERPL-MCNC: 89 MG/DL — SIGNIFICANT CHANGE UP (ref 70–99)
HCT VFR BLD CALC: 24.9 % — LOW (ref 34.5–45)
HGB BLD-MCNC: 7.9 G/DL — LOW (ref 11.5–15.5)
MCHC RBC-ENTMCNC: 26.3 PG — LOW (ref 27–34)
MCHC RBC-ENTMCNC: 31.7 GM/DL — LOW (ref 32–36)
MCV RBC AUTO: 83 FL — SIGNIFICANT CHANGE UP (ref 80–100)
PCP SPEC-MCNC: SIGNIFICANT CHANGE UP
PHOSPHATE SERPL-MCNC: 4.8 MG/DL — HIGH (ref 2.5–4.5)
PLATELET # BLD AUTO: 698 K/UL — HIGH (ref 150–400)
POTASSIUM SERPL-MCNC: 4.4 MMOL/L — SIGNIFICANT CHANGE UP (ref 3.5–5.3)
POTASSIUM SERPL-SCNC: 4.4 MMOL/L — SIGNIFICANT CHANGE UP (ref 3.5–5.3)
RBC # BLD: 3 M/UL — LOW (ref 3.8–5.2)
RBC # FLD: 15.8 % — HIGH (ref 10.3–14.5)
SODIUM SERPL-SCNC: 135 MMOL/L — SIGNIFICANT CHANGE UP (ref 135–145)
WBC # BLD: 10.25 K/UL — SIGNIFICANT CHANGE UP (ref 3.8–10.5)
WBC # FLD AUTO: 10.25 K/UL — SIGNIFICANT CHANGE UP (ref 3.8–10.5)

## 2017-11-24 PROCEDURE — 99232 SBSQ HOSP IP/OBS MODERATE 35: CPT

## 2017-11-24 PROCEDURE — 99222 1ST HOSP IP/OBS MODERATE 55: CPT | Mod: GC

## 2017-11-24 RX ORDER — ACETAMINOPHEN 500 MG
1000 TABLET ORAL ONCE
Qty: 0 | Refills: 0 | Status: COMPLETED | OUTPATIENT
Start: 2017-11-24 | End: 2017-11-24

## 2017-11-24 RX ORDER — PIPERACILLIN AND TAZOBACTAM 4; .5 G/20ML; G/20ML
3.38 INJECTION, POWDER, LYOPHILIZED, FOR SOLUTION INTRAVENOUS EVERY 8 HOURS
Qty: 0 | Refills: 0 | Status: DISCONTINUED | OUTPATIENT
Start: 2017-11-24 | End: 2017-11-29

## 2017-11-24 RX ADMIN — Medication 400 MILLIGRAM(S): at 02:11

## 2017-11-24 RX ADMIN — Medication 400 MILLIGRAM(S): at 07:15

## 2017-11-24 RX ADMIN — Medication 25 MILLIGRAM(S): at 04:14

## 2017-11-24 RX ADMIN — Medication 1000 MILLIGRAM(S): at 18:07

## 2017-11-24 RX ADMIN — Medication 1000 MILLIGRAM(S): at 02:41

## 2017-11-24 RX ADMIN — IMIPENEM AND CILASTATIN 100 MILLIGRAM(S): 250; 250 INJECTION, POWDER, FOR SOLUTION INTRAVENOUS at 12:12

## 2017-11-24 RX ADMIN — ENOXAPARIN SODIUM 40 MILLIGRAM(S): 100 INJECTION SUBCUTANEOUS at 12:12

## 2017-11-24 RX ADMIN — HYDROMORPHONE HYDROCHLORIDE 30 MILLILITER(S): 2 INJECTION INTRAMUSCULAR; INTRAVENOUS; SUBCUTANEOUS at 16:15

## 2017-11-24 RX ADMIN — Medication 3 MILLIGRAM(S): at 22:17

## 2017-11-24 RX ADMIN — PIPERACILLIN AND TAZOBACTAM 25 GRAM(S): 4; .5 INJECTION, POWDER, LYOPHILIZED, FOR SOLUTION INTRAVENOUS at 22:17

## 2017-11-24 RX ADMIN — Medication 400 MILLIGRAM(S): at 17:37

## 2017-11-24 RX ADMIN — Medication 100 MILLIGRAM(S): at 12:14

## 2017-11-24 RX ADMIN — Medication 25 MILLIGRAM(S): at 16:17

## 2017-11-24 RX ADMIN — IMIPENEM AND CILASTATIN 100 MILLIGRAM(S): 250; 250 INJECTION, POWDER, FOR SOLUTION INTRAVENOUS at 05:31

## 2017-11-24 RX ADMIN — SENNA PLUS 2 TABLET(S): 8.6 TABLET ORAL at 22:17

## 2017-11-24 RX ADMIN — HYDROMORPHONE HYDROCHLORIDE 30 MILLILITER(S): 2 INJECTION INTRAMUSCULAR; INTRAVENOUS; SUBCUTANEOUS at 19:11

## 2017-11-24 RX ADMIN — Medication 400 MILLIGRAM(S): at 12:12

## 2017-11-24 RX ADMIN — Medication 1000 MILLIGRAM(S): at 12:42

## 2017-11-24 RX ADMIN — HYDROMORPHONE HYDROCHLORIDE 30 MILLILITER(S): 2 INJECTION INTRAMUSCULAR; INTRAVENOUS; SUBCUTANEOUS at 07:14

## 2017-11-24 RX ADMIN — Medication 25 MILLIGRAM(S): at 08:00

## 2017-11-24 RX ADMIN — Medication 25 MILLIGRAM(S): at 12:12

## 2017-11-24 RX ADMIN — HYDROMORPHONE HYDROCHLORIDE 30 MILLILITER(S): 2 INJECTION INTRAMUSCULAR; INTRAVENOUS; SUBCUTANEOUS at 02:17

## 2017-11-24 RX ADMIN — Medication 25 MILLIGRAM(S): at 20:27

## 2017-11-24 RX ADMIN — Medication 1000 MILLIGRAM(S): at 07:45

## 2017-11-24 RX ADMIN — Medication 25 MILLIGRAM(S): at 00:11

## 2017-11-24 RX ADMIN — IMIPENEM AND CILASTATIN 100 MILLIGRAM(S): 250; 250 INJECTION, POWDER, FOR SOLUTION INTRAVENOUS at 00:11

## 2017-11-24 NOTE — PROVIDER CONTACT NOTE (OTHER) - DATE AND TIME:
25-Oct-2017 06:55
08-Nov-2017 22:00
04-Nov-2017 23:15
08-Nov-2017 06:00
10-Nov-2017 10:31
12-Nov-2017 17:51
16-Nov-2017 06:00
16-Nov-2017 19:35
16-Nov-2017 22:45
16-Oct-2017 18:15
17-Nov-2017 07:02
17-Oct-2017 02:05
18-Oct-2017 02:00
19-Oct-2017 00:00
22-Oct-2017 18:46
23-Oct-2017 14:45
24-Nov-2017 22:53
27-Oct-2017 01:27
28-Oct-2017 06:55
31-Oct-2017 13:23
28-Oct-2017 22:55

## 2017-11-24 NOTE — PROVIDER CONTACT NOTE (OTHER) - REASON
Bottle of vodka and drinking glasses found @ bedside.
Low UO - 20cc for last hour
PT vomited
Pt c/o SOB
Pt had fever 101.3, , IV tylenol given, temp decreased to 99.9, HR 96
Pt has temp of 101.2
Pt has temperature of 100.0 and HR of 105
Pt in excruciating pain, unrelieved by Dilaudid PCA/rescue bolus
Pt reporting increased abd pain
Pt temp 100.9
Pt vomited large amount of light brown emesis
Pt's complaining of inadequate pain relief, SOB, chest tightness.
Pt's temp 101 at 1800
Pt's temp 101.5, 
Uncontrolled Pain
increased temp
pain upon urination
pca diluadid volume reset incorrectly
pt complained of increased abdominal pain
Pt vomited approx. 200ml light brown contents w/ Oxycodone

## 2017-11-24 NOTE — PROVIDER CONTACT NOTE (OTHER) - SITUATION
Pt was given 20mg ER Oxycodone, pt then vomited 200ml of light brown contents w/ entire pill visualized.
patient washing her face and tiger tube accidentally fell out. patient now hysterically crying. denies pain at this time
Pt c/o nausea w/ 150ml of bilious emesis
Bottle of vodka and drinking glasses found @ bedside.
Low UO - 20cc for last hour
Pt complains of SOB, that happened suddenly and slowly went away. VSS. O2 sat 94% on room air.
Pt had fever 101.3, , standing Tylenol was already being given, temp rechecked and was 99.9, HR 96.
Pt had temp of 101 at 1800.
Pt has 101.5 temp, , all other VSS.
Pt has temperature of 100.0 and HR of 105
Pt in excruciating pain, unrelieved by Dilaudid PCA/rescue bolus
Pt reporting increased abd. pain
Pt vomited approx 50cc brown liquid
Pt vomited large amount of light brown emesis. No PO meds were given.
Pt's complaining of inadequate pain relief
Pt's temp now 101.2.
Pt. diagnosed with necrotizing pancreatitis, has PCA pump and PRN dilaudid on board - not still not controlled
increased temp
pt verbalized complaints of pain upon urination, notified MD.
pt verbalized complaints of worsening abdominal pain, notified MD. pt on a PCA dilaudid pump, IV tylenol, and IVP toradol.
when pca dilaudid changed VTBI reset automatically to 27cc,  vile is 30cc, pain service contacted and REGINA Powell at bedside

## 2017-11-24 NOTE — PROGRESS NOTE ADULT - ASSESSMENT
Impression:  56 yearold female with Pancreatitis and infected pancreatitic necrosis, s/p endoscopic cystgastrostomy with two lumen apposing stents.  Initially improved, then became febrile and with worsening of abdominal pain, antibiotics changed from ciprofloxacin to imipenem, now afebrile x 3 days and no leukocytosis.  On TPN per surgery team.  Anemic, getting blood transfusion.    Recommendation:    Advance to full liquids  Continue imipenem  No plan for endoscopic debridement given symptoms improving, will consider for clinical deterioration.

## 2017-11-24 NOTE — CONSULT NOTE ADULT - ASSESSMENT
57yF w/ PMH sig for HTN and nephrolithiasis, s/p lap appy. Pt presented to [facility] ED on 10/16/2017 c/o acute onset severe sharp stabbing upper abdominal pain that radiates to the back. She says the pain woke her up in the middle of the night.  admitted on 10/16 for gallstone pancreatitis c/b necrotizing pancreatitis and ascending cholangitis s/p ERCP and stent placement (10/21) and FNA of perihepatic fluid (10/31), which grew E. faecalis. She is s/p cystgastrostomy with 2 axiom stents placement by GI on 11/8. She was not able to tolerate tube feeding and now is receiving TPN/ clears.  cultures are so far negative except on 30th which grew Enterococcus fecalis   will gaby attending 57yF w/ PMH sig for HTN and nephrolithiasis, s/p lap appy. Pt presented to [facility] ED on 10/16/2017 c/o acute onset severe sharp stabbing upper abdominal pain that radiates to the back. She says the pain woke her up in the middle of the night.  admitted on 10/16 for gallstone pancreatitis c/b necrotizing pancreatitis and ascending cholangitis s/p ERCP and stent placement (10/21) and FNA of perihepatic fluid (10/31), which grew E. faecalis. She is s/p cystgastrostomy with 2 axiom stents placement by GI on 11/8. She was not able to tolerate tube feeding and now is receiving TPN/ clears.  cultures are so far negative except on 30th which grew Enterococcus fecalis   dc imipenem, and start zosyn   will need repeat imaging of abdomen to assess the progress, last one wa on 15th,   oral vs IV and the duration depends on what the repeat ct shaows  thanks

## 2017-11-24 NOTE — PROVIDER CONTACT NOTE (OTHER) - RECOMMENDATIONS
Pause tube feeds?
MD Wendy made aware
come to see patient
Pause tube feeds?
tylenol
Administer IVF bolus
Administer additional Dilaudid for pain control; Diagnostic reevaluation; Notify pain service
DO Amrit notified, urine tox screen.
Draw more labs in AM?
MD will come to assess pt.
Notify MD. Perform EKG on patient.
Notify MD. Zofran?
pain service Increase pt demand dose on PCA
pending UA
reset volume to correct volume, REIGNA Powell and REGINA Peacock verified VTBI and volume adjusted
to make MD aware.
tylenol

## 2017-11-24 NOTE — PROVIDER CONTACT NOTE (OTHER) - ASSESSMENT
Pt A&Ox4, is mentating at her normal baseline. Pt denies drinking any alcohol and is showing no signs of being intoxicated. Pt in private room w/ door closed. Pt had family and friends @ bedside this evening. Bottle found @ bedside during 2200 medication administration. Surgery ANANYI Sandoval notified.

## 2017-11-24 NOTE — PROVIDER CONTACT NOTE (OTHER) - ACTION/TREATMENT ORDERED:
MD notified, instructed to continue tube feeds and to give Zofran if patient is still nauseous, no further interventions given at this time, will continue to monitor.
PA notified, no interventions given at this time, will continue to monitor.
MD notified instructed to give dose of IV Tylenol now, no further interventions given, will continue to monitor.
MD notified, instructed to recheck temp in a little while, no further interventions given, will continue to monitor.
Will reschedule midnight Tylenol for now. No further action at this time.
will come see patient when handoff completed. provided emotional reassurance and support
zofran given. Pt states, "I feel better." Md made aware. No further interventions at this time. Will continue to monitor.
MD to evaluate pt at bedside.
Md notified, instructed to hold tube feeds, abd xray ordered, will continue to monitor.
waiting for tylenol order, will continue to monitor.
0.5mg Dilaudid IVP ordered and administered with no relief in pain after 15min. Constanza VICK at bedside - ordered 2mg Dilaudid IVP, after which pt described relief of acute pain. Will continue to monitor
Administer 500cc LR bolus; Continue to monitor UO
DO Amrit ordered urine tox screen w/ pcp. Asked pt to have boyfriend bring bottle to car. Will continue to monitor the pt.
MD Abarca aware. MD will come to bedside to assess pt, as per MD, will modify pain medication. As per MD, will ask his senior if EKG is necessary. No further intervention. Will continue to monitor.
MD Bush notified and aware. Ordered dose of IV tylenol. Coming down to assess pt with surgery team. Will continue to monitor pt and maintain safety.
MD Bush notified and made aware. Reglan ordered. MD is coming to unit to assess pt. Will continue to monitor.
MD aware. No interventions at this time.
MD notified, instructed to draw AM labs as ordered, team is rounding and will assess patient, awaiting further instruction, will continue to monitor.
Pain service contacted, pain service to speak with anesthesia about next step.
UA will be completed. will continue to monitor.
awaiting for further instruction. will continue to monitor.
reset volume to correct volume, REGINA Powell and REGINA Peacock verified VTBI and volume adjusted

## 2017-11-24 NOTE — CONSULT NOTE ADULT - ATTENDING COMMENTS
Acute gall stone pancreatitis  Possible cholangitis  Severe pain    Pain control, PCA consult called  Saturating well, CXR not in fluid overload  Hemodynamically stable, hold antihypertensive  Increase IVF rate, electrolytes wnl  Abx Zosyn, follow culture  All scan reviewed, GI called for evaluation of ERCP
I performed the procedure personally
-repeat CT abd/pelvis to reassess collection  -afebrile, normal WBC  -seems better  -DC imipenem  -Zosyn 3.375 gm iv q8  -will decide final abx based on CT results and PO status    Noel Boland  Attending Physician   Division of Infectious Disease  Pager #[phone]  After 5pm/weekend #[phone]    Please call the ID service [phone] with questions or concerns over the weekend.
Clinical presentation more likely driven by cholecystitis than just cholangitis, however clearly there is a stone in the duct. Plan for ERCP and stent placement.

## 2017-11-24 NOTE — PROGRESS NOTE ADULT - SUBJECTIVE AND OBJECTIVE BOX
ATP Progress note    Subjective: Patient seen and examined. No acute events overnight. Patient states that she continues to feel better. Has been tolerating clears and is agreeable to potentially starting low-fat diet today.      Objective:   T(C): 36.8 (11-24-17 @ 06:32), Max: 37.2 (11-24-17 @ 01:24)  HR: 74 (11-24-17 @ 06:32) (74 - 83)  BP: 109/71 (11-24-17 @ 06:32) (108/74 - 117/80)  RR: 18 (11-24-17 @ 06:32) (18 - 18)  SpO2: 95% (11-24-17 @ 06:32) (95% - 96%)          11-23-17 @ 07:01  -  11-24-17 @ 07:00  --------------------------------------------------------  IN: 1500 mL / OUT: 0 mL / NET: 1500 mL        LABS:                        8.4    11.33 )-----------( 763      ( 23 Nov 2017 10:31 )             26.2     11-23    139  |  101  |  11  ----------------------------<  101<H>  4.4   |  26  |  0.43<L>    Ca    8.9      23 Nov 2017 10:31  Phos  5.2     11-22  Mg     1.8     11-22    TPro  7.2  /  Alb  3.0<L>  /  TBili  0.2  /  DBili  x   /  AST  26  /  ALT  20  /  AlkPhos  279<H>  11-23          Physical Exam:   Focused Physical Exam:   General: NAD  Respiratory: Nonlabored breathing  Abdomen: Soft, ND, mild tenderness in epigastric region, improved from previous exams

## 2017-11-24 NOTE — PROVIDER CONTACT NOTE (OTHER) - BACKGROUND
10/16: cholangitis/pancreatitis
cholecystitis; pancreatitis.
pt admitted with pancreatitis and cholangitis
s/p EDG, UPPER EUS W/2 STENTS PLACED, BALLOON DILATION
10/16 Abd pain +cholangitis + Pancreatitis. 10/17 stent plcmnt ERCP 10/30 pancreatic sample
10/16: pancreatitis  10/17: ERCP w/ stent  10/30: IR, pancreatic biopsy  11/8: EGD, stent x2, balloon dilatation
PMH pancreatitis, cholangitis  10/17 ERCP with stent placed 11/8EGD, endoscopy, and ultrasound, 2 stents placed with balloon dilation.
Pt admitted for cholecystitis.
Pt admitted on 10/16 for abdominal pain. Diagnosed w/ cholecystitis.
Pt admitted on 10/16 with cholecystitis
Pt has been febrile during day, pancultured 11/16.
Pt has been running fevers during the day. IV Tylenol was given prior to fever being recorded. Pancultured 11/16.
Pt. has necrotizing pancreatitis
admit with kamran
admitted with Cholecystitis
necrotizing pancreatitis
pancreatitis, cholangitis
s/p ERCP
s/p ERCP with pancreatitis

## 2017-11-24 NOTE — CHART NOTE - NSCHARTNOTEFT_GEN_A_CORE
Pt seen for malnutrition and TPN follow up, as per department protocol.    Hospital Course: Pt is a 58 yo woman with history of nephrolithiasis, HTN, and appendectomy admitted on 10/16 for gallstone pancreatitis c/b necrotizing pancreatitis and ascending cholangitis S/P ERCP and stent placement (10/21) and FNA of perihepatic fluid (10/31), which grew E. faecalis. Pt now S/P endoscopic drainage of pancreatic necrosis, cystgastrostomy placement of 2 metal stents 11/8.     Nutrition history: Pt admitted 10/16, received intermittent po diet of low fat clears and enteral feeds of Jevity 1.2 with poor tolerance (per notes, tiger tube was in duodenum during periods of poor enteral tolerance). Pt was NPO since 10/27; TPN ordered 11/5.  PN continues however pt has now been tolerating clear liquids with no GI distress. Diet advanced to Low Fat today; pt observed eating melon with good appetite. Pt advised to eat small, frequent meals to establish tolerance to po diet.    Source: Patient [X ]    Family [ ]     other [ X]; medical record    Diet : Low Fat diet + TPN    GI: + 2BM 11/23. Pt denies GI distress.     Enteral /Parenteral Nutrition: TPN infusing at 55ml/hr (84Gm amino acids, 192Gm dextrose, 71ml 20%lipids) to [id number] (17 pollo/Kg, 1.26 Gm protein/Kg per dosing wt 66.5Kg); with 10cc MVI 9+5, 3cc MTE-5.     Weight: 69.5Kg (10/26), 66.5Kg (10/17); weight change likely reflects fluid shifts; no recent weight  Edema: none noted    Pertinent Medications: MEDICATIONS  (STANDING):  acetaminophen  IVPB. 1000 milliGRAM(s) IV Intermittent once  acetaminophen  IVPB. 1000 milliGRAM(s) IV Intermittent once  diphenhydrAMINE   Injectable 25 milliGRAM(s) IV Push once  docusate sodium 100 milliGRAM(s) Oral daily  enoxaparin Injectable 40 milliGRAM(s) SubCutaneous daily  fentaNYL   Patch  50 MICROgram(s)/Hr 1 Patch Transdermal every 72 hours  HYDROmorphone PCA (1 mG/mL) 30 milliLiter(s) PCA Continuous PCA Continuous  imipenem/cilastatin  IVPB      imipenem/cilastatin  IVPB 500 milliGRAM(s) IV Intermittent every 6 hours  melatonin 3 milliGRAM(s) Oral at bedtime  senna 2 Tablet(s) Oral at bedtime  sodium chloride 0.45%. 1000 milliLiter(s) (50 mL/Hr) IV Continuous <Continuous>    MEDICATIONS  (PRN):  diphenhydrAMINE   Injectable 25 milliGRAM(s) IV Push every 4 hours PRN Rash and/or Itching  HYDROmorphone PCA (1 mG/mL) Rescue Clinician Bolus 1 milliGRAM(s) IV Push every 15 minutes PRN for Pain Scale GREATER THAN 6  naloxone Injectable 0.1 milliGRAM(s) IV Push every 3 minutes PRN For ANY of the following changes in patient status:  A. RR LESS THAN 10 breaths per minute, B. Oxygen saturation LESS THAN 90%, C. Sedation score of 6  ondansetron Injectable 4 milliGRAM(s) IV Push every 6 hours PRN Nausea    Pertinent Labs:  11-24 Na135 mmol/L Glu 89 mg/dL K+ 4.4 mmol/L Cr  0.51 mg/dL BUN 12 mg/dL Phos 4.8 mg/dL<H> A    Skin: no pressure injuries    Estimated Needs:   [ X] no change since previous assessment  [ ] recalculated:     Previous Nutrition Diagnosis:     [X ] Malnutrition; severe    Nutrition Diagnosis is [X ] ongoing; being addressed with diet advancement and PN    New Nutrition Diagnosis: [ X] not applicable     Interventions:     Recommend:  1) PN per MD Guallpa; monitor ability to d/c PN when pt is meeting 75% of nutrition needs via po diet  2) Continue Low Fat diet; encourage small, frequent meals to establish tolerance to po diet  3) Monitor weight, lab values, skin, po intake and GI tolerance    Monitoring and Evaluation:   Follow up per protocol  RD to remain available for further nutritional interventions as indicated.   Anabela Molina, MS RD [facility], #[phone].

## 2017-11-24 NOTE — PROGRESS NOTE ADULT - ATTENDING COMMENTS
Patient seen and examined.  Chart reviewed.  Resident note confirmed.  Pt is a 57 year old female with necrotizing pancreatitis.  Pt s/p cyst gastrostomy x2 by gastroenterology.  Over the weekend, she developed fever/leukocytosis and imipenem was restarted.  The tenderness in the RUQ and LUQ is improved and she is tolerating clears.  Hct was stable overnight.  Continue pain control.  Continue ISP.  No endoscopic pancreatic debridement planned by GI at this time. Adv to low fat diet.  Continue TPN.  Monitor I’s and O’s .  Continue supportive care.  Continue DVT proph.  PT/OT evaluations.  Discharge planning.

## 2017-11-24 NOTE — PROGRESS NOTE ADULT - ASSESSMENT
57 y.o. woman with history of nephrolithiasis, HTN, and appendectomy admitted on 10/16 for gallstone pancreatitis c/b necrotizing pancreatitis and ascending cholangitis s/p ERCP and stent placement (10/21) and FNA of perihepatic fluid (10/31), which grew E. faecalis. She is s/p cystgastrostomy with 2 axiom stents placement by GI on 11/8. She was not able to tolerate tube feeding and now is receiving TPN/ clears.    Plan:  - Continue clears with TPN; possible advance to low-fat diet today  - OOB ambulate   - Pain control with patch/PCA  - GI following, no intervention at this time  - Discharge planning

## 2017-11-24 NOTE — PROGRESS NOTE ADULT - SUBJECTIVE AND OBJECTIVE BOX
Day _55__ of Anesthesia Pain Management Service    SUBJECTIVE: Patient seen and examined at the bedside. Patient states that she is feeling better and pain is better today. Denies any adverse effects.     Pain Scale Score	At rest: ___ 	With Activity: ___ 	[ X] Refer to charted pain scores    THERAPY:    [ ] IV PCA Morphine		[ ] 5 mg/mL	[ ] 1 mg/mL  [X ] IV PCA Hydromorphone	[ ] 5 mg/mL	[X] 1 mg/mL  [ ] IV PCA Fentanyl		[ ] 50 micrograms/mL    Demand dose   0.5  lockout   6 (minutes)       MEDICATIONS  (STANDING):  acetaminophen  IVPB. 1000 milliGRAM(s) IV Intermittent once  acetaminophen  IVPB. 1000 milliGRAM(s) IV Intermittent once  diphenhydrAMINE   Injectable 25 milliGRAM(s) IV Push once  docusate sodium 100 milliGRAM(s) Oral daily  enoxaparin Injectable 40 milliGRAM(s) SubCutaneous daily  fentaNYL   Patch  50 MICROgram(s)/Hr 1 Patch Transdermal every 72 hours  HYDROmorphone PCA (1 mG/mL) 30 milliLiter(s) PCA Continuous PCA Continuous  imipenem/cilastatin  IVPB      imipenem/cilastatin  IVPB 500 milliGRAM(s) IV Intermittent every 6 hours  melatonin 3 milliGRAM(s) Oral at bedtime  senna 2 Tablet(s) Oral at bedtime  sodium chloride 0.45%. 1000 milliLiter(s) (50 mL/Hr) IV Continuous <Continuous>    MEDICATIONS  (PRN):  diphenhydrAMINE   Injectable 25 milliGRAM(s) IV Push every 4 hours PRN Rash and/or Itching  HYDROmorphone PCA (1 mG/mL) Rescue Clinician Bolus 1 milliGRAM(s) IV Push every 15 minutes PRN for Pain Scale GREATER THAN 6  naloxone Injectable 0.1 milliGRAM(s) IV Push every 3 minutes PRN For ANY of the following changes in patient status:  A. RR LESS THAN 10 breaths per minute, B. Oxygen saturation LESS THAN 90%, C. Sedation score of 6  ondansetron Injectable 4 milliGRAM(s) IV Push every 6 hours PRN Nausea      OBJECTIVE:    Sedation Score:	[X ] Alert	[ ] Drowsy 	[ ] Arousable	[ ] Asleep	[ ] Unresponsive    Side Effects:	[X ] None	[ ] Nausea	[ ] Vomiting	[ ] Pruritus  		[ ] Other:    Vital Signs Last 24 Hrs  T(C): 36.7 (24 Nov 2017 09:42), Max: 37.2 (24 Nov 2017 01:24)  T(F): 98.1 (24 Nov 2017 09:42), Max: 99 (24 Nov 2017 01:24)  HR: 77 (24 Nov 2017 09:42) (74 - 83)  BP: 107/72 (24 Nov 2017 09:42) (107/72 - 117/78)  BP(mean): --  RR: 18 (24 Nov 2017 09:42) (18 - 18)  SpO2: 96% (24 Nov 2017 09:42) (95% - 96%)    ASSESSMENT/ PLAN  Will decrease amount today and reassess for improvement. Please contact with any questions.     Therapy to  be:	[X ] Continue   [ ] Discontinued   [ ] Change to prn Analgesics    Documentation and Verification of current medications:   [X] Done	[ ] Not done, not elligible    Comments:

## 2017-11-24 NOTE — CONSULT NOTE ADULT - SUBJECTIVE AND OBJECTIVE BOX
HPI:  57yF w/ PMH sig for HTN and nephrolithiasis, s/p lap appy. Pt presented to [facility] ED on 10/16/2017 c/o acute onset severe sharp stabbing upper abdominal pain that radiates to the back. She says the pain woke her up in the middle of the night.  admitted on 10/16 for gallstone pancreatitis c/b necrotizing pancreatitis and ascending cholangitis s/p ERCP and stent placement (10/21) and FNA of perihepatic fluid (10/31), which grew E. faecalis. She is s/p cystgastrostomy with 2 axiom stents placement by GI on 11/8. She was not able to tolerate tube feeding and now is receiving TPN/ clears.  cultures are so far negative except on 30th which grew Enterococcus fecalis         PAST MEDICAL & SURGICAL HISTORY:  Kidney stone  Hypertension  Kidney stones  History of appendectomy      Allergies  No Known Allergies        ANTIMICROBIALS:  imipenem/cilastatin  IVPB    imipenem/cilastatin  IVPB 500 every 6 hours      OTHER MEDS:  acetaminophen  IVPB. 1000 milliGRAM(s) IV Intermittent once  diphenhydrAMINE   Injectable 25 milliGRAM(s) IV Push every 4 hours PRN  diphenhydrAMINE   Injectable 25 milliGRAM(s) IV Push once  docusate sodium 100 milliGRAM(s) Oral daily  enoxaparin Injectable 40 milliGRAM(s) SubCutaneous daily  fentaNYL   Patch  50 MICROgram(s)/Hr 1 Patch Transdermal every 72 hours  HYDROmorphone PCA (1 mG/mL) 30 milliLiter(s) PCA Continuous PCA Continuous  HYDROmorphone PCA (1 mG/mL) Rescue Clinician Bolus 1 milliGRAM(s) IV Push every 15 minutes PRN  melatonin 3 milliGRAM(s) Oral at bedtime  naloxone Injectable 0.1 milliGRAM(s) IV Push every 3 minutes PRN  ondansetron Injectable 4 milliGRAM(s) IV Push every 6 hours PRN  senna 2 Tablet(s) Oral at bedtime  sodium chloride 0.45%. 1000 milliLiter(s) IV Continuous <Continuous>      SOCIAL HISTORY: non smoker and non drinker   FAMILY HISTORY:  No pertinent family history in first degree relatives      REVIEW OF SYSTEMS  [  ] ROS unobtainable because:    [ x ] All other systems negative except as noted below:	    Constitutional:  [ ] fever [ ] weight loss  Skin:  [ ] rash [ ] phlebitis	  Eyes: [ ] icterus [ ] inflammation	  ENMT: [ ] discharge [ ] thrush [ ] ulcers [ ] exudates  Respiratory: [ ] dyspnea [ ] hemoptysis [ ] cough [ ] sputum	  Cardiovascular:  [ ] chest pain [ ] palpitations [ ] edema	  Gastrointestinal:  [ ] nausea [ ] vomiting [ ] diarrhea [ ] constipation [ x] pain	  Genitourinary:  [ ] dysuria [ ] frequency [ ] hematuria [ ] discharge [ ] flank pain  Musculoskeletal:  [ ] myalgias [ ] arthralgias [ ] arthritis	  Neurological:  [ ] headache [ ] seizures	  Psychiatric:  [ ] anxiety [ ] depression	  Hematology/Lymphatics:  [ ] lymphadenopathy  Endocrine:  [ ] adrenal [ ] thyroid  Allergic/Immunologic:	 [ ] transplant [ ] seasonal    Vital Signs Last 24 Hrs  T(F): 98.1 (11-24-17 @ 09:42), Max: 99.7 (11-19-17 @ 10:25)    Vital Signs Last 24 Hrs  HR: 77 (11-24-17 @ 09:42) (74 - 83)  BP: 107/72 (11-24-17 @ 09:42) (107/72 - 117/78)  RR: 18 (11-24-17 @ 09:42)  SpO2: 96% (11-24-17 @ 09:42) (95% - 96%)  Wt(kg): --    PHYSICAL EXAM:  General: non-toxic  HEAD/EYES: anicteric, PERRL  ENT:  supple  Cardiovascular:   S1, S2  Respiratory:  clear bilaterally  GI: pain in the abdomen, and tenderness  :  no CVA tenderness   Musculoskeletal:  no synovitis  Neurologic:  grossly non-focal  Skin:  no rash  Lymph: no lymphadenopathy  Psychiatric:  appropriate affect  Vascular:  picc line in in place and was placed on 5th of November                                   7.9    10.25 )-----------( 698      ( 24 Nov 2017 07:45 )             24.9       11-24    135  |  99  |  12  ----------------------------<  89  4.4   |  23  |  0.51    Ca    8.4      24 Nov 2017 07:41  Phos  4.8     11-24    TPro  7.2  /  Alb  3.0<L>  /  TBili  0.2  /  DBili  x   /  AST  26  /  ALT  20  /  AlkPhos  279<H>  11-23          MICROBIOLOGY:  .Blood PICC/PERC Single Lumen  11-16-17   No growth at 5 days.  --  --      .Urine Clean Catch (Midstream)  11-16-17   10,000 - 49,000 CFU/mL Methicillin resistant Staphylococcus aureus  --  Methicillin resistant Staphylococcus aureus      Abdominal Fl Abdominal Fluid  10-30-17   Moderate Enterococcus faecalis  --  Enterococcus faecalis       blood cx so far have been negative         RADIOLOGY:< from: CT Abdomen and Pelvis w/ Oral Cont and w/ IV Cont (11.15.17 @ 09:51) >  LIVER: Within normal limits.  BILE DUCTS: A patent common bile duct stent is in place. There is new   mild central intrahepatic biliary ductal dilatation and common bile duct   dilatation up to 1 cm, new since 11/7/2017.  GALLBLADDER: Within normal limits.  SPLEEN: Within normal limits.  PANCREAS: Redemonstrated necrotizing pancreatitis with interval placement   of two lumen apposing stents. Multifocal fluid collections compatible   with walled off necrosis are significantly decreased in size. Residual   multiloculated complex collection measures approximately 13.5 x 2.4 cm,   previously measuring 15.6 x 7.5 cm (series 2, image 40). There is a   previously described collection in the pancreatic head/uncinate process   is also smaller in size.    < end of copied text > HPI:  57yF w/ PMH sig for HTN and nephrolithiasis, s/p lap appy. Pt presented to [facility] ED on 10/16/2017 c/o acute onset severe sharp stabbing upper abdominal pain that radiates to the back. She says the pain woke her up in the middle of the night.  admitted on 10/16 for gallstone pancreatitis c/b necrotizing pancreatitis and ascending cholangitis s/p ERCP and stent placement (10/21) and FNA of perihepatic fluid (10/31), which grew E. faecalis. She is s/p cystgastrostomy with 2 axiom stents placement by GI on 11/8. PICC line nutrition and now receiving po intake for the first day, started with clears   cultures are so far negative except on 30th which grew Enterococcus fecalis         PAST MEDICAL & SURGICAL HISTORY:  Kidney stone  Hypertension  Kidney stones  History of appendectomy      Allergies  No Known Allergies        ANTIMICROBIALS:  imipenem/cilastatin  IVPB    imipenem/cilastatin  IVPB 500 every 6 hours      OTHER MEDS:  acetaminophen  IVPB. 1000 milliGRAM(s) IV Intermittent once  diphenhydrAMINE   Injectable 25 milliGRAM(s) IV Push every 4 hours PRN  diphenhydrAMINE   Injectable 25 milliGRAM(s) IV Push once  docusate sodium 100 milliGRAM(s) Oral daily  enoxaparin Injectable 40 milliGRAM(s) SubCutaneous daily  fentaNYL   Patch  50 MICROgram(s)/Hr 1 Patch Transdermal every 72 hours  HYDROmorphone PCA (1 mG/mL) 30 milliLiter(s) PCA Continuous PCA Continuous  HYDROmorphone PCA (1 mG/mL) Rescue Clinician Bolus 1 milliGRAM(s) IV Push every 15 minutes PRN  melatonin 3 milliGRAM(s) Oral at bedtime  naloxone Injectable 0.1 milliGRAM(s) IV Push every 3 minutes PRN  ondansetron Injectable 4 milliGRAM(s) IV Push every 6 hours PRN  senna 2 Tablet(s) Oral at bedtime  sodium chloride 0.45%. 1000 milliLiter(s) IV Continuous <Continuous>      SOCIAL HISTORY: non smoker and non drinker   FAMILY HISTORY:  No pertinent family history in first degree relatives      REVIEW OF SYSTEMS  [  ] ROS unobtainable because:    [ x ] All other systems negative except as noted below:	    Constitutional:  [ ] fever [ ] weight loss  Skin:  [ ] rash [ ] phlebitis	  Eyes: [ ] icterus [ ] inflammation	  ENMT: [ ] discharge [ ] thrush [ ] ulcers [ ] exudates  Respiratory: [ ] dyspnea [ ] hemoptysis [ ] cough [ ] sputum	  Cardiovascular:  [ ] chest pain [ ] palpitations [ ] edema	  Gastrointestinal:  [ ] nausea [ ] vomiting [ ] diarrhea [ ] constipation [ x] pain	  Genitourinary:  [ ] dysuria [ ] frequency [ ] hematuria [ ] discharge [ ] flank pain  Musculoskeletal:  [ ] myalgias [ ] arthralgias [ ] arthritis	  Neurological:  [ ] headache [ ] seizures	  Psychiatric:  [ ] anxiety [ ] depression	  Hematology/Lymphatics:  [ ] lymphadenopathy  Endocrine:  [ ] adrenal [ ] thyroid  Allergic/Immunologic:	 [ ] transplant [ ] seasonal    Vital Signs Last 24 Hrs  T(F): 98.1 (11-24-17 @ 09:42), Max: 99.7 (11-19-17 @ 10:25)    Vital Signs Last 24 Hrs  HR: 77 (11-24-17 @ 09:42) (74 - 83)  BP: 107/72 (11-24-17 @ 09:42) (107/72 - 117/78)  RR: 18 (11-24-17 @ 09:42)  SpO2: 96% (11-24-17 @ 09:42) (95% - 96%)  Wt(kg): --    PHYSICAL EXAM:  General: non-toxic  HEAD/EYES: anicteric, PERRL  ENT:  supple  Cardiovascular:   S1, S2  Respiratory:  clear bilaterally  GI: pain in the abdomen, and tenderness  :  no CVA tenderness   Musculoskeletal:  no synovitis  Neurologic:  grossly non-focal  Skin:  no rash  Lymph: no lymphadenopathy  Psychiatric:  appropriate affect  Vascular:  picc line in in place and was placed on 5th of November                                   7.9    10.25 )-----------( 698      ( 24 Nov 2017 07:45 )             24.9       11-24    135  |  99  |  12  ----------------------------<  89  4.4   |  23  |  0.51    Ca    8.4      24 Nov 2017 07:41  Phos  4.8     11-24    TPro  7.2  /  Alb  3.0<L>  /  TBili  0.2  /  DBili  x   /  AST  26  /  ALT  20  /  AlkPhos  279<H>  11-23          MICROBIOLOGY:  .Blood PICC/PERC Single Lumen  11-16-17   No growth at 5 days.  --  --      .Urine Clean Catch (Midstream)  11-16-17   10,000 - 49,000 CFU/mL Methicillin resistant Staphylococcus aureus  --  Methicillin resistant Staphylococcus aureus      Abdominal Fl Abdominal Fluid  10-30-17   Moderate Enterococcus faecalis  --  Enterococcus faecalis       blood cx so far have been negative         RADIOLOGY:< from: CT Abdomen and Pelvis w/ Oral Cont and w/ IV Cont (11.15.17 @ 09:51) >  LIVER: Within normal limits.  BILE DUCTS: A patent common bile duct stent is in place. There is new   mild central intrahepatic biliary ductal dilatation and common bile duct   dilatation up to 1 cm, new since 11/7/2017.  GALLBLADDER: Within normal limits.  SPLEEN: Within normal limits.  PANCREAS: Redemonstrated necrotizing pancreatitis with interval placement   of two lumen apposing stents. Multifocal fluid collections compatible   with walled off necrosis are significantly decreased in size. Residual   multiloculated complex collection measures approximately 13.5 x 2.4 cm,   previously measuring 15.6 x 7.5 cm (series 2, image 40). There is a   previously described collection in the pancreatic head/uncinate process   is also smaller in size.    < end of copied text >

## 2017-11-25 LAB
ALBUMIN SERPL ELPH-MCNC: 2.9 G/DL — LOW (ref 3.3–5)
ALP SERPL-CCNC: 249 U/L — HIGH (ref 40–120)
ALT FLD-CCNC: 19 U/L — SIGNIFICANT CHANGE UP (ref 10–45)
ANION GAP SERPL CALC-SCNC: 13 MMOL/L — SIGNIFICANT CHANGE UP (ref 5–17)
AST SERPL-CCNC: 25 U/L — SIGNIFICANT CHANGE UP (ref 10–40)
BILIRUB SERPL-MCNC: 0.2 MG/DL — SIGNIFICANT CHANGE UP (ref 0.2–1.2)
BLD GP AB SCN SERPL QL: NEGATIVE — SIGNIFICANT CHANGE UP
BUN SERPL-MCNC: 15 MG/DL — SIGNIFICANT CHANGE UP (ref 7–23)
CALCIUM SERPL-MCNC: 9.4 MG/DL — SIGNIFICANT CHANGE UP (ref 8.4–10.5)
CHLORIDE SERPL-SCNC: 100 MMOL/L — SIGNIFICANT CHANGE UP (ref 96–108)
CO2 SERPL-SCNC: 26 MMOL/L — SIGNIFICANT CHANGE UP (ref 22–31)
CREAT SERPL-MCNC: 0.54 MG/DL — SIGNIFICANT CHANGE UP (ref 0.5–1.3)
GLUCOSE SERPL-MCNC: 95 MG/DL — SIGNIFICANT CHANGE UP (ref 70–99)
HCT VFR BLD CALC: 27.3 % — LOW (ref 34.5–45)
HGB BLD-MCNC: 8.4 G/DL — LOW (ref 11.5–15.5)
MCHC RBC-ENTMCNC: 25.8 PG — LOW (ref 27–34)
MCHC RBC-ENTMCNC: 30.8 GM/DL — LOW (ref 32–36)
MCV RBC AUTO: 83.7 FL — SIGNIFICANT CHANGE UP (ref 80–100)
PLATELET # BLD AUTO: 767 K/UL — HIGH (ref 150–400)
POTASSIUM SERPL-MCNC: 4.3 MMOL/L — SIGNIFICANT CHANGE UP (ref 3.5–5.3)
POTASSIUM SERPL-SCNC: 4.3 MMOL/L — SIGNIFICANT CHANGE UP (ref 3.5–5.3)
PROT SERPL-MCNC: 7.2 G/DL — SIGNIFICANT CHANGE UP (ref 6–8.3)
RBC # BLD: 3.26 M/UL — LOW (ref 3.8–5.2)
RBC # FLD: 15.9 % — HIGH (ref 10.3–14.5)
RH IG SCN BLD-IMP: POSITIVE — SIGNIFICANT CHANGE UP
SODIUM SERPL-SCNC: 139 MMOL/L — SIGNIFICANT CHANGE UP (ref 135–145)
WBC # BLD: 11.41 K/UL — HIGH (ref 3.8–10.5)
WBC # FLD AUTO: 11.41 K/UL — HIGH (ref 3.8–10.5)

## 2017-11-25 PROCEDURE — 99232 SBSQ HOSP IP/OBS MODERATE 35: CPT

## 2017-11-25 RX ORDER — ACETAMINOPHEN 500 MG
1000 TABLET ORAL EVERY 6 HOURS
Qty: 0 | Refills: 0 | Status: COMPLETED | OUTPATIENT
Start: 2017-11-25 | End: 2017-11-25

## 2017-11-25 RX ORDER — ACETAMINOPHEN 500 MG
1000 TABLET ORAL ONCE
Qty: 0 | Refills: 0 | Status: COMPLETED | OUTPATIENT
Start: 2017-11-25 | End: 2017-11-25

## 2017-11-25 RX ADMIN — Medication 1000 MILLIGRAM(S): at 01:07

## 2017-11-25 RX ADMIN — Medication 100 MILLIGRAM(S): at 12:28

## 2017-11-25 RX ADMIN — HYDROMORPHONE HYDROCHLORIDE 30 MILLILITER(S): 2 INJECTION INTRAMUSCULAR; INTRAVENOUS; SUBCUTANEOUS at 13:52

## 2017-11-25 RX ADMIN — Medication 25 MILLIGRAM(S): at 22:53

## 2017-11-25 RX ADMIN — Medication 3 MILLIGRAM(S): at 22:54

## 2017-11-25 RX ADMIN — Medication 25 MILLIGRAM(S): at 09:35

## 2017-11-25 RX ADMIN — Medication 1000 MILLIGRAM(S): at 21:20

## 2017-11-25 RX ADMIN — HYDROMORPHONE HYDROCHLORIDE 1 MILLIGRAM(S): 2 INJECTION INTRAMUSCULAR; INTRAVENOUS; SUBCUTANEOUS at 17:37

## 2017-11-25 RX ADMIN — HYDROMORPHONE HYDROCHLORIDE 30 MILLILITER(S): 2 INJECTION INTRAMUSCULAR; INTRAVENOUS; SUBCUTANEOUS at 19:13

## 2017-11-25 RX ADMIN — Medication 400 MILLIGRAM(S): at 00:37

## 2017-11-25 RX ADMIN — HYDROMORPHONE HYDROCHLORIDE 1 MILLIGRAM(S): 2 INJECTION INTRAMUSCULAR; INTRAVENOUS; SUBCUTANEOUS at 09:35

## 2017-11-25 RX ADMIN — Medication 400 MILLIGRAM(S): at 12:33

## 2017-11-25 RX ADMIN — HYDROMORPHONE HYDROCHLORIDE 30 MILLILITER(S): 2 INJECTION INTRAMUSCULAR; INTRAVENOUS; SUBCUTANEOUS at 07:12

## 2017-11-25 RX ADMIN — Medication 25 MILLIGRAM(S): at 05:04

## 2017-11-25 RX ADMIN — Medication 25 MILLIGRAM(S): at 00:37

## 2017-11-25 RX ADMIN — Medication 25 MILLIGRAM(S): at 19:14

## 2017-11-25 RX ADMIN — PIPERACILLIN AND TAZOBACTAM 25 GRAM(S): 4; .5 INJECTION, POWDER, LYOPHILIZED, FOR SOLUTION INTRAVENOUS at 05:04

## 2017-11-25 RX ADMIN — Medication 1000 MILLIGRAM(S): at 13:03

## 2017-11-25 RX ADMIN — PIPERACILLIN AND TAZOBACTAM 25 GRAM(S): 4; .5 INJECTION, POWDER, LYOPHILIZED, FOR SOLUTION INTRAVENOUS at 13:52

## 2017-11-25 RX ADMIN — Medication 400 MILLIGRAM(S): at 20:55

## 2017-11-25 RX ADMIN — ENOXAPARIN SODIUM 40 MILLIGRAM(S): 100 INJECTION SUBCUTANEOUS at 12:28

## 2017-11-25 RX ADMIN — PIPERACILLIN AND TAZOBACTAM 25 GRAM(S): 4; .5 INJECTION, POWDER, LYOPHILIZED, FOR SOLUTION INTRAVENOUS at 21:00

## 2017-11-25 RX ADMIN — Medication 400 MILLIGRAM(S): at 06:52

## 2017-11-25 RX ADMIN — SENNA PLUS 2 TABLET(S): 8.6 TABLET ORAL at 21:00

## 2017-11-25 RX ADMIN — Medication 25 MILLIGRAM(S): at 13:54

## 2017-11-25 NOTE — PROGRESS NOTE ADULT - SUBJECTIVE AND OBJECTIVE BOX
ATP Progress note    Subjective: Patient seen and examined. No acute events overnight. Patient states that she continues to feel better. Has been tolerating low fat diet with no postprandial pain.     Objective:   Vital Signs Last 24 Hrs  T(C): 36.8 (25 Nov 2017 09:23), Max: 37.4 (25 Nov 2017 01:43)  T(F): 98.3 (25 Nov 2017 09:23), Max: 99.3 (25 Nov 2017 01:43)  HR: 80 (25 Nov 2017 09:23) (77 - 89)  BP: 111/70 (25 Nov 2017 09:23) (110/72 - 122/76)  BP(mean): --  RR: 17 (25 Nov 2017 09:23) (17 - 18)  SpO2: 96% (25 Nov 2017 09:23) (94% - 96%)            24 Nov 2017 07:01  -  25 Nov 2017 07:00  --------------------------------------------------------  IN:    Oral Fluid: 860 mL    sodium chloride 0.45%.: 575 mL    Solution: 200 mL    Solution: 200 mL    TPN (Total Parenteral Nutrition): 660 mL  Total IN: 2495 mL    OUT:  Total OUT: 0 mL    Total NET: 2495 mL      Physical Exam:   Focused Physical Exam:   General: NAD  Respiratory: Nonlabored breathing  Abdomen: Soft, ND, mild tenderness in epigastric region, improved from previous exams                   8.4    11.41 )-----------( 767      ( 25 Nov 2017 08:49 )             27.3     11-25    139  |  100  |  15  ----------------------------<  95  4.3   |  26  |  0.54    Ca    9.4      25 Nov 2017 08:49  Phos  4.8     11-24    TPro  7.2  /  Alb  2.9<L>  /  TBili  0.2  /  DBili  x   /  AST  25  /  ALT  19  /  AlkPhos  249<H>  11-25            Assessment and Plan:   · Assessment		  57 y.o. woman with history of nephrolithiasis, HTN, and appendectomy admitted on 10/16 for gallstone pancreatitis c/b necrotizing pancreatitis and ascending cholangitis s/p ERCP and stent placement (10/21) and FNA of perihepatic fluid (10/31), which grew E. faecalis. She is s/p cystgastrostomy with 2 axiom stents placement by GI on 11/8. She was not able to tolerate tube feeding and now is receiving TPN/ clears.    Plan:  - Continue low fat diet with TPN.   - OOB ambulate   - Pain control with patch/PCA  - GI following, no intervention at this time  - CT scan of abdomen on Monday   - Zosyn as per ID

## 2017-11-25 NOTE — PROGRESS NOTE ADULT - SUBJECTIVE AND OBJECTIVE BOX
PN infusion at 55 ccs/hr  Diet advanced to low fat yesterday  11-24 @ 07:01  -  11-25 @ 07:00  --------------------------------------------------------  IN:    Oral Fluid: 860 mL    sodium chloride 0.45%.: 575 mL    Solution: 200 mL    Solution: 200 mL    TPN (Total Parenteral Nutrition): 660 mL  Total IN: 2495 mL    OUT:  Total OUT: 0 mL    Total NET: 2495 mL        T(C): 36.8 (11-25-17 @ 09:23), Max: 37.4 (11-25-17 @ 01:43)  HR: 80 (11-25-17 @ 09:23) (77 - 89)  BP: 111/70 (11-25-17 @ 09:23) (110/72 - 122/76)  RR: 17 (11-25-17 @ 09:23) (17 - 18)  SpO2: 96% (11-25-17 @ 09:23) (94% - 96%)  Wt(kg): --    Labs   11-25    139  |  100  |  15  ----------------------------<  95  4.3   |  26  |  0.54    Ca    9.4      25 Nov 2017 08:49  Phos  4.8     11-24    TPro  7.2  /  Alb  2.9<L>  /  TBili  0.2  /  DBili  x   /  AST  25  /  ALT  19  /  AlkPhos  249<H>  11-25      LIVER FUNCTIONS - ( 25 Nov 2017 08:49 )  Alb: 2.9 g/dL / Pro: 7.2 g/dL / ALK PHOS: 249 U/L / ALT: 19 U/L / AST: 25 U/L / GGT: x           CAPILLARY BLOOD GLUCOSE        I&O's Detail    24 Nov 2017 07:01  -  25 Nov 2017 07:00  --------------------------------------------------------  IN:    Oral Fluid: 860 mL    sodium chloride 0.45%.: 575 mL    Solution: 200 mL    Solution: 200 mL    TPN (Total Parenteral Nutrition): 660 mL  Total IN: 2495 mL    OUT:  Total OUT: 0 mL    Total NET: 2495 mL

## 2017-11-25 NOTE — PROGRESS NOTE ADULT - ATTENDING COMMENTS
Patient seen and examined.  Chart reviewed.  Resident note confirmed.  Pt is a 57 year old female with necrotizing pancreatitis.  Pt s/p cyst gastrostomy x2 by gastroenterology.  No acute events overnight.  The tenderness in the RUQ and LUQ is improved and she is tolerating a low fat diet.  Hct was stable overnight.  Continue pain control.  Continue ISP.  No endoscopic pancreatic debridement planned by GI at this time. Continue low fat diet.  Continue TPN.  Monitor I’s and O’s .  Pt continues on Zosyn per ID.  A CT abdomen is planned for Tuesday by ID.  Continue supportive care.  Continue DVT proph.  PT/OT evaluations.    Discharge planning.

## 2017-11-25 NOTE — PROGRESS NOTE ADULT - SUBJECTIVE AND OBJECTIVE BOX
Day __38/16_ of Anesthesia Pain Management Service    SUBJECTIVE:    Pain Scale Score	At rest: ___2 	With Activity: ___ 	[ ] Refer to charted pain scores    THERAPY:    [ ] IV PCA Morphine		[ ] 5 mg/mL	[ ] 1 mg/mL  [ x] IV PCA Hydromorphone	[ ] 5 mg/mL	[ ] 1 mg/mL  [ ] IV PCA Fentanyl		[ ] 50 micrograms/mL    Demand dose 0.45   lockout  6  (minutes) Continuous Rate  0  Total:     Daily      MEDICATIONS  (STANDING):  acetaminophen  IVPB. 1000 milliGRAM(s) IV Intermittent once  diphenhydrAMINE   Injectable 25 milliGRAM(s) IV Push once  docusate sodium 100 milliGRAM(s) Oral daily  enoxaparin Injectable 40 milliGRAM(s) SubCutaneous daily  fentaNYL   Patch  50 MICROgram(s)/Hr 1 Patch Transdermal every 72 hours  HYDROmorphone PCA (1 mG/mL) 30 milliLiter(s) PCA Continuous PCA Continuous  melatonin 3 milliGRAM(s) Oral at bedtime  piperacillin/tazobactam IVPB. 3.375 Gram(s) IV Intermittent every 8 hours  senna 2 Tablet(s) Oral at bedtime  sodium chloride 0.45%. 1000 milliLiter(s) (50 mL/Hr) IV Continuous <Continuous>    MEDICATIONS  (PRN):  diphenhydrAMINE   Injectable 25 milliGRAM(s) IV Push every 4 hours PRN Rash and/or Itching  HYDROmorphone PCA (1 mG/mL) Rescue Clinician Bolus 1 milliGRAM(s) IV Push every 15 minutes PRN for Pain Scale GREATER THAN 6  naloxone Injectable 0.1 milliGRAM(s) IV Push every 3 minutes PRN For ANY of the following changes in patient status:  A. RR LESS THAN 10 breaths per minute, B. Oxygen saturation LESS THAN 90%, C. Sedation score of 6  ondansetron Injectable 4 milliGRAM(s) IV Push every 6 hours PRN Nausea      OBJECTIVE:    Sedation Score:	[x ] Alert	[ ] Drowsy 	[ ] Arousable	[ ] Asleep	[ ] Unresponsive    Side Effects:	[x ] None	[ ] Nausea	[ ] Vomiting	[ ] Pruritus  		[ ] Other:    Vital Signs Last 24 Hrs  T(C): 37.4 (25 Nov 2017 01:43), Max: 37.4 (25 Nov 2017 01:43)  T(F): 99.3 (25 Nov 2017 01:43), Max: 99.3 (25 Nov 2017 01:43)  HR: 78 (25 Nov 2017 01:43) (74 - 89)  BP: 110/72 (25 Nov 2017 01:43) (107/72 - 122/76)  BP(mean): --  RR: 18 (25 Nov 2017 01:43) (18 - 18)  SpO2: 96% (25 Nov 2017 01:43) (94% - 96%)    ASSESSMENT/ PLAN    Therapy to  be:	[x ] Continue   [ ] Discontinued   [ ] Change to prn Analgesics    Documentation and Verification of current medications:   [X] Done	[ ] Not done, not elligible    Comments:

## 2017-11-26 LAB
ALBUMIN SERPL ELPH-MCNC: 3.3 G/DL — SIGNIFICANT CHANGE UP (ref 3.3–5)
ALP SERPL-CCNC: 234 U/L — HIGH (ref 40–120)
ALT FLD-CCNC: 13 U/L — SIGNIFICANT CHANGE UP (ref 10–45)
ANION GAP SERPL CALC-SCNC: 14 MMOL/L — SIGNIFICANT CHANGE UP (ref 5–17)
AST SERPL-CCNC: 26 U/L — SIGNIFICANT CHANGE UP (ref 10–40)
BILIRUB SERPL-MCNC: <0.2 MG/DL — SIGNIFICANT CHANGE UP (ref 0.2–1.2)
BUN SERPL-MCNC: 15 MG/DL — SIGNIFICANT CHANGE UP (ref 7–23)
CALCIUM SERPL-MCNC: 9.1 MG/DL — SIGNIFICANT CHANGE UP (ref 8.4–10.5)
CHLORIDE SERPL-SCNC: 98 MMOL/L — SIGNIFICANT CHANGE UP (ref 96–108)
CO2 SERPL-SCNC: 24 MMOL/L — SIGNIFICANT CHANGE UP (ref 22–31)
CREAT SERPL-MCNC: 0.42 MG/DL — LOW (ref 0.5–1.3)
GLUCOSE SERPL-MCNC: 96 MG/DL — SIGNIFICANT CHANGE UP (ref 70–99)
HCT VFR BLD CALC: 25.5 % — LOW (ref 34.5–45)
HGB BLD-MCNC: 8 G/DL — LOW (ref 11.5–15.5)
MAGNESIUM SERPL-MCNC: 1.9 MG/DL — SIGNIFICANT CHANGE UP (ref 1.6–2.6)
MCHC RBC-ENTMCNC: 26.2 PG — LOW (ref 27–34)
MCHC RBC-ENTMCNC: 31.4 GM/DL — LOW (ref 32–36)
MCV RBC AUTO: 83.6 FL — SIGNIFICANT CHANGE UP (ref 80–100)
PHOSPHATE SERPL-MCNC: 4.8 MG/DL — HIGH (ref 2.5–4.5)
PLATELET # BLD AUTO: 730 K/UL — HIGH (ref 150–400)
POTASSIUM SERPL-MCNC: 4.2 MMOL/L — SIGNIFICANT CHANGE UP (ref 3.5–5.3)
POTASSIUM SERPL-SCNC: 4.2 MMOL/L — SIGNIFICANT CHANGE UP (ref 3.5–5.3)
PROT SERPL-MCNC: 7.1 G/DL — SIGNIFICANT CHANGE UP (ref 6–8.3)
RBC # BLD: 3.05 M/UL — LOW (ref 3.8–5.2)
RBC # FLD: 16 % — HIGH (ref 10.3–14.5)
SODIUM SERPL-SCNC: 136 MMOL/L — SIGNIFICANT CHANGE UP (ref 135–145)
WBC # BLD: 10.61 K/UL — HIGH (ref 3.8–10.5)
WBC # FLD AUTO: 10.61 K/UL — HIGH (ref 3.8–10.5)

## 2017-11-26 PROCEDURE — 99231 SBSQ HOSP IP/OBS SF/LOW 25: CPT

## 2017-11-26 RX ORDER — ACETAMINOPHEN 500 MG
1000 TABLET ORAL ONCE
Qty: 0 | Refills: 0 | Status: COMPLETED | OUTPATIENT
Start: 2017-11-26 | End: 2017-11-26

## 2017-11-26 RX ORDER — ACETAMINOPHEN 500 MG
1000 TABLET ORAL ONCE
Qty: 0 | Refills: 0 | Status: COMPLETED | OUTPATIENT
Start: 2017-11-27 | End: 2017-11-27

## 2017-11-26 RX ADMIN — PIPERACILLIN AND TAZOBACTAM 25 GRAM(S): 4; .5 INJECTION, POWDER, LYOPHILIZED, FOR SOLUTION INTRAVENOUS at 14:32

## 2017-11-26 RX ADMIN — SODIUM CHLORIDE 50 MILLILITER(S): 9 INJECTION, SOLUTION INTRAVENOUS at 14:20

## 2017-11-26 RX ADMIN — ENOXAPARIN SODIUM 40 MILLIGRAM(S): 100 INJECTION SUBCUTANEOUS at 14:16

## 2017-11-26 RX ADMIN — HYDROMORPHONE HYDROCHLORIDE 1 MILLIGRAM(S): 2 INJECTION INTRAMUSCULAR; INTRAVENOUS; SUBCUTANEOUS at 01:09

## 2017-11-26 RX ADMIN — SENNA PLUS 2 TABLET(S): 8.6 TABLET ORAL at 21:08

## 2017-11-26 RX ADMIN — FENTANYL CITRATE 1 PATCH: 50 INJECTION INTRAVENOUS at 18:45

## 2017-11-26 RX ADMIN — Medication 400 MILLIGRAM(S): at 14:05

## 2017-11-26 RX ADMIN — Medication 25 MILLIGRAM(S): at 18:44

## 2017-11-26 RX ADMIN — FENTANYL CITRATE 1 PATCH: 50 INJECTION INTRAVENOUS at 19:00

## 2017-11-26 RX ADMIN — Medication 400 MILLIGRAM(S): at 05:07

## 2017-11-26 RX ADMIN — Medication 1000 MILLIGRAM(S): at 14:25

## 2017-11-26 RX ADMIN — Medication 3 MILLIGRAM(S): at 22:43

## 2017-11-26 RX ADMIN — Medication 25 MILLIGRAM(S): at 22:43

## 2017-11-26 RX ADMIN — Medication 25 MILLIGRAM(S): at 14:13

## 2017-11-26 RX ADMIN — HYDROMORPHONE HYDROCHLORIDE 30 MILLILITER(S): 2 INJECTION INTRAMUSCULAR; INTRAVENOUS; SUBCUTANEOUS at 07:28

## 2017-11-26 RX ADMIN — Medication 400 MILLIGRAM(S): at 21:06

## 2017-11-26 RX ADMIN — HYDROMORPHONE HYDROCHLORIDE 30 MILLILITER(S): 2 INJECTION INTRAMUSCULAR; INTRAVENOUS; SUBCUTANEOUS at 02:43

## 2017-11-26 RX ADMIN — Medication 25 MILLIGRAM(S): at 06:03

## 2017-11-26 RX ADMIN — Medication 1000 MILLIGRAM(S): at 21:36

## 2017-11-26 RX ADMIN — Medication 1000 MILLIGRAM(S): at 05:37

## 2017-11-26 RX ADMIN — Medication 25 MILLIGRAM(S): at 02:42

## 2017-11-26 RX ADMIN — HYDROMORPHONE HYDROCHLORIDE 1 MILLIGRAM(S): 2 INJECTION INTRAMUSCULAR; INTRAVENOUS; SUBCUTANEOUS at 12:48

## 2017-11-26 RX ADMIN — Medication 25 MILLIGRAM(S): at 10:04

## 2017-11-26 RX ADMIN — PIPERACILLIN AND TAZOBACTAM 25 GRAM(S): 4; .5 INJECTION, POWDER, LYOPHILIZED, FOR SOLUTION INTRAVENOUS at 21:06

## 2017-11-26 RX ADMIN — PIPERACILLIN AND TAZOBACTAM 25 GRAM(S): 4; .5 INJECTION, POWDER, LYOPHILIZED, FOR SOLUTION INTRAVENOUS at 05:07

## 2017-11-26 RX ADMIN — HYDROMORPHONE HYDROCHLORIDE 30 MILLILITER(S): 2 INJECTION INTRAMUSCULAR; INTRAVENOUS; SUBCUTANEOUS at 19:13

## 2017-11-26 RX ADMIN — HYDROMORPHONE HYDROCHLORIDE 30 MILLILITER(S): 2 INJECTION INTRAMUSCULAR; INTRAVENOUS; SUBCUTANEOUS at 22:41

## 2017-11-26 RX ADMIN — Medication 100 MILLIGRAM(S): at 14:16

## 2017-11-26 NOTE — PROGRESS NOTE ADULT - SUBJECTIVE AND OBJECTIVE BOX
Pt continues with PN and low fat diet which she is tolerating   11-25 @ 07:01  -  11-26 @ 07:00  --------------------------------------------------------  IN:    Oral Fluid: 580 mL    sodium chloride 0.45%.: 1200 mL    Solution: 200 mL    TPN (Total Parenteral Nutrition): 1320 mL  Total IN: 3300 mL    OUT:  Total OUT: 0 mL    Total NET: 3300 mL        T(C): 36.8 (11-26-17 @ 04:50), Max: 37.1 (11-25-17 @ 13:59)  HR: 83 (11-26-17 @ 04:50) (78 - 84)  BP: 108/68 (11-26-17 @ 04:50) (106/70 - 112/70)  RR: 18 (11-26-17 @ 04:50) (18 - 18)  SpO2: 98% (11-26-17 @ 04:50) (97% - 98%)  Wt(kg): --    Labs   11-26    136  |  98  |  15  ----------------------------<  96  4.2   |  24  |  0.42<L>    Ca    9.1      26 Nov 2017 09:33  Phos  4.8     11-26  Mg     1.9     11-26    TPro  7.1  /  Alb  3.3  /  TBili  <0.2  /  DBili  x   /  AST  26  /  ALT  13  /  AlkPhos  234<H>  11-26      LIVER FUNCTIONS - ( 26 Nov 2017 09:33 )  Alb: 3.3 g/dL / Pro: 7.1 g/dL / ALK PHOS: 234 U/L / ALT: 13 U/L / AST: 26 U/L / GGT: x           CAPILLARY BLOOD GLUCOSE        I&O's Detail    25 Nov 2017 07:01  -  26 Nov 2017 07:00  --------------------------------------------------------  IN:    Oral Fluid: 580 mL    sodium chloride 0.45%.: 1200 mL    Solution: 200 mL    TPN (Total Parenteral Nutrition): 1320 mL  Total IN: 3300 mL    OUT:  Total OUT: 0 mL    Total NET: 3300 mL

## 2017-11-26 NOTE — PROGRESS NOTE ADULT - SUBJECTIVE AND OBJECTIVE BOX
Day __39/17_ of Anesthesia Pain Management Service    SUBJECTIVE:    Pain Scale Score	At rest: __2_ 	With Activity: ___ 	[ ] Refer to charted pain scores    THERAPY:    [ ] IV PCA Morphine		[ ] 5 mg/mL	[ ] 1 mg/mL  x[ ] IV PCA Hydromorphone	[ ] 5 mg/mL	[ ] 1 mg/mL  [ ] IV PCA Fentanyl		[ ] 50 micrograms/mL    Demand dose0.45    lockout  6  (minutes) Continuous Rate   0Total:     Daily      MEDICATIONS  (STANDING):  diphenhydrAMINE   Injectable 25 milliGRAM(s) IV Push once  docusate sodium 100 milliGRAM(s) Oral daily  enoxaparin Injectable 40 milliGRAM(s) SubCutaneous daily  fentaNYL   Patch  50 MICROgram(s)/Hr 1 Patch Transdermal every 72 hours  HYDROmorphone PCA (1 mG/mL) 30 milliLiter(s) PCA Continuous PCA Continuous  melatonin 3 milliGRAM(s) Oral at bedtime  piperacillin/tazobactam IVPB. 3.375 Gram(s) IV Intermittent every 8 hours  senna 2 Tablet(s) Oral at bedtime  sodium chloride 0.45%. 1000 milliLiter(s) (50 mL/Hr) IV Continuous <Continuous>    MEDICATIONS  (PRN):  diphenhydrAMINE   Injectable 25 milliGRAM(s) IV Push every 4 hours PRN Rash and/or Itching  HYDROmorphone PCA (1 mG/mL) Rescue Clinician Bolus 1 milliGRAM(s) IV Push every 15 minutes PRN for Pain Scale GREATER THAN 6  naloxone Injectable 0.1 milliGRAM(s) IV Push every 3 minutes PRN For ANY of the following changes in patient status:  A. RR LESS THAN 10 breaths per minute, B. Oxygen saturation LESS THAN 90%, C. Sedation score of 6  ondansetron Injectable 4 milliGRAM(s) IV Push every 6 hours PRN Nausea      OBJECTIVE:    Sedation Score:	[x ] Alert	[ ] Drowsy 	[ ] Arousable	[ ] Asleep	[ ] Unresponsive    Side Effects:	[ x] None	[ ] Nausea	[ ] Vomiting	[ ] Pruritus  		[ ] Other:    Vital Signs Last 24 Hrs  T(C): 36.8 (26 Nov 2017 04:50), Max: 37.1 (25 Nov 2017 13:59)  T(F): 98.2 (26 Nov 2017 04:50), Max: 98.7 (25 Nov 2017 13:59)  HR: 83 (26 Nov 2017 04:50) (78 - 84)  BP: 108/68 (26 Nov 2017 04:50) (106/70 - 112/70)  BP(mean): --  RR: 18 (26 Nov 2017 04:50) (17 - 18)  SpO2: 98% (26 Nov 2017 04:50) (96% - 98%)    ASSESSMENT/ PLAN    Therapy to  be:	[x ] Continue   [ ] Discontinued   [ ] Change to prn Analgesics    Documentation and Verification of current medications:   [X] Done	[ ] Not done, not elligible    Comments:

## 2017-11-26 NOTE — PROGRESS NOTE ADULT - SUBJECTIVE AND OBJECTIVE BOX
ATP Progress note    Subjective: Patient seen and examined. No acute events overnight. Patient continues to tolerate low-fat diet.       Objective:   T(C): 36.8 (11-26-17 @ 04:50), Max: 37.1 (11-25-17 @ 13:59)  HR: 83 (11-26-17 @ 04:50) (78 - 84)  BP: 108/68 (11-26-17 @ 04:50) (106/70 - 112/70)  RR: 18 (11-26-17 @ 04:50) (17 - 18)  SpO2: 98% (11-26-17 @ 04:50) (96% - 98%)          11-25-17 @ 07:01  -  11-26-17 @ 07:00  --------------------------------------------------------  IN: 3300 mL / OUT: 0 mL / NET: 3300 mL        LABS:                        8.4    11.41 )-----------( 767      ( 25 Nov 2017 08:49 )             27.3     11-25    139  |  100  |  15  ----------------------------<  95  4.3   |  26  |  0.54    Ca    9.4      25 Nov 2017 08:49    TPro  7.2  /  Alb  2.9<L>  /  TBili  0.2  /  DBili  x   /  AST  25  /  ALT  19  /  AlkPhos  249<H>  11-25      Focused Physical Exam:   General: NAD  Respiratory: Nonlabored breathing  Abdomen: Soft, ND, mild tenderness in epigastric region, improved from previous exams

## 2017-11-26 NOTE — PROGRESS NOTE ADULT - ASSESSMENT
57 y.o. woman with history of nephrolithiasis, HTN, and appendectomy admitted on 10/16 for gallstone pancreatitis c/b necrotizing pancreatitis and ascending cholangitis s/p ERCP and stent placement (10/21) and FNA of perihepatic fluid (10/31), which grew E. faecalis. She is s/p cystgastrostomy with 2 axiom stents placement by GI on 11/8. She was not able to tolerate tube feeding and now is receiving low-fat diet with TPN.      Plan:  - Continue low fat diet with TPN  - OOB ambulate   - Pain control with patch/PCA  - GI following, no intervention at this time  - CT scan of abdomen on Monday   - Continue Zosyn as per ID

## 2017-11-27 DIAGNOSIS — K85.90 ACUTE PANCREATITIS WITHOUT NECROSIS OR INFECTION, UNSPECIFIED: ICD-10-CM

## 2017-11-27 DIAGNOSIS — A49.1 STREPTOCOCCAL INFECTION, UNSPECIFIED SITE: ICD-10-CM

## 2017-11-27 LAB
ANION GAP SERPL CALC-SCNC: 14 MMOL/L — SIGNIFICANT CHANGE UP (ref 5–17)
BUN SERPL-MCNC: 14 MG/DL — SIGNIFICANT CHANGE UP (ref 7–23)
CALCIUM SERPL-MCNC: 9.3 MG/DL — SIGNIFICANT CHANGE UP (ref 8.4–10.5)
CHLORIDE SERPL-SCNC: 99 MMOL/L — SIGNIFICANT CHANGE UP (ref 96–108)
CO2 SERPL-SCNC: 24 MMOL/L — SIGNIFICANT CHANGE UP (ref 22–31)
CREAT SERPL-MCNC: 0.45 MG/DL — LOW (ref 0.5–1.3)
GLUCOSE SERPL-MCNC: 104 MG/DL — HIGH (ref 70–99)
HCT VFR BLD CALC: 26.7 % — LOW (ref 34.5–45)
HGB BLD-MCNC: 8.4 G/DL — LOW (ref 11.5–15.5)
MAGNESIUM SERPL-MCNC: 1.8 MG/DL — SIGNIFICANT CHANGE UP (ref 1.6–2.6)
MCHC RBC-ENTMCNC: 26.3 PG — LOW (ref 27–34)
MCHC RBC-ENTMCNC: 31.5 GM/DL — LOW (ref 32–36)
MCV RBC AUTO: 83.7 FL — SIGNIFICANT CHANGE UP (ref 80–100)
PHOSPHATE SERPL-MCNC: 4.4 MG/DL — SIGNIFICANT CHANGE UP (ref 2.5–4.5)
PLATELET # BLD AUTO: 725 K/UL — HIGH (ref 150–400)
POTASSIUM SERPL-MCNC: 4.2 MMOL/L — SIGNIFICANT CHANGE UP (ref 3.5–5.3)
POTASSIUM SERPL-SCNC: 4.2 MMOL/L — SIGNIFICANT CHANGE UP (ref 3.5–5.3)
RBC # BLD: 3.19 M/UL — LOW (ref 3.8–5.2)
RBC # FLD: 15.6 % — HIGH (ref 10.3–14.5)
SODIUM SERPL-SCNC: 137 MMOL/L — SIGNIFICANT CHANGE UP (ref 135–145)
WBC # BLD: 12.06 K/UL — HIGH (ref 3.8–10.5)
WBC # FLD AUTO: 12.06 K/UL — HIGH (ref 3.8–10.5)

## 2017-11-27 PROCEDURE — 74177 CT ABD & PELVIS W/CONTRAST: CPT | Mod: 26

## 2017-11-27 PROCEDURE — 99233 SBSQ HOSP IP/OBS HIGH 50: CPT

## 2017-11-27 PROCEDURE — 99232 SBSQ HOSP IP/OBS MODERATE 35: CPT

## 2017-11-27 PROCEDURE — 99232 SBSQ HOSP IP/OBS MODERATE 35: CPT | Mod: GC

## 2017-11-27 RX ORDER — MAGNESIUM SULFATE 500 MG/ML
1 VIAL (ML) INJECTION ONCE
Qty: 0 | Refills: 0 | Status: COMPLETED | OUTPATIENT
Start: 2017-11-27 | End: 2017-11-27

## 2017-11-27 RX ORDER — ACETAMINOPHEN 500 MG
1000 TABLET ORAL ONCE
Qty: 0 | Refills: 0 | Status: COMPLETED | OUTPATIENT
Start: 2017-11-28 | End: 2017-11-28

## 2017-11-27 RX ORDER — ACETAMINOPHEN 500 MG
1000 TABLET ORAL ONCE
Qty: 0 | Refills: 0 | Status: COMPLETED | OUTPATIENT
Start: 2017-11-27 | End: 2017-11-27

## 2017-11-27 RX ADMIN — Medication 100 GRAM(S): at 14:10

## 2017-11-27 RX ADMIN — Medication 25 MILLIGRAM(S): at 05:23

## 2017-11-27 RX ADMIN — Medication 400 MILLIGRAM(S): at 16:40

## 2017-11-27 RX ADMIN — HYDROMORPHONE HYDROCHLORIDE 1 MILLIGRAM(S): 2 INJECTION INTRAMUSCULAR; INTRAVENOUS; SUBCUTANEOUS at 13:01

## 2017-11-27 RX ADMIN — PIPERACILLIN AND TAZOBACTAM 25 GRAM(S): 4; .5 INJECTION, POWDER, LYOPHILIZED, FOR SOLUTION INTRAVENOUS at 14:11

## 2017-11-27 RX ADMIN — Medication 3 MILLIGRAM(S): at 22:07

## 2017-11-27 RX ADMIN — ENOXAPARIN SODIUM 40 MILLIGRAM(S): 100 INJECTION SUBCUTANEOUS at 13:12

## 2017-11-27 RX ADMIN — Medication 25 MILLIGRAM(S): at 17:58

## 2017-11-27 RX ADMIN — Medication 1000 MILLIGRAM(S): at 06:00

## 2017-11-27 RX ADMIN — SENNA PLUS 2 TABLET(S): 8.6 TABLET ORAL at 22:07

## 2017-11-27 RX ADMIN — Medication 1000 MILLIGRAM(S): at 23:46

## 2017-11-27 RX ADMIN — HYDROMORPHONE HYDROCHLORIDE 1 MILLIGRAM(S): 2 INJECTION INTRAMUSCULAR; INTRAVENOUS; SUBCUTANEOUS at 17:55

## 2017-11-27 RX ADMIN — HYDROMORPHONE HYDROCHLORIDE 1 MILLIGRAM(S): 2 INJECTION INTRAMUSCULAR; INTRAVENOUS; SUBCUTANEOUS at 22:01

## 2017-11-27 RX ADMIN — Medication 25 MILLIGRAM(S): at 22:13

## 2017-11-27 RX ADMIN — HYDROMORPHONE HYDROCHLORIDE 30 MILLILITER(S): 2 INJECTION INTRAMUSCULAR; INTRAVENOUS; SUBCUTANEOUS at 21:59

## 2017-11-27 RX ADMIN — PIPERACILLIN AND TAZOBACTAM 25 GRAM(S): 4; .5 INJECTION, POWDER, LYOPHILIZED, FOR SOLUTION INTRAVENOUS at 05:22

## 2017-11-27 RX ADMIN — HYDROMORPHONE HYDROCHLORIDE 30 MILLILITER(S): 2 INJECTION INTRAMUSCULAR; INTRAVENOUS; SUBCUTANEOUS at 07:30

## 2017-11-27 RX ADMIN — HYDROMORPHONE HYDROCHLORIDE 30 MILLILITER(S): 2 INJECTION INTRAMUSCULAR; INTRAVENOUS; SUBCUTANEOUS at 19:31

## 2017-11-27 RX ADMIN — Medication 25 MILLIGRAM(S): at 09:00

## 2017-11-27 RX ADMIN — Medication 25 MILLIGRAM(S): at 02:02

## 2017-11-27 RX ADMIN — Medication 400 MILLIGRAM(S): at 22:46

## 2017-11-27 RX ADMIN — HYDROMORPHONE HYDROCHLORIDE 1 MILLIGRAM(S): 2 INJECTION INTRAMUSCULAR; INTRAVENOUS; SUBCUTANEOUS at 02:02

## 2017-11-27 RX ADMIN — Medication 25 MILLIGRAM(S): at 13:12

## 2017-11-27 RX ADMIN — PIPERACILLIN AND TAZOBACTAM 25 GRAM(S): 4; .5 INJECTION, POWDER, LYOPHILIZED, FOR SOLUTION INTRAVENOUS at 22:09

## 2017-11-27 RX ADMIN — Medication 400 MILLIGRAM(S): at 10:13

## 2017-11-27 RX ADMIN — Medication 100 MILLIGRAM(S): at 13:12

## 2017-11-27 RX ADMIN — Medication 400 MILLIGRAM(S): at 05:22

## 2017-11-27 NOTE — CHART NOTE - NSCHARTNOTEFT_GEN_A_CORE
Pt seen for malnutrition and TPN follow up. Calorie Count posted 11/27 - 11/29; communicated with RN.    Hospital Course: Pt is a 56 yo woman with history of nephrolithiasis, HTN, and appendectomy admitted on 10/16 for gallstone pancreatitis c/b necrotizing pancreatitis and ascending cholangitis S/P ERCP and stent placement (10/21) and FNA of perihepatic fluid (10/31), which grew E. faecalis. Pt S/P endoscopic drainage of pancreatic necrosis, cystgastrostomy placement of 2 metal stents 11/8.     Diet advanced to Low Fat on 11/24; pt previously tolerating well, however with reduced po intake today due to abdominal pain.    Diet recall:  11/26 Breakfast: oatmeal, cereal, fruit  11/26 Lunch: soup, vegetable  11/26 Dinner: burger, salad    11/27 Breakfast: yogurt, fruit, tea    Source: Patient [X ]    Family [ ]     other [ X]; medical record, RN    Diet : Low Fat diet + TPN    GI: + BM 11/26. Pt denies N+V, endorses abdominal pain and reduced appetite/intake     Enteral /Parenteral Nutrition: TPN infusing at 55ml/hr (84Gm amino acids, 192Gm dextrose, 71ml 20%lipids) to [id number] (17 pollo/Kg, 1.26 Gm protein/Kg per dosing wt 66.5Kg); with 10cc MVI 9+5, 3cc MTE-5.     Weight: 69.5Kg (10/26), 66.5Kg (10/17); weight change likely reflects fluid shifts; no recent weight  Edema: none noted    Pertinent Medications: MEDICATIONS  (STANDING):  diphenhydrAMINE   Injectable 25 milliGRAM(s) IV Push once  docusate sodium 100 milliGRAM(s) Oral daily  enoxaparin Injectable 40 milliGRAM(s) SubCutaneous daily  HYDROmorphone PCA (1 mG/mL) 30 milliLiter(s) PCA Continuous PCA Continuous  magnesium sulfate  IVPB 1 Gram(s) IV Intermittent once  melatonin 3 milliGRAM(s) Oral at bedtime  piperacillin/tazobactam IVPB. 3.375 Gram(s) IV Intermittent every 8 hours  senna 2 Tablet(s) Oral at bedtime  sodium chloride 0.45%. 1000 milliLiter(s) (20 mL/Hr) IV Continuous <Continuous>    MEDICATIONS  (PRN):  diphenhydrAMINE   Injectable 25 milliGRAM(s) IV Push every 4 hours PRN Rash and/or Itching  HYDROmorphone PCA (1 mG/mL) Rescue Clinician Bolus 1 milliGRAM(s) IV Push every 15 minutes PRN for Pain Scale GREATER THAN 6  naloxone Injectable 0.1 milliGRAM(s) IV Push every 3 minutes PRN For ANY of the following changes in patient status:  A. RR LESS THAN 10 breaths per minute, B. Oxygen saturation LESS THAN 90%, C. Sedation score of 6  ondansetron Injectable 4 milliGRAM(s) IV Push every 6 hours PRN Nausea    Pertinent Labs:  11-27 Na137 mmol/L Glu 104 mg/dL<H> K+ 4.2 mmol/L Cr  0.45 mg/dL<L> BUN 14 mg/dL Phos 4.4 mg/dL     Skin: no pressure injuries    Estimated Needs:   [ X] no change since previous assessment  [ ] recalculated:     Previous Nutrition Diagnosis:     [X ] Malnutrition; severe    Nutrition Diagnosis is [X ] ongoing; being addressed with po diet with calorie count, and PN    New Nutrition Diagnosis: [ X] not applicable     Interventions:     Recommend:  1) PN per MD Guallpa; monitor ability to d/c PN when pt is meeting 75% of nutrition needs via po diet  2) Continue Low Fat diet; encourage small, frequent meals of nutrient dense foods  3) Monitor weight, lab values, skin, po intake and GI tolerance    Monitoring and Evaluation:   Follow up per protocol  RD to remain available for further nutritional interventions as indicated.   Anabela Molina, MS RD [facility], #[phone].

## 2017-11-27 NOTE — PROGRESS NOTE ADULT - ASSESSMENT
57 y.o. woman with history of nephrolithiasis, HTN, and appendectomy admitted on 10/16 for gallstone pancreatitis c/b necrotizing pancreatitis and ascending cholangitis s/p ERCP and stent placement (10/21) and FNA of perihepatic fluid (10/31), which grew E. faecalis. She is s/p cystgastrostomy with 2 axiom stents placement by GI on 11/8. She was not able to tolerate tube feeding and now is receiving low-fat diet with TPN.    C.w diet  Pain control  C/w atbx as per surgical team  No plans for endoscopic debridement currently 57 y.o. woman with history of nephrolithiasis, HTN, and appendectomy admitted on 10/16 for gallstone pancreatitis c/b necrotizing pancreatitis and ascending cholangitis s/p ERCP and stent placement (10/21) and FNA of peripancreatic fluid/necrosis (10/31), which grew E. faecalis. She is s/p endoscopic cystgastrostomy with 2 AXIOS stents on 11/8.  Her fever has resolved with broad spectrum antibiotics (Imipenem, now Zosyn per ID) and is now tolerating some PO intake supplemented by TPN.  Her abdominal pain is improved.  CT scan today shows near resolution of collections.    Advance diet as tolerated.  Pain control  Antibiotics as per infectious disease consultants  No plans for endoscopic debridement currently.  Outpatient GI followup with Dr. Uziel Calles or Dr. Abraham Javier ([phone]).  Will need further outpatient endoscopic procedures and eventual removal of stents.  Will sign off, call for questions/concerns.

## 2017-11-27 NOTE — PROGRESS NOTE ADULT - ATTENDING COMMENTS
Noel Boland  Attending Physician   Division of Infectious Disease  Pager #[phone]  After 5pm/weekend #[phone]

## 2017-11-27 NOTE — PROGRESS NOTE ADULT - SUBJECTIVE AND OBJECTIVE BOX
[facility]STRO[facility]      Chief Complaint:  Patient is a 57y old  Female who presents with a chief complaint of Abdominal pain (16 Oct 2017 09:50)      Interval Events: Pt feeling well. Now on regular PO diet.    Allergies:  No Known Allergies      Hospital Medications:  acetaminophen  IVPB. 1000 milliGRAM(s) IV Intermittent once  diphenhydrAMINE   Injectable 25 milliGRAM(s) IV Push every 4 hours PRN  diphenhydrAMINE   Injectable 25 milliGRAM(s) IV Push once  docusate sodium 100 milliGRAM(s) Oral daily  enoxaparin Injectable 40 milliGRAM(s) SubCutaneous daily  HYDROmorphone PCA (1 mG/mL) 30 milliLiter(s) PCA Continuous PCA Continuous  HYDROmorphone PCA (1 mG/mL) Rescue Clinician Bolus 1 milliGRAM(s) IV Push every 15 minutes PRN  melatonin 3 milliGRAM(s) Oral at bedtime  naloxone Injectable 0.1 milliGRAM(s) IV Push every 3 minutes PRN  ondansetron Injectable 4 milliGRAM(s) IV Push every 6 hours PRN  piperacillin/tazobactam IVPB. 3.375 Gram(s) IV Intermittent every 8 hours  senna 2 Tablet(s) Oral at bedtime  sodium chloride 0.45%. 1000 milliLiter(s) IV Continuous <Continuous>      PMHX/PSHX:  Kidney stone  Hypertension  No pertinent past medical history  Kidney stones  History of appendectomy      Family history:  No pertinent family history in first degree relatives      ROS:     General:  No fevers, chills  Eyes:  Good vision  ENT:  No odynophagia, dysphagia  CV:  No pain, palpitations  Resp:  No dyspnea, cough, tachypnea, wheezing  GI:  See HPI  Muscle:  No pain, weakness  Skin:  No rash, edema      PHYSICAL EXAM:     GENERAL:  No distress  HEENT: conjunctivae clear  CHEST:  Full & symmetric excursion, no increased effort  HEART:  Regular rhythm  ABDOMEN:  Soft, mild tender, non-distended  EXTREMITIES:  no edema  SKIN:  Dry/warm  NEURO:  Alert    Vital Signs:  Vital Signs Last 24 Hrs  T(C): 37.2 (27 Nov 2017 05:32), Max: 37.2 (27 Nov 2017 05:32)  T(F): 98.9 (27 Nov 2017 05:32), Max: 98.9 (27 Nov 2017 05:32)  HR: 83 (27 Nov 2017 05:32) (74 - 83)  BP: 107/66 (27 Nov 2017 05:32) (102/67 - 119/70)  BP(mean): --  RR: 18 (27 Nov 2017 05:32) (18 - 18)  SpO2: 98% (27 Nov 2017 05:32) (95% - 98%)  Daily     Daily     LABS:                        8.0    10.61 )-----------( 730      ( 26 Nov 2017 09:50 )             25.5     11-26    136  |  98  |  15  ----------------------------<  96  4.2   |  24  |  0.42<L>    Ca    9.1      26 Nov 2017 09:33  Phos  4.8     11-26  Mg     1.9     11-26    TPro  7.1  /  Alb  3.3  /  TBili  <0.2  /  DBili  x   /  AST  26  /  ALT  13  /  AlkPhos  234<H>  11-26    LIVER FUNCTIONS - ( 26 Nov 2017 09:33 )  Alb: 3.3 g/dL / Pro: 7.1 g/dL / ALK PHOS: 234 U/L / ALT: 13 U/L / AST: 26 U/L / GGT: x                   Imaging:

## 2017-11-27 NOTE — PROGRESS NOTE ADULT - SUBJECTIVE AND OBJECTIVE BOX
PINA MILLS 57y [id number]    Patient is a 57y old  Female who presents with a chief complaint of Abdominal pain (16 Oct 2017 09:50)      Follow Up/CC:  pancreatitis    Interval History/ROS: tolerating PO, c/o abd pain    Allergies    No Known Allergies    Intolerances        ANTIMICROBIALS:  piperacillin/tazobactam IVPB. 3.375 every 8 hours      MEDICATIONS  (STANDING):  diphenhydrAMINE   Injectable 25 milliGRAM(s) IV Push once  docusate sodium 100 milliGRAM(s) Oral daily  enoxaparin Injectable 40 milliGRAM(s) SubCutaneous daily  HYDROmorphone PCA (1 mG/mL) 30 milliLiter(s) PCA Continuous PCA Continuous  melatonin 3 milliGRAM(s) Oral at bedtime  piperacillin/tazobactam IVPB. 3.375 Gram(s) IV Intermittent every 8 hours  senna 2 Tablet(s) Oral at bedtime  sodium chloride 0.45%. 1000 milliLiter(s) (20 mL/Hr) IV Continuous <Continuous>    MEDICATIONS  (PRN):  diphenhydrAMINE   Injectable 25 milliGRAM(s) IV Push every 4 hours PRN Rash and/or Itching  HYDROmorphone PCA (1 mG/mL) Rescue Clinician Bolus 1 milliGRAM(s) IV Push every 15 minutes PRN for Pain Scale GREATER THAN 6  naloxone Injectable 0.1 milliGRAM(s) IV Push every 3 minutes PRN For ANY of the following changes in patient status:  A. RR LESS THAN 10 breaths per minute, B. Oxygen saturation LESS THAN 90%, C. Sedation score of 6  ondansetron Injectable 4 milliGRAM(s) IV Push every 6 hours PRN Nausea        Vital Signs Last 24 Hrs  T(C): 36.6 (27 Nov 2017 14:15), Max: 37.2 (27 Nov 2017 05:32)  T(F): 97.8 (27 Nov 2017 14:15), Max: 98.9 (27 Nov 2017 05:32)  HR: 74 (27 Nov 2017 14:15) (74 - 87)  BP: 106/74 (27 Nov 2017 14:15) (104/67 - 119/70)  BP(mean): --  RR: 18 (27 Nov 2017 14:15) (18 - 18)  SpO2: 94% (27 Nov 2017 14:15) (94% - 98%)    CBC Full  -  ( 27 Nov 2017 08:39 )  WBC Count : 12.06 K/uL  Hemoglobin : 8.4 g/dL  Hematocrit : 26.7 %  Platelet Count - Automated : 725 K/uL  Mean Cell Volume : 83.7 fl  Mean Cell Hemoglobin : 26.3 pg  Mean Cell Hemoglobin Concentration : 31.5 gm/dL  Auto Neutrophil # : x  Auto Lymphocyte # : x  Auto Monocyte # : x  Auto Eosinophil # : x  Auto Basophil # : x  Auto Neutrophil % : x  Auto Lymphocyte % : x  Auto Monocyte % : x  Auto Eosinophil % : x  Auto Basophil % : x    11-27    137  |  99  |  14  ----------------------------<  104<H>  4.2   |  24  |  0.45<L>    Ca    9.3      27 Nov 2017 08:45  Phos  4.4     11-27  Mg     1.8     11-27    TPro  7.1  /  Alb  3.3  /  TBili  <0.2  /  DBili  x   /  AST  26  /  ALT  13  /  AlkPhos  234<H>  11-26    LIVER FUNCTIONS - ( 26 Nov 2017 09:33 )  Alb: 3.3 g/dL / Pro: 7.1 g/dL / ALK PHOS: 234 U/L / ALT: 13 U/L / AST: 26 U/L / GGT: x               MICROBIOLOGY:  .Blood PICC/PERC Single Lumen  11-16-17   No growth at 5 days.  --  --      .Urine Clean Catch (Midstream)  11-16-17   10,000 - 49,000 CFU/mL Methicillin resistant Staphylococcus aureus  --  Methicillin resistant Staphylococcus aureus      Abdominal Fl Abdominal Fluid  10-30-17   Moderate Enterococcus faecalis  --  Enterococcus faecalis        RADIOLOGY  CT Abdomen and Pelvis w/ Oral Cont and w/ IV Cont (11.27.17 @ 09:56) >  Interval collapse of collections within the lesser sac.    Small collections remain adjacent to the pancreatic tail and inferior to   the pancreatic head, decreased.

## 2017-11-27 NOTE — PROGRESS NOTE ADULT - ASSESSMENT
57yF w/ PMH sig for HTN and nephrolithiasis, s/p lap appy. Pt presented to [facility] ED on 10/16/2017 c/o acute onset severe sharp stabbing upper abdominal pain that radiates to the back. She says the pain woke her up in the middle of the night.  admitted on 10/16 for gallstone pancreatitis c/b necrotizing pancreatitis and ascending cholangitis s/p ERCP and stent placement (10/21) and FNA of perihepatic fluid (10/31), which grew E. faecalis. She is s/p cystgastrostomy with 2 axiom stents placement by GI on 11/8. She was not able to tolerate tube feeding and now is receiving TPN/ clears.  cultures are so far negative except on 30th which grew Enterococcus fecalis

## 2017-11-27 NOTE — CHART NOTE - NSCHARTNOTEFT_GEN_A_CORE
Pain Management Attending Addendum    SUBJECTIVE: Patient is doing well with IV PCA, pain controlled. Will continue current therapy today.    Therapy:    [X] IV PCA         [ ] PRN Analgesics    OBJECTIVE:   [X] Pain appropriately controlled    [ ] Other:    Side Effects:  [X] None	             [ ] Nausea              [ ] Pruritis                	[ ] Other:    ASSESSMENT/PLAN: Continue current therapy

## 2017-11-27 NOTE — PROGRESS NOTE ADULT - SUBJECTIVE AND OBJECTIVE BOX
Day 40\18 of Anesthesia Pain Management Service    SUBJECTIVE: Patient is doing well with IV PCA    Pain Scale Score:	[X] Refer to charted pain scores    THERAPY:    [ ] IV PCA Morphine		[ ] 5 mg/mL	[ ] 1 mg/mL  [X] IV PCA Hydromorphone	[ ] 5 mg/mL	[X] 1 mg/mL  [ ] IV PCA Fentanyl		[ ] 50 micrograms/mL    Demand dose: 0.45 mg     Lockout: 6 minutes   Continuous Rate: 0 mg/hr  4 Hour Limit: 9 mg    MEDICATIONS  (STANDING):  acetaminophen  IVPB. 1000 milliGRAM(s) IV Intermittent once  diphenhydrAMINE   Injectable 25 milliGRAM(s) IV Push once  docusate sodium 100 milliGRAM(s) Oral daily  enoxaparin Injectable 40 milliGRAM(s) SubCutaneous daily  HYDROmorphone PCA (1 mG/mL) 30 milliLiter(s) PCA Continuous PCA Continuous  melatonin 3 milliGRAM(s) Oral at bedtime  piperacillin/tazobactam IVPB. 3.375 Gram(s) IV Intermittent every 8 hours  senna 2 Tablet(s) Oral at bedtime  sodium chloride 0.45%. 1000 milliLiter(s) (20 mL/Hr) IV Continuous <Continuous>    MEDICATIONS  (PRN):  diphenhydrAMINE   Injectable 25 milliGRAM(s) IV Push every 4 hours PRN Rash and/or Itching  HYDROmorphone PCA (1 mG/mL) Rescue Clinician Bolus 1 milliGRAM(s) IV Push every 15 minutes PRN for Pain Scale GREATER THAN 6  naloxone Injectable 0.1 milliGRAM(s) IV Push every 3 minutes PRN For ANY of the following changes in patient status:  A. RR LESS THAN 10 breaths per minute, B. Oxygen saturation LESS THAN 90%, C. Sedation score of 6  ondansetron Injectable 4 milliGRAM(s) IV Push every 6 hours PRN Nausea      OBJECTIVE:    Sedation Score:	[ X] Alert	[ ] Drowsy 	[ ] Arousable	[ ] Asleep	[ ] Unresponsive    Side Effects:	[X ] None	[ ] Nausea	[ ] Vomiting	[ ] Pruritus  		[ ] Other:    Vital Signs Last 24 Hrs  T(C): 36.7 (27 Nov 2017 09:26), Max: 37.2 (27 Nov 2017 05:32)  T(F): 98 (27 Nov 2017 09:26), Max: 98.9 (27 Nov 2017 05:32)  HR: 87 (27 Nov 2017 09:26) (74 - 87)  BP: 114/77 (27 Nov 2017 09:26) (102/67 - 119/70)  BP(mean): --  RR: 18 (27 Nov 2017 09:26) (18 - 18)  SpO2: 97% (27 Nov 2017 09:26) (95% - 98%)    ASSESSMENT/ PLAN    Therapy to  be:               [X] Continued   [ ] Discontinued   [ ] Changed to PRN Analgesics    Documentation and Verification of current medications:   [X] Done	[ ] Not done, not eligible    Comments:

## 2017-11-27 NOTE — PROGRESS NOTE ADULT - ATTENDING COMMENTS
seen and examined 11/27/2017 @ 1050    tolerating some regular diet  still using Dilaudid PCA for epigastric pain    WBC stable @ 12    CT abd/pelvis (today) - small stable undrained WOPN at tail, other WOPN at head and body is well drained endoscopically, collection adjacent to head is much small as compared to 11/15    vanco sensitive E faecalis infected pancreatic necrosis  -on Zosyn  -f/u ID    severe protein calorie malnutrition  -TPN  -calorie count

## 2017-11-27 NOTE — PROGRESS NOTE ADULT - SUBJECTIVE AND OBJECTIVE BOX
ATP Progress note    Subjective: Patient seen and examined. No acute events overnight. Patient states that her pain continues to improve. She is tolerating her low-fat diet. Has not been nausea or had any episodes of emesis. Still using PCA pump and asking for IV tylenol.       Objective:   T(C): 37.2 (11-27-17 @ 05:32), Max: 37.2 (11-27-17 @ 05:32)  HR: 83 (11-27-17 @ 05:32) (74 - 83)  BP: 107/66 (11-27-17 @ 05:32) (102/67 - 119/70)  RR: 18 (11-27-17 @ 05:32) (18 - 18)  SpO2: 98% (11-27-17 @ 05:32) (95% - 98%)          11-26-17 @ 07:01  -  11-27-17 @ 07:00  --------------------------------------------------------  IN: 3360 mL / OUT: 0 mL / NET: 3360 mL        LABS:                        8.0    10.61 )-----------( 730      ( 26 Nov 2017 09:50 )             25.5     11-26    136  |  98  |  15  ----------------------------<  96  4.2   |  24  |  0.42<L>    Ca    9.1      26 Nov 2017 09:33  Phos  4.8     11-26  Mg     1.9     11-26    TPro  7.1  /  Alb  3.3  /  TBili  <0.2  /  DBili  x   /  AST  26  /  ALT  13  /  AlkPhos  234<H>  11-26      Focused Physical Exam:   General: NAD  Respiratory: Nonlabored breathing  Abdomen: Soft, ND, mild tenderness in epigastric region, continues to improve from previous exams

## 2017-11-28 LAB
ALBUMIN SERPL ELPH-MCNC: 3 G/DL — LOW (ref 3.3–5)
ALP SERPL-CCNC: 222 U/L — HIGH (ref 40–120)
ALT FLD-CCNC: 14 U/L — SIGNIFICANT CHANGE UP (ref 10–45)
ANION GAP SERPL CALC-SCNC: 13 MMOL/L — SIGNIFICANT CHANGE UP (ref 5–17)
AST SERPL-CCNC: 24 U/L — SIGNIFICANT CHANGE UP (ref 10–40)
BASOPHILS # BLD AUTO: 0.06 K/UL — SIGNIFICANT CHANGE UP (ref 0–0.2)
BASOPHILS NFR BLD AUTO: 0.6 % — SIGNIFICANT CHANGE UP (ref 0–2)
BILIRUB SERPL-MCNC: 0.2 MG/DL — SIGNIFICANT CHANGE UP (ref 0.2–1.2)
BUN SERPL-MCNC: 14 MG/DL — SIGNIFICANT CHANGE UP (ref 7–23)
CALCIUM SERPL-MCNC: 9.2 MG/DL — SIGNIFICANT CHANGE UP (ref 8.4–10.5)
CHLORIDE SERPL-SCNC: 101 MMOL/L — SIGNIFICANT CHANGE UP (ref 96–108)
CO2 SERPL-SCNC: 26 MMOL/L — SIGNIFICANT CHANGE UP (ref 22–31)
CREAT SERPL-MCNC: 0.53 MG/DL — SIGNIFICANT CHANGE UP (ref 0.5–1.3)
EOSINOPHIL # BLD AUTO: 0.85 K/UL — HIGH (ref 0–0.5)
EOSINOPHIL NFR BLD AUTO: 8 % — HIGH (ref 0–6)
GLUCOSE SERPL-MCNC: 103 MG/DL — HIGH (ref 70–99)
HCT VFR BLD CALC: 26.3 % — LOW (ref 34.5–45)
HGB BLD-MCNC: 8.3 G/DL — LOW (ref 11.5–15.5)
IMM GRANULOCYTES NFR BLD AUTO: 0.3 % — SIGNIFICANT CHANGE UP (ref 0–1.5)
LYMPHOCYTES # BLD AUTO: 2.88 K/UL — SIGNIFICANT CHANGE UP (ref 1–3.3)
LYMPHOCYTES # BLD AUTO: 27 % — SIGNIFICANT CHANGE UP (ref 13–44)
MCHC RBC-ENTMCNC: 26.2 PG — LOW (ref 27–34)
MCHC RBC-ENTMCNC: 31.6 GM/DL — LOW (ref 32–36)
MCV RBC AUTO: 83 FL — SIGNIFICANT CHANGE UP (ref 80–100)
MONOCYTES # BLD AUTO: 0.68 K/UL — SIGNIFICANT CHANGE UP (ref 0–0.9)
MONOCYTES NFR BLD AUTO: 6.4 % — SIGNIFICANT CHANGE UP (ref 2–14)
NEUTROPHILS # BLD AUTO: 6.17 K/UL — SIGNIFICANT CHANGE UP (ref 1.8–7.4)
NEUTROPHILS NFR BLD AUTO: 57.7 % — SIGNIFICANT CHANGE UP (ref 43–77)
PLATELET # BLD AUTO: 723 K/UL — HIGH (ref 150–400)
POTASSIUM SERPL-MCNC: 4.4 MMOL/L — SIGNIFICANT CHANGE UP (ref 3.5–5.3)
POTASSIUM SERPL-SCNC: 4.4 MMOL/L — SIGNIFICANT CHANGE UP (ref 3.5–5.3)
PROT SERPL-MCNC: 7.6 G/DL — SIGNIFICANT CHANGE UP (ref 6–8.3)
RBC # BLD: 3.17 M/UL — LOW (ref 3.8–5.2)
RBC # FLD: 15.7 % — HIGH (ref 10.3–14.5)
SODIUM SERPL-SCNC: 140 MMOL/L — SIGNIFICANT CHANGE UP (ref 135–145)
WBC # BLD: 10.67 K/UL — HIGH (ref 3.8–10.5)
WBC # FLD AUTO: 10.67 K/UL — HIGH (ref 3.8–10.5)

## 2017-11-28 PROCEDURE — 99233 SBSQ HOSP IP/OBS HIGH 50: CPT

## 2017-11-28 PROCEDURE — 99232 SBSQ HOSP IP/OBS MODERATE 35: CPT

## 2017-11-28 RX ORDER — ACETAMINOPHEN 500 MG
1000 TABLET ORAL ONCE
Qty: 0 | Refills: 0 | Status: COMPLETED | OUTPATIENT
Start: 2017-11-28 | End: 2017-11-28

## 2017-11-28 RX ORDER — HYDROMORPHONE HYDROCHLORIDE 2 MG/ML
1 INJECTION INTRAMUSCULAR; INTRAVENOUS; SUBCUTANEOUS
Qty: 0 | Refills: 0 | Status: DISCONTINUED | OUTPATIENT
Start: 2017-11-28 | End: 2017-11-29

## 2017-11-28 RX ORDER — HYDROMORPHONE HYDROCHLORIDE 2 MG/ML
2 INJECTION INTRAMUSCULAR; INTRAVENOUS; SUBCUTANEOUS
Qty: 0 | Refills: 0 | Status: DISCONTINUED | OUTPATIENT
Start: 2017-11-28 | End: 2017-11-29

## 2017-11-28 RX ORDER — HYDROMORPHONE HYDROCHLORIDE 2 MG/ML
2 INJECTION INTRAMUSCULAR; INTRAVENOUS; SUBCUTANEOUS EVERY 4 HOURS
Qty: 0 | Refills: 0 | Status: DISCONTINUED | OUTPATIENT
Start: 2017-11-28 | End: 2017-11-28

## 2017-11-28 RX ORDER — HYDROMORPHONE HYDROCHLORIDE 2 MG/ML
4 INJECTION INTRAMUSCULAR; INTRAVENOUS; SUBCUTANEOUS EVERY 4 HOURS
Qty: 0 | Refills: 0 | Status: DISCONTINUED | OUTPATIENT
Start: 2017-11-28 | End: 2017-11-28

## 2017-11-28 RX ORDER — HYDROMORPHONE HYDROCHLORIDE 2 MG/ML
6 INJECTION INTRAMUSCULAR; INTRAVENOUS; SUBCUTANEOUS
Qty: 0 | Refills: 0 | Status: DISCONTINUED | OUTPATIENT
Start: 2017-11-28 | End: 2017-11-29

## 2017-11-28 RX ORDER — HYDROMORPHONE HYDROCHLORIDE 2 MG/ML
4 INJECTION INTRAMUSCULAR; INTRAVENOUS; SUBCUTANEOUS
Qty: 0 | Refills: 0 | Status: DISCONTINUED | OUTPATIENT
Start: 2017-11-28 | End: 2017-11-29

## 2017-11-28 RX ADMIN — HYDROMORPHONE HYDROCHLORIDE 6 MILLIGRAM(S): 2 INJECTION INTRAMUSCULAR; INTRAVENOUS; SUBCUTANEOUS at 20:54

## 2017-11-28 RX ADMIN — HYDROMORPHONE HYDROCHLORIDE 6 MILLIGRAM(S): 2 INJECTION INTRAMUSCULAR; INTRAVENOUS; SUBCUTANEOUS at 16:45

## 2017-11-28 RX ADMIN — Medication 25 MILLIGRAM(S): at 18:09

## 2017-11-28 RX ADMIN — ENOXAPARIN SODIUM 40 MILLIGRAM(S): 100 INJECTION SUBCUTANEOUS at 11:33

## 2017-11-28 RX ADMIN — HYDROMORPHONE HYDROCHLORIDE 6 MILLIGRAM(S): 2 INJECTION INTRAMUSCULAR; INTRAVENOUS; SUBCUTANEOUS at 12:57

## 2017-11-28 RX ADMIN — HYDROMORPHONE HYDROCHLORIDE 30 MILLILITER(S): 2 INJECTION INTRAMUSCULAR; INTRAVENOUS; SUBCUTANEOUS at 07:33

## 2017-11-28 RX ADMIN — Medication 1000 MILLIGRAM(S): at 12:00

## 2017-11-28 RX ADMIN — Medication 1000 MILLIGRAM(S): at 22:34

## 2017-11-28 RX ADMIN — Medication 1000 MILLIGRAM(S): at 06:04

## 2017-11-28 RX ADMIN — Medication 400 MILLIGRAM(S): at 05:34

## 2017-11-28 RX ADMIN — Medication 25 MILLIGRAM(S): at 05:47

## 2017-11-28 RX ADMIN — PIPERACILLIN AND TAZOBACTAM 25 GRAM(S): 4; .5 INJECTION, POWDER, LYOPHILIZED, FOR SOLUTION INTRAVENOUS at 05:34

## 2017-11-28 RX ADMIN — HYDROMORPHONE HYDROCHLORIDE 6 MILLIGRAM(S): 2 INJECTION INTRAMUSCULAR; INTRAVENOUS; SUBCUTANEOUS at 21:24

## 2017-11-28 RX ADMIN — HYDROMORPHONE HYDROCHLORIDE 1 MILLIGRAM(S): 2 INJECTION INTRAMUSCULAR; INTRAVENOUS; SUBCUTANEOUS at 18:09

## 2017-11-28 RX ADMIN — Medication 100 MILLIGRAM(S): at 11:33

## 2017-11-28 RX ADMIN — PIPERACILLIN AND TAZOBACTAM 25 GRAM(S): 4; .5 INJECTION, POWDER, LYOPHILIZED, FOR SOLUTION INTRAVENOUS at 13:54

## 2017-11-28 RX ADMIN — Medication 3 MILLIGRAM(S): at 23:59

## 2017-11-28 RX ADMIN — Medication 400 MILLIGRAM(S): at 11:32

## 2017-11-28 RX ADMIN — Medication 25 MILLIGRAM(S): at 02:03

## 2017-11-28 RX ADMIN — SENNA PLUS 2 TABLET(S): 8.6 TABLET ORAL at 20:53

## 2017-11-28 RX ADMIN — PIPERACILLIN AND TAZOBACTAM 25 GRAM(S): 4; .5 INJECTION, POWDER, LYOPHILIZED, FOR SOLUTION INTRAVENOUS at 22:05

## 2017-11-28 RX ADMIN — Medication 25 MILLIGRAM(S): at 22:00

## 2017-11-28 RX ADMIN — HYDROMORPHONE HYDROCHLORIDE 1 MILLIGRAM(S): 2 INJECTION INTRAMUSCULAR; INTRAVENOUS; SUBCUTANEOUS at 18:49

## 2017-11-28 RX ADMIN — HYDROMORPHONE HYDROCHLORIDE 6 MILLIGRAM(S): 2 INJECTION INTRAMUSCULAR; INTRAVENOUS; SUBCUTANEOUS at 13:30

## 2017-11-28 RX ADMIN — Medication 400 MILLIGRAM(S): at 22:04

## 2017-11-28 RX ADMIN — HYDROMORPHONE HYDROCHLORIDE 6 MILLIGRAM(S): 2 INJECTION INTRAMUSCULAR; INTRAVENOUS; SUBCUTANEOUS at 16:13

## 2017-11-28 RX ADMIN — Medication 25 MILLIGRAM(S): at 14:00

## 2017-11-28 RX ADMIN — Medication 25 MILLIGRAM(S): at 09:38

## 2017-11-28 NOTE — PROGRESS NOTE ADULT - SUBJECTIVE AND OBJECTIVE BOX
On PN and Low Fat diet  Dewayne count in progress  11-27 @ 07:01  -  11-28 @ 07:00  --------------------------------------------------------  IN:    Oral Fluid: 920 mL    sodium chloride 0.45%.: 240 mL    Solution: 200 mL    Solution: 400 mL    TPN (Total Parenteral Nutrition): 1265 mL  Total IN: 3025 mL    OUT:  Total OUT: 0 mL    Total NET: 3025 mL        T(C): 36.9 (11-28-17 @ 06:27), Max: 37.2 (11-28-17 @ 02:00)  HR: 75 (11-28-17 @ 06:27) (74 - 89)  BP: 106/69 (11-28-17 @ 06:27) (105/66 - 119/81)  RR: 18 (11-28-17 @ 06:27) (18 - 18)  SpO2: 94% (11-28-17 @ 06:27) (94% - 97%)  Wt(kg): --    Labs   11-27    137  |  99  |  14  ----------------------------<  104<H>  4.2   |  24  |  0.45<L>    Ca    9.3      27 Nov 2017 08:45  Phos  4.4     11-27  Mg     1.8     11-27    TPro  7.1  /  Alb  3.3  /  TBili  <0.2  /  DBili  x   /  AST  26  /  ALT  13  /  AlkPhos  234<H>  11-26      LIVER FUNCTIONS - ( 26 Nov 2017 09:33 )  Alb: 3.3 g/dL / Pro: 7.1 g/dL / ALK PHOS: 234 U/L / ALT: 13 U/L / AST: 26 U/L / GGT: x           CAPILLARY BLOOD GLUCOSE        I&O's Detail    27 Nov 2017 07:01  -  28 Nov 2017 07:00  --------------------------------------------------------  IN:    Oral Fluid: 920 mL    sodium chloride 0.45%.: 240 mL    Solution: 200 mL    Solution: 400 mL    TPN (Total Parenteral Nutrition): 1265 mL  Total IN: 3025 mL    OUT:  Total OUT: 0 mL    Total NET: 3025 mL

## 2017-11-28 NOTE — PROGRESS NOTE ADULT - ATTENDING COMMENTS
seen and examined 11/28/2017 @ 1153    abd pain continues to improve  minimal epigastric tenderness    severe protein calorie malnutrition  -now tolerating a full diet  -D/C TPN  -add [employer] nutrition supplement    gallstone pancreatitis with E faecalis infected necrosis much improved after endoscopic drainage and ABx  -D/C Dilaudid PCA and start Dilaudid PO  -ABx as per ID

## 2017-11-28 NOTE — PROGRESS NOTE ADULT - ATTENDING COMMENTS
Nole Boland  Attending Physician   Division of Infectious Disease  Pager #[phone]  After 5pm/weekend #[phone]

## 2017-11-28 NOTE — PROGRESS NOTE ADULT - SUBJECTIVE AND OBJECTIVE BOX
PINA MILLS 57y [id number]    Patient is a 57y old  Female who presents with a chief complaint of Abdominal pain (16 Oct 2017 09:50)      Follow Up/CC:  pancreatic abscess    Interval History/ROS: some abd pain, tolerating PO    Allergies    No Known Allergies    Intolerances        ANTIMICROBIALS:  piperacillin/tazobactam IVPB. 3.375 every 8 hours      MEDICATIONS  (STANDING):  diphenhydrAMINE   Injectable 25 milliGRAM(s) IV Push once  docusate sodium 100 milliGRAM(s) Oral daily  enoxaparin Injectable 40 milliGRAM(s) SubCutaneous daily  melatonin 3 milliGRAM(s) Oral at bedtime  piperacillin/tazobactam IVPB. 3.375 Gram(s) IV Intermittent every 8 hours  senna 2 Tablet(s) Oral at bedtime  sodium chloride 0.45%. 1000 milliLiter(s) (20 mL/Hr) IV Continuous <Continuous>    MEDICATIONS  (PRN):  diphenhydrAMINE   Injectable 25 milliGRAM(s) IV Push every 4 hours PRN Rash and/or Itching  HYDROmorphone   Tablet 6 milliGRAM(s) Oral every 3 hours PRN Severe Pain (7 - 10)  HYDROmorphone   Tablet 4 milliGRAM(s) Oral every 3 hours PRN Moderate Pain (4 - 6)  HYDROmorphone   Tablet 2 milliGRAM(s) Oral every 3 hours PRN Mild Pain (1 - 3)  HYDROmorphone  Injectable 1 milliGRAM(s) IV Push every 3 hours PRN breakthrough pain  naloxone Injectable 0.1 milliGRAM(s) IV Push every 3 minutes PRN For ANY of the following changes in patient status:  A. RR LESS THAN 10 breaths per minute, B. Oxygen saturation LESS THAN 90%, C. Sedation score of 6  ondansetron Injectable 4 milliGRAM(s) IV Push every 6 hours PRN Nausea        Vital Signs Last 24 Hrs  T(C): 36.9 (28 Nov 2017 10:00), Max: 37.2 (28 Nov 2017 02:00)  T(F): 98.5 (28 Nov 2017 10:00), Max: 98.9 (28 Nov 2017 02:00)  HR: 75 (28 Nov 2017 10:00) (74 - 89)  BP: 108/73 (28 Nov 2017 10:00) (105/66 - 119/81)  BP(mean): --  RR: 16 (28 Nov 2017 10:00) (16 - 18)  SpO2: 93% (28 Nov 2017 10:00) (93% - 96%)    CBC Full  -  ( 28 Nov 2017 07:29 )  WBC Count : 10.67 K/uL  Hemoglobin : 8.3 g/dL  Hematocrit : 26.3 %  Platelet Count - Automated : 723 K/uL  Mean Cell Volume : 83.0 fl  Mean Cell Hemoglobin : 26.2 pg  Mean Cell Hemoglobin Concentration : 31.6 gm/dL  Auto Neutrophil # : 6.17 K/uL  Auto Lymphocyte # : 2.88 K/uL  Auto Monocyte # : 0.68 K/uL  Auto Eosinophil # : 0.85 K/uL  Auto Basophil # : 0.06 K/uL  Auto Neutrophil % : 57.7 %  Auto Lymphocyte % : 27.0 %  Auto Monocyte % : 6.4 %  Auto Eosinophil % : 8.0 %  Auto Basophil % : 0.6 %    11-28    140  |  101  |  14  ----------------------------<  103<H>  4.4   |  26  |  0.53    Ca    9.2      28 Nov 2017 08:54  Phos  4.4     11-27  Mg     1.8     11-27    TPro  7.6  /  Alb  3.0<L>  /  TBili  0.2  /  DBili  x   /  AST  24  /  ALT  14  /  AlkPhos  222<H>  11-28    LIVER FUNCTIONS - ( 28 Nov 2017 08:54 )  Alb: 3.0 g/dL / Pro: 7.6 g/dL / ALK PHOS: 222 U/L / ALT: 14 U/L / AST: 24 U/L / GGT: x               MICROBIOLOGY:  .Blood PICC/PERC Single Lumen  11-16-17   No growth at 5 days.  --  --      .Urine Clean Catch (Midstream)  11-16-17   10,000 - 49,000 CFU/mL Methicillin resistant Staphylococcus aureus  --  Methicillin resistant Staphylococcus aureus      Abdominal Fl Abdominal Fluid  10-30-17   Moderate Enterococcus faecalis  --  Enterococcus faecalis              v            RADIOLOGY

## 2017-11-28 NOTE — PROGRESS NOTE ADULT - SUBJECTIVE AND OBJECTIVE BOX
DX: Gallstone pancreatitis with pseudocyst and necrosis, s/p endo cystgastrostomy and stent placement; now improving    Vital Signs Last 24 Hrs  T(C): 36.9 (28 Nov 2017 06:27), Max: 37.2 (28 Nov 2017 02:00)  T(F): 98.5 (28 Nov 2017 06:27), Max: 98.9 (28 Nov 2017 02:00)  HR: 75 (28 Nov 2017 06:27) (74 - 89)  BP: 106/69 (28 Nov 2017 06:27) (105/66 - 119/81)  BP(mean): --  RR: 18 (28 Nov 2017 06:27) (18 - 18)  SpO2: 94% (28 Nov 2017 06:27) (94% - 97%)    I&O's Summary    27 Nov 2017 07:01  -  28 Nov 2017 07:00  --------------------------------------------------------  IN: 1725 mL / OUT: 0 mL / NET: 1725 mL        SUBJECTIVE: Pt seen and examined. Tolerating diet. ABD pain intermittent. No fever or chills.     Physical Exam:  General Appearance: Appears well, NAD  Abdomen: Soft, NT, ND  Extremities: Grossly symmetric, SCD's in place     LABS:                        8.4    12.06 )-----------( 725      ( 27 Nov 2017 08:39 )             26.7     11-27    137  |  99  |  14  ----------------------------<  104<H>  4.2   |  24  |  0.45<L>    Ca    9.3      27 Nov 2017 08:45  Phos  4.4     11-27  Mg     1.8     11-27    TPro  7.1  /  Alb  3.3  /  TBili  <0.2  /  DBili  x   /  AST  26  /  ALT  13  /  AlkPhos  234<H>  11-26          Lilli Alfred PA-C  p#[phone]

## 2017-11-28 NOTE — PROGRESS NOTE ADULT - PROBLEM SELECTOR PLAN 1
Management as per surgical team  D/C PN as soon as calorie intake is adequate as per pollo count which is in progress

## 2017-11-28 NOTE — CHART NOTE - NSCHARTNOTEFT_GEN_A_CORE
Brief Note - Calorie Count Day 1 (11/27)    Pt reports she ate well yesterday, despite abdominal pain. Patient is feeling better today, and indicated willingness to drink Promote (237calories, 14.8Gm protein per 8oz serving) to help meet protein and calorie needs. Recommendation communicated with team.    Patient met 75% of estimated nutrition needs on day 1:  Breakfast (11/27): 100% fruit, 100% yogurt  Lunch (11/27): 75% soup. 100% grilled cheese sandwich, 100% juice, 1 roll, 1 dessert  Dinner (11/27): 100% shrimp and rice, 100% vegetable, 100% juice, 100% rice pudding    Estimated Energy Needs based on dosing wt 66.5Kg  1662-1995cal/day (25-30cal/Kg)  80-93Gm protein/day (1.2-1.4Gm/Kg)    Recommend:  1) Continue Regular, Low Fat diet; add Promote (237calories, 14.8Gm protein per 8oz serving) tid  2) Consider discontinuing TPN as appropriate; defer to team and MD Guallpa  3) Monitor weight, lab values, skin, po intake and GI tolerance    Monitoring and Evaluation:   Follow up per protocol  RD to remain available for further nutritional interventions as indicated.   Anabela Molina, MS RD [facility], #[phone].

## 2017-11-28 NOTE — PROGRESS NOTE ADULT - ASSESSMENT
57 y.o. woman with history of nephrolithiasis, HTN, and appendectomy admitted on 10/16 for gallstone pancreatitis c/b necrotizing pancreatitis and ascending cholangitis s/p ERCP and stent placement (10/21) and FNA of perihepatic fluid (10/31), which grew E. faecalis. She is s/p cystgastrostomy with 2 axiom stents placement by GI on 11/8. She was not able to tolerate tube feeding and now is receiving low-fat diet with TPN.      Plan:  - Continue low fat diet, and DC TPN upon discharge with adequate calorie count  - OOB ambulate   - Pain control with patch/PCA/IV Tyl; transition to oral pain meds today  - Continue Zosyn as per ID, and transition to Augmentin when WBC down trending  - Lovenox for DVT ppx

## 2017-11-28 NOTE — PROGRESS NOTE ADULT - SUBJECTIVE AND OBJECTIVE BOX
Day  41/19 of Anesthesia Pain Management Service    SUBJECTIVE: Patient is doing well with IV PCA    Pain Scale Score:	[X] Refer to charted pain scores    THERAPY:    [ ] IV PCA Morphine		[ ] 5 mg/mL	[ ] 1 mg/mL  [X] IV PCA Hydromorphone	[ ] 5 mg/mL	[X] 1 mg/mL  [ ] IV PCA Fentanyl		[ ] 50 micrograms/mL    Demand dose: 0.45 mg     Lockout: 6 minutes   Continuous Rate: 0 mg/hr  4 Hour Limit: 9 mg    MEDICATIONS  (STANDING):  diphenhydrAMINE   Injectable 25 milliGRAM(s) IV Push once  docusate sodium 100 milliGRAM(s) Oral daily  enoxaparin Injectable 40 milliGRAM(s) SubCutaneous daily  HYDROmorphone PCA (1 mG/mL) 30 milliLiter(s) PCA Continuous PCA Continuous  melatonin 3 milliGRAM(s) Oral at bedtime  piperacillin/tazobactam IVPB. 3.375 Gram(s) IV Intermittent every 8 hours  senna 2 Tablet(s) Oral at bedtime  sodium chloride 0.45%. 1000 milliLiter(s) (20 mL/Hr) IV Continuous <Continuous>    MEDICATIONS  (PRN):  diphenhydrAMINE   Injectable 25 milliGRAM(s) IV Push every 4 hours PRN Rash and/or Itching  HYDROmorphone PCA (1 mG/mL) Rescue Clinician Bolus 1 milliGRAM(s) IV Push every 15 minutes PRN for Pain Scale GREATER THAN 6  naloxone Injectable 0.1 milliGRAM(s) IV Push every 3 minutes PRN For ANY of the following changes in patient status:  A. RR LESS THAN 10 breaths per minute, B. Oxygen saturation LESS THAN 90%, C. Sedation score of 6  ondansetron Injectable 4 milliGRAM(s) IV Push every 6 hours PRN Nausea      OBJECTIVE:    Sedation Score:	[ X] Alert	[ ] Drowsy 	[ ] Arousable	[ ] Asleep	[ ] Unresponsive    Side Effects:	[X ] None	[ ] Nausea	[ ] Vomiting	[ ] Pruritus  		[ ] Other:    Vital Signs Last 24 Hrs  T(C): 36.9 (28 Nov 2017 06:27), Max: 37.2 (28 Nov 2017 02:00)  T(F): 98.5 (28 Nov 2017 06:27), Max: 98.9 (28 Nov 2017 02:00)  HR: 75 (28 Nov 2017 06:27) (74 - 89)  BP: 106/69 (28 Nov 2017 06:27) (105/66 - 119/81)  BP(mean): --  RR: 18 (28 Nov 2017 06:27) (18 - 18)  SpO2: 94% (28 Nov 2017 06:27) (94% - 96%)    ASSESSMENT/ PLAN    Therapy to  be:               [X] Continued   [ ] Discontinued   [ ] Changed to PRN Analgesics    Documentation and Verification of current medications:   [X] Done	[ ] Not done, not eligible    Comments: Pain improving. Plan to reduce PCA demand dose.

## 2017-11-28 NOTE — PROGRESS NOTE ADULT - ASSESSMENT
57yF w/ PMH sig for HTN and nephrolithiasis, s/p lap appy. Pt presented to [facility] ED on 10/16/2017 c/o acute onset severe sharp stabbing upper abdominal pain that radiates to the back. She says the pain woke her up in the middle of the night.  admitted on 10/16 for gallstone pancreatitis c/b necrotizing pancreatitis and ascending cholangitis s/p ERCP and stent placement (10/21) and FNA of perihepatic fluid (10/31), which grew E. faecalis. She is s/p cystgastrostomy with 2 axiom stents placement by GI on 11/8. She was not able to tolerate tube feeding and now is receiving TPN/ clears.  cultures are so far negative except on 30th which grew Enterococcus fecalis

## 2017-11-29 DIAGNOSIS — R10.9 UNSPECIFIED ABDOMINAL PAIN: ICD-10-CM

## 2017-11-29 LAB
ALBUMIN SERPL ELPH-MCNC: 3.4 G/DL — SIGNIFICANT CHANGE UP (ref 3.3–5)
ALP SERPL-CCNC: 233 U/L — HIGH (ref 40–120)
ALT FLD-CCNC: 18 U/L — SIGNIFICANT CHANGE UP (ref 10–45)
ANION GAP SERPL CALC-SCNC: 17 MMOL/L — SIGNIFICANT CHANGE UP (ref 5–17)
AST SERPL-CCNC: 29 U/L — SIGNIFICANT CHANGE UP (ref 10–40)
BILIRUB DIRECT SERPL-MCNC: 0.1 MG/DL — SIGNIFICANT CHANGE UP (ref 0–0.2)
BILIRUB INDIRECT FLD-MCNC: 0.1 MG/DL — LOW (ref 0.2–1)
BILIRUB SERPL-MCNC: 0.2 MG/DL — SIGNIFICANT CHANGE UP (ref 0.2–1.2)
BUN SERPL-MCNC: 10 MG/DL — SIGNIFICANT CHANGE UP (ref 7–23)
CALCIUM SERPL-MCNC: 9.7 MG/DL — SIGNIFICANT CHANGE UP (ref 8.4–10.5)
CHLORIDE SERPL-SCNC: 102 MMOL/L — SIGNIFICANT CHANGE UP (ref 96–108)
CO2 SERPL-SCNC: 22 MMOL/L — SIGNIFICANT CHANGE UP (ref 22–31)
CREAT SERPL-MCNC: 0.36 MG/DL — LOW (ref 0.5–1.3)
GLUCOSE SERPL-MCNC: 95 MG/DL — SIGNIFICANT CHANGE UP (ref 70–99)
HCT VFR BLD CALC: 29 % — LOW (ref 34.5–45)
HGB BLD-MCNC: 8.8 G/DL — LOW (ref 11.5–15.5)
MAGNESIUM SERPL-MCNC: 1.9 MG/DL — SIGNIFICANT CHANGE UP (ref 1.6–2.6)
MCHC RBC-ENTMCNC: 25.1 PG — LOW (ref 27–34)
MCHC RBC-ENTMCNC: 30.3 GM/DL — LOW (ref 32–36)
MCV RBC AUTO: 82.9 FL — SIGNIFICANT CHANGE UP (ref 80–100)
PHOSPHATE SERPL-MCNC: 4.3 MG/DL — SIGNIFICANT CHANGE UP (ref 2.5–4.5)
PLATELET # BLD AUTO: 832 K/UL — HIGH (ref 150–400)
POTASSIUM SERPL-MCNC: 3.9 MMOL/L — SIGNIFICANT CHANGE UP (ref 3.5–5.3)
POTASSIUM SERPL-SCNC: 3.9 MMOL/L — SIGNIFICANT CHANGE UP (ref 3.5–5.3)
PROT SERPL-MCNC: 8 G/DL — SIGNIFICANT CHANGE UP (ref 6–8.3)
RBC # BLD: 3.5 M/UL — LOW (ref 3.8–5.2)
RBC # FLD: 15.6 % — HIGH (ref 10.3–14.5)
SODIUM SERPL-SCNC: 141 MMOL/L — SIGNIFICANT CHANGE UP (ref 135–145)
WBC # BLD: 11.22 K/UL — HIGH (ref 3.8–10.5)
WBC # FLD AUTO: 11.22 K/UL — HIGH (ref 3.8–10.5)

## 2017-11-29 PROCEDURE — 99233 SBSQ HOSP IP/OBS HIGH 50: CPT

## 2017-11-29 PROCEDURE — 99232 SBSQ HOSP IP/OBS MODERATE 35: CPT

## 2017-11-29 RX ORDER — ACETAMINOPHEN 500 MG
1000 TABLET ORAL ONCE
Qty: 0 | Refills: 0 | Status: COMPLETED | OUTPATIENT
Start: 2017-11-29 | End: 2017-11-29

## 2017-11-29 RX ORDER — DOCUSATE SODIUM 100 MG
100 CAPSULE ORAL THREE TIMES A DAY
Qty: 0 | Refills: 0 | Status: DISCONTINUED | OUTPATIENT
Start: 2017-11-29 | End: 2017-12-01

## 2017-11-29 RX ORDER — OXYCODONE HYDROCHLORIDE 5 MG/1
15 TABLET ORAL EVERY 12 HOURS
Qty: 0 | Refills: 0 | Status: DISCONTINUED | OUTPATIENT
Start: 2017-11-29 | End: 2017-11-29

## 2017-11-29 RX ORDER — OXYCODONE HYDROCHLORIDE 5 MG/1
60 TABLET ORAL EVERY 12 HOURS
Qty: 0 | Refills: 0 | Status: DISCONTINUED | OUTPATIENT
Start: 2017-11-29 | End: 2017-12-01

## 2017-11-29 RX ORDER — HYDROMORPHONE HYDROCHLORIDE 2 MG/ML
2 INJECTION INTRAMUSCULAR; INTRAVENOUS; SUBCUTANEOUS
Qty: 0 | Refills: 0 | Status: DISCONTINUED | OUTPATIENT
Start: 2017-11-29 | End: 2017-11-30

## 2017-11-29 RX ORDER — HYDROMORPHONE HYDROCHLORIDE 2 MG/ML
6 INJECTION INTRAMUSCULAR; INTRAVENOUS; SUBCUTANEOUS
Qty: 0 | Refills: 0 | Status: DISCONTINUED | OUTPATIENT
Start: 2017-11-29 | End: 2017-11-30

## 2017-11-29 RX ORDER — HYDROMORPHONE HYDROCHLORIDE 2 MG/ML
1 INJECTION INTRAMUSCULAR; INTRAVENOUS; SUBCUTANEOUS ONCE
Qty: 0 | Refills: 0 | Status: DISCONTINUED | OUTPATIENT
Start: 2017-11-29 | End: 2017-11-29

## 2017-11-29 RX ORDER — HYDROMORPHONE HYDROCHLORIDE 2 MG/ML
4 INJECTION INTRAMUSCULAR; INTRAVENOUS; SUBCUTANEOUS
Qty: 0 | Refills: 0 | Status: DISCONTINUED | OUTPATIENT
Start: 2017-11-29 | End: 2017-11-30

## 2017-11-29 RX ADMIN — HYDROMORPHONE HYDROCHLORIDE 6 MILLIGRAM(S): 2 INJECTION INTRAMUSCULAR; INTRAVENOUS; SUBCUTANEOUS at 01:39

## 2017-11-29 RX ADMIN — HYDROMORPHONE HYDROCHLORIDE 1 MILLIGRAM(S): 2 INJECTION INTRAMUSCULAR; INTRAVENOUS; SUBCUTANEOUS at 12:00

## 2017-11-29 RX ADMIN — Medication 25 MILLIGRAM(S): at 02:00

## 2017-11-29 RX ADMIN — Medication 100 MILLIGRAM(S): at 21:12

## 2017-11-29 RX ADMIN — Medication 25 MILLIGRAM(S): at 14:31

## 2017-11-29 RX ADMIN — OXYCODONE HYDROCHLORIDE 60 MILLIGRAM(S): 5 TABLET ORAL at 14:15

## 2017-11-29 RX ADMIN — Medication 1000 MILLIGRAM(S): at 17:10

## 2017-11-29 RX ADMIN — Medication 25 MILLIGRAM(S): at 11:54

## 2017-11-29 RX ADMIN — Medication 3 MILLIGRAM(S): at 23:16

## 2017-11-29 RX ADMIN — HYDROMORPHONE HYDROCHLORIDE 6 MILLIGRAM(S): 2 INJECTION INTRAMUSCULAR; INTRAVENOUS; SUBCUTANEOUS at 16:47

## 2017-11-29 RX ADMIN — Medication 25 MILLIGRAM(S): at 05:50

## 2017-11-29 RX ADMIN — PIPERACILLIN AND TAZOBACTAM 25 GRAM(S): 4; .5 INJECTION, POWDER, LYOPHILIZED, FOR SOLUTION INTRAVENOUS at 14:06

## 2017-11-29 RX ADMIN — Medication 25 MILLIGRAM(S): at 21:13

## 2017-11-29 RX ADMIN — SENNA PLUS 2 TABLET(S): 8.6 TABLET ORAL at 21:12

## 2017-11-29 RX ADMIN — HYDROMORPHONE HYDROCHLORIDE 6 MILLIGRAM(S): 2 INJECTION INTRAMUSCULAR; INTRAVENOUS; SUBCUTANEOUS at 23:46

## 2017-11-29 RX ADMIN — HYDROMORPHONE HYDROCHLORIDE 6 MILLIGRAM(S): 2 INJECTION INTRAMUSCULAR; INTRAVENOUS; SUBCUTANEOUS at 05:40

## 2017-11-29 RX ADMIN — HYDROMORPHONE HYDROCHLORIDE 1 MILLIGRAM(S): 2 INJECTION INTRAMUSCULAR; INTRAVENOUS; SUBCUTANEOUS at 11:15

## 2017-11-29 RX ADMIN — Medication 100 MILLIGRAM(S): at 11:55

## 2017-11-29 RX ADMIN — Medication 1 TABLET(S): at 17:10

## 2017-11-29 RX ADMIN — HYDROMORPHONE HYDROCHLORIDE 6 MILLIGRAM(S): 2 INJECTION INTRAMUSCULAR; INTRAVENOUS; SUBCUTANEOUS at 20:28

## 2017-11-29 RX ADMIN — HYDROMORPHONE HYDROCHLORIDE 1 MILLIGRAM(S): 2 INJECTION INTRAMUSCULAR; INTRAVENOUS; SUBCUTANEOUS at 11:38

## 2017-11-29 RX ADMIN — HYDROMORPHONE HYDROCHLORIDE 6 MILLIGRAM(S): 2 INJECTION INTRAMUSCULAR; INTRAVENOUS; SUBCUTANEOUS at 23:16

## 2017-11-29 RX ADMIN — FENTANYL CITRATE 1 PATCH: 50 INJECTION INTRAVENOUS at 19:00

## 2017-11-29 RX ADMIN — OXYCODONE HYDROCHLORIDE 60 MILLIGRAM(S): 5 TABLET ORAL at 13:27

## 2017-11-29 RX ADMIN — ENOXAPARIN SODIUM 40 MILLIGRAM(S): 100 INJECTION SUBCUTANEOUS at 11:55

## 2017-11-29 RX ADMIN — HYDROMORPHONE HYDROCHLORIDE 1 MILLIGRAM(S): 2 INJECTION INTRAMUSCULAR; INTRAVENOUS; SUBCUTANEOUS at 10:48

## 2017-11-29 RX ADMIN — PIPERACILLIN AND TAZOBACTAM 25 GRAM(S): 4; .5 INJECTION, POWDER, LYOPHILIZED, FOR SOLUTION INTRAVENOUS at 05:53

## 2017-11-29 RX ADMIN — HYDROMORPHONE HYDROCHLORIDE 6 MILLIGRAM(S): 2 INJECTION INTRAMUSCULAR; INTRAVENOUS; SUBCUTANEOUS at 19:58

## 2017-11-29 RX ADMIN — HYDROMORPHONE HYDROCHLORIDE 6 MILLIGRAM(S): 2 INJECTION INTRAMUSCULAR; INTRAVENOUS; SUBCUTANEOUS at 02:09

## 2017-11-29 RX ADMIN — HYDROMORPHONE HYDROCHLORIDE 6 MILLIGRAM(S): 2 INJECTION INTRAMUSCULAR; INTRAVENOUS; SUBCUTANEOUS at 06:10

## 2017-11-29 RX ADMIN — Medication 400 MILLIGRAM(S): at 06:56

## 2017-11-29 RX ADMIN — HYDROMORPHONE HYDROCHLORIDE 6 MILLIGRAM(S): 2 INJECTION INTRAMUSCULAR; INTRAVENOUS; SUBCUTANEOUS at 14:36

## 2017-11-29 RX ADMIN — HYDROMORPHONE HYDROCHLORIDE 6 MILLIGRAM(S): 2 INJECTION INTRAMUSCULAR; INTRAVENOUS; SUBCUTANEOUS at 13:57

## 2017-11-29 NOTE — PROGRESS NOTE ADULT - ASSESSMENT
57 y.o. woman with history of nephrolithiasis, HTN, and appendectomy admitted on 10/16 for gallstone pancreatitis c/b necrotizing pancreatitis and ascending cholangitis s/p ERCP and stent placement (10/21) and FNA of perihepatic fluid (10/31), which grew E. faecalis. She is s/p cystgastrostomy with 2 axiom stents placement by GI on 11/8.      Plan:  - Continue low fat diet  - OOB ambulate   - Pain control give dose of IV tylenol now, discuss with pain management for adjustments   - Continue Zosyn as per ID, and transition to Augmentin - Lovenox for DVT ppx    ACS [id number]

## 2017-11-29 NOTE — PROGRESS NOTE ADULT - SUBJECTIVE AND OBJECTIVE BOX
PINA MILLS 57y [id number]    Patient is a 57y old  Female who presents with a chief complaint of Abdominal pain (16 Oct 2017 09:50)      Follow Up/CC:  pancreatic abscess    Interval History/ROS: c/o abd pain, taking PO, TPN stopped    Allergies    No Known Allergies    Intolerances        ANTIMICROBIALS:  piperacillin/tazobactam IVPB. 3.375 every 8 hours      MEDICATIONS  (STANDING):  docusate sodium 100 milliGRAM(s) Oral three times a day  enoxaparin Injectable 40 milliGRAM(s) SubCutaneous daily  melatonin 3 milliGRAM(s) Oral at bedtime  oxyCODONE  ER Tablet 60 milliGRAM(s) Oral every 12 hours  piperacillin/tazobactam IVPB. 3.375 Gram(s) IV Intermittent every 8 hours  senna 2 Tablet(s) Oral at bedtime    MEDICATIONS  (PRN):  diphenhydrAMINE   Injectable 25 milliGRAM(s) IV Push every 4 hours PRN Rash and/or Itching  HYDROmorphone   Tablet 6 milliGRAM(s) Oral every 3 hours PRN Severe breakthrough Pain (7 - 10)  HYDROmorphone   Tablet 4 milliGRAM(s) Oral every 3 hours PRN Moderate breakthrough Pain (4 - 6)  HYDROmorphone   Tablet 2 milliGRAM(s) Oral every 3 hours PRN Mild breakthrough Pain (1 - 3)  naloxone Injectable 0.1 milliGRAM(s) IV Push every 3 minutes PRN For ANY of the following changes in patient status:  A. RR LESS THAN 10 breaths per minute, B. Oxygen saturation LESS THAN 90%, C. Sedation score of 6  ondansetron Injectable 4 milliGRAM(s) IV Push every 6 hours PRN Nausea        Vital Signs Last 24 Hrs  T(C): 36.6 (29 Nov 2017 13:20), Max: 37.1 (28 Nov 2017 17:02)  T(F): 97.9 (29 Nov 2017 13:20), Max: 98.7 (28 Nov 2017 17:02)  HR: 95 (29 Nov 2017 13:20) (80 - 95)  BP: 110/74 (29 Nov 2017 13:20) (108/74 - 125/83)  BP(mean): --  RR: 18 (29 Nov 2017 13:20) (18 - 18)  SpO2: 94% (29 Nov 2017 13:20) (94% - 96%)    CBC Full  -  ( 29 Nov 2017 08:26 )  WBC Count : 11.22 K/uL  Hemoglobin : 8.8 g/dL  Hematocrit : 29.0 %  Platelet Count - Automated : 832 K/uL  Mean Cell Volume : 82.9 fl  Mean Cell Hemoglobin : 25.1 pg  Mean Cell Hemoglobin Concentration : 30.3 gm/dL  Auto Neutrophil # : x  Auto Lymphocyte # : x  Auto Monocyte # : x  Auto Eosinophil # : x  Auto Basophil # : x  Auto Neutrophil % : x  Auto Lymphocyte % : x  Auto Monocyte % : x  Auto Eosinophil % : x  Auto Basophil % : x    11-29    141  |  102  |  10  ----------------------------<  95  3.9   |  22  |  0.36<L>    Ca    9.7      29 Nov 2017 07:45  Phos  4.3     11-29  Mg     1.9     11-29    TPro  8.0  /  Alb  3.4  /  TBili  0.2  /  DBili  0.1  /  AST  29  /  ALT  18  /  AlkPhos  233<H>  11-29    LIVER FUNCTIONS - ( 29 Nov 2017 07:45 )  Alb: 3.4 g/dL / Pro: 8.0 g/dL / ALK PHOS: 233 U/L / ALT: 18 U/L / AST: 29 U/L / GGT: x               MICROBIOLOGY:  .Blood PICC/PERC Single Lumen  11-16-17   No growth at 5 days.  --  --      .Urine Clean Catch (Midstream)  11-16-17   10,000 - 49,000 CFU/mL Methicillin resistant Staphylococcus aureus  --  Methicillin resistant Staphylococcus aureus      Abdominal Fl Abdominal Fluid  10-30-17   Moderate Enterococcus faecalis  --  Enterococcus faecalis              v            RADIOLOGY

## 2017-11-29 NOTE — CONSULT NOTE ADULT - CONSULT REQUESTED DATE/TIME
30-Oct-2017 16:02
05-Nov-2017 10:17
17-Oct-2017 04:11
17-Oct-2017 05:37
19-Oct-2017 09:45
24-Nov-2017 13:12
29-Nov-2017 17:50

## 2017-11-29 NOTE — PROGRESS NOTE ADULT - ATTENDING COMMENTS
seen and examined 11/29/2017 @ 1100    some nausea and now diarrhea  moving around in bed comfortably and c/o abd pain  abd soft / NT / ND    Dilaudid PCA was D/C'd yesterday and started on PO Dilaudid  she was receiving about 36mg of  Dilaudid daily from PCA. this is equivalent to almost 500 mg oxycodone  she now appears to be in opiate withdrawal requiring Dilaudid IV  -will start Oxycontin 60mg q12h  -continue Dilaudid 6mg PO q6h PRN  -reconsult chronic pain

## 2017-11-29 NOTE — PROGRESS NOTE ADULT - ASSESSMENT
57yF w/ PMH sig for HTN and nephrolithiasis, s/p lap appy. Pt presented to [facility] ED on 10/16/2017 c/o acute onset severe sharp stabbing upper abdominal pain that radiates to the back. She says the pain woke her up in the middle of the night.  admitted on 10/16 for gallstone pancreatitis c/b necrotizing pancreatitis and ascending cholangitis s/p ERCP and stent placement (10/21) and FNA of perihepatic fluid (10/31), which grew E. faecalis. She is s/p cystgastrostomy with 2 axiom stents placement by GI on 11/8. She was not able to tolerate tube feeding and now is receiving TPN/ clears.  cultures are so far negative except on 30th which grew Enterococcus fecalis

## 2017-11-29 NOTE — CONSULT NOTE ADULT - SUBJECTIVE AND OBJECTIVE BOX
Chief Complaint: abdominal pain    HPI:  57yF w/ PMH sig for HTN and nephrolithiasis, s/p laparoscopic appendectomy. Patient admitted to [facility] on 10/16/2017 with c/o acute onset severe sharp stabbing upper abdominal pain radiating to the back.  Patient was found to have gallstone pancreatitis c/b necrotizing pancreatitis and ascending cholangitis s/p ERCP and stent placement (10/21) and FNA of perihepatic fluid (10/31), which grew E. faecalis. She is s/p cystgastrostomy with 2 axiom stents placement by GI on 11/8.  Pain was controlled with IVPCA which was discontinued and patient placed on oral regimen.  Patient reports pain is most severe in left upper and lower quadrants, described as stabbing, currently 9/10 on pain scale.  Patient able to tolerate small amount of pudding and fruit today.    She is currently taking PO Dilaudid 6mg q3hrs prn with minimal relief.  Oxycontin 60mg Q12hrs started today.  Patient denies adverse affects from current pain medication regimen.        PAST MEDICAL & SURGICAL HISTORY:  Kidney stone  Hypertension  Kidney stones  History of appendectomy      FAMILY HISTORY:  No pertinent family history in first degree relatives      Allergies    No Known Allergies    PAIN MEDICATIONS:  HYDROmorphone   Tablet 6 milliGRAM(s) Oral every 3 hours PRN  HYDROmorphone   Tablet 4 milliGRAM(s) Oral every 3 hours PRN  HYDROmorphone   Tablet 2 milliGRAM(s) Oral every 3 hours PRN  melatonin 3 milliGRAM(s) Oral at bedtime  ondansetron Injectable 4 milliGRAM(s) IV Push every 6 hours PRN  oxyCODONE  ER Tablet 60 milliGRAM(s) Oral every 12 hours    Heme:  enoxaparin Injectable 40 milliGRAM(s) SubCutaneous daily    Antibiotics:  amoxicillin  875 milliGRAM(s)/clavulanate 1 Tablet(s) Oral every 12 hours    Cardiovascular:    GI:  docusate sodium 100 milliGRAM(s) Oral three times a day  senna 2 Tablet(s) Oral at bedtime    Endocrine:    All Other Medications:  naloxone Injectable 0.1 milliGRAM(s) IV Push every 3 minutes PRN      Vital Signs Last 24 Hrs  T(C): 36.7 (29 Nov 2017 17:05), Max: 37.1 (29 Nov 2017 06:31)  T(F): 98 (29 Nov 2017 17:05), Max: 98.7 (29 Nov 2017 06:31)  HR: 86 (29 Nov 2017 17:05) (80 - 95)  BP: 111/73 (29 Nov 2017 17:05) (108/74 - 125/83)  BP(mean): --  RR: 18 (29 Nov 2017 17:05) (18 - 18)  SpO2: 98% (29 Nov 2017 17:05) (94% - 98%)    PAIN SCORE:      9   SCALE USED: (1-10 VNRS)             PHYSICAL EXAM:    GENERAL: appears uncomfortable, grimacing  HEAD:  Atraumatic, Normocephalic  NERVOUS SYSTEM:  Alert & Oriented X3, Good concentration; Moves all extyremities  CHEST/LUNG: Respiratory rate and effort WNL  HEART: Regular rate   ABDOMEN: Soft, diffuse tenderness to palpation throughout        LABS:                          8.8    11.22 )-----------( 832      ( 29 Nov 2017 08:26 )             29.0     11-29    141  |  102  |  10  ----------------------------<  95  3.9   |  22  |  0.36<L>    Ca    9.7      29 Nov 2017 07:45  Phos  4.3     11-29  Mg     1.9     11-29    TPro  8.0  /  Alb  3.4  /  TBili  0.2  /  DBili  0.1  /  AST  29  /  ALT  18  /  AlkPhos  233<H>  11-29                [x ]  [place]  Reviewed and Copied to Chart  [id number]

## 2017-11-29 NOTE — CHART NOTE - NSCHARTNOTEFT_GEN_A_CORE
Pt seen for malnutrition and TPN follow up. Calorie Count posted 11/27 but only filled out for one day.    Hospital Course: Pt is a 58 yo woman with history of nephrolithiasis, HTN, and appendectomy admitted on 10/16 for gallstone pancreatitis c/b necrotizing pancreatitis and ascending cholangitis S/P ERCP and stent placement (10/21) and FNA of perihepatic fluid (10/31), which grew E. faecalis. Pt S/P endoscopic drainage of pancreatic necrosis, cystgastrostomy placement of 2 metal stents 11/8.     Pt has been eating well on Low Fat diet; Promote tid added yesterday to help meet nutrient needs. TPN, IV fluids and IV pain medication have been discontinued. Per , pt has been in pain since yesterday afternoon, resulting in minimal po intake last night and nothing by mouth today.    Pt had previously been meeting 75% of estimated nutrient needs, per pt diet recall and calorie count:    11/26 Breakfast: oatmeal, cereal, fruit  11/26 Lunch: soup, vegetable  11/26 Dinner: burger, salad    11/27 Breakfast: 100% fruit, 100% yogurt  11/27 Lunch: 75% soup. 100% grilled cheese sandwich, 100% juice, 1 roll, 1 dessert  11/27 Dinner: 100% shrimp and rice, 100% vegetable, 100% juice, 100% rice pudding    Estimated Energy Needs based on dosing wt 66.5Kg  1662-1995cal/day (25-30cal/Kg)  80-93Gm protein/day (1.2-1.4Gm/Kg)    Source: Patient [X ]    Family [ ]     other [ X]; medical record    Diet : Low Fat diet + Promote (237calories, 14.8Gm protein per 8oz serving) tid     GI: + BM 11/26, +flatus. Pt denies N+V, endorses abdominal pain and reduced appetite/intake.     Enteral /Parenteral Nutrition: TPN d/c    Weight: 69.5Kg (10/26), 66.5Kg (10/17); weight change likely reflects fluid shifts; no recent weight  Edema: none noted    Pertinent Medications: MEDICATIONS  (STANDING):  diphenhydrAMINE   Injectable 25 milliGRAM(s) IV Push once  docusate sodium 100 milliGRAM(s) Oral daily  enoxaparin Injectable 40 milliGRAM(s) SubCutaneous daily  melatonin 3 milliGRAM(s) Oral at bedtime  oxyCODONE  ER Tablet 60 milliGRAM(s) Oral every 12 hours  piperacillin/tazobactam IVPB. 3.375 Gram(s) IV Intermittent every 8 hours  senna 2 Tablet(s) Oral at bedtime    MEDICATIONS  (PRN):  diphenhydrAMINE   Injectable 25 milliGRAM(s) IV Push every 4 hours PRN Rash and/or Itching  HYDROmorphone   Tablet 6 milliGRAM(s) Oral every 3 hours PRN Severe Pain (7 - 10)  HYDROmorphone  Injectable 1 milliGRAM(s) IV Push every 3 hours PRN breakthrough pain  naloxone Injectable 0.1 milliGRAM(s) IV Push every 3 minutes PRN For ANY of the following changes in patient status:  A. RR LESS THAN 10 breaths per minute, B. Oxygen saturation LESS THAN 90%, C. Sedation score of 6  ondansetron Injectable 4 milliGRAM(s) IV Push every 6 hours PRN Nausea    Pertinent Labs:  11-29 Na141 mmol/L Glu 95 mg/dL K+ 3.9 mmol/L Cr  0.36 mg/dL<L> BUN 10 mg/dL Phos 4.3 mg/dL Alb 3.4 g/dL     Skin: no pressure injuries    Estimated Needs:   [ X] no change since previous assessment  [ ] recalculated:     Previous Nutrition Diagnosis:     [X ] Malnutrition; severe    Nutrition Diagnosis is [X ] ongoing; being addressed with Low Fat diet + Promote tid    New Nutrition Diagnosis: [ X] not applicable     Interventions:     Recommend:  1) Continue Regular, Low Fat diet + Promote (237calories, 14.8Gm protein per 8oz serving) tid  2) Monitor weight, lab values, skin, po intake and GI tolerance    Monitoring and Evaluation:   Follow up per protocol  RD to remain available for further nutritional interventions as indicated.   Anabela Molina, MS RD [facility], #[phone].

## 2017-11-29 NOTE — CONSULT NOTE ADULT - PROBLEM SELECTOR RECOMMENDATION 9
The patient is tolerating the fentanyl patch and it was increased today.  The full effect of this change will not be seen till tomorrow.  It would be ideal to have the patient on a Butrans patch instead of fentanyl secondary to the possible sphincter of oddi spasm secondary to fentanyl.  Due to the patient's NPO status the maintenance of the PCA with a long-acting narcotic is the best solution for this patient's pain until she is able to tolerate oral medications.  At that time patient did be transitioned over to an oral regimen for ongoing care. At this point I would maintain the PCA determine pain relief tomorrow for the Duragesic.
Management as per surgical team  Nutrition via PN
Would suggest change PO Dilaudid to 8mg po Q3hrs prn  Continue Oxycontin 60mg PO Q12hrs for long acting pain control  Consider Neurontin 100mg TID  Bowel Regimen

## 2017-11-29 NOTE — PROGRESS NOTE ADULT - SUBJECTIVE AND OBJECTIVE BOX
ACS Progress Note     Subjective: complaining of abdominal pain no improvement by 6mg of PO dilaudid, tolerating regular diet     PE:  mild distress from pain   abd epigastric tenderness, no rebound or guarding     Vital Signs Last 24 Hrs  T(C): 37.1 (29 Nov 2017 06:31), Max: 37.1 (28 Nov 2017 17:02)  T(F): 98.7 (29 Nov 2017 06:31), Max: 98.7 (28 Nov 2017 17:02)  HR: 82 (29 Nov 2017 06:31) (75 - 91)  BP: 108/74 (29 Nov 2017 06:31) (108/68 - 122/82)  BP(mean): --  RR: 18 (29 Nov 2017 06:31) (16 - 18)  SpO2: 96% (29 Nov 2017 06:31) (93% - 96%)    I&O's Detail    28 Nov 2017 07:01  -  29 Nov 2017 07:00  --------------------------------------------------------  IN:    Oral Fluid: 240 mL    Solution: 100 mL    TPN (Total Parenteral Nutrition): 660 mL  Total IN: 1000 mL    OUT:    Voided: 500 mL  Total OUT: 500 mL    Total NET: 500 mL          Daily     Daily     MEDICATIONS  (STANDING):  diphenhydrAMINE   Injectable 25 milliGRAM(s) IV Push once  docusate sodium 100 milliGRAM(s) Oral daily  enoxaparin Injectable 40 milliGRAM(s) SubCutaneous daily  melatonin 3 milliGRAM(s) Oral at bedtime  piperacillin/tazobactam IVPB. 3.375 Gram(s) IV Intermittent every 8 hours  senna 2 Tablet(s) Oral at bedtime    MEDICATIONS  (PRN):  diphenhydrAMINE   Injectable 25 milliGRAM(s) IV Push every 4 hours PRN Rash and/or Itching  HYDROmorphone   Tablet 6 milliGRAM(s) Oral every 3 hours PRN Severe Pain (7 - 10)  HYDROmorphone   Tablet 4 milliGRAM(s) Oral every 3 hours PRN Moderate Pain (4 - 6)  HYDROmorphone   Tablet 2 milliGRAM(s) Oral every 3 hours PRN Mild Pain (1 - 3)  HYDROmorphone  Injectable 1 milliGRAM(s) IV Push every 3 hours PRN breakthrough pain  naloxone Injectable 0.1 milliGRAM(s) IV Push every 3 minutes PRN For ANY of the following changes in patient status:  A. RR LESS THAN 10 breaths per minute, B. Oxygen saturation LESS THAN 90%, C. Sedation score of 6  ondansetron Injectable 4 milliGRAM(s) IV Push every 6 hours PRN Nausea      LABS:                        8.3    10.67 )-----------( 723      ( 28 Nov 2017 07:29 )             26.3     11-28    140  |  101  |  14  ----------------------------<  103<H>  4.4   |  26  |  0.53    Ca    9.2      28 Nov 2017 08:54  Phos  4.4     11-27  Mg     1.8     11-27    TPro  7.6  /  Alb  3.0<L>  /  TBili  0.2  /  DBili  x   /  AST  24  /  ALT  14  /  AlkPhos  222<H>  11-28          RADIOLOGY & ADDITIONAL STUDIES:

## 2017-11-29 NOTE — CONSULT NOTE ADULT - CONSULT REASON
abdominal pain
Abdominal pain
CBD stone, gallstone pancreatitis
Choledocholithiasis
Pancreatitis Pain poorly relieved by iv Narcotic PCA
Pancreatitis, failure to tolerate TF
Pancreatitis, fluid collection

## 2017-11-29 NOTE — CONSULT NOTE ADULT - ASSESSMENT
57yF w/ PMH sig for HTN and nephrolithiasis, s/p laparoscopic appendectomy with c/o abdominal pain in the setting of gallstone pancreatitis c/b necrotizing pancreatitis and ascending cholangitis s/p ERCP and stent placement (10/21) and FNA of perihepatic fluid (10/31), which grew E. faecalis. She is s/p cystgastrostomy with 2 axiom stents placement by GI on 11/8.  With continued pain s.p discontinuation of IV PCA dilaudid.

## 2017-11-29 NOTE — PROGRESS NOTE ADULT - ATTENDING COMMENTS
Noel Boland  Attending Physician   Division of Infectious Disease  Pager #[phone]  After 5pm/weekend #[phone]

## 2017-11-30 LAB
ALBUMIN SERPL ELPH-MCNC: 3.1 G/DL — LOW (ref 3.3–5)
ALP SERPL-CCNC: 206 U/L — HIGH (ref 40–120)
ALT FLD-CCNC: 18 U/L — SIGNIFICANT CHANGE UP (ref 10–45)
ANION GAP SERPL CALC-SCNC: 16 MMOL/L — SIGNIFICANT CHANGE UP (ref 5–17)
AST SERPL-CCNC: 22 U/L — SIGNIFICANT CHANGE UP (ref 10–40)
BASOPHILS # BLD AUTO: 0.08 K/UL — SIGNIFICANT CHANGE UP (ref 0–0.2)
BASOPHILS NFR BLD AUTO: 0.8 % — SIGNIFICANT CHANGE UP (ref 0–2)
BILIRUB SERPL-MCNC: 0.2 MG/DL — SIGNIFICANT CHANGE UP (ref 0.2–1.2)
BUN SERPL-MCNC: 8 MG/DL — SIGNIFICANT CHANGE UP (ref 7–23)
CALCIUM SERPL-MCNC: 9.5 MG/DL — SIGNIFICANT CHANGE UP (ref 8.4–10.5)
CHLORIDE SERPL-SCNC: 103 MMOL/L — SIGNIFICANT CHANGE UP (ref 96–108)
CO2 SERPL-SCNC: 23 MMOL/L — SIGNIFICANT CHANGE UP (ref 22–31)
CREAT SERPL-MCNC: 0.57 MG/DL — SIGNIFICANT CHANGE UP (ref 0.5–1.3)
EOSINOPHIL # BLD AUTO: 0.47 K/UL — SIGNIFICANT CHANGE UP (ref 0–0.5)
EOSINOPHIL NFR BLD AUTO: 4.7 % — SIGNIFICANT CHANGE UP (ref 0–6)
GLUCOSE SERPL-MCNC: 88 MG/DL — SIGNIFICANT CHANGE UP (ref 70–99)
HCT VFR BLD CALC: 28 % — LOW (ref 34.5–45)
HGB BLD-MCNC: 8.8 G/DL — LOW (ref 11.5–15.5)
IMM GRANULOCYTES NFR BLD AUTO: 0.4 % — SIGNIFICANT CHANGE UP (ref 0–1.5)
LYMPHOCYTES # BLD AUTO: 3.8 K/UL — HIGH (ref 1–3.3)
LYMPHOCYTES # BLD AUTO: 37.6 % — SIGNIFICANT CHANGE UP (ref 13–44)
MCHC RBC-ENTMCNC: 26 PG — LOW (ref 27–34)
MCHC RBC-ENTMCNC: 31.4 GM/DL — LOW (ref 32–36)
MCV RBC AUTO: 82.8 FL — SIGNIFICANT CHANGE UP (ref 80–100)
MONOCYTES # BLD AUTO: 0.77 K/UL — SIGNIFICANT CHANGE UP (ref 0–0.9)
MONOCYTES NFR BLD AUTO: 7.6 % — SIGNIFICANT CHANGE UP (ref 2–14)
NEUTROPHILS # BLD AUTO: 4.94 K/UL — SIGNIFICANT CHANGE UP (ref 1.8–7.4)
NEUTROPHILS NFR BLD AUTO: 48.9 % — SIGNIFICANT CHANGE UP (ref 43–77)
PLATELET # BLD AUTO: 804 K/UL — HIGH (ref 150–400)
POTASSIUM SERPL-MCNC: 4.1 MMOL/L — SIGNIFICANT CHANGE UP (ref 3.5–5.3)
POTASSIUM SERPL-SCNC: 4.1 MMOL/L — SIGNIFICANT CHANGE UP (ref 3.5–5.3)
PROT SERPL-MCNC: 7.5 G/DL — SIGNIFICANT CHANGE UP (ref 6–8.3)
RBC # BLD: 3.38 M/UL — LOW (ref 3.8–5.2)
RBC # FLD: 16.1 % — HIGH (ref 10.3–14.5)
SODIUM SERPL-SCNC: 142 MMOL/L — SIGNIFICANT CHANGE UP (ref 135–145)
WBC # BLD: 10.1 K/UL — SIGNIFICANT CHANGE UP (ref 3.8–10.5)
WBC # FLD AUTO: 10.1 K/UL — SIGNIFICANT CHANGE UP (ref 3.8–10.5)

## 2017-11-30 PROCEDURE — 99233 SBSQ HOSP IP/OBS HIGH 50: CPT

## 2017-11-30 RX ORDER — GABAPENTIN 400 MG/1
100 CAPSULE ORAL THREE TIMES A DAY
Qty: 0 | Refills: 0 | Status: DISCONTINUED | OUTPATIENT
Start: 2017-11-30 | End: 2017-12-01

## 2017-11-30 RX ORDER — HYDROMORPHONE HYDROCHLORIDE 2 MG/ML
8 INJECTION INTRAMUSCULAR; INTRAVENOUS; SUBCUTANEOUS
Qty: 0 | Refills: 0 | Status: DISCONTINUED | OUTPATIENT
Start: 2017-11-30 | End: 2017-12-01

## 2017-11-30 RX ADMIN — GABAPENTIN 100 MILLIGRAM(S): 400 CAPSULE ORAL at 21:05

## 2017-11-30 RX ADMIN — HYDROMORPHONE HYDROCHLORIDE 8 MILLIGRAM(S): 2 INJECTION INTRAMUSCULAR; INTRAVENOUS; SUBCUTANEOUS at 15:09

## 2017-11-30 RX ADMIN — HYDROMORPHONE HYDROCHLORIDE 8 MILLIGRAM(S): 2 INJECTION INTRAMUSCULAR; INTRAVENOUS; SUBCUTANEOUS at 18:08

## 2017-11-30 RX ADMIN — HYDROMORPHONE HYDROCHLORIDE 8 MILLIGRAM(S): 2 INJECTION INTRAMUSCULAR; INTRAVENOUS; SUBCUTANEOUS at 21:37

## 2017-11-30 RX ADMIN — Medication 100 MILLIGRAM(S): at 21:05

## 2017-11-30 RX ADMIN — HYDROMORPHONE HYDROCHLORIDE 8 MILLIGRAM(S): 2 INJECTION INTRAMUSCULAR; INTRAVENOUS; SUBCUTANEOUS at 18:07

## 2017-11-30 RX ADMIN — HYDROMORPHONE HYDROCHLORIDE 8 MILLIGRAM(S): 2 INJECTION INTRAMUSCULAR; INTRAVENOUS; SUBCUTANEOUS at 06:15

## 2017-11-30 RX ADMIN — Medication 25 MILLIGRAM(S): at 06:30

## 2017-11-30 RX ADMIN — Medication 1 TABLET(S): at 06:16

## 2017-11-30 RX ADMIN — OXYCODONE HYDROCHLORIDE 60 MILLIGRAM(S): 5 TABLET ORAL at 14:08

## 2017-11-30 RX ADMIN — Medication 25 MILLIGRAM(S): at 11:22

## 2017-11-30 RX ADMIN — HYDROMORPHONE HYDROCHLORIDE 8 MILLIGRAM(S): 2 INJECTION INTRAMUSCULAR; INTRAVENOUS; SUBCUTANEOUS at 11:52

## 2017-11-30 RX ADMIN — Medication 100 MILLIGRAM(S): at 06:16

## 2017-11-30 RX ADMIN — Medication 25 MILLIGRAM(S): at 01:49

## 2017-11-30 RX ADMIN — Medication 25 MILLIGRAM(S): at 19:53

## 2017-11-30 RX ADMIN — GABAPENTIN 100 MILLIGRAM(S): 400 CAPSULE ORAL at 14:07

## 2017-11-30 RX ADMIN — Medication 1 TABLET(S): at 17:32

## 2017-11-30 RX ADMIN — HYDROMORPHONE HYDROCHLORIDE 8 MILLIGRAM(S): 2 INJECTION INTRAMUSCULAR; INTRAVENOUS; SUBCUTANEOUS at 11:51

## 2017-11-30 RX ADMIN — ONDANSETRON 4 MILLIGRAM(S): 8 TABLET, FILM COATED ORAL at 07:16

## 2017-11-30 RX ADMIN — HYDROMORPHONE HYDROCHLORIDE 8 MILLIGRAM(S): 2 INJECTION INTRAMUSCULAR; INTRAVENOUS; SUBCUTANEOUS at 08:55

## 2017-11-30 RX ADMIN — HYDROMORPHONE HYDROCHLORIDE 8 MILLIGRAM(S): 2 INJECTION INTRAMUSCULAR; INTRAVENOUS; SUBCUTANEOUS at 21:07

## 2017-11-30 RX ADMIN — HYDROMORPHONE HYDROCHLORIDE 8 MILLIGRAM(S): 2 INJECTION INTRAMUSCULAR; INTRAVENOUS; SUBCUTANEOUS at 06:45

## 2017-11-30 RX ADMIN — OXYCODONE HYDROCHLORIDE 60 MILLIGRAM(S): 5 TABLET ORAL at 14:07

## 2017-11-30 RX ADMIN — Medication 25 MILLIGRAM(S): at 15:10

## 2017-11-30 RX ADMIN — ENOXAPARIN SODIUM 40 MILLIGRAM(S): 100 INJECTION SUBCUTANEOUS at 11:20

## 2017-11-30 RX ADMIN — OXYCODONE HYDROCHLORIDE 60 MILLIGRAM(S): 5 TABLET ORAL at 01:48

## 2017-11-30 RX ADMIN — HYDROMORPHONE HYDROCHLORIDE 8 MILLIGRAM(S): 2 INJECTION INTRAMUSCULAR; INTRAVENOUS; SUBCUTANEOUS at 15:14

## 2017-11-30 RX ADMIN — ONDANSETRON 4 MILLIGRAM(S): 8 TABLET, FILM COATED ORAL at 10:11

## 2017-11-30 RX ADMIN — HYDROMORPHONE HYDROCHLORIDE 8 MILLIGRAM(S): 2 INJECTION INTRAMUSCULAR; INTRAVENOUS; SUBCUTANEOUS at 08:54

## 2017-11-30 NOTE — PROGRESS NOTE ADULT - ATTENDING COMMENTS
seen and examined 11/30/2017 @ 0950    opiate withdrawal symptoms improved on OxyContin 60mg q12h and Dilaudid 8 mg PO q3h  able to tolerate some PO diet so I encouraged nutrition supplements    abd soft / NT / ND    will plan D/C home on Augmentin when opiate withdrawal well controlled and tolerating regular diet again

## 2017-11-30 NOTE — PROGRESS NOTE ADULT - SUBJECTIVE AND OBJECTIVE BOX
ATP Progress note    Subjective: Patient seen and examined. No acute events overnight. Patient did not require IV tylenol overnight. This AM is complaining of abdominal pain.       Objective:   T(C): 36.9 (11-30-17 @ 09:00), Max: 37.3 (11-30-17 @ 01:39)  HR: 85 (11-30-17 @ 09:00) (81 - 95)  BP: 114/79 (11-30-17 @ 09:00) (104/69 - 115/75)  RR: 18 (11-30-17 @ 09:00) (18 - 18)  SpO2: 95% (11-30-17 @ 09:00) (94% - 98%)          11-29-17 @ 07:01  -  11-30-17 @ 07:00  --------------------------------------------------------  IN: 600 mL / OUT: 1 mL / NET: 599 mL        LABS:                        8.8    10.10 )-----------( 804      ( 30 Nov 2017 08:53 )             28.0     11-30    142  |  103  |  8   ----------------------------<  88  4.1   |  23  |  0.57    Ca    9.5      30 Nov 2017 08:53  Phos  4.3     11-29  Mg     1.9     11-29    TPro  7.5  /  Alb  3.1<L>  /  TBili  0.2  /  DBili  x   /  AST  22  /  ALT  18  /  AlkPhos  206<H>  11-30      Physical Exam:   Gen: mild distress from pain   Abd: tender to palpation diffusely, no guarding, no palpable masses

## 2017-11-30 NOTE — PROGRESS NOTE ADULT - ASSESSMENT
57 y.o. woman with history of nephrolithiasis, HTN, and appendectomy admitted on 10/16 for gallstone pancreatitis c/b necrotizing pancreatitis and ascending cholangitis s/p ERCP and stent placement (10/21) and FNA of perihepatic fluid (10/31), which grew E. faecalis. She is s/p cystgastrostomy with 2 axiom stents placement by GI on 11/8.      Plan:  - Continue low fat diet, no longer on TPN  - OOB ambulate   - Pain management following: changed PO Dilaudid to 8mg po Q3hrs prn, continue Oxycontin 60mg PO Q12hrs for long acting pain control, continue Neurontin 100mg TID  - Continue Augmentin per ID Rec

## 2017-12-01 ENCOUNTER — TRANSCRIPTION ENCOUNTER (OUTPATIENT)
Age: 57
End: 2017-12-01

## 2017-12-01 VITALS
TEMPERATURE: 97 F | RESPIRATION RATE: 18 BRPM | OXYGEN SATURATION: 93 % | HEART RATE: 92 BPM | DIASTOLIC BLOOD PRESSURE: 74 MMHG | SYSTOLIC BLOOD PRESSURE: 108 MMHG

## 2017-12-01 LAB
ALBUMIN SERPL ELPH-MCNC: 3.5 G/DL — SIGNIFICANT CHANGE UP (ref 3.3–5)
ALP SERPL-CCNC: 183 U/L — HIGH (ref 40–120)
ALT FLD-CCNC: 11 U/L — SIGNIFICANT CHANGE UP (ref 10–45)
ANION GAP SERPL CALC-SCNC: 15 MMOL/L — SIGNIFICANT CHANGE UP (ref 5–17)
AST SERPL-CCNC: 26 U/L — SIGNIFICANT CHANGE UP (ref 10–40)
BILIRUB SERPL-MCNC: 0.2 MG/DL — SIGNIFICANT CHANGE UP (ref 0.2–1.2)
BUN SERPL-MCNC: 11 MG/DL — SIGNIFICANT CHANGE UP (ref 7–23)
CALCIUM SERPL-MCNC: 9.3 MG/DL — SIGNIFICANT CHANGE UP (ref 8.4–10.5)
CHLORIDE SERPL-SCNC: 98 MMOL/L — SIGNIFICANT CHANGE UP (ref 96–108)
CO2 SERPL-SCNC: 25 MMOL/L — SIGNIFICANT CHANGE UP (ref 22–31)
CREAT SERPL-MCNC: 0.43 MG/DL — LOW (ref 0.5–1.3)
GLUCOSE SERPL-MCNC: 86 MG/DL — SIGNIFICANT CHANGE UP (ref 70–99)
HCT VFR BLD CALC: 28.8 % — LOW (ref 34.5–45)
HGB BLD-MCNC: 8.9 G/DL — LOW (ref 11.5–15.5)
MCHC RBC-ENTMCNC: 26 PG — LOW (ref 27–34)
MCHC RBC-ENTMCNC: 30.9 GM/DL — LOW (ref 32–36)
MCV RBC AUTO: 84.2 FL — SIGNIFICANT CHANGE UP (ref 80–100)
PLATELET # BLD AUTO: 726 K/UL — HIGH (ref 150–400)
POTASSIUM SERPL-MCNC: 4.2 MMOL/L — SIGNIFICANT CHANGE UP (ref 3.5–5.3)
POTASSIUM SERPL-SCNC: 4.2 MMOL/L — SIGNIFICANT CHANGE UP (ref 3.5–5.3)
PROT SERPL-MCNC: 7.1 G/DL — SIGNIFICANT CHANGE UP (ref 6–8.3)
RBC # BLD: 3.42 M/UL — LOW (ref 3.8–5.2)
RBC # FLD: 16.4 % — HIGH (ref 10.3–14.5)
SODIUM SERPL-SCNC: 138 MMOL/L — SIGNIFICANT CHANGE UP (ref 135–145)
WBC # BLD: 12.42 K/UL — HIGH (ref 3.8–10.5)
WBC # FLD AUTO: 12.42 K/UL — HIGH (ref 3.8–10.5)

## 2017-12-01 PROCEDURE — 85610 PROTHROMBIN TIME: CPT

## 2017-12-01 PROCEDURE — 84484 ASSAY OF TROPONIN QUANT: CPT

## 2017-12-01 PROCEDURE — 82150 ASSAY OF AMYLASE: CPT

## 2017-12-01 PROCEDURE — 82947 ASSAY GLUCOSE BLOOD QUANT: CPT

## 2017-12-01 PROCEDURE — C1751: CPT

## 2017-12-01 PROCEDURE — 83735 ASSAY OF MAGNESIUM: CPT

## 2017-12-01 PROCEDURE — 99285 EMERGENCY DEPT VISIT HI MDM: CPT | Mod: 25

## 2017-12-01 PROCEDURE — 82330 ASSAY OF CALCIUM: CPT

## 2017-12-01 PROCEDURE — C2625: CPT

## 2017-12-01 PROCEDURE — 83605 ASSAY OF LACTIC ACID: CPT

## 2017-12-01 PROCEDURE — 97110 THERAPEUTIC EXERCISES: CPT

## 2017-12-01 PROCEDURE — 87086 URINE CULTURE/COLONY COUNT: CPT

## 2017-12-01 PROCEDURE — 10022: CPT

## 2017-12-01 PROCEDURE — 87205 SMEAR GRAM STAIN: CPT

## 2017-12-01 PROCEDURE — 80307 DRUG TEST PRSMV CHEM ANLYZR: CPT

## 2017-12-01 PROCEDURE — 99232 SBSQ HOSP IP/OBS MODERATE 35: CPT

## 2017-12-01 PROCEDURE — 85027 COMPLETE CBC AUTOMATED: CPT

## 2017-12-01 PROCEDURE — 74020: CPT

## 2017-12-01 PROCEDURE — 71045 X-RAY EXAM CHEST 1 VIEW: CPT

## 2017-12-01 PROCEDURE — 84145 PROCALCITONIN (PCT): CPT

## 2017-12-01 PROCEDURE — 87075 CULTR BACTERIA EXCEPT BLOOD: CPT

## 2017-12-01 PROCEDURE — 86900 BLOOD TYPING SEROLOGIC ABO: CPT

## 2017-12-01 PROCEDURE — 86923 COMPATIBILITY TEST ELECTRIC: CPT

## 2017-12-01 PROCEDURE — 74183 MRI ABD W/O CNTR FLWD CNTR: CPT

## 2017-12-01 PROCEDURE — C1726: CPT

## 2017-12-01 PROCEDURE — 76942 ECHO GUIDE FOR BIOPSY: CPT

## 2017-12-01 PROCEDURE — 87449 NOS EACH ORGANISM AG IA: CPT

## 2017-12-01 PROCEDURE — 82803 BLOOD GASES ANY COMBINATION: CPT

## 2017-12-01 PROCEDURE — 82248 BILIRUBIN DIRECT: CPT

## 2017-12-01 PROCEDURE — 74018 RADEX ABDOMEN 1 VIEW: CPT

## 2017-12-01 PROCEDURE — 87324 CLOSTRIDIUM AG IA: CPT

## 2017-12-01 PROCEDURE — 84100 ASSAY OF PHOSPHORUS: CPT

## 2017-12-01 PROCEDURE — 81003 URINALYSIS AUTO W/O SCOPE: CPT

## 2017-12-01 PROCEDURE — 99231 SBSQ HOSP IP/OBS SF/LOW 25: CPT

## 2017-12-01 PROCEDURE — 80053 COMPREHEN METABOLIC PANEL: CPT

## 2017-12-01 PROCEDURE — C1874: CPT

## 2017-12-01 PROCEDURE — 36569 INSJ PICC 5 YR+ W/O IMAGING: CPT

## 2017-12-01 PROCEDURE — 85730 THROMBOPLASTIN TIME PARTIAL: CPT

## 2017-12-01 PROCEDURE — 96374 THER/PROPH/DIAG INJ IV PUSH: CPT | Mod: XU

## 2017-12-01 PROCEDURE — 87040 BLOOD CULTURE FOR BACTERIA: CPT

## 2017-12-01 PROCEDURE — 93005 ELECTROCARDIOGRAM TRACING: CPT

## 2017-12-01 PROCEDURE — 81025 URINE PREGNANCY TEST: CPT

## 2017-12-01 PROCEDURE — 80048 BASIC METABOLIC PNL TOTAL CA: CPT

## 2017-12-01 PROCEDURE — 76705 ECHO EXAM OF ABDOMEN: CPT

## 2017-12-01 PROCEDURE — 85014 HEMATOCRIT: CPT

## 2017-12-01 PROCEDURE — 96375 TX/PRO/DX INJ NEW DRUG ADDON: CPT

## 2017-12-01 PROCEDURE — 82962 GLUCOSE BLOOD TEST: CPT

## 2017-12-01 PROCEDURE — 86901 BLOOD TYPING SEROLOGIC RH(D): CPT

## 2017-12-01 PROCEDURE — 84702 CHORIONIC GONADOTROPIN TEST: CPT

## 2017-12-01 PROCEDURE — A9585: CPT

## 2017-12-01 PROCEDURE — 87186 SC STD MICRODIL/AGAR DIL: CPT

## 2017-12-01 PROCEDURE — 81001 URINALYSIS AUTO W/SCOPE: CPT

## 2017-12-01 PROCEDURE — 96376 TX/PRO/DX INJ SAME DRUG ADON: CPT

## 2017-12-01 PROCEDURE — 83690 ASSAY OF LIPASE: CPT

## 2017-12-01 PROCEDURE — 86850 RBC ANTIBODY SCREEN: CPT

## 2017-12-01 PROCEDURE — P9016: CPT

## 2017-12-01 PROCEDURE — 84295 ASSAY OF SERUM SODIUM: CPT

## 2017-12-01 PROCEDURE — 74177 CT ABD & PELVIS W/CONTRAST: CPT

## 2017-12-01 PROCEDURE — 97162 PT EVAL MOD COMPLEX 30 MIN: CPT

## 2017-12-01 PROCEDURE — C1769: CPT

## 2017-12-01 PROCEDURE — 84132 ASSAY OF SERUM POTASSIUM: CPT

## 2017-12-01 PROCEDURE — 87070 CULTURE OTHR SPECIMN AEROBIC: CPT

## 2017-12-01 PROCEDURE — 97116 GAIT TRAINING THERAPY: CPT

## 2017-12-01 PROCEDURE — 82435 ASSAY OF BLOOD CHLORIDE: CPT

## 2017-12-01 PROCEDURE — 80076 HEPATIC FUNCTION PANEL: CPT

## 2017-12-01 RX ORDER — GABAPENTIN 400 MG/1
1 CAPSULE ORAL
Qty: 0 | Refills: 0 | COMMUNITY
Start: 2017-12-01

## 2017-12-01 RX ORDER — DOCUSATE SODIUM 100 MG
1 CAPSULE ORAL
Qty: 0 | Refills: 0 | COMMUNITY
Start: 2017-12-01

## 2017-12-01 RX ORDER — HYDROMORPHONE HYDROCHLORIDE 2 MG/ML
1 INJECTION INTRAMUSCULAR; INTRAVENOUS; SUBCUTANEOUS
Qty: 112 | Refills: 0 | OUTPATIENT
Start: 2017-12-01 | End: 2017-12-15

## 2017-12-01 RX ORDER — OXYCODONE HYDROCHLORIDE 5 MG/1
1 TABLET ORAL
Qty: 28 | Refills: 0 | OUTPATIENT
Start: 2017-12-01 | End: 2017-12-15

## 2017-12-01 RX ORDER — SENNA PLUS 8.6 MG/1
2 TABLET ORAL
Qty: 0 | Refills: 0 | COMMUNITY
Start: 2017-12-01

## 2017-12-01 RX ORDER — GABAPENTIN 400 MG/1
1 CAPSULE ORAL
Qty: 30 | Refills: 0 | OUTPATIENT
Start: 2017-12-01 | End: 2017-12-11

## 2017-12-01 RX ADMIN — OXYCODONE HYDROCHLORIDE 60 MILLIGRAM(S): 5 TABLET ORAL at 02:05

## 2017-12-01 RX ADMIN — GABAPENTIN 100 MILLIGRAM(S): 400 CAPSULE ORAL at 05:22

## 2017-12-01 RX ADMIN — Medication 1 TABLET(S): at 17:20

## 2017-12-01 RX ADMIN — GABAPENTIN 100 MILLIGRAM(S): 400 CAPSULE ORAL at 13:38

## 2017-12-01 RX ADMIN — Medication 3 MILLIGRAM(S): at 00:03

## 2017-12-01 RX ADMIN — HYDROMORPHONE HYDROCHLORIDE 8 MILLIGRAM(S): 2 INJECTION INTRAMUSCULAR; INTRAVENOUS; SUBCUTANEOUS at 14:34

## 2017-12-01 RX ADMIN — Medication 1 TABLET(S): at 05:22

## 2017-12-01 RX ADMIN — HYDROMORPHONE HYDROCHLORIDE 8 MILLIGRAM(S): 2 INJECTION INTRAMUSCULAR; INTRAVENOUS; SUBCUTANEOUS at 11:19

## 2017-12-01 RX ADMIN — HYDROMORPHONE HYDROCHLORIDE 8 MILLIGRAM(S): 2 INJECTION INTRAMUSCULAR; INTRAVENOUS; SUBCUTANEOUS at 14:35

## 2017-12-01 RX ADMIN — HYDROMORPHONE HYDROCHLORIDE 8 MILLIGRAM(S): 2 INJECTION INTRAMUSCULAR; INTRAVENOUS; SUBCUTANEOUS at 08:23

## 2017-12-01 RX ADMIN — Medication 25 MILLIGRAM(S): at 00:03

## 2017-12-01 RX ADMIN — HYDROMORPHONE HYDROCHLORIDE 8 MILLIGRAM(S): 2 INJECTION INTRAMUSCULAR; INTRAVENOUS; SUBCUTANEOUS at 17:22

## 2017-12-01 RX ADMIN — HYDROMORPHONE HYDROCHLORIDE 8 MILLIGRAM(S): 2 INJECTION INTRAMUSCULAR; INTRAVENOUS; SUBCUTANEOUS at 11:17

## 2017-12-01 RX ADMIN — OXYCODONE HYDROCHLORIDE 60 MILLIGRAM(S): 5 TABLET ORAL at 13:40

## 2017-12-01 RX ADMIN — Medication 100 MILLIGRAM(S): at 05:22

## 2017-12-01 RX ADMIN — HYDROMORPHONE HYDROCHLORIDE 8 MILLIGRAM(S): 2 INJECTION INTRAMUSCULAR; INTRAVENOUS; SUBCUTANEOUS at 05:25

## 2017-12-01 RX ADMIN — Medication 25 MILLIGRAM(S): at 15:49

## 2017-12-01 RX ADMIN — Medication 25 MILLIGRAM(S): at 05:22

## 2017-12-01 RX ADMIN — OXYCODONE HYDROCHLORIDE 60 MILLIGRAM(S): 5 TABLET ORAL at 13:39

## 2017-12-01 RX ADMIN — HYDROMORPHONE HYDROCHLORIDE 8 MILLIGRAM(S): 2 INJECTION INTRAMUSCULAR; INTRAVENOUS; SUBCUTANEOUS at 05:55

## 2017-12-01 RX ADMIN — HYDROMORPHONE HYDROCHLORIDE 8 MILLIGRAM(S): 2 INJECTION INTRAMUSCULAR; INTRAVENOUS; SUBCUTANEOUS at 17:21

## 2017-12-01 RX ADMIN — HYDROMORPHONE HYDROCHLORIDE 8 MILLIGRAM(S): 2 INJECTION INTRAMUSCULAR; INTRAVENOUS; SUBCUTANEOUS at 08:22

## 2017-12-01 RX ADMIN — ENOXAPARIN SODIUM 40 MILLIGRAM(S): 100 INJECTION SUBCUTANEOUS at 11:16

## 2017-12-01 RX ADMIN — Medication 25 MILLIGRAM(S): at 09:14

## 2017-12-01 NOTE — DISCHARGE NOTE ADULT - PLAN OF CARE
Secondary to Gallstone pancreatitis; goal is pain control, decrease inflammation, antibiotic therapy Augmentin 875mg oral twice a day for 2 weeks. Follow-up with Infectious Disease, Pain Management and Gastroenterology within 1 week.  Follow-up with Surgery (Dr. Woody Beltran) in 2 weeks.

## 2017-12-01 NOTE — DISCHARGE NOTE ADULT - CARE PROVIDERS DIRECT ADDRESSES
,[email or web address],DirectAddress_Unknown

## 2017-12-01 NOTE — DISCHARGE NOTE ADULT - CARE PROVIDER_API CALL
Woody Beltran), Surgery; Surgical Critical Care  [address] [id number]  Phone: [phone]  Fax: [phone] Woody Beltran), Surgery; Surgical Critical Care  [address] [id number]  Phone: [phone]  Fax: [phone]    Noel Boland; MBBS), Infectious Disease; Internal Medicine  [address] [place] [address]  Phone: [phone]  Fax: [phone]    Abraham Javier (MD), Gastroenterology; Internal Medicine  [address] [place] [address]  Phone: [phone]  Fax: [phone]    Zehra Beltran), Anesthesiology  [address] [facility]  [address] [place] [address]  Phone: [phone]  Fax: [phone]

## 2017-12-01 NOTE — DISCHARGE NOTE ADULT - PATIENT PORTAL LINK FT
“You can access the FollowHealth Patient Portal, offered by [facility], by registering with the following website: [email or web address]”

## 2017-12-01 NOTE — PROGRESS NOTE ADULT - SUBJECTIVE AND OBJECTIVE BOX
PINA MILLS 57y [id number]    Patient is a 57y old  Female who presents with a chief complaint of Abdominal pain (16 Oct 2017 09:50)      Follow Up/CC:  pancreatic abscess    Interval History/ROS: feels well, no abd pain    Allergies    No Known Allergies    Intolerances        ANTIMICROBIALS:  amoxicillin  875 milliGRAM(s)/clavulanate 1 every 12 hours      MEDICATIONS  (STANDING):  amoxicillin  875 milliGRAM(s)/clavulanate 1 Tablet(s) Oral every 12 hours  docusate sodium 100 milliGRAM(s) Oral three times a day  enoxaparin Injectable 40 milliGRAM(s) SubCutaneous daily  gabapentin 100 milliGRAM(s) Oral three times a day  melatonin 3 milliGRAM(s) Oral at bedtime  oxyCODONE  ER Tablet 60 milliGRAM(s) Oral every 12 hours  senna 2 Tablet(s) Oral at bedtime    MEDICATIONS  (PRN):  diphenhydrAMINE   Injectable 25 milliGRAM(s) IV Push every 4 hours PRN Rash and/or Itching  HYDROmorphone   Tablet 8 milliGRAM(s) Oral every 3 hours PRN Severe breakthrough Pain (7 - 10)  naloxone Injectable 0.1 milliGRAM(s) IV Push every 3 minutes PRN For ANY of the following changes in patient status:  A. RR LESS THAN 10 breaths per minute, B. Oxygen saturation LESS THAN 90%, C. Sedation score of 6  ondansetron Injectable 4 milliGRAM(s) IV Push every 6 hours PRN Nausea        Vital Signs Last 24 Hrs  T(C): 37 (01 Dec 2017 09:28), Max: 37.1 (30 Nov 2017 22:03)  T(F): 98.6 (01 Dec 2017 09:28), Max: 98.7 (30 Nov 2017 22:03)  HR: 81 (01 Dec 2017 09:28) (79 - 85)  BP: 107/72 (01 Dec 2017 09:28) (101/67 - 119/81)  BP(mean): --  RR: 18 (01 Dec 2017 09:28) (18 - 198)  SpO2: 93% (01 Dec 2017 09:28) (93% - 96%)    CBC Full  -  ( 01 Dec 2017 08:59 )  WBC Count : 12.42 K/uL  Hemoglobin : 8.9 g/dL  Hematocrit : 28.8 %  Platelet Count - Automated : 726 K/uL  Mean Cell Volume : 84.2 fl  Mean Cell Hemoglobin : 26.0 pg  Mean Cell Hemoglobin Concentration : 30.9 gm/dL  Auto Neutrophil # : x  Auto Lymphocyte # : x  Auto Monocyte # : x  Auto Eosinophil # : x  Auto Basophil # : x  Auto Neutrophil % : x  Auto Lymphocyte % : x  Auto Monocyte % : x  Auto Eosinophil % : x  Auto Basophil % : x    12-01    138  |  98  |  11  ----------------------------<  86  4.2   |  25  |  0.43<L>    Ca    9.3      01 Dec 2017 09:02    TPro  7.1  /  Alb  3.5  /  TBili  0.2  /  DBili  x   /  AST  26  /  ALT  11  /  AlkPhos  183<H>  12-01    LIVER FUNCTIONS - ( 01 Dec 2017 09:02 )  Alb: 3.5 g/dL / Pro: 7.1 g/dL / ALK PHOS: 183 U/L / ALT: 11 U/L / AST: 26 U/L / GGT: x               MICROBIOLOGY:  .Blood PICC/PERC Single Lumen  11-16-17   No growth at 5 days.  --  --      .Urine Clean Catch (Midstream)  11-16-17   10,000 - 49,000 CFU/mL Methicillin resistant Staphylococcus aureus  --  Methicillin resistant Staphylococcus aureus              v            RADIOLOGY

## 2017-12-01 NOTE — DISCHARGE NOTE ADULT - PROVIDER TOKENS
SONIDO:'[id number]' TOKEN:'[id number]:'[id number]:'[id number]',TOKEN:'[id number]'

## 2017-12-01 NOTE — PROGRESS NOTE ADULT - ASSESSMENT
57yF w/ PMH sig for HTN and nephrolithiasis, s/p lap appy. Pt presented to [facility] ED on 10/16/2017 c/o acute onset severe sharp stabbing upper abdominal pain that radiates to the back. She says the pain woke her up in the middle of the night.  admitted on 10/16 for gallstone pancreatitis c/b necrotizing pancreatitis and ascending cholangitis s/p ERCP and stent placement (10/21) and FNA of perihepatic fluid (10/31), which grew E. faecalis. She is s/p cystgastrostomy with 2 axiom stents placement by GI on 11/8. She was not able to tolerate tube feeding and now is receiving TPN/ clears.  cultures are so far negative except on 30th which grew Enterococcus fecalis

## 2017-12-01 NOTE — PROGRESS NOTE ADULT - ATTENDING COMMENTS
Noel Boland  Attending Physician   Division of Infectious Disease  Pager #[phone]  After 5pm/weekend #[phone]

## 2017-12-01 NOTE — DISCHARGE NOTE ADULT - ADDITIONAL INSTRUCTIONS
Please follow up with Dr. Woody Beltran in 2 weeks after discharge from the hospital. You may call [phone] to schedule an appointment.

## 2017-12-01 NOTE — PROGRESS NOTE ADULT - PROBLEM SELECTOR PROBLEM 2
Pancreatic necrosis
Pancreatic necrosis
Cholecystitis
Cholecystitis
Fever, unspecified fever cause
Pancreatic necrosis
Enterococcal infection

## 2017-12-01 NOTE — DISCHARGE NOTE ADULT - MEDICATION SUMMARY - MEDICATIONS TO TAKE
I will START or STAY ON the medications listed below when I get home from the hospital:    HYDROmorphone 8 mg oral tablet  -- 1 tab(s) by mouth every 3 hours, As needed, Severe breakthrough Pain (7 - 10) MDD:8 tablets  -- Indication: For Moderate pain as needed    oxyCODONE 60 mg oral tablet, extended release  -- 1 tab(s) by mouth every 12 hours, As Needed -for severe pain MDD:2 tablets   -- Indication: For Severe pain as needed    gabapentin 100 mg oral capsule  -- 1 cap(s) by mouth 3 times a day  -- Indication: For Pain (neuropathic)    amLODIPine 5 mg oral tablet  -- 1 tab(s) by mouth once a day  -- Indication: For Blood pressure medication    docusate sodium 100 mg oral capsule  -- 1 cap(s) by mouth 3 times a day  -- Indication: For Laxative    senna oral tablet  -- 2 tab(s) by mouth once a day (at bedtime)  -- Indication: For Laxative    amoxicillin-clavulanate 875 mg-125 mg oral tablet  -- 1 tab(s) by mouth every 12 hours  -- Indication: For Antibiotics

## 2017-12-01 NOTE — DISCHARGE NOTE ADULT - CARE PLAN
Principal Discharge DX:	Pancreatic necrosis  Goal:	Secondary to Gallstone pancreatitis; goal is pain control, decrease inflammation, antibiotic therapy  Instructions for follow-up, activity and diet:	Augmentin 875mg oral twice a day for 2 weeks. Follow-up with Infectious Disease, Pain Management and Gastroenterology within 1 week.  Follow-up with Surgery (Dr. Woody Beltran) in 2 weeks.  Secondary Diagnosis:	Pancreatic abscess  Secondary Diagnosis:	Cholecystitis  Secondary Diagnosis:	Abdominal pain, unspecified abdominal location

## 2017-12-01 NOTE — PROGRESS NOTE ADULT - SUBJECTIVE AND OBJECTIVE BOX
ATP Progress note    Subjective: Patient seen and examined. No acute events overnight. Patient did not require IV tylenol overnight. This AM feeling well, pain is better controlled since altering pain medication dosages.      Objective:   Vital Signs Last 24 Hrs  T(C): 37 (01 Dec 2017 09:28), Max: 37.1 (30 Nov 2017 22:03)  T(F): 98.6 (01 Dec 2017 09:28), Max: 98.7 (30 Nov 2017 22:03)  HR: 81 (01 Dec 2017 09:28) (79 - 85)  BP: 107/72 (01 Dec 2017 09:28) (101/67 - 119/81)  BP(mean): --  RR: 18 (01 Dec 2017 09:28) (18 - 198)  SpO2: 93% (01 Dec 2017 09:28) (93% - 96%)          I&O's Summary    30 Nov 2017 07:01  -  01 Dec 2017 07:00  --------------------------------------------------------  IN: 240 mL / OUT: 0 mL / NET: 240 mL          LABS:                        8.8    10.10 )-----------( 804      ( 30 Nov 2017 08:53 )             28.0     11-30    142  |  103  |  8   ----------------------------<  88  4.1   |  23  |  0.57    Ca    9.5      30 Nov 2017 08:53  Phos  4.3     11-29  Mg     1.9     11-29    TPro  7.5  /  Alb  3.1<L>  /  TBili  0.2  /  DBili  x   /  AST  22  /  ALT  18  /  AlkPhos  206<H>  11-30      Physical Exam:   Gen: NAD  Abd: tender to palpation diffusely, no guarding, no palpable masses

## 2017-12-01 NOTE — PROGRESS NOTE ADULT - ASSESSMENT
57 y.o. woman with history of nephrolithiasis, HTN, and appendectomy admitted on 10/16 for gallstone pancreatitis c/b necrotizing pancreatitis and ascending cholangitis s/p ERCP and stent placement (10/21) and FNA of perihepatic fluid (10/31), which grew E. faecalis. She is s/p cystgastrostomy with 2 axiom stents placement by GI on 11/8.      Plan:  - Continue low fat diet, no longer on TPN  - OOB ambulate   - Pain management following: changed PO Dilaudid to 8mg po Q3hrs prn, continue Oxycontin 60mg PO Q12hrs for long acting pain control, continue Neurontin 100mg TID. Better controlled.  - Continue Augmentin per ID Rec  - pending WBC possible d/c today

## 2017-12-01 NOTE — PROGRESS NOTE ADULT - PROBLEM SELECTOR PLAN 2
management as per surgery  Nutrition via PN
management as per surgical team  Until GI access can be obtained and pt is tolerating enteral diet will continue with PN
- RUQ US to assess for cholecystitis  - draw and follow blood cultures  - recommend abx as above
- draw and follow blood cultures  - consider abx as above
- f/u culture data  - continue yesica
- f/u culture data  - continue yesica
Management as per surgery
Management as per surgery  Nutrition via PN
Management as per surgery  Nutrition via PN and Low fat diet, as soon as Low Fat diet is adequate d/c PN
Management as per surgery  nutrition via PN
Management as per surgery  nutrition via PN
Management as per surgical team
Management as per surgical team  Nutrition via PN
Management per surgery  Nutrition via PN with diet being advance to Low fat as of  yesterday  If tolerates diet D/C PN
On diet would d/c PN ASAP
management as per surgery
management as per surgical team  PN for nutrition
-as above
-as above  -can transition to augmentin 875 mg po bid if able to take PO ABX

## 2017-12-01 NOTE — DISCHARGE NOTE ADULT - HOSPITAL COURSE
57yF w/ PMH sig for HTN and nephrolithiasis, s/p lap appy. Pt presented to [facility] ED on 10/16/2017 c/o acute onset severe sharp stabbing upper abdominal pain that radiates to the back. She says the pain woke her up in the middle of the night. She is uncertain exactly what time it was. She is also c/o nausea and NBNB emesis x3 episodes. Last PO intake was 10PM the night PTA. She has never experienced a pain like this before. She denies F/C, CP, SOB, palpitations, diarrhea, constipation, hematuria, or dysuria.        Pt instructed to take medication as prescribed, she cannot take more than what is given. Her boyfriend will monitor her dosages. Risks explained including death with overdosing on pain medication.  Pt was advised to follow up with Chronic pain in 1 week and Dr. Beltran in 2 weeks. 57yF w/ significant past medical history for Hypertension and nephrolithiasis, s/p laparoscopic appendectomy. Pt presented to [facility] ED on 10/16/2017 complaining of acute onset severe "sharp stabbing upper abdominal pain" that radiated to the back. She said that the pain woke her up in the middle of the night. She was also complaining of nausea and Non-bilious, non-bloody emesis x3 episodes. Last oral intake was 10PM the night before. She had never experienced a pain like this before. She denied chest pain, Shortness of breath, palpitations, diarrhea, constipation, hematuria, or dysuria.        Pt instructed to take medication as prescribed, she cannot take more than what is given. Her boyfriend will monitor her dosages. Risks explained including death with overdosing on pain medication.  Pt was advised to follow up with Chronic pain in 1 week and Dr. Beltran in 2 weeks. 57yF w/ significant past medical history for Hypertension and nephrolithiasis, s/p laparoscopic appendectomy. Pt presented to [facility] ED on 10/16/2017 complaining of acute onset severe "sharp stabbing upper abdominal pain" that radiated to the back. She said that the pain woke her up in the middle of the night. She was also complaining of nausea and Non-bilious, non-bloody emesis x3 episodes. Last oral intake was 10PM the night before. She had never experienced a pain like this before. She denied chest pain, Shortness of breath, palpitations, diarrhea, constipation, hematuria, or dysuria.        Patient was admitted to Surgical ICU on 10/17/17 and had ERCP performed by GI team on 10/17/2017 which indicated Ascending cholangitis; patient was diagnosed with sepsis secondary to ascending cholangitis which was present on admission (diagnosed as choledocholithiasis with TG13 critera met for mild cholangitis.) During the ERCP, GI performed a sphincterotomy and placed a plastic stent due to the presence of gallstones.  The patient continued to complain of severe pain; she was diagnosed with necrotizing pancreatitis (as a complication of her original gallstone pancreatitis diagnosis) and Anesthesia/Pain Management was consulted for placement of a PCEA for pain.    10/20/17, the patient had Abdominal CT scans performed which revealed splenic and portal vein thrombosis.  Anticoagulation was restarted.  Patient had continuous episodes of pain, now accompanied by fevers and diarrhea.  She was tested for C. diff, which came back negative, thus antibiotics were resumed.    Due to PO intolerance, patient had Nasogastric tube placed and was started on Tube Feeds via NGT on 10/25/17.  Necrotic fluid collection from pancreatitis was deemed not yet mature for endoscopic drainage; conservative (nonsurgical) management continued (IC fluids, Pain control, Antibiotics).  Due to improved health status, the patient was transferred from SICU to the surgical wards on 10/26/17.  Tiger-tube was placed and Jevity 1.2 (tube feeds) were started.    Throughout the hospital course, patient continued to have episodes of nausea and vomiting, tube feeds were subsequently stopped, patient was placed on NPO and then slowly resumed feeds based on improvement of GI function (+flatus, +bowel movement, - nausea/vomiting).     Interventional Radiology was consulted, evaluated the patient, and performed Fine Needle Aspiration of her peripancreatic fluid collection on 10/30/17 in the IR suite.  The fluid grew  E. faecalis, and Ciprofloxacin was started (switched from Imipenem). CT scan confirmed necrosis of the pancreatic body on 10/28/17.    Patient gradually improved, antibiotic therapy reduced the effects of the necrosis, and the patient's wbc improved.  GI function gradually improved and the patient was able to tolerate oral feeding. She was gradually weaned off the PCEA/PCA, and transitioned to oral pain medications.  IV Antibiotics were transitioned to oral (by Infectious Disease), and the patient was deemed stable for discharge on 12/01/17.  She will be discharged with antibiotics and narcotic pain medication and instructed to use only as directed as the risks of overusing the medication include respiratory depression which can result in fatality; both the patient and her boyfriend (present at bedside) state that her boyfriend will monitor her dosages.  She states that she understands the plan and medication regimen.  The patient is instructed to follow-up outpatient with GI, Infectious Disease, Pain Management within 1 week of being discharged, and to follow-up with Surgery (Dr. Woody Beltran) in 2 weeks after being discharged.  The plan for the gallstone complication is to consider electively perform a cholecystectomy (removal of gallbladder) in several weeks, once the inflammation from her current episode/hospital admission has completely resolved and pain has resolved.

## 2017-12-01 NOTE — PROGRESS NOTE ADULT - PROBLEM SELECTOR PROBLEM 1
Cholecystitis
Pancreatic necrosis
Pancreatic abscess

## 2017-12-01 NOTE — PROGRESS NOTE ADULT - ATTENDING COMMENTS
no more opiate withdrawal symptoms on OxyContin 60mg q12h and Dilaudid 8 mg PO q3h  tolerating regular diet again    abd soft / NT / ND    -D/C home on Augmentin for infected pancreatic necrosis s/p endoscopic drainage  -Rx 2 weeks of OxyContin and Dilaudid and f/u with chronic pain for continued weaning. I explained the importance of taking medication exactly as prescribed and that she may not drive a car  while taking these medications  -colace BID to prevent opiate induced constipation

## 2017-12-01 NOTE — CHART NOTE - NSCHARTNOTEFT_GEN_A_CORE
Source: Patient [X ]    Family [X ]     other [X ] chart/medical record    Diet :  Low Fat + Promote-3 per day (provides additional 711cal & 44gm protein/day)     Pt seen for malnutrition follow-up. Reports good tolerance and appetite of low fat diet. Consuming 50-75% at meals + 1 Promote daily. Denies N+V. +Flatus, + 1 BM daily. Noted with discharge planning in progress. States limited knowledge of low fat diet. Low fat Nutrition therapy handout reviewed and questions addressed. Pt/pt's  verbalize understanding and are amenable to suggestions.     PO intake:  < 50% [ ] 50-75% [X ]   % [ ]  other : + 1 Promote daily.     Source for PO intake [X ] Patient [X] family [ ] chart [ ] staff [ ] other    Current Weight: 11/29: 131.8 pounds, 10/26: 153.2 pounds, 10/17: 146.6 pounds     Pertinent Medications: MEDICATIONS  (STANDING):  amoxicillin  875 milliGRAM(s)/clavulanate 1 Tablet(s) Oral every 12 hours  docusate sodium 100 milliGRAM(s) Oral three times a day  enoxaparin Injectable 40 milliGRAM(s) SubCutaneous daily  gabapentin 100 milliGRAM(s) Oral three times a day  melatonin 3 milliGRAM(s) Oral at bedtime  oxyCODONE  ER Tablet 60 milliGRAM(s) Oral every 12 hours  senna 2 Tablet(s) Oral at bedtime    MEDICATIONS  (PRN):  ondansetron Injectable 4 milliGRAM(s) IV Push every 6 hours PRN Nausea    Pertinent Labs:  12-01 Na138 mmol/L Glu 86 mg/dL K+ 4.2 mmol/L Cr  0.43 mg/dL<L> BUN 11 mg/dL Phos n/a   Alb 3.5 g/dL PAB n/a         Skin: No pressure injuries.    Estimated Needs: [X ] no change since previous assessment      Previous Nutrition Diagnosis:  [X ] Malnutrition     Nutrition Diagnosis is [X ] ongoing- improving/being addressed with good appetite/intake of po diet [ ] resolved [ ] not applicable       Recommend    [ ] Change Diet To:    [ ] Nutrition Supplement    [ ] Nutrition Support    [X ] Other: 1) Continue current nutrition care plan as tolerated.  2) Low Fat nutrition education provided       Monitoring and Evaluation:     [X ] PO intake [X ] Tolerance to diet prescription [X ] weights [X ] follow up per protocol    [X ] other: 1. Encourage PO intake within therapeutic diet as tolerated. 2. Monitor po intake, weights, diet tolerance, and lab values. 3. RD to remain available to reinforce nutrition education as requested.

## 2017-12-01 NOTE — PROGRESS NOTE ADULT - I WAS PHYSICALLY PRESENT FOR THE KEY PORTIONS OF THE EVALUATION AND MANAGEMENT (E/M) SERVICE PROVIDED.  I AGREE WITH THE ABOVE HISTORY, PHYSICAL, AND PLAN WHICH I HAVE REVIEWED AND EDITED WHERE APPROPRIATE

## 2017-12-01 NOTE — PROGRESS NOTE ADULT - PROBLEM SELECTOR PLAN 1
-s/p drainage  -stable  -Dc with augmentin 875 mg po bid x 2 weeks  -CT shows improvement  -F/u in ID office in 2-3 weeks [phone]  -Drains per GI - to stay in for 3 months

## 2017-12-01 NOTE — DISCHARGE NOTE ADULT - NS AS ACTIVITY OBS
when taking pain medications/No Heavy lifting/straining/Do not make important decisions/Do not drive or operate machinery

## 2017-12-05 PROBLEM — Z00.00 ENCOUNTER FOR PREVENTIVE HEALTH EXAMINATION: Status: ACTIVE | Noted: 2017-12-05

## 2017-12-08 ENCOUNTER — MOBILE ON CALL (OUTPATIENT)
Age: 57
End: 2017-12-08

## 2017-12-14 ENCOUNTER — APPOINTMENT (OUTPATIENT)
Dept: TRAUMA SURGERY | Facility: CLINIC | Age: 57
End: 2017-12-14
Payer: MEDICAID

## 2017-12-14 VITALS
BODY MASS INDEX: 26.41 KG/M2 | HEART RATE: 82 BPM | DIASTOLIC BLOOD PRESSURE: 84 MMHG | WEIGHT: 131 LBS | HEIGHT: 59 IN | SYSTOLIC BLOOD PRESSURE: 124 MMHG | TEMPERATURE: 98 F

## 2017-12-14 DIAGNOSIS — M54.5 LOW BACK PAIN: ICD-10-CM

## 2017-12-14 DIAGNOSIS — I10 ESSENTIAL (PRIMARY) HYPERTENSION: ICD-10-CM

## 2017-12-14 PROCEDURE — 99213 OFFICE O/P EST LOW 20 MIN: CPT

## 2017-12-14 RX ORDER — GABAPENTIN 100 MG/1
100 CAPSULE ORAL 3 TIMES DAILY
Qty: 90 | Refills: 5 | Status: ACTIVE | COMMUNITY
Start: 2017-12-14 | End: 1900-01-01

## 2017-12-29 ENCOUNTER — MOBILE ON CALL (OUTPATIENT)
Age: 57
End: 2017-12-29

## 2018-01-01 NOTE — PROGRESS NOTE ADULT - ASSESSMENT
Stable on PN  BG control improved  Will continue with PN until enteral intake can be resumed and is adequate Statement Selected

## 2018-01-04 ENCOUNTER — MOBILE ON CALL (OUTPATIENT)
Age: 58
End: 2018-01-04

## 2018-01-10 ENCOUNTER — APPOINTMENT (OUTPATIENT)
Dept: TRAUMA SURGERY | Facility: CLINIC | Age: 58
End: 2018-01-10
Payer: MEDICAID

## 2018-01-10 VITALS
SYSTOLIC BLOOD PRESSURE: 92 MMHG | HEART RATE: 109 BPM | TEMPERATURE: 98.6 F | DIASTOLIC BLOOD PRESSURE: 60 MMHG | BODY MASS INDEX: 26.41 KG/M2 | WEIGHT: 131 LBS | HEIGHT: 59 IN

## 2018-01-10 PROCEDURE — 99214 OFFICE O/P EST MOD 30 MIN: CPT

## 2018-01-10 RX ORDER — OXYCODONE HYDROCHLORIDE 60 MG/1
60 TABLET, FILM COATED, EXTENDED RELEASE ORAL
Qty: 28 | Refills: 0 | Status: COMPLETED | COMMUNITY
Start: 2017-12-08 | End: 2017-12-28

## 2018-01-10 RX ORDER — OXYCODONE HYDROCHLORIDE 40 MG/1
40 TABLET, FILM COATED, EXTENDED RELEASE ORAL
Qty: 14 | Refills: 0 | Status: COMPLETED | COMMUNITY
Start: 2018-01-04 | End: 2018-01-10

## 2018-01-10 RX ORDER — HYDROMORPHONE HYDROCHLORIDE 4 MG/1
4 TABLET ORAL
Qty: 56 | Refills: 0 | Status: DISCONTINUED | COMMUNITY
Start: 2017-12-08 | End: 2018-01-10

## 2018-01-10 RX ORDER — OXYCODONE HYDROCHLORIDE 40 MG/1
40 TABLET, FILM COATED, EXTENDED RELEASE ORAL
Qty: 14 | Refills: 0 | Status: COMPLETED | COMMUNITY
Start: 2017-12-29 | End: 2018-01-04

## 2018-01-10 RX ORDER — HYDROMORPHONE HYDROCHLORIDE 2 MG/1
2 TABLET ORAL
Qty: 56 | Refills: 0 | Status: DISCONTINUED | COMMUNITY
Start: 2017-12-14 | End: 2018-01-10

## 2018-01-11 RX ORDER — DOCUSATE SODIUM 100 MG
100 TABLET ORAL
Refills: 0 | Status: ACTIVE | COMMUNITY

## 2018-01-11 RX ORDER — AMLODIPINE BESYLATE 5 MG/1
TABLET ORAL
Refills: 0 | Status: ACTIVE | COMMUNITY

## 2018-01-11 RX ORDER — GABAPENTIN 100 MG/1
CAPSULE ORAL
Refills: 0 | Status: ACTIVE | COMMUNITY

## 2018-01-11 RX ORDER — AMOXICILLIN AND CLAVULANATE POTASSIUM 875; 125 MG/1; 1/1
875-125 TABLET, FILM COATED ORAL
Refills: 0 | Status: COMPLETED | COMMUNITY

## 2018-01-16 ENCOUNTER — APPOINTMENT (OUTPATIENT)
Dept: GASTROENTEROLOGY | Facility: CLINIC | Age: 58
End: 2018-01-16
Payer: MEDICAID

## 2018-01-16 VITALS
DIASTOLIC BLOOD PRESSURE: 85 MMHG | WEIGHT: 121 LBS | OXYGEN SATURATION: 96 % | TEMPERATURE: 97.9 F | HEART RATE: 83 BPM | SYSTOLIC BLOOD PRESSURE: 126 MMHG | RESPIRATION RATE: 15 BRPM | BODY MASS INDEX: 24.44 KG/M2

## 2018-01-16 DIAGNOSIS — Z87.891 PERSONAL HISTORY OF NICOTINE DEPENDENCE: ICD-10-CM

## 2018-01-16 DIAGNOSIS — Z86.79 PERSONAL HISTORY OF OTHER DISEASES OF THE CIRCULATORY SYSTEM: ICD-10-CM

## 2018-01-16 DIAGNOSIS — Z78.9 OTHER SPECIFIED HEALTH STATUS: ICD-10-CM

## 2018-01-16 DIAGNOSIS — Z87.442 PERSONAL HISTORY OF URINARY CALCULI: ICD-10-CM

## 2018-01-16 DIAGNOSIS — K35.80 UNSPECIFIED ACUTE APPENDICITIS: ICD-10-CM

## 2018-01-16 DIAGNOSIS — R10.819 ABDOMINAL TENDERNESS, UNSPECIFIED SITE: ICD-10-CM

## 2018-01-16 PROCEDURE — 99214 OFFICE O/P EST MOD 30 MIN: CPT

## 2018-01-18 ENCOUNTER — FORM ENCOUNTER (OUTPATIENT)
Age: 58
End: 2018-01-18

## 2018-01-18 PROBLEM — Z87.442 HISTORY OF RENAL CALCULI: Status: RESOLVED | Noted: 2017-12-14 | Resolved: 2018-01-18

## 2018-01-18 PROBLEM — Z87.891 FORMER SMOKER: Status: ACTIVE | Noted: 2018-01-16

## 2018-01-18 PROBLEM — R10.819 ABDOMINAL TENDERNESS: Status: ACTIVE | Noted: 2018-01-18

## 2018-01-18 PROBLEM — Z78.9 CURRENT NON-DRINKER OF ALCOHOL: Status: ACTIVE | Noted: 2018-01-16

## 2018-01-18 PROBLEM — Z86.79 HISTORY OF HYPERTENSION: Status: RESOLVED | Noted: 2017-12-14 | Resolved: 2018-01-18

## 2018-01-18 PROBLEM — K35.80 ACUTE APPENDICITIS: Status: RESOLVED | Noted: 2017-12-14 | Resolved: 2018-01-18

## 2018-01-18 LAB
ALBUMIN SERPL ELPH-MCNC: 4.6 G/DL
ALP BLD-CCNC: 132 U/L
ALT SERPL-CCNC: 11 U/L
AMYLASE/CREAT SERPL: 42 U/L
ANION GAP SERPL CALC-SCNC: 18 MMOL/L
AST SERPL-CCNC: 26 U/L
BASOPHILS # BLD AUTO: 0.05 K/UL
BASOPHILS NFR BLD AUTO: 0.8 %
BILIRUB SERPL-MCNC: 0.3 MG/DL
BUN SERPL-MCNC: 12 MG/DL
CALCIUM SERPL-MCNC: 10.3 MG/DL
CHLORIDE SERPL-SCNC: 106 MMOL/L
CO2 SERPL-SCNC: 22 MMOL/L
CREAT SERPL-MCNC: 0.7 MG/DL
EOSINOPHIL # BLD AUTO: 0.42 K/UL
EOSINOPHIL NFR BLD AUTO: 6.6 %
GLUCOSE SERPL-MCNC: 97 MG/DL
HCT VFR BLD CALC: 43.3 %
HGB BLD-MCNC: 13.3 G/DL
IMM GRANULOCYTES NFR BLD AUTO: 0.2 %
LPL SERPL-CCNC: 27 U/L
LYMPHOCYTES # BLD AUTO: 2.12 K/UL
LYMPHOCYTES NFR BLD AUTO: 33.4 %
MAN DIFF?: NORMAL
MCHC RBC-ENTMCNC: 25 PG
MCHC RBC-ENTMCNC: 30.7 GM/DL
MCV RBC AUTO: 81.5 FL
MONOCYTES # BLD AUTO: 0.28 K/UL
MONOCYTES NFR BLD AUTO: 4.4 %
NEUTROPHILS # BLD AUTO: 3.47 K/UL
NEUTROPHILS NFR BLD AUTO: 54.6 %
PLATELET # BLD AUTO: 429 K/UL
POTASSIUM SERPL-SCNC: 4 MMOL/L
PROT SERPL-MCNC: 8.6 G/DL
RBC # BLD: 5.31 M/UL
RBC # FLD: 17.1 %
SODIUM SERPL-SCNC: 146 MMOL/L
WBC # FLD AUTO: 6.35 K/UL

## 2018-01-19 ENCOUNTER — OUTPATIENT (OUTPATIENT)
Dept: OUTPATIENT SERVICES | Facility: HOSPITAL | Age: 58
LOS: 1 days | End: 2018-01-19
Payer: MEDICAID

## 2018-01-19 ENCOUNTER — APPOINTMENT (OUTPATIENT)
Dept: CT IMAGING | Facility: CLINIC | Age: 58
End: 2018-01-19
Payer: MEDICAID

## 2018-01-19 DIAGNOSIS — K85.91 ACUTE PANCREATITIS WITH UNINFECTED NECROSIS, UNSPECIFIED: ICD-10-CM

## 2018-01-19 DIAGNOSIS — Z90.49 ACQUIRED ABSENCE OF OTHER SPECIFIED PARTS OF DIGESTIVE TRACT: Chronic | ICD-10-CM

## 2018-01-19 DIAGNOSIS — N20.0 CALCULUS OF KIDNEY: Chronic | ICD-10-CM

## 2018-01-19 PROCEDURE — 74178 CT ABD&PLV WO CNTR FLWD CNTR: CPT

## 2018-01-19 PROCEDURE — 82565 ASSAY OF CREATININE: CPT

## 2018-01-19 PROCEDURE — 74178 CT ABD&PLV WO CNTR FLWD CNTR: CPT | Mod: 26

## 2018-01-25 ENCOUNTER — MESSAGE (OUTPATIENT)
Age: 58
End: 2018-01-25

## 2018-02-06 ENCOUNTER — OUTPATIENT (OUTPATIENT)
Dept: OUTPATIENT SERVICES | Facility: HOSPITAL | Age: 58
LOS: 1 days | End: 2018-02-06
Payer: MEDICAID

## 2018-02-06 VITALS
RESPIRATION RATE: 16 BRPM | TEMPERATURE: 99 F | HEART RATE: 98 BPM | WEIGHT: 119.93 LBS | SYSTOLIC BLOOD PRESSURE: 113 MMHG | HEIGHT: 59 IN | OXYGEN SATURATION: 99 % | DIASTOLIC BLOOD PRESSURE: 85 MMHG

## 2018-02-06 DIAGNOSIS — N20.0 CALCULUS OF KIDNEY: Chronic | ICD-10-CM

## 2018-02-06 DIAGNOSIS — K85.10 BILIARY ACUTE PANCREATITIS WITHOUT NECROSIS OR INFECTION: ICD-10-CM

## 2018-02-06 DIAGNOSIS — Z90.49 ACQUIRED ABSENCE OF OTHER SPECIFIED PARTS OF DIGESTIVE TRACT: Chronic | ICD-10-CM

## 2018-02-06 DIAGNOSIS — K85.01 IDIOPATHIC ACUTE PANCREATITIS WITH UNINFECTED NECROSIS: ICD-10-CM

## 2018-02-06 DIAGNOSIS — N60.09 SOLITARY CYST OF UNSPECIFIED BREAST: Chronic | ICD-10-CM

## 2018-02-06 DIAGNOSIS — Z98.890 OTHER SPECIFIED POSTPROCEDURAL STATES: Chronic | ICD-10-CM

## 2018-02-06 DIAGNOSIS — M54.5 LOW BACK PAIN: ICD-10-CM

## 2018-02-06 DIAGNOSIS — I10 ESSENTIAL (PRIMARY) HYPERTENSION: ICD-10-CM

## 2018-02-06 DIAGNOSIS — K83.0 CHOLANGITIS: ICD-10-CM

## 2018-02-06 PROCEDURE — G0463: CPT

## 2018-02-06 NOTE — H&P PST ADULT - PMH
Gall stone pancreatitis  11/2017  Hypertension    Kidney stone Chronic lower back pain  on percocet 2-4 times a day  Gall stone pancreatitis  necrotizing pancreatitis s/p stent placement 11/2017 treated with IV abx  Hypertension    Kidney stone

## 2018-02-06 NOTE — H&P PST ADULT - PSH
Breast cyst  s/p removal  History of appendectomy    Kidney stones  lithotripsy Breast cyst  s/p removal  History of appendectomy    History of biliary stent insertion  11/2017  Kidney stones  lithotripsy

## 2018-02-06 NOTE — H&P PST ADULT - HISTORY OF PRESENT ILLNESS
57 year old female with PMH of HTN, recent hospital stay gall stone pancreatitis/ Cholangitis s/p stent placement 57 year old female with PMH of HTN, SICU hospital stay 10/2017-12/1/17 w/ abdominal pain/ gall stone pancreatitis with E. Faecalis infected necrosis/ Cholangitis/  s/p biliary stent placement treated with IV antibiotics planned for laparoscopic cholecystectomy 2/7/18.     *** Patient will be scheduled for upper EUS/ stent removal after lap kamran, spoke to Shayy at Dr. Calles's office.

## 2018-02-06 NOTE — H&P PST ADULT - PROBLEM SELECTOR PLAN 1
planned for laparoscopic cholecystectomy 2/7/18.   recent CBC, CMP, Amylase/lipase on file  Preprocedure instructions discussed   UcG the day of procedure

## 2018-02-06 NOTE — H&P PST ADULT - GASTROINTESTINAL DETAILS
nontender/bowel sounds normal/soft/no rebound tenderness/no guarding/no masses palpable/no distention

## 2018-02-07 ENCOUNTER — OUTPATIENT (OUTPATIENT)
Dept: OUTPATIENT SERVICES | Facility: HOSPITAL | Age: 58
LOS: 1 days | End: 2018-02-07
Payer: MEDICAID

## 2018-02-07 ENCOUNTER — RESULT REVIEW (OUTPATIENT)
Age: 58
End: 2018-02-07

## 2018-02-07 ENCOUNTER — APPOINTMENT (OUTPATIENT)
Dept: TRAUMA SURGERY | Facility: HOSPITAL | Age: 58
End: 2018-02-07

## 2018-02-07 ENCOUNTER — TRANSCRIPTION ENCOUNTER (OUTPATIENT)
Age: 58
End: 2018-02-07

## 2018-02-07 ENCOUNTER — APPOINTMENT (OUTPATIENT)
Dept: GASTROENTEROLOGY | Facility: HOSPITAL | Age: 58
End: 2018-02-07

## 2018-02-07 VITALS
HEART RATE: 75 BPM | SYSTOLIC BLOOD PRESSURE: 106 MMHG | OXYGEN SATURATION: 97 % | TEMPERATURE: 98 F | RESPIRATION RATE: 16 BRPM | DIASTOLIC BLOOD PRESSURE: 65 MMHG

## 2018-02-07 VITALS
RESPIRATION RATE: 18 BRPM | TEMPERATURE: 98 F | WEIGHT: 121.92 LBS | OXYGEN SATURATION: 97 % | DIASTOLIC BLOOD PRESSURE: 90 MMHG | SYSTOLIC BLOOD PRESSURE: 136 MMHG | HEART RATE: 80 BPM | HEIGHT: 59 IN

## 2018-02-07 DIAGNOSIS — N20.0 CALCULUS OF KIDNEY: Chronic | ICD-10-CM

## 2018-02-07 DIAGNOSIS — Z90.49 ACQUIRED ABSENCE OF OTHER SPECIFIED PARTS OF DIGESTIVE TRACT: Chronic | ICD-10-CM

## 2018-02-07 DIAGNOSIS — K83.0 CHOLANGITIS: ICD-10-CM

## 2018-02-07 DIAGNOSIS — Z98.890 OTHER SPECIFIED POSTPROCEDURAL STATES: Chronic | ICD-10-CM

## 2018-02-07 DIAGNOSIS — N60.09 SOLITARY CYST OF UNSPECIFIED BREAST: Chronic | ICD-10-CM

## 2018-02-07 LAB — HCG UR QL: NEGATIVE — SIGNIFICANT CHANGE UP

## 2018-02-07 PROCEDURE — 88304 TISSUE EXAM BY PATHOLOGIST: CPT | Mod: 26

## 2018-02-07 PROCEDURE — 47562 LAPAROSCOPIC CHOLECYSTECTOMY: CPT | Mod: GC

## 2018-02-07 PROCEDURE — 88304 TISSUE EXAM BY PATHOLOGIST: CPT

## 2018-02-07 PROCEDURE — 81025 URINE PREGNANCY TEST: CPT

## 2018-02-07 PROCEDURE — 47562 LAPAROSCOPIC CHOLECYSTECTOMY: CPT

## 2018-02-07 RX ORDER — CEFOTETAN DISODIUM 1 G
2 VIAL (EA) INJECTION ONCE
Qty: 0 | Refills: 0 | Status: COMPLETED | OUTPATIENT
Start: 2018-02-07 | End: 2018-02-07

## 2018-02-07 RX ORDER — SODIUM CHLORIDE 9 MG/ML
1000 INJECTION, SOLUTION INTRAVENOUS
Qty: 0 | Refills: 0 | Status: DISCONTINUED | OUTPATIENT
Start: 2018-02-07 | End: 2018-02-22

## 2018-02-07 RX ORDER — AMLODIPINE BESYLATE 2.5 MG/1
1 TABLET ORAL
Qty: 0 | Refills: 0 | COMMUNITY

## 2018-02-07 RX ORDER — HYDROMORPHONE HYDROCHLORIDE 2 MG/ML
0.5 INJECTION INTRAMUSCULAR; INTRAVENOUS; SUBCUTANEOUS
Qty: 0 | Refills: 0 | Status: DISCONTINUED | OUTPATIENT
Start: 2018-02-07 | End: 2018-02-07

## 2018-02-07 RX ORDER — LIDOCAINE HCL 20 MG/ML
0.2 VIAL (ML) INJECTION ONCE
Qty: 0 | Refills: 0 | Status: DISCONTINUED | OUTPATIENT
Start: 2018-02-07 | End: 2018-02-07

## 2018-02-07 RX ORDER — HYDROMORPHONE HYDROCHLORIDE 2 MG/ML
4 INJECTION INTRAMUSCULAR; INTRAVENOUS; SUBCUTANEOUS ONCE
Qty: 0 | Refills: 0 | Status: DISCONTINUED | OUTPATIENT
Start: 2018-02-07 | End: 2018-02-07

## 2018-02-07 RX ORDER — OXYCODONE AND ACETAMINOPHEN 5; 325 MG/1; MG/1
1 TABLET ORAL EVERY 4 HOURS
Qty: 0 | Refills: 0 | Status: DISCONTINUED | OUTPATIENT
Start: 2018-02-07 | End: 2018-02-07

## 2018-02-07 RX ORDER — SODIUM CHLORIDE 9 MG/ML
3 INJECTION INTRAMUSCULAR; INTRAVENOUS; SUBCUTANEOUS EVERY 8 HOURS
Qty: 0 | Refills: 0 | Status: DISCONTINUED | OUTPATIENT
Start: 2018-02-07 | End: 2018-02-07

## 2018-02-07 RX ORDER — ONDANSETRON 8 MG/1
4 TABLET, FILM COATED ORAL ONCE
Qty: 0 | Refills: 0 | Status: DISCONTINUED | OUTPATIENT
Start: 2018-02-07 | End: 2018-02-07

## 2018-02-07 RX ADMIN — HYDROMORPHONE HYDROCHLORIDE 0.5 MILLIGRAM(S): 2 INJECTION INTRAMUSCULAR; INTRAVENOUS; SUBCUTANEOUS at 14:30

## 2018-02-07 RX ADMIN — HYDROMORPHONE HYDROCHLORIDE 0.5 MILLIGRAM(S): 2 INJECTION INTRAMUSCULAR; INTRAVENOUS; SUBCUTANEOUS at 14:40

## 2018-02-07 RX ADMIN — HYDROMORPHONE HYDROCHLORIDE 4 MILLIGRAM(S): 2 INJECTION INTRAMUSCULAR; INTRAVENOUS; SUBCUTANEOUS at 14:45

## 2018-02-07 RX ADMIN — HYDROMORPHONE HYDROCHLORIDE 0.5 MILLIGRAM(S): 2 INJECTION INTRAMUSCULAR; INTRAVENOUS; SUBCUTANEOUS at 15:30

## 2018-02-07 RX ADMIN — HYDROMORPHONE HYDROCHLORIDE 0.5 MILLIGRAM(S): 2 INJECTION INTRAMUSCULAR; INTRAVENOUS; SUBCUTANEOUS at 14:20

## 2018-02-07 RX ADMIN — HYDROMORPHONE HYDROCHLORIDE 4 MILLIGRAM(S): 2 INJECTION INTRAMUSCULAR; INTRAVENOUS; SUBCUTANEOUS at 15:15

## 2018-02-07 RX ADMIN — HYDROMORPHONE HYDROCHLORIDE 0.5 MILLIGRAM(S): 2 INJECTION INTRAMUSCULAR; INTRAVENOUS; SUBCUTANEOUS at 14:10

## 2018-02-07 NOTE — ASU DISCHARGE PLAN (ADULT/PEDIATRIC). - FOLLOWUP APPOINTMENT CLINIC/PHYSICIAN
Please follow up with Dr. Beltran in 1-2 weeks after surgery. Please call [phone] to schedule an appointment.

## 2018-02-07 NOTE — BRIEF OPERATIVE NOTE - PROCEDURE
<<-----Click on this checkbox to enter Procedure Laparoscopic cholecystectomy  02/07/2018    Active  [id number]

## 2018-02-07 NOTE — ASU DISCHARGE PLAN (ADULT/PEDIATRIC). - MEDICATION SUMMARY - MEDICATIONS TO TAKE
I will START or STAY ON the medications listed below when I get home from the hospital:    Percocet 5/325 oral tablet  -- 1 tab(s) by mouth every 6 hours, As Needed -for severe pain MDD:4 tabs   -- Indication: For Pain    amLODIPine 5 mg oral tablet  -- 1 tab(s) by mouth once a day  -- Indication: For HTN

## 2018-02-15 ENCOUNTER — APPOINTMENT (OUTPATIENT)
Dept: TRAUMA SURGERY | Facility: CLINIC | Age: 58
End: 2018-02-15
Payer: MEDICAID

## 2018-02-15 VITALS
HEIGHT: 59 IN | HEART RATE: 84 BPM | SYSTOLIC BLOOD PRESSURE: 127 MMHG | BODY MASS INDEX: 24.39 KG/M2 | TEMPERATURE: 98.5 F | WEIGHT: 121 LBS | DIASTOLIC BLOOD PRESSURE: 85 MMHG

## 2018-02-15 PROCEDURE — 99024 POSTOP FOLLOW-UP VISIT: CPT

## 2018-02-21 ENCOUNTER — OUTPATIENT (OUTPATIENT)
Dept: OUTPATIENT SERVICES | Facility: HOSPITAL | Age: 58
LOS: 1 days | End: 2018-02-21
Payer: MEDICAID

## 2018-02-21 ENCOUNTER — APPOINTMENT (OUTPATIENT)
Dept: GASTROENTEROLOGY | Facility: HOSPITAL | Age: 58
End: 2018-02-21

## 2018-02-21 DIAGNOSIS — K85.01 IDIOPATHIC ACUTE PANCREATITIS WITH UNINFECTED NECROSIS: ICD-10-CM

## 2018-02-21 DIAGNOSIS — N20.0 CALCULUS OF KIDNEY: Chronic | ICD-10-CM

## 2018-02-21 DIAGNOSIS — K85.90 ACUTE PANCREATITIS WITHOUT NECROSIS OR INFECTION, UNSPECIFIED: ICD-10-CM

## 2018-02-21 DIAGNOSIS — N60.09 SOLITARY CYST OF UNSPECIFIED BREAST: Chronic | ICD-10-CM

## 2018-02-21 DIAGNOSIS — Z90.49 ACQUIRED ABSENCE OF OTHER SPECIFIED PARTS OF DIGESTIVE TRACT: Chronic | ICD-10-CM

## 2018-02-21 DIAGNOSIS — Z98.890 OTHER SPECIFIED POSTPROCEDURAL STATES: Chronic | ICD-10-CM

## 2018-02-21 PROCEDURE — 43247 EGD REMOVE FOREIGN BODY: CPT

## 2018-02-21 PROCEDURE — 43247 EGD REMOVE FOREIGN BODY: CPT | Mod: GC

## 2018-04-03 ENCOUNTER — APPOINTMENT (OUTPATIENT)
Dept: GASTROENTEROLOGY | Facility: CLINIC | Age: 58
End: 2018-04-03
Payer: MEDICAID

## 2018-04-03 VITALS
HEART RATE: 74 BPM | OXYGEN SATURATION: 95 % | BODY MASS INDEX: 22.78 KG/M2 | DIASTOLIC BLOOD PRESSURE: 87 MMHG | RESPIRATION RATE: 15 BRPM | SYSTOLIC BLOOD PRESSURE: 121 MMHG | TEMPERATURE: 98.1 F | WEIGHT: 113 LBS | HEIGHT: 59 IN

## 2018-04-03 DIAGNOSIS — R10.9 UNSPECIFIED ABDOMINAL PAIN: ICD-10-CM

## 2018-04-03 DIAGNOSIS — K85.91 ACUTE PANCREATITIS WITH UNINFECTED NECROSIS, UNSPECIFIED: ICD-10-CM

## 2018-04-03 PROCEDURE — 99214 OFFICE O/P EST MOD 30 MIN: CPT

## 2018-04-03 RX ORDER — OXYCODONE HYDROCHLORIDE 20 MG/1
20 TABLET, EXTENDED RELEASE ORAL
Qty: 28 | Refills: 0 | Status: DISCONTINUED | COMMUNITY
Start: 2018-01-11 | End: 2018-04-03

## 2018-04-03 RX ORDER — OXYCODONE 20 MG/1
20 TABLET ORAL
Refills: 0 | Status: ACTIVE | COMMUNITY

## 2018-04-03 RX ORDER — AMLODIPINE BESYLATE 5 MG/1
5 TABLET ORAL DAILY
Qty: 30 | Refills: 5 | Status: DISCONTINUED | COMMUNITY
Start: 2017-12-14 | End: 2018-04-03

## 2018-04-05 PROBLEM — K85.91 NECROTIZING PANCREATITIS: Status: ACTIVE | Noted: 2017-12-08

## 2018-04-05 LAB
ALBUMIN SERPL ELPH-MCNC: 4.4 G/DL
ALP BLD-CCNC: 159 U/L
ALT SERPL-CCNC: 13 U/L
AMYLASE/CREAT SERPL: 77 U/L
ANION GAP SERPL CALC-SCNC: 13 MMOL/L
AST SERPL-CCNC: 18 U/L
BASOPHILS # BLD AUTO: 0.08 K/UL
BASOPHILS NFR BLD AUTO: 1 %
BILIRUB SERPL-MCNC: 0.5 MG/DL
BUN SERPL-MCNC: 16 MG/DL
CALCIUM SERPL-MCNC: 10.2 MG/DL
CHLORIDE SERPL-SCNC: 101 MMOL/L
CO2 SERPL-SCNC: 25 MMOL/L
CREAT SERPL-MCNC: 0.78 MG/DL
EOSINOPHIL # BLD AUTO: 0.48 K/UL
EOSINOPHIL NFR BLD AUTO: 6.3 %
GLUCOSE SERPL-MCNC: 89 MG/DL
HCT VFR BLD CALC: 41.5 %
HGB BLD-MCNC: 14.1 G/DL
IMM GRANULOCYTES NFR BLD AUTO: 0.1 %
LPL SERPL-CCNC: 111 U/L
LYMPHOCYTES # BLD AUTO: 2.42 K/UL
LYMPHOCYTES NFR BLD AUTO: 31.6 %
MAN DIFF?: NORMAL
MCHC RBC-ENTMCNC: 27.3 PG
MCHC RBC-ENTMCNC: 34 GM/DL
MCV RBC AUTO: 80.4 FL
MONOCYTES # BLD AUTO: 0.42 K/UL
MONOCYTES NFR BLD AUTO: 5.5 %
NEUTROPHILS # BLD AUTO: 4.26 K/UL
NEUTROPHILS NFR BLD AUTO: 55.5 %
PLATELET # BLD AUTO: 386 K/UL
POTASSIUM SERPL-SCNC: 4.4 MMOL/L
PROT SERPL-MCNC: 8.2 G/DL
RBC # BLD: 5.16 M/UL
RBC # FLD: 16.2 %
SODIUM SERPL-SCNC: 139 MMOL/L
WBC # FLD AUTO: 7.67 K/UL

## 2018-04-06 ENCOUNTER — APPOINTMENT (OUTPATIENT)
Dept: CT IMAGING | Facility: CLINIC | Age: 58
End: 2018-04-06

## 2018-04-07 ENCOUNTER — APPOINTMENT (OUTPATIENT)
Dept: MRI IMAGING | Facility: CLINIC | Age: 58
End: 2018-04-07
Payer: MEDICAID

## 2018-04-07 ENCOUNTER — OUTPATIENT (OUTPATIENT)
Dept: OUTPATIENT SERVICES | Facility: HOSPITAL | Age: 58
LOS: 1 days | End: 2018-04-07
Payer: MEDICAID

## 2018-04-07 DIAGNOSIS — Z90.49 ACQUIRED ABSENCE OF OTHER SPECIFIED PARTS OF DIGESTIVE TRACT: Chronic | ICD-10-CM

## 2018-04-07 DIAGNOSIS — Z98.890 OTHER SPECIFIED POSTPROCEDURAL STATES: Chronic | ICD-10-CM

## 2018-04-07 DIAGNOSIS — N20.0 CALCULUS OF KIDNEY: Chronic | ICD-10-CM

## 2018-04-07 DIAGNOSIS — N60.09 SOLITARY CYST OF UNSPECIFIED BREAST: Chronic | ICD-10-CM

## 2018-04-07 DIAGNOSIS — Z00.8 ENCOUNTER FOR OTHER GENERAL EXAMINATION: ICD-10-CM

## 2018-04-07 PROCEDURE — 72148 MRI LUMBAR SPINE W/O DYE: CPT | Mod: 26

## 2018-04-07 PROCEDURE — 72141 MRI NECK SPINE W/O DYE: CPT

## 2018-04-07 PROCEDURE — 72141 MRI NECK SPINE W/O DYE: CPT | Mod: 26

## 2018-04-07 PROCEDURE — 72148 MRI LUMBAR SPINE W/O DYE: CPT

## 2018-04-11 ENCOUNTER — OUTPATIENT (OUTPATIENT)
Dept: OUTPATIENT SERVICES | Facility: HOSPITAL | Age: 58
LOS: 1 days | End: 2018-04-11
Payer: MEDICAID

## 2018-04-11 ENCOUNTER — APPOINTMENT (OUTPATIENT)
Dept: CT IMAGING | Facility: CLINIC | Age: 58
End: 2018-04-11
Payer: MEDICAID

## 2018-04-11 DIAGNOSIS — N20.0 CALCULUS OF KIDNEY: Chronic | ICD-10-CM

## 2018-04-11 DIAGNOSIS — N60.09 SOLITARY CYST OF UNSPECIFIED BREAST: Chronic | ICD-10-CM

## 2018-04-11 DIAGNOSIS — Z00.8 ENCOUNTER FOR OTHER GENERAL EXAMINATION: ICD-10-CM

## 2018-04-11 DIAGNOSIS — Z90.49 ACQUIRED ABSENCE OF OTHER SPECIFIED PARTS OF DIGESTIVE TRACT: Chronic | ICD-10-CM

## 2018-04-11 DIAGNOSIS — Z98.890 OTHER SPECIFIED POSTPROCEDURAL STATES: Chronic | ICD-10-CM

## 2018-04-11 PROCEDURE — 74177 CT ABD & PELVIS W/CONTRAST: CPT

## 2018-04-11 PROCEDURE — 74177 CT ABD & PELVIS W/CONTRAST: CPT | Mod: 26

## 2018-07-03 ENCOUNTER — APPOINTMENT (OUTPATIENT)
Dept: GASTROENTEROLOGY | Facility: CLINIC | Age: 58
End: 2018-07-03

## 2018-07-17 PROBLEM — K85.10 BILIARY ACUTE PANCREATITIS WITHOUT NECROSIS OR INFECTION: Chronic | Status: ACTIVE | Noted: 2018-02-06

## 2020-01-08 NOTE — CONSULT NOTE ADULT - ASSESSMENT
57F w/ choledocholithiasis, pancreatitis    Neuro: severe pain, not opioid naive  - IV tylenol, dilaudid pushes  - PCA consult placed    Resp: no acute issues, satting well  - will monitor with continuous pulse ox - high doses of pain meds - concern for resp depression  - nasal cannula prn to keep sat >92%    CV: tachycardia 2/2 SIRS and pain  - q1hr vitals  - hold home amlodipine  - LR @ 100cc/hr    GI: choledocholithiasis, pancreatitis  - GI consulted urgently for ERCP  - strict NPO    : Lara for I/O monitoring  -BMP, replete prn    ID: Zosyn empirically, some question of cholecystitis  - leukocytosis upon admission, will trend   - afebrile    Heme: trend CBCs  - Lovenox VTE ppx    Dispo: admit to SICU  Full code    d/w attending Dr. Rasheed Hargrove [place] [id number] Complex Repair And Epidermal Autograft Text: The defect edges were debeveled with a #15 scalpel blade.  The primary defect was closed partially with a complex linear closure.  Given the location of the defect, shape of the defect and the proximity to free margins an epidermal autograft was deemed most appropriate to repair the remaining defect.  The graft was trimmed to fit the size of the remaining defect.  The graft was then placed in the primary defect, oriented appropriately, and sutured into place.

## 2020-09-25 NOTE — PROGRESS NOTE ADULT - SUBJECTIVE AND OBJECTIVE BOX
[FreeTextEntry1] : Flu vaccine administered. Pt tolerated well; no s/s of adverse effects. , RN  Surgery Trauma Team Progress Note    SUBJECTIVE:   Patient was seen and examined this morning. There was no acute event overnight. She is resting comfortably in bed. Pain is well controlled. She does not report pain, fever, n/v, chest pain, or shortness of breath. Her pain has been improved. Now on sips of liquids and it did not worsen her abdominal pain.    OBJECTIVE:   T(C): 37.3 (11-13-17 @ 05:51), Max: 38.3 (11-12-17 @ 22:11)  HR: 93 (11-13-17 @ 05:51) (82 - 93)  BP: 109/70 (11-13-17 @ 05:51) (105/68 - 124/82)  RR: 18 (11-13-17 @ 05:51) (16 - 18)  SpO2: 97% (11-13-17 @ 05:51) (96% - 98%)  Wt(kg): --  CAPILLARY BLOOD GLUCOSE        I&O's Detail    12 Nov 2017 07:01  -  13 Nov 2017 07:00  --------------------------------------------------------  IN:    sodium chloride 0.45%.: 1200 mL    Solution: 200 mL    Solution: 300 mL    TPN (Total Parenteral Nutrition): 1320 mL  Total IN: 3020 mL    OUT:    Voided: 700 mL  Total OUT: 700 mL    Total NET: 2320 mL        PHYSICAL EXAM:  Gen: Well-nourished, well-developed, A&O x3, resting in bed in no acute distress with PICC line  CV: RRR  Resp: Patent airways, unlabored   Abdomen: Soft, epigastric tenderness, nondistended  Extremities: All 4 extremities warm and well perfused, no edema

## 2021-01-12 NOTE — PROGRESS NOTE ADULT - ASSESSMENT
Patient refused Depakote this evening stating that he is allergic to it and it causes hand trmors. 57 y.o. woman with history of nephrolithiasis, HTN, and appendectomy admitted on 10/16 for gallstone pancreatitis c/b necrotizing pancreatitis and ascending cholangitis s/p ERCP and stent placement (10/21) and FNA of perihepatic fluid (10/31), which grew E. faecalis. She is s/p cystgastrostomy with 2 axiom stents placement by GI on 11/8. She was not able to tolerate tube feeding and now is receiving low-fat diet with TPN.      Plan:  - Continue low fat diet with TPN  - OOB ambulate   - Pain control with patch/PCA/IV Tyl; Consider starting to transition to PO regimen  - GI following, no intervention at this time  - CT abd/pelvis today  - Continue Zosyn as per ID

## 2021-01-15 NOTE — PROGRESS NOTE ADULT - ATTENDING COMMENTS
Pt seen and examined today at 10am, agree with above. Pt says that she's feeling much better today. Her pain is improved to moderate level, improved by PCA and fentanyl patch. No N/V (tube feeds still held). 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 No general ileus. Will plan to have GI or IR advance feeding tube into jejunum as pt has not tolerated tube feeds into the duodenum. Leukocytosis stable (19 from 19), procalcitonin significantly decreased. Afebrile. As pt's clinical status is improving, will not start antibiotics or arrange for FNA for now. Both have more risks than benefits currently, although will reconsider if pt's clinical status changes. Continue pain control with PCA and fentanyl patch. Pt notes that she is walking around the unit, and this was encouraged. Nerve pain Nerve pain Nerve pain Nerve pain Nerve pain

## 2021-10-06 PROBLEM — I10 ESSENTIAL HYPERTENSION: Status: ACTIVE | Noted: 2017-12-14

## 2021-12-29 NOTE — H&P ADULT - REASON FOR ADMISSION
Abdominal pain I have reviewed and confirmed nurses' notes for patient's medications, allergies, medical history, and surgical history.

## 2023-01-01 NOTE — ED PROVIDER NOTE - NS ED MD DISPO DIVISION
allow for swallow between intakes/maintain upright posture during/after eating for 30 mins/oral hygiene/position upright (90 degrees)/small sips/bites
[facility]
Warm

## 2023-10-31 NOTE — PROGRESS NOTE ADULT - SUBJECTIVE AND OBJECTIVE BOX
Pt on Low Fat diet along with PN  11-26 @ 07:01  -  11-27 @ 07:00  --------------------------------------------------------  IN:    Oral Fluid: 240 mL    sodium chloride 0.45%.: 1200 mL    Solution: 300 mL    Solution: 300 mL    TPN (Total Parenteral Nutrition): 1320 mL  Total IN: 3360 mL    OUT:  Total OUT: 0 mL    Total NET: 3360 mL        T(C): 37.2 (11-27-17 @ 05:32), Max: 37.2 (11-27-17 @ 05:32)  HR: 83 (11-27-17 @ 05:32) (74 - 83)  BP: 107/66 (11-27-17 @ 05:32) (102/67 - 119/70)  RR: 18 (11-27-17 @ 05:32) (18 - 18)  SpO2: 98% (11-27-17 @ 05:32) (95% - 98%)  Wt(kg): --    Labs   11-26    136  |  98  |  15  ----------------------------<  96  4.2   |  24  |  0.42<L>    Ca    9.1      26 Nov 2017 09:33  Phos  4.8     11-26  Mg     1.9     11-26    TPro  7.1  /  Alb  3.3  /  TBili  <0.2  /  DBili  x   /  AST  26  /  ALT  13  /  AlkPhos  234<H>  11-26      LIVER FUNCTIONS - ( 26 Nov 2017 09:33 )  Alb: 3.3 g/dL / Pro: 7.1 g/dL / ALK PHOS: 234 U/L / ALT: 13 U/L / AST: 26 U/L / GGT: x           CAPILLARY BLOOD GLUCOSE        I&O's Detail    26 Nov 2017 07:01  -  27 Nov 2017 07:00  --------------------------------------------------------  IN:    Oral Fluid: 240 mL    sodium chloride 0.45%.: 1200 mL    Solution: 300 mL    Solution: 300 mL    TPN (Total Parenteral Nutrition): 1320 mL  Total IN: 3360 mL    OUT:  Total OUT: 0 mL    Total NET: 3360 mL Patient

## 2024-02-19 NOTE — PROGRESS NOTE ADULT - SUBJECTIVE AND OBJECTIVE BOX
Refill Routing Note   Medication(s) are not appropriate for processing by [facility] for the following reason(s):        Drug-disease interaction    [facility] action(s):  Defer  Approve        Medication Therapy Plan: FLOS; FOVS in 3 weeks; Drug-Drug: citalopram and flecainide    Pharmacist review requested: Yes     Appointments  past 12m or future 3m with PCP    Date Provider   Last Visit   3/6/2023 Xander Will MD   Next Visit   3/11/2024 Xander Will MD   ED visits in past 90 days: 0        Note composed:6:13 AM 02/19/2024           Chief Complaint:  Patient is a 57y old  Female who presents with a chief complaint of Abdominal pain (16 Oct 2017 09:50)      Interval Events: Pt feeling better. Tolerating clear liquids    Allergies:  No Known Allergies      Hospital Medications:  diphenhydrAMINE   Injectable 25 milliGRAM(s) IV Push every 4 hours PRN  diphenhydrAMINE   Injectable 25 milliGRAM(s) IV Push once  docusate sodium 100 milliGRAM(s) Oral daily  enoxaparin Injectable 40 milliGRAM(s) SubCutaneous daily  fentaNYL   Patch  50 MICROgram(s)/Hr 1 Patch Transdermal every 72 hours  HYDROmorphone PCA (1 mG/mL) 30 milliLiter(s) PCA Continuous PCA Continuous  HYDROmorphone PCA (1 mG/mL) Rescue Clinician Bolus 1 milliGRAM(s) IV Push every 15 minutes PRN  imipenem/cilastatin  IVPB      imipenem/cilastatin  IVPB 500 milliGRAM(s) IV Intermittent every 6 hours  melatonin 3 milliGRAM(s) Oral at bedtime  naloxone Injectable 0.1 milliGRAM(s) IV Push every 3 minutes PRN  ondansetron Injectable 4 milliGRAM(s) IV Push every 6 hours PRN  senna 2 Tablet(s) Oral at bedtime  sodium chloride 0.45%. 1000 milliLiter(s) IV Continuous <Continuous>      PMHX/PSHX:  Kidney stone  Hypertension  No pertinent past medical history  Kidney stones  History of appendectomy      Family history:  No pertinent family history in first degree relatives      ROS:     General:  No fevers, chills  Eyes:  Good vision  ENT:  No odynophagia, dysphagia  CV:  No pain, palpitations  Resp:  No dyspnea, cough, tachypnea, wheezing  GI:  See HPI  Muscle:  No pain, weakness  Skin:  No rash, edema      PHYSICAL EXAM:     GENERAL:  No distress  HEENT: conjunctivae clear  CHEST:  Full & symmetric excursion, no increased effort  HEART:  Regular rhythm  ABDOMEN:  Soft, mildly tender, non-distended  EXTREMITIES:  no edema  SKIN:  Dry/warm  NEURO:  Alert    Vital Signs:  Vital Signs Last 24 Hrs  T(C): 36.8 (24 Nov 2017 06:32), Max: 37.2 (24 Nov 2017 01:24)  T(F): 98.2 (24 Nov 2017 06:32), Max: 99 (24 Nov 2017 01:24)  HR: 74 (24 Nov 2017 06:32) (74 - 83)  BP: 109/71 (24 Nov 2017 06:32) (108/74 - 117/78)  BP(mean): --  RR: 18 (24 Nov 2017 06:32) (18 - 18)  SpO2: 95% (24 Nov 2017 06:32) (95% - 96%)  Daily     Daily     LABS:                        7.9    10.25 )-----------( 698      ( 24 Nov 2017 07:45 )             24.9     11-24    135  |  99  |  12  ----------------------------<  89  4.4   |  23  |  0.51    Ca    8.4      24 Nov 2017 07:41  Phos  4.8     11-24    TPro  7.2  /  Alb  3.0<L>  /  TBili  0.2  /  DBili  x   /  AST  26  /  ALT  20  /  AlkPhos  279<H>  11-23    LIVER FUNCTIONS - ( 23 Nov 2017 10:31 )  Alb: 3.0 g/dL / Pro: 7.2 g/dL / ALK PHOS: 279 U/L / ALT: 20 U/L / AST: 26 U/L / GGT: x                   Imaging:

## 2024-02-23 NOTE — PROGRESS NOTE ADULT - ATTENDING COMMENTS
Seen on 11/12 with fellow at bedside  Agree with CT to gauge progress. May require necrosectomy but we will determine this according to patients clinical course. 145

## 2024-12-17 NOTE — PROGRESS NOTE ADULT - SUBJECTIVE AND OBJECTIVE BOX
Pain Management Attending Addendum    SUBJECTIVE: Patient doing well with IV PCA    Therapy:    [X] IV PCA         [ ] PRN Analgesics    OBJECTIVE:   [X] Pain appropriately controlled    [ ] Other:    Side Effects:  [X] None	             [ ] Nausea              [ ] Pruritis                	[ ] Other:    ASSESSMENT/PLAN: Continue current therapy    Comments: IUP @ 16 weeks gestation

## 2025-05-15 NOTE — DIETITIAN INITIAL EVALUATION ADULT. - OTHER INFO
Acute chest pain
Nutrition Assessment warranted for length of stay in SICU. Pt with obstructing common bile duct stone, subsequent cholangitis and pancreatitis now S/P ERCP with biliary stent placement that will require future ERCP for stent removal currently undergoing empiric antibiotic therapy and pain control.